# Patient Record
Sex: FEMALE | Race: WHITE | NOT HISPANIC OR LATINO
[De-identification: names, ages, dates, MRNs, and addresses within clinical notes are randomized per-mention and may not be internally consistent; named-entity substitution may affect disease eponyms.]

---

## 2020-08-25 PROBLEM — Z00.00 ENCOUNTER FOR PREVENTIVE HEALTH EXAMINATION: Status: ACTIVE | Noted: 2020-08-25

## 2020-09-01 ENCOUNTER — APPOINTMENT (OUTPATIENT)
Dept: ORTHOPEDIC SURGERY | Facility: CLINIC | Age: 55
End: 2020-09-01
Payer: COMMERCIAL

## 2020-09-01 VITALS
HEART RATE: 97 BPM | DIASTOLIC BLOOD PRESSURE: 84 MMHG | HEIGHT: 66 IN | WEIGHT: 250 LBS | TEMPERATURE: 97.8 F | SYSTOLIC BLOOD PRESSURE: 143 MMHG | BODY MASS INDEX: 40.18 KG/M2

## 2020-09-01 DIAGNOSIS — M16.12 UNILATERAL PRIMARY OSTEOARTHRITIS, LEFT HIP: ICD-10-CM

## 2020-09-01 PROCEDURE — 99204 OFFICE O/P NEW MOD 45 MIN: CPT

## 2020-09-01 PROCEDURE — 73502 X-RAY EXAM HIP UNI 2-3 VIEWS: CPT | Mod: LT

## 2020-09-08 ENCOUNTER — OUTPATIENT (OUTPATIENT)
Dept: OUTPATIENT SERVICES | Facility: HOSPITAL | Age: 55
LOS: 1 days | End: 2020-09-08
Payer: COMMERCIAL

## 2020-09-08 ENCOUNTER — APPOINTMENT (OUTPATIENT)
Dept: CT IMAGING | Facility: CLINIC | Age: 55
End: 2020-09-08
Payer: COMMERCIAL

## 2020-09-08 ENCOUNTER — APPOINTMENT (OUTPATIENT)
Dept: RADIOLOGY | Facility: CLINIC | Age: 55
End: 2020-09-08
Payer: COMMERCIAL

## 2020-09-08 ENCOUNTER — RESULT REVIEW (OUTPATIENT)
Age: 55
End: 2020-09-08

## 2020-09-08 DIAGNOSIS — Z00.00 ENCOUNTER FOR GENERAL ADULT MEDICAL EXAMINATION WITHOUT ABNORMAL FINDINGS: ICD-10-CM

## 2020-09-08 PROCEDURE — 73700 CT LOWER EXTREMITY W/O DYE: CPT | Mod: 26,LT

## 2020-09-08 PROCEDURE — 72170 X-RAY EXAM OF PELVIS: CPT

## 2020-09-08 PROCEDURE — 72170 X-RAY EXAM OF PELVIS: CPT | Mod: 26

## 2020-09-08 PROCEDURE — 73700 CT LOWER EXTREMITY W/O DYE: CPT

## 2020-09-08 PROCEDURE — 76376 3D RENDER W/INTRP POSTPROCES: CPT | Mod: 26

## 2020-09-08 PROCEDURE — 76376 3D RENDER W/INTRP POSTPROCES: CPT

## 2020-09-24 DIAGNOSIS — Z01.818 ENCOUNTER FOR OTHER PREPROCEDURAL EXAMINATION: ICD-10-CM

## 2020-10-14 ENCOUNTER — OUTPATIENT (OUTPATIENT)
Dept: OUTPATIENT SERVICES | Facility: HOSPITAL | Age: 55
LOS: 1 days | End: 2020-10-14
Payer: COMMERCIAL

## 2020-10-14 VITALS — HEIGHT: 67 IN | WEIGHT: 250 LBS

## 2020-10-14 DIAGNOSIS — Z01.818 ENCOUNTER FOR OTHER PREPROCEDURAL EXAMINATION: ICD-10-CM

## 2020-10-14 DIAGNOSIS — M16.12 UNILATERAL PRIMARY OSTEOARTHRITIS, LEFT HIP: ICD-10-CM

## 2020-10-14 DIAGNOSIS — Z90.49 ACQUIRED ABSENCE OF OTHER SPECIFIED PARTS OF DIGESTIVE TRACT: Chronic | ICD-10-CM

## 2020-10-14 DIAGNOSIS — Z98.890 OTHER SPECIFIED POSTPROCEDURAL STATES: Chronic | ICD-10-CM

## 2020-10-14 RX ORDER — MUPIROCIN 20 MG/G
1 OINTMENT TOPICAL
Qty: 1 | Refills: 0
Start: 2020-10-14 | End: 2020-10-18

## 2020-10-14 NOTE — H&P PST ADULT - NSICDXFAMILYHX_GEN_ALL_CORE_FT
FAMILY HISTORY:  Father  Still living? No  Family history of cardiac arrest, Age at diagnosis: Age Unknown  Family history of gangrene, Age at diagnosis: Age Unknown  FHx: anemia, Age at diagnosis: Age Unknown    Mother  Still living? Yes, Estimated age: 81-90  Family history of hypertension, Age at diagnosis: Age Unknown  Family history of type 2 diabetes mellitus, Age at diagnosis: Age Unknown

## 2020-10-14 NOTE — H&P PST ADULT - NSANTHOSAYNRD_GEN_A_CORE
No. MARKY screening performed.  STOP BANG Legend: 0-2 = LOW Risk; 3-4 = INTERMEDIATE Risk; 5-8 = HIGH Risk

## 2020-10-14 NOTE — H&P PST ADULT - NSICDXPASTMEDICALHX_GEN_ALL_CORE_FT
PAST MEDICAL HISTORY:  Hypertension     Osteoarthritis     Prediabetes      PAST MEDICAL HISTORY:  Hypertension     Obesity, Class II, BMI 35-39.9     Osteoarthritis     Prediabetes     Spider veins

## 2020-10-14 NOTE — H&P PST ADULT - PRESSURE ULCER(S)
23  [facility]  Pre-Operative Clearance Request    Patient Name:   Patsy Schilder             :   1955    Surgeon: Dr. Jocelyne King             Date of Surgery: 3/1/23    Surgical Procedure: L4, L5 POSTERIOR INSTRUMENTED FUSION, L1-L5 LAMINECTOMY. Please Complete: 2-3 WEEKS PRIOR TO SURGERY TO AVOID CANCELLATION OF SURGERY. [x]  History and Physical       [x]  Medical  Clearance                     Testing is ordered by Yobani KNOWLES per anesthesia guidelines                         **Please fax test results, H&P, and clearance to [phone] and to                                      P. A.T at [phone]** no

## 2020-10-14 NOTE — H&P PST ADULT - NSICDXPASTSURGICALHX_GEN_ALL_CORE_FT
PAST SURGICAL HISTORY:  History of appendectomy with right oophorecomy 2010    History of hip surgery pinning of left hip age 12 and hardware removed age 17

## 2020-10-14 NOTE — H&P PST ADULT - NSICDXPROBLEM_GEN_ALL_CORE_FT
PROBLEM DIAGNOSES  Problem: Primary osteoarthritis of left hip  Assessment and Plan: left anterior THR. medical clearance requested and PCP sent patient to cardiologist for evaluation. Stress test and echo to be done 10/15/20. EKG done by cardiology and blood work done at [facility] per Dr. James's office. Instructed to stop advil. mupirocin ointment 2% escribed and sent to patient's pharmacy and to be used twice a day for 5 consecutive days. Instructions reviewed and verbalized understanding. surgical wash and ERAS instructions reviewed and verbalized understanding. covid PCR aptt confirmed for 10/19/20 at 1000

## 2020-10-14 NOTE — H&P PST ADULT - SOURCE OF INFORMATION, PROFILE
patient/medical history patient/medical history obtained via telephone as per current covid19 protocol, physical exam to be done day of surgery

## 2020-10-14 NOTE — H&P PST ADULT - HISTORY OF PRESENT ILLNESS
56 yo female presents s/p fall at age 12 resulting in a left hip dislocation which required surgery and "pinning". She was non ambulatory for 3-4 years and was told at age 20 that she would require a hip replacement at that time. She now reports difficulty walking and is currently using a cane. She denies any prior injections into the hip. Pain at rest 0/10 and increased to 10/10 with prolonged activity and when standing from a sitting position. She takes 2 advil in the morning with good relief.

## 2020-10-16 DIAGNOSIS — Z11.59 ENCOUNTER FOR SCREENING FOR OTHER VIRAL DISEASES: ICD-10-CM

## 2020-10-16 PROCEDURE — G0463: CPT

## 2020-10-21 ENCOUNTER — APPOINTMENT (OUTPATIENT)
Dept: ORTHOPEDIC SURGERY | Facility: HOSPITAL | Age: 55
End: 2020-10-21

## 2020-11-09 DIAGNOSIS — M79.605 PAIN IN LEFT LEG: ICD-10-CM

## 2020-11-09 DIAGNOSIS — M79.609 PAIN IN UNSPECIFIED LIMB: ICD-10-CM

## 2020-11-09 PROBLEM — M19.90 UNSPECIFIED OSTEOARTHRITIS, UNSPECIFIED SITE: Chronic | Status: ACTIVE | Noted: 2020-10-14

## 2020-11-09 PROBLEM — I10 ESSENTIAL (PRIMARY) HYPERTENSION: Chronic | Status: ACTIVE | Noted: 2020-10-14

## 2020-11-09 PROBLEM — I78.1 NEVUS, NON-NEOPLASTIC: Chronic | Status: ACTIVE | Noted: 2020-10-14

## 2020-11-09 PROBLEM — E66.9 OBESITY, UNSPECIFIED: Chronic | Status: ACTIVE | Noted: 2020-10-14

## 2020-11-25 ENCOUNTER — OUTPATIENT (OUTPATIENT)
Dept: OUTPATIENT SERVICES | Facility: HOSPITAL | Age: 55
LOS: 1 days | End: 2020-11-25
Payer: COMMERCIAL

## 2020-11-25 VITALS — HEIGHT: 67 IN | WEIGHT: 227.08 LBS

## 2020-11-25 DIAGNOSIS — Z01.818 ENCOUNTER FOR OTHER PREPROCEDURAL EXAMINATION: ICD-10-CM

## 2020-11-25 DIAGNOSIS — Z90.49 ACQUIRED ABSENCE OF OTHER SPECIFIED PARTS OF DIGESTIVE TRACT: Chronic | ICD-10-CM

## 2020-11-25 DIAGNOSIS — M16.12 UNILATERAL PRIMARY OSTEOARTHRITIS, LEFT HIP: ICD-10-CM

## 2020-11-25 DIAGNOSIS — Z98.890 OTHER SPECIFIED POSTPROCEDURAL STATES: Chronic | ICD-10-CM

## 2020-11-25 RX ORDER — METFORMIN HYDROCHLORIDE 850 MG/1
1 TABLET ORAL
Qty: 0 | Refills: 0 | DISCHARGE

## 2020-11-25 NOTE — H&P PST ADULT - ASSESSMENT
54 yo female  with left hip pain  Planned surgery.- left total hip replacement 12/3/20  Will obtain medical clearance/cardiac clearance  Pre op instructions provided- medications mupirocin, wash  Instructions provided on medications to continue and to take the day morning of surgery

## 2020-11-25 NOTE — H&P PST ADULT - NSICDXPASTMEDICALHX_GEN_ALL_CORE_FT
PAST MEDICAL HISTORY:  Hypertension     Obesity, Class II, BMI 35-39.9     Osteoarthritis     Prediabetes     Spider veins

## 2020-11-25 NOTE — H&P PST ADULT - HISTORY OF PRESENT ILLNESS
54 yo female presents s/p fall at age 12 resulting in a left hip dislocation which required surgery and "pinning". She was non ambulatory for 3-4 years and was told at age 20 that she would require a hip replacement at that time. She now reports difficulty walking and is currently using a cane. She denies any prior injections into the hip. Pain at rest 0/10 and increased to 10/10 with prolonged activity and when standing from a sitting position. She takes 2 advil in the morning with good relief. Surgery postponed from 10/21 because of elevated Aic and is rescheduled for 12/3/20

## 2020-11-27 DIAGNOSIS — Z11.59 ENCOUNTER FOR SCREENING FOR OTHER VIRAL DISEASES: ICD-10-CM

## 2020-11-27 PROCEDURE — G0463: CPT

## 2020-12-03 ENCOUNTER — APPOINTMENT (OUTPATIENT)
Dept: ORTHOPEDIC SURGERY | Facility: HOSPITAL | Age: 55
End: 2020-12-03

## 2020-12-17 ENCOUNTER — OUTPATIENT (OUTPATIENT)
Dept: OUTPATIENT SERVICES | Facility: HOSPITAL | Age: 55
LOS: 1 days | End: 2020-12-17
Payer: COMMERCIAL

## 2020-12-17 VITALS
SYSTOLIC BLOOD PRESSURE: 125 MMHG | DIASTOLIC BLOOD PRESSURE: 66 MMHG | HEART RATE: 92 BPM | TEMPERATURE: 97 F | RESPIRATION RATE: 14 BRPM | HEIGHT: 67 IN | WEIGHT: 227.08 LBS | OXYGEN SATURATION: 98 %

## 2020-12-17 DIAGNOSIS — M16.12 UNILATERAL PRIMARY OSTEOARTHRITIS, LEFT HIP: ICD-10-CM

## 2020-12-17 DIAGNOSIS — Z01.818 ENCOUNTER FOR OTHER PREPROCEDURAL EXAMINATION: ICD-10-CM

## 2020-12-17 DIAGNOSIS — Z90.49 ACQUIRED ABSENCE OF OTHER SPECIFIED PARTS OF DIGESTIVE TRACT: Chronic | ICD-10-CM

## 2020-12-17 DIAGNOSIS — M25.552 PAIN IN LEFT HIP: ICD-10-CM

## 2020-12-17 DIAGNOSIS — Z98.890 OTHER SPECIFIED POSTPROCEDURAL STATES: Chronic | ICD-10-CM

## 2020-12-17 RX ORDER — GLUCAGON INJECTION, SOLUTION 0.5 MG/.1ML
1 INJECTION, SOLUTION SUBCUTANEOUS ONCE
Refills: 0 | Status: DISCONTINUED | OUTPATIENT
Start: 2020-12-28 | End: 2020-12-29

## 2020-12-17 RX ORDER — METFORMIN HYDROCHLORIDE 850 MG/1
2 TABLET ORAL
Qty: 0 | Refills: 0 | DISCHARGE

## 2020-12-17 RX ORDER — DEXTROSE 50 % IN WATER 50 %
15 SYRINGE (ML) INTRAVENOUS ONCE
Refills: 0 | Status: DISCONTINUED | OUTPATIENT
Start: 2020-12-28 | End: 2020-12-29

## 2020-12-17 RX ORDER — DEXTROSE 50 % IN WATER 50 %
25 SYRINGE (ML) INTRAVENOUS ONCE
Refills: 0 | Status: DISCONTINUED | OUTPATIENT
Start: 2020-12-28 | End: 2020-12-29

## 2020-12-17 RX ORDER — DEXTROSE 50 % IN WATER 50 %
12.5 SYRINGE (ML) INTRAVENOUS ONCE
Refills: 0 | Status: DISCONTINUED | OUTPATIENT
Start: 2020-12-28 | End: 2020-12-29

## 2020-12-17 NOTE — H&P PST ADULT - ASSESSMENT
this is a 54 y/o female who is scheduled for left hip replacement on 12/28/20 this is a 54 y/o female who is scheduled for left hip replacement on 12/28/20    Na 130 . repeated pending results     FS ; 124     HCG pending

## 2020-12-17 NOTE — H&P PST ADULT - HISTORY OF PRESENT ILLNESS
56 yo female presents s/p fall at age 12 resulting in a left hip dislocation which required surgery and "pinning". She was non ambulatory for 3-4 years and was told at age 20 that she would require a hip replacement at that time. She now reports difficulty walking and is currently using a cane. She denies any prior injections into the hip. Pain at rest 0/10 and increased to 10/10 with prolonged activity and when standing from a sitting position. She takes 2 advil in the morning with good relief. Surgery postponed from 10/21 because of elevated Aic and is rescheduled for 12/3/20; surgery again rescheduled again due to shingles

## 2020-12-17 NOTE — H&P PST ADULT - NSICDXPROBLEM_GEN_ALL_CORE_FT
PROBLEM DIAGNOSES  Problem: Left hip pain  Assessment and Plan: left total hip replacement, anterior approach; covid appt giiven, mupirocin info given-already has some; preop instructions reviewed; had repeat bloodwork and went back for medical clearance

## 2020-12-17 NOTE — H&P PST ADULT - NEUROLOGICAL
Please update me before 1 month to let me know how you're doing on the new cholesterol medication-Crestor   negative Alert & oriented; no sensory, motor or coordination deficits, normal reflexes

## 2020-12-17 NOTE — H&P PST ADULT - NSICDXPASTMEDICALHX_GEN_ALL_CORE_FT
PAST MEDICAL HISTORY:  Hypertension     Obesity, Class II, BMI 35-39.9     Osteoarthritis     Prediabetes now diabetes    [facility], 11/26/20-12/5/20    Spider veins

## 2020-12-18 ENCOUNTER — APPOINTMENT (OUTPATIENT)
Dept: ORTHOPEDIC SURGERY | Facility: CLINIC | Age: 55
End: 2020-12-18

## 2020-12-19 DIAGNOSIS — Z20.828 CONTACT WITH AND (SUSPECTED) EXPOSURE TO OTHER VIRAL COMMUNICABLE DISEASES: ICD-10-CM

## 2020-12-19 PROCEDURE — G0463: CPT

## 2020-12-21 PROBLEM — B02.9 ZOSTER WITHOUT COMPLICATIONS: Chronic | Status: ACTIVE | Noted: 2020-12-17

## 2020-12-21 PROBLEM — R73.03 PREDIABETES: Chronic | Status: ACTIVE | Noted: 2020-10-14

## 2020-12-26 ENCOUNTER — OUTPATIENT (OUTPATIENT)
Dept: OUTPATIENT SERVICES | Facility: HOSPITAL | Age: 55
LOS: 1 days | End: 2020-12-26
Payer: COMMERCIAL

## 2020-12-26 DIAGNOSIS — Z98.890 OTHER SPECIFIED POSTPROCEDURAL STATES: Chronic | ICD-10-CM

## 2020-12-26 DIAGNOSIS — Z90.49 ACQUIRED ABSENCE OF OTHER SPECIFIED PARTS OF DIGESTIVE TRACT: Chronic | ICD-10-CM

## 2020-12-26 DIAGNOSIS — Z20.828 CONTACT WITH AND (SUSPECTED) EXPOSURE TO OTHER VIRAL COMMUNICABLE DISEASES: ICD-10-CM

## 2020-12-26 LAB — SARS-COV-2 RNA SPEC QL NAA+PROBE: SIGNIFICANT CHANGE UP

## 2020-12-26 PROCEDURE — U0003: CPT

## 2020-12-28 ENCOUNTER — APPOINTMENT (OUTPATIENT)
Dept: ORTHOPEDIC SURGERY | Facility: HOSPITAL | Age: 55
End: 2020-12-28

## 2020-12-28 ENCOUNTER — TRANSCRIPTION ENCOUNTER (OUTPATIENT)
Age: 55
End: 2020-12-28

## 2020-12-28 ENCOUNTER — INPATIENT (INPATIENT)
Facility: HOSPITAL | Age: 55
LOS: 0 days | Discharge: HOME | DRG: 470 | End: 2020-12-29
Attending: ORTHOPAEDIC SURGERY | Admitting: ORTHOPAEDIC SURGERY
Payer: COMMERCIAL

## 2020-12-28 ENCOUNTER — RESULT REVIEW (OUTPATIENT)
Age: 55
End: 2020-12-28

## 2020-12-28 VITALS
DIASTOLIC BLOOD PRESSURE: 66 MMHG | WEIGHT: 226.19 LBS | RESPIRATION RATE: 23 BRPM | TEMPERATURE: 97 F | SYSTOLIC BLOOD PRESSURE: 125 MMHG | OXYGEN SATURATION: 98 % | HEART RATE: 92 BPM

## 2020-12-28 DIAGNOSIS — M16.12 UNILATERAL PRIMARY OSTEOARTHRITIS, LEFT HIP: ICD-10-CM

## 2020-12-28 DIAGNOSIS — Z90.49 ACQUIRED ABSENCE OF OTHER SPECIFIED PARTS OF DIGESTIVE TRACT: Chronic | ICD-10-CM

## 2020-12-28 DIAGNOSIS — Z98.890 OTHER SPECIFIED POSTPROCEDURAL STATES: Chronic | ICD-10-CM

## 2020-12-28 LAB
ANION GAP SERPL CALC-SCNC: 9 MMOL/L — SIGNIFICANT CHANGE UP (ref 5–17)
BUN SERPL-MCNC: 25 MG/DL — HIGH (ref 7–23)
CALCIUM SERPL-MCNC: 8.7 MG/DL — SIGNIFICANT CHANGE UP (ref 8.4–10.5)
CHLORIDE SERPL-SCNC: 101 MMOL/L — SIGNIFICANT CHANGE UP (ref 96–108)
CO2 SERPL-SCNC: 24 MMOL/L — SIGNIFICANT CHANGE UP (ref 22–31)
CREAT SERPL-MCNC: 1.16 MG/DL — SIGNIFICANT CHANGE UP (ref 0.5–1.3)
GLUCOSE BLDC GLUCOMTR-MCNC: 124 MG/DL — HIGH (ref 70–99)
GLUCOSE BLDC GLUCOMTR-MCNC: 174 MG/DL — HIGH (ref 70–99)
GLUCOSE BLDC GLUCOMTR-MCNC: 210 MG/DL — HIGH (ref 70–99)
GLUCOSE BLDC GLUCOMTR-MCNC: 297 MG/DL — HIGH (ref 70–99)
GLUCOSE SERPL-MCNC: 307 MG/DL — HIGH (ref 70–99)
HCG UR QL: NEGATIVE — SIGNIFICANT CHANGE UP
HCT VFR BLD CALC: 32.5 % — LOW (ref 34.5–45)
HGB BLD-MCNC: 11 G/DL — LOW (ref 11.5–15.5)
POTASSIUM SERPL-MCNC: 4.1 MMOL/L — SIGNIFICANT CHANGE UP (ref 3.5–5.3)
POTASSIUM SERPL-SCNC: 4.1 MMOL/L — SIGNIFICANT CHANGE UP (ref 3.5–5.3)
SODIUM SERPL-SCNC: 134 MMOL/L — LOW (ref 135–145)
SODIUM SERPL-SCNC: 136 MMOL/L — SIGNIFICANT CHANGE UP (ref 135–145)

## 2020-12-28 PROCEDURE — 99223 1ST HOSP IP/OBS HIGH 75: CPT

## 2020-12-28 PROCEDURE — 73501 X-RAY EXAM HIP UNI 1 VIEW: CPT | Mod: 26,LT

## 2020-12-28 PROCEDURE — 27130 TOTAL HIP ARTHROPLASTY: CPT | Mod: LT

## 2020-12-28 PROCEDURE — 88311 DECALCIFY TISSUE: CPT | Mod: 26

## 2020-12-28 PROCEDURE — 88305 TISSUE EXAM BY PATHOLOGIST: CPT | Mod: 26

## 2020-12-28 RX ORDER — CHLORHEXIDINE GLUCONATE 213 G/1000ML
1 SOLUTION TOPICAL ONCE
Refills: 0 | Status: COMPLETED | OUTPATIENT
Start: 2020-12-28 | End: 2020-12-28

## 2020-12-28 RX ORDER — SODIUM CHLORIDE 9 MG/ML
1000 INJECTION, SOLUTION INTRAVENOUS
Refills: 0 | Status: DISCONTINUED | OUTPATIENT
Start: 2020-12-28 | End: 2020-12-29

## 2020-12-28 RX ORDER — CELECOXIB 200 MG/1
200 CAPSULE ORAL EVERY 12 HOURS
Refills: 0 | Status: DISCONTINUED | OUTPATIENT
Start: 2020-12-28 | End: 2020-12-29

## 2020-12-28 RX ORDER — HYDROMORPHONE HYDROCHLORIDE 2 MG/ML
0.5 INJECTION INTRAMUSCULAR; INTRAVENOUS; SUBCUTANEOUS ONCE
Refills: 0 | Status: DISCONTINUED | OUTPATIENT
Start: 2020-12-28 | End: 2020-12-29

## 2020-12-28 RX ORDER — INSULIN LISPRO 100/ML
VIAL (ML) SUBCUTANEOUS
Refills: 0 | Status: DISCONTINUED | OUTPATIENT
Start: 2020-12-28 | End: 2020-12-29

## 2020-12-28 RX ORDER — ACETAMINOPHEN 500 MG
1000 TABLET ORAL EVERY 8 HOURS
Refills: 0 | Status: DISCONTINUED | OUTPATIENT
Start: 2020-12-29 | End: 2020-12-29

## 2020-12-28 RX ORDER — TRANEXAMIC ACID 100 MG/ML
1000 INJECTION, SOLUTION INTRAVENOUS ONCE
Refills: 0 | Status: COMPLETED | OUTPATIENT
Start: 2020-12-28 | End: 2020-12-28

## 2020-12-28 RX ORDER — ACETAMINOPHEN 500 MG
1000 TABLET ORAL EVERY 6 HOURS
Refills: 0 | Status: COMPLETED | OUTPATIENT
Start: 2020-12-28 | End: 2020-12-29

## 2020-12-28 RX ORDER — ASPIRIN/CALCIUM CARB/MAGNESIUM 324 MG
81 TABLET ORAL EVERY 12 HOURS
Refills: 0 | Status: DISCONTINUED | OUTPATIENT
Start: 2020-12-29 | End: 2020-12-29

## 2020-12-28 RX ORDER — SODIUM CHLORIDE 9 MG/ML
500 INJECTION INTRAMUSCULAR; INTRAVENOUS; SUBCUTANEOUS ONCE
Refills: 0 | Status: COMPLETED | OUTPATIENT
Start: 2020-12-28 | End: 2020-12-28

## 2020-12-28 RX ORDER — ONDANSETRON 8 MG/1
4 TABLET, FILM COATED ORAL EVERY 6 HOURS
Refills: 0 | Status: DISCONTINUED | OUTPATIENT
Start: 2020-12-28 | End: 2020-12-29

## 2020-12-28 RX ORDER — APREPITANT 80 MG/1
40 CAPSULE ORAL ONCE
Refills: 0 | Status: COMPLETED | OUTPATIENT
Start: 2020-12-28 | End: 2020-12-28

## 2020-12-28 RX ORDER — CEFAZOLIN SODIUM 1 G
2000 VIAL (EA) INJECTION ONCE
Refills: 0 | Status: COMPLETED | OUTPATIENT
Start: 2020-12-28 | End: 2020-12-28

## 2020-12-28 RX ORDER — ACETAMINOPHEN 500 MG
1000 TABLET ORAL ONCE
Refills: 0 | Status: COMPLETED | OUTPATIENT
Start: 2020-12-28 | End: 2020-12-28

## 2020-12-28 RX ORDER — SENNA PLUS 8.6 MG/1
2 TABLET ORAL AT BEDTIME
Refills: 0 | Status: DISCONTINUED | OUTPATIENT
Start: 2020-12-28 | End: 2020-12-29

## 2020-12-28 RX ORDER — OXYCODONE HYDROCHLORIDE 5 MG/1
5 TABLET ORAL
Refills: 0 | Status: DISCONTINUED | OUTPATIENT
Start: 2020-12-28 | End: 2020-12-29

## 2020-12-28 RX ORDER — PANTOPRAZOLE SODIUM 20 MG/1
40 TABLET, DELAYED RELEASE ORAL
Refills: 0 | Status: DISCONTINUED | OUTPATIENT
Start: 2020-12-28 | End: 2020-12-29

## 2020-12-28 RX ORDER — SODIUM CHLORIDE 9 MG/ML
1000 INJECTION, SOLUTION INTRAVENOUS
Refills: 0 | Status: DISCONTINUED | OUTPATIENT
Start: 2020-12-28 | End: 2020-12-28

## 2020-12-28 RX ORDER — POLYETHYLENE GLYCOL 3350 17 G/17G
17 POWDER, FOR SOLUTION ORAL AT BEDTIME
Refills: 0 | Status: DISCONTINUED | OUTPATIENT
Start: 2020-12-28 | End: 2020-12-29

## 2020-12-28 RX ORDER — LOSARTAN POTASSIUM 100 MG/1
100 TABLET, FILM COATED ORAL DAILY
Refills: 0 | Status: DISCONTINUED | OUTPATIENT
Start: 2020-12-30 | End: 2020-12-29

## 2020-12-28 RX ORDER — OMEPRAZOLE 10 MG/1
1 CAPSULE, DELAYED RELEASE ORAL
Qty: 30 | Refills: 1
Start: 2020-12-28 | End: 2021-02-25

## 2020-12-28 RX ORDER — HYDROMORPHONE HYDROCHLORIDE 2 MG/ML
0.5 INJECTION INTRAMUSCULAR; INTRAVENOUS; SUBCUTANEOUS
Refills: 0 | Status: DISCONTINUED | OUTPATIENT
Start: 2020-12-28 | End: 2020-12-28

## 2020-12-28 RX ORDER — ASPIRIN/CALCIUM CARB/MAGNESIUM 324 MG
81 TABLET ORAL EVERY 12 HOURS
Refills: 0 | Status: DISCONTINUED | OUTPATIENT
Start: 2020-12-28 | End: 2020-12-28

## 2020-12-28 RX ORDER — MAGNESIUM HYDROXIDE 400 MG/1
30 TABLET, CHEWABLE ORAL DAILY
Refills: 0 | Status: DISCONTINUED | OUTPATIENT
Start: 2020-12-28 | End: 2020-12-29

## 2020-12-28 RX ORDER — CELECOXIB 200 MG/1
1 CAPSULE ORAL
Qty: 60 | Refills: 0
Start: 2020-12-28 | End: 2021-01-26

## 2020-12-28 RX ORDER — ONDANSETRON 8 MG/1
4 TABLET, FILM COATED ORAL ONCE
Refills: 0 | Status: DISCONTINUED | OUTPATIENT
Start: 2020-12-28 | End: 2020-12-28

## 2020-12-28 RX ORDER — OXYCODONE HYDROCHLORIDE 5 MG/1
10 TABLET ORAL
Refills: 0 | Status: DISCONTINUED | OUTPATIENT
Start: 2020-12-28 | End: 2020-12-29

## 2020-12-28 RX ORDER — METFORMIN HYDROCHLORIDE 850 MG/1
1000 TABLET ORAL
Refills: 0 | Status: DISCONTINUED | OUTPATIENT
Start: 2020-12-29 | End: 2020-12-29

## 2020-12-28 RX ORDER — ASPIRIN/CALCIUM CARB/MAGNESIUM 324 MG
1 TABLET ORAL
Qty: 83 | Refills: 0
Start: 2020-12-28 | End: 2021-02-07

## 2020-12-28 RX ORDER — CEFAZOLIN SODIUM 1 G
2000 VIAL (EA) INJECTION EVERY 8 HOURS
Refills: 0 | Status: COMPLETED | OUTPATIENT
Start: 2020-12-28 | End: 2020-12-28

## 2020-12-28 RX ADMIN — POLYETHYLENE GLYCOL 3350 17 GRAM(S): 17 POWDER, FOR SOLUTION ORAL at 21:28

## 2020-12-28 RX ADMIN — CELECOXIB 200 MILLIGRAM(S): 200 CAPSULE ORAL at 21:28

## 2020-12-28 RX ADMIN — APREPITANT 40 MILLIGRAM(S): 80 CAPSULE ORAL at 06:56

## 2020-12-28 RX ADMIN — Medication 400 MILLIGRAM(S): at 14:10

## 2020-12-28 RX ADMIN — SODIUM CHLORIDE 500 MILLILITER(S): 9 INJECTION INTRAMUSCULAR; INTRAVENOUS; SUBCUTANEOUS at 16:44

## 2020-12-28 RX ADMIN — Medication 100 MILLIGRAM(S): at 23:52

## 2020-12-28 RX ADMIN — Medication 3: at 17:16

## 2020-12-28 RX ADMIN — SODIUM CHLORIDE 75 MILLILITER(S): 9 INJECTION, SOLUTION INTRAVENOUS at 10:57

## 2020-12-28 RX ADMIN — Medication 1000 MILLIGRAM(S): at 14:44

## 2020-12-28 RX ADMIN — Medication 100 MILLIGRAM(S): at 15:56

## 2020-12-28 RX ADMIN — Medication 400 MILLIGRAM(S): at 20:19

## 2020-12-28 RX ADMIN — Medication 2: at 13:09

## 2020-12-28 RX ADMIN — CELECOXIB 200 MILLIGRAM(S): 200 CAPSULE ORAL at 21:31

## 2020-12-28 RX ADMIN — SENNA PLUS 2 TABLET(S): 8.6 TABLET ORAL at 21:28

## 2020-12-28 RX ADMIN — CHLORHEXIDINE GLUCONATE 1 APPLICATION(S): 213 SOLUTION TOPICAL at 06:56

## 2020-12-28 RX ADMIN — SODIUM CHLORIDE 125 MILLILITER(S): 9 INJECTION, SOLUTION INTRAVENOUS at 13:11

## 2020-12-28 RX ADMIN — SODIUM CHLORIDE 500 MILLILITER(S): 9 INJECTION INTRAMUSCULAR; INTRAVENOUS; SUBCUTANEOUS at 10:30

## 2020-12-28 RX ADMIN — Medication 1000 MILLIGRAM(S): at 20:21

## 2020-12-28 NOTE — DIETITIAN INITIAL EVALUATION ADULT. - OTHER INFO
54 yo female presents s/p fall at age 12 resulting in a left hip dislocation which required surgery and "pinning". She was non ambulatory for 3-4 years and was told at age 20 that she would require a hip replacement at that time. She now reports difficulty walking and is currently using a cane. Pt admitted for L THR.    Pt seen for nutrition assessment as pt reported to diet technician she follows "no carb, no sugar" diet at home.  Pt reported when COVID19 pandemic first started, she was consuming excessive CHO portions.  States she went to her doctor a few months later and her A1c was 11%, reports she was motivated to lose weight and better manage new dx DM.  Pt states she was strict with her CHO intake (limited breads, rice, starchy veg) and only drank water or coffee.  Per dietary recall, it appears pt was eating in a caloric deficit for prolonged period of time.  Pt consuming complex CHO (non starchy vegetables, fruits), usually consumes 3x meals per day.  Endorses related weight loss (~30#) x 2-3 months (noted wt 250# 10/20, adm wt 226#, indicating 9.6% wt loss x 11 weeks.  Pt reports she self monitors blood glucose occasionally as usual values ~110-115mg/dl preprandially; states she takes metformin bid for management of DM.  Discussed importance of adequate CHO intake for overall health (energy, preserve LMB, blood glucose regulation), especially while recovering from surgery.  Reinforced importance of adequate protein; pt able to verbalize understanding and states she will be eating protein-rich foods and will not be as strict with carbohydrate intake, which was encouraged.  RD to follow up.  Diet tech to obtain meal preferences daily to optimize po intake.

## 2020-12-28 NOTE — DISCHARGE NOTE NURSING/CASE MANAGEMENT/SOCIAL WORK - CASE MANAGER'S NAME
RN/CM   / office [phone]  Your [facility] RN/ Mini Timmons can be reached at [phone] or main office # [phone]

## 2020-12-28 NOTE — DISCHARGE NOTE NURSING/CASE MANAGEMENT/SOCIAL WORK - NSSCNAMETXT_GEN_ALL_CORE
[facility] - [phone]  Nurse to visit the day after hospital discharge; physical therapist to follow. Please contact the home care agency at the above phone number if you have not heard from them by 12 noon on the day after your hospital discharge.

## 2020-12-28 NOTE — DISCHARGE NOTE PROVIDER - NSDCCPGOAL_GEN_ALL_CORE_FT
To get better and follow your care plan as instructed. Improve ambulation, ADLs and quality of life.

## 2020-12-28 NOTE — DISCHARGE NOTE PROVIDER - NSDCFUADDAPPT_GEN_ALL_CORE_FT
It is advisable to follow up with your primary care provider within the next 2-3 weeks to ensure your medications are appropriate and there are no underlying problems after your procedure.

## 2020-12-28 NOTE — OCCUPATIONAL THERAPY INITIAL EVALUATION ADULT - LIVES WITH, PROFILE
Pt lives with her spouse in a private home, 2 platform steps to enter. Pt's bedroom is on the 2nd floor (~13 steps up), however has guest bedroom that she will be staying in on the first floor. Pt has a 1/2 bath on the first floor, has a walk in shower on the 2nd floor. Pt was independent with ADLs, functional mobility/transfers using a cane prior to admission./spouse

## 2020-12-28 NOTE — DISCHARGE NOTE PROVIDER - HOSPITAL COURSE
This patient was admitted to [facility] with a history of severe degenerative joint disease of the left hip.  Patient underwent Pre-Surgical Testing and was medically cleared to undergo elective procedure. Patient underwent Left Anterior THR by Dr. Isidra James on 12/28/20. Procedure was well tolerated.  No operative or lisette-operative complications arose during patient's hospital course.  Patient received antibiotic according to SCIP guidelines for infection prevention.  Aspirin 81mg q 12h was given for DVT prophylaxis, in addition to the use of SCDs.  Anesthesia, Medical Hospitalist, Physical Therapy and Occupational Therapy were consulted. Patient is stable for discharge with a good prognosis.  Appropriate discharge instructions and medications are provided in this document.

## 2020-12-28 NOTE — DISCHARGE NOTE PROVIDER - NSDCFUSCHEDAPPT_GEN_ALL_CORE_FT
DONTAE DO ; 01/11/2021 ; [facility]  DONTAE DO ; 03/02/2021 ; [facility] [address]

## 2020-12-28 NOTE — DISCHARGE NOTE PROVIDER - NSDCCPCAREPLAN_GEN_ALL_CORE_FT
PRINCIPAL DISCHARGE DIAGNOSIS  Diagnosis: Primary osteoarthritis of left hip  Assessment and Plan of Treatment: You have had an Anterior Total Hip Replacement. Follow instructions given to you by your surgeon.   PT/OT Total Hip Protocol; Ambulation, transfers, stairs, & ADLs  Full weight bearing both legs; Walker/cane use as instructed by PT/OT  Anterior THR precautions for 4 weeks: No straight leg raise; No external rotation of hip when extended-standing or lying flat; No hyperextension of hip when standing (kickback)  • Ice 20 minutes several times daily with at least a 20 minute break in between icing sessions  You have a LUKE dressing. You may shower. Disconnect LUKE battery prior to showering. Reconnect battery after showering and press orange button to resume LUKE power. Remove LUKE dressing on post-op day #7.  Keep incision clean. DO NOT APPLY ANYTHING to incision site (salves/ointments/creams). Do not scrub incision site. Pat dry after shower.  Prineo removal 2 weeks after surgery at Surgeon's office.

## 2020-12-28 NOTE — DISCHARGE NOTE PROVIDER - NSDCFUADDINST_GEN_ALL_CORE_FT

## 2020-12-28 NOTE — CONSULT NOTE ADULT - ASSESSMENT
Afetrcare following left THR 12/28    pain meds oxycodone and dilaudid. Monitor for respiratory depression and lethargy.  PT/OT.  DVT prophylaxis.  DVT ppx: [ x]ASA81 [ ] STH525 [ ] Lovenox [ ] Coumadin   [ ] Eliquis [  ] Heparin  Dispo:     Home [ ]     Acute Rehab [ ]     ALVARO [ ]     TBD [x ]    HTN  losartan with parameter.    Obesity BMI 35.4    DM2  sliding scale.  continue metformin.       Afetrcare following left THR 12/28    pain meds oxycodone and dilaudid. Monitor for respiratory depression and lethargy.  PT/OT.  DVT prophylaxis.  DVT ppx: [ x]ASA81 [ ] UBL325 [ ] Lovenox [ ] Coumadin   [ ] Eliquis [  ] Heparin  Dispo:     Home [ ]     Acute Rehab [ ]     ALVARO [ ]     TBD [x ]    HTN  losartan with parameter.    Obesity BMI 35.4    DM2  sliding scale.  continue metformin.    Plan of care was discuss with patient, all questions were answered, seems understand and in agreement.

## 2020-12-28 NOTE — PHYSICAL THERAPY INITIAL EVALUATION ADULT - ADDITIONAL COMMENTS
pvt home with 1 platform MALLY, then plans to stay on main floor guest bedroom with bathroom. Her bedroom is upstairs 13 steps w/ HR. Pt drives. Pt has RW, commode, cane. Pt's spouse will be home to ast pt upon d/c home.

## 2020-12-28 NOTE — PHYSICAL THERAPY INITIAL EVALUATION ADULT - RANGE OF MOTION EXAMINATION, REHAB EVAL
left hip not tested due to surgery/bilateral upper extremity ROM was WNL (within normal limits)/Right LE ROM was WNL (within normal limits)

## 2020-12-28 NOTE — PHYSICAL THERAPY INITIAL EVALUATION ADULT - CRITERIA FOR SKILLED THERAPEUTIC INTERVENTIONS
HCPT, home w/ ast, RW, commode/impairments found/anticipated equipment needs at discharge/anticipated discharge recommendation

## 2020-12-28 NOTE — DISCHARGE NOTE PROVIDER - NSDCCPTREATMENT_GEN_ALL_CORE_FT
PRINCIPAL PROCEDURE  Procedure: Total replacement of left hip joint by anterior approach  Findings and Treatment: severe DJD left hip

## 2020-12-28 NOTE — ASU PREOP CHECKLIST - PATIENT'S PERSONAL PROPERTY GIVEN TO
on unit [4 x 4] : 4 x 4  [Abdominal Pad] : Abdominal Pad [2+] : left 2+ [Ankle Swelling (On Exam)] : present [Varicose Veins Of Lower Extremities] : present [Varicose Veins Of The Left Leg] : of the left leg [] : of the left leg [Ankle Swelling On The Left] : moderate [Skin Ulcer] : ulcer [JVD] : no jugular venous distention  [de-identified] : A&Ox3, NAD [de-identified] : 4/5 strength in all quadrants [de-identified] : left lateral lower leg venous stasis ulceration down to skin and subcutaneous tissue and fat with stasis dermatitis [de-identified] : Dressing and Coban applied by DPM this visit\par circ neurovascular function WNL post Coban application\par EpiFix 3.5cm x 3.5cm Mesh\par   ITEM#: [id number]\par    EXP: 09/01/2025\par Tissue ID: [id number]\par    100% used 0% discarded\par \par \par  [FreeTextEntry1] : Lower Leg [FreeTextEntry2] : 4.3 [FreeTextEntry3] : 1.8 [FreeTextEntry4] : 0.2 [de-identified] : serosanguineous  [de-identified] : intact [de-identified] : <25% [de-identified] : Epifix- 3.5cm x 3.5cm Mesh [de-identified] : Coban [de-identified] : Adaptic Touch and Calcium Alginate [de-identified] : Cleansed with NS\par Lot# [id number]\par EXP: 03/01/2023\par \par EpiFix 3.5cm x 3.5cm\par   ITEM#: [id number]\par    EXP: 09/01/2025\par Tissue ID: [id number]\par    100% used 0% discarded [TWNoteComboBox1] : Left [TWNoteComboBox4] : Small [TWNoteComboBox5] : No [TWNoteComboBox6] : Venous [de-identified] : No [de-identified] : Normal [de-identified] : None [de-identified] : None [de-identified] : >75% [de-identified] : Yes [de-identified] : Application of skin substitute [de-identified] : Multilayer other compression wrap [de-identified] : Weekly [de-identified] : Primary Dressing

## 2020-12-28 NOTE — OCCUPATIONAL THERAPY INITIAL EVALUATION ADULT - ANTICIPATED DISCHARGE DISPOSITION, OT EVAL
Recommend supervision/assist for functional activities prn which pt states her  will provide./no needs

## 2020-12-28 NOTE — DISCHARGE NOTE PROVIDER - NSDCMRMEDTOKEN_GEN_ALL_CORE_FT
3:1 Commode: Dx: s/p Left Anterior THR  Advil 200 mg oral tablet: 2 tab(s) orally once a day  aspirin 81 mg oral delayed release tablet: 1 tab orally every 12 hours. Take 2 hours before Celebrex.   celecoxib 200 mg oral capsule: 1 cap orally every 12 hours. Take 2 hours after Aspirin.  losartan-hydrochlorothiazide 100 mg-25 mg oral tablet: 1 tab(s) orally once a day (at bedtime)  metFORMIN 1000 mg oral tablet: 1 tab(s) orally 2 times a day  mupirocin 2% topical ointment: 1 application in each nostril 2 times a day   omeprazole 20 mg oral delayed release capsule: 1 cap orally once a day   Rolling Walker with 5 inch wheels: Dx: s/p  Left Anterior THR   3:1 Commode: Dx: s/p Left Anterior THR  acetaminophen 500 mg oral tablet: 2 tab(s) orally every 8 hours  aspirin 81 mg oral delayed release tablet: 1 tab orally every 12 hours. Take 2 hours before Celebrex.   celecoxib 200 mg oral capsule: 1 cap orally every 12 hours. Take 2 hours after Aspirin.  losartan-hydrochlorothiazide 100mg-12.5mg oral tablet: 1 tab(s) orally once a day  metFORMIN 1000 mg oral tablet: 1 tab(s) orally 2 times a day  omeprazole 20 mg oral delayed release capsule: 1 cap orally once a day   oxyCODONE 5 mg oral tablet: 1-2 tab(s) orally every 4 hours, As Needed -Moderate-Severe Pain   Reference #: [id number] MDD:6  polyethylene glycol 3350 oral powder for reconstitution: 17 gram(s) orally once a day (at bedtime), As Needed - for constipation  Rolling Walker with 5 inch wheels: Dx: s/p  Left Anterior THR  senna oral tablet: 2 tab(s) orally once a day (at bedtime), As Needed - for constipation

## 2020-12-28 NOTE — DISCHARGE NOTE PROVIDER - PROVIDER TOKENS
PROVIDER:[TOKEN:[[id number]],SCHEDULEDAPPT:[01/11/2021],SCHEDULEDAPPTTIME:[10:40 AM],ESTABLISHEDPATIENT:[T]]

## 2020-12-28 NOTE — DISCHARGE NOTE PROVIDER - CARE PROVIDER_API CALL
Isidra James)  Orthopedics  [address], [address]  [place] [address]  Phone: [phone]  Fax: [phone]  Established Patient  Scheduled Appointment: 01/11/2021 10:40 AM

## 2020-12-28 NOTE — DISCHARGE NOTE NURSING/CASE MANAGEMENT/SOCIAL WORK - PATIENT PORTAL LINK FT
You can access the FollowMyHealth Patient Portal offered by [facility] by registering at the following website: [email or web address]. By joining [employer]’s FollowMyHealth portal, you will also be able to view your health information using other applications (apps) compatible with our system.

## 2020-12-28 NOTE — CONSULT NOTE ADULT - SUBJECTIVE AND OBJECTIVE BOX
Patient is a 55y old  Female who presents with a chief complaint of Left Anterior THR (28 Dec 2020 11:50)  56 yo female presents s/p fall at age 12 resulting in a left hip dislocation which required surgery and "pinning". She was non ambulatory for 3-4 years and was told at age 20 that she would require a hip replacement at that time. She now reports difficulty walking and is currently using a cane. She denies any prior injections into the hip. Pain at rest 0/10 and increased to 10/10 with prolonged activity and when standing from a sitting position. She takes 2 advil in the morning with good relief. Surgery postponed from 10/21 because of elevated Aic and is rescheduled for 12/3/20; surgery again rescheduled again due to shingles.    HPI:  Patient is seen and examined.      PAST MEDICAL & SURGICAL HISTORY:  Shingles  midback area, 11/26/20-12/5/20    Obesity, Class II, BMI 35-39.9    Spider veins    Osteoarthritis    Hypertension    Prediabetes  now diabetes    History of hip surgery  pinning of left hip age 12 and hardware removed age 17    History of appendectomy  with right oophorecomy 2010    MEDICATIONS  (STANDING):  acetaminophen  IVPB .. 1000 milliGRAM(s) IV Intermittent once  acetaminophen  IVPB .. 1000 milliGRAM(s) IV Intermittent every 6 hours  aspirin enteric coated 81 milliGRAM(s) Oral every 12 hours  ceFAZolin   IVPB 2000 milliGRAM(s) IV Intermittent every 8 hours  celecoxib 200 milliGRAM(s) Oral every 12 hours  dextrose 40% Gel 15 Gram(s) Oral once  dextrose 5%. 1000 milliLiter(s) (50 mL/Hr) IV Continuous <Continuous>  dextrose 5%. 1000 milliLiter(s) (100 mL/Hr) IV Continuous <Continuous>  dextrose 50% Injectable 25 Gram(s) IV Push once  dextrose 50% Injectable 12.5 Gram(s) IV Push once  dextrose 50% Injectable 25 Gram(s) IV Push once  glucagon  Injectable 1 milliGRAM(s) IntraMuscular once  HYDROmorphone  Injectable 0.5 milliGRAM(s) IV Push once  insulin lispro (ADMELOG) corrective regimen sliding scale   SubCutaneous three times a day before meals  lactated ringers. 1000 milliLiter(s) (125 mL/Hr) IV Continuous <Continuous>  pantoprazole    Tablet 40 milliGRAM(s) Oral before breakfast  polyethylene glycol 3350 17 Gram(s) Oral at bedtime  senna 2 Tablet(s) Oral at bedtime    MEDICATIONS  (PRN):  magnesium hydroxide Suspension 30 milliLiter(s) Oral daily PRN Constipation  ondansetron Injectable 4 milliGRAM(s) IV Push every 6 hours PRN Nausea and/or Vomiting  oxyCODONE    IR 5 milliGRAM(s) Oral every 3 hours PRN Moderate Pain (4 - 6)  oxyCODONE    IR 10 milliGRAM(s) Oral every 3 hours PRN Severe Pain (7 - 10)      Allergies    No Known Allergies    Intolerances    SOCIAL HISTORY:  Smoker:  YES / NO        PACK YEARS:                         WHEN QUIT?  ETOH use:  YES / NO               FREQUENCY / QUANTITY:  Ilicit Drug use:  YES / NO  Occupation:  Assisted device use (Cane / Walker):  Live with:      FAMILY HISTORY:  Family history of gangrene (Father)  after wound to the foot    Family history of cardiac arrest (Father)    FHx: anemia (Father)    Family history of hypertension (Mother)    Family history of type 2 diabetes mellitus (Mother)        Vital Signs Last 24 Hrs  T(C): 36.6 (28 Dec 2020 12:28), Max: 36.9 (28 Dec 2020 10:00)  T(F): 97.8 (28 Dec 2020 12:28), Max: 98.4 (28 Dec 2020 10:00)  HR: 93 (28 Dec 2020 13:28) (65 - 96)  BP: 113/75 (28 Dec 2020 13:28) (101/52 - 125/66)  BP(mean): --  RR: 16 (28 Dec 2020 12:28) (15 - 23)  SpO2: 95% (28 Dec 2020 13:28) (95% - 100%)            LABS:    12-28    136  |  x   |  x   ----------------------------<  x   x    |  x   |  x             CAPILLARY BLOOD GLUCOSE      POCT Blood Glucose.: 210 mg/dL (28 Dec 2020 12:20)      RADIOLOGY & ADDITIONAL STUDIES: Patient is a 55y old  Female who presents with a chief complaint of Left Anterior THR (28 Dec 2020 11:50)  56 yo female presents s/p fall at age 12 resulting in a left hip dislocation which required surgery and "pinning". She was non ambulatory for 3-4 years and was told at age 20 that she would require a hip replacement at that time. She now reports difficulty walking and is currently using a cane. She denies any prior injections into the hip. Pain at rest 0/10 and increased to 10/10 with prolonged activity and when standing from a sitting position. She takes 2 advil in the morning with good relief. Surgery postponed from 10/21 because of elevated Aic and is rescheduled for 12/3/20; surgery again rescheduled again due to shingles.    HPI:  Patient is seen and examined.  Pain is controlled.      PAST MEDICAL & SURGICAL HISTORY:  Shingles  midback area, 11/26/20-12/5/20    Obesity, Class II, BMI 35-39.9    Spider veins    Osteoarthritis    Hypertension    Prediabetes  now diabetes    History of hip surgery  pinning of left hip age 12 and hardware removed age 17    History of appendectomy  with right oophorecomy 2010    MEDICATIONS  (STANDING):  acetaminophen  IVPB .. 1000 milliGRAM(s) IV Intermittent once  acetaminophen  IVPB .. 1000 milliGRAM(s) IV Intermittent every 6 hours  aspirin enteric coated 81 milliGRAM(s) Oral every 12 hours  ceFAZolin   IVPB 2000 milliGRAM(s) IV Intermittent every 8 hours  celecoxib 200 milliGRAM(s) Oral every 12 hours  dextrose 40% Gel 15 Gram(s) Oral once  dextrose 5%. 1000 milliLiter(s) (50 mL/Hr) IV Continuous <Continuous>  dextrose 5%. 1000 milliLiter(s) (100 mL/Hr) IV Continuous <Continuous>  dextrose 50% Injectable 25 Gram(s) IV Push once  dextrose 50% Injectable 12.5 Gram(s) IV Push once  dextrose 50% Injectable 25 Gram(s) IV Push once  glucagon  Injectable 1 milliGRAM(s) IntraMuscular once  HYDROmorphone  Injectable 0.5 milliGRAM(s) IV Push once  insulin lispro (ADMELOG) corrective regimen sliding scale   SubCutaneous three times a day before meals  lactated ringers. 1000 milliLiter(s) (125 mL/Hr) IV Continuous <Continuous>  pantoprazole    Tablet 40 milliGRAM(s) Oral before breakfast  polyethylene glycol 3350 17 Gram(s) Oral at bedtime  senna 2 Tablet(s) Oral at bedtime    MEDICATIONS  (PRN):  magnesium hydroxide Suspension 30 milliLiter(s) Oral daily PRN Constipation  ondansetron Injectable 4 milliGRAM(s) IV Push every 6 hours PRN Nausea and/or Vomiting  oxyCODONE    IR 5 milliGRAM(s) Oral every 3 hours PRN Moderate Pain (4 - 6)  oxyCODONE    IR 10 milliGRAM(s) Oral every 3 hours PRN Severe Pain (7 - 10)      Allergies    No Known Allergies    Intolerances    SOCIAL HISTORY:  Smoker: / NO        PACK YEARS:                         WHEN QUIT?  ETOH use:  NO               FREQUENCY / QUANTITY:  Ilicit Drug use:   NO  FAMILY HISTORY:  Family history of gangrene (Father)  after wound to the foot    Family history of cardiac arrest (Father)    FHx: anemia (Father)    Family history of hypertension (Mother)    Family history of type 2 diabetes mellitus (Mother)        Vital Signs Last 24 Hrs  T(C): 36.6 (28 Dec 2020 12:28), Max: 36.9 (28 Dec 2020 10:00)  T(F): 97.8 (28 Dec 2020 12:28), Max: 98.4 (28 Dec 2020 10:00)  HR: 93 (28 Dec 2020 13:28) (65 - 96)  BP: 113/75 (28 Dec 2020 13:28) (101/52 - 125/66)  BP(mean): --  RR: 16 (28 Dec 2020 12:28) (15 - 23)  SpO2: 95% (28 Dec 2020 13:28) (95% - 100%)            LABS:    12-28    136  |  x   |  x   ----------------------------<  x   x    |  x   |  x             CAPILLARY BLOOD GLUCOSE      POCT Blood Glucose.: 210 mg/dL (28 Dec 2020 12:20)      RADIOLOGY & ADDITIONAL STUDIES:

## 2020-12-29 VITALS
OXYGEN SATURATION: 97 % | HEART RATE: 73 BPM | RESPIRATION RATE: 18 BRPM | TEMPERATURE: 98 F | SYSTOLIC BLOOD PRESSURE: 102 MMHG | DIASTOLIC BLOOD PRESSURE: 63 MMHG

## 2020-12-29 LAB
ANION GAP SERPL CALC-SCNC: 9 MMOL/L — SIGNIFICANT CHANGE UP (ref 5–17)
BUN SERPL-MCNC: 17 MG/DL — SIGNIFICANT CHANGE UP (ref 7–23)
CALCIUM SERPL-MCNC: 8.6 MG/DL — SIGNIFICANT CHANGE UP (ref 8.4–10.5)
CHLORIDE SERPL-SCNC: 103 MMOL/L — SIGNIFICANT CHANGE UP (ref 96–108)
CO2 SERPL-SCNC: 25 MMOL/L — SIGNIFICANT CHANGE UP (ref 22–31)
CREAT SERPL-MCNC: 0.83 MG/DL — SIGNIFICANT CHANGE UP (ref 0.5–1.3)
GLUCOSE BLDC GLUCOMTR-MCNC: 131 MG/DL — HIGH (ref 70–99)
GLUCOSE BLDC GLUCOMTR-MCNC: 165 MG/DL — HIGH (ref 70–99)
GLUCOSE SERPL-MCNC: 146 MG/DL — HIGH (ref 70–99)
HCT VFR BLD CALC: 27.1 % — LOW (ref 34.5–45)
HGB BLD-MCNC: 9.2 G/DL — LOW (ref 11.5–15.5)
MCHC RBC-ENTMCNC: 30.4 PG — SIGNIFICANT CHANGE UP (ref 27–34)
MCHC RBC-ENTMCNC: 33.9 GM/DL — SIGNIFICANT CHANGE UP (ref 32–36)
MCV RBC AUTO: 89.4 FL — SIGNIFICANT CHANGE UP (ref 80–100)
NRBC # BLD: 0 /100 WBCS — SIGNIFICANT CHANGE UP (ref 0–0)
PLATELET # BLD AUTO: 197 K/UL — SIGNIFICANT CHANGE UP (ref 150–400)
POTASSIUM SERPL-MCNC: 3.5 MMOL/L — SIGNIFICANT CHANGE UP (ref 3.5–5.3)
POTASSIUM SERPL-SCNC: 3.5 MMOL/L — SIGNIFICANT CHANGE UP (ref 3.5–5.3)
RBC # BLD: 3.03 M/UL — LOW (ref 3.8–5.2)
RBC # FLD: 14.7 % — HIGH (ref 10.3–14.5)
SODIUM SERPL-SCNC: 137 MMOL/L — SIGNIFICANT CHANGE UP (ref 135–145)
WBC # BLD: 11.22 K/UL — HIGH (ref 3.8–10.5)
WBC # FLD AUTO: 11.22 K/UL — HIGH (ref 3.8–10.5)

## 2020-12-29 PROCEDURE — 88311 DECALCIFY TISSUE: CPT

## 2020-12-29 PROCEDURE — 81025 URINE PREGNANCY TEST: CPT

## 2020-12-29 PROCEDURE — 94664 DEMO&/EVAL PT USE INHALER: CPT

## 2020-12-29 PROCEDURE — 97110 THERAPEUTIC EXERCISES: CPT

## 2020-12-29 PROCEDURE — C1776: CPT

## 2020-12-29 PROCEDURE — 97116 GAIT TRAINING THERAPY: CPT

## 2020-12-29 PROCEDURE — 36415 COLL VENOUS BLD VENIPUNCTURE: CPT

## 2020-12-29 PROCEDURE — 97161 PT EVAL LOW COMPLEX 20 MIN: CPT

## 2020-12-29 PROCEDURE — 86901 BLOOD TYPING SEROLOGIC RH(D): CPT

## 2020-12-29 PROCEDURE — 86900 BLOOD TYPING SEROLOGIC ABO: CPT

## 2020-12-29 PROCEDURE — 99233 SBSQ HOSP IP/OBS HIGH 50: CPT

## 2020-12-29 PROCEDURE — 82962 GLUCOSE BLOOD TEST: CPT

## 2020-12-29 PROCEDURE — 97530 THERAPEUTIC ACTIVITIES: CPT

## 2020-12-29 PROCEDURE — 97165 OT EVAL LOW COMPLEX 30 MIN: CPT

## 2020-12-29 PROCEDURE — 85014 HEMATOCRIT: CPT

## 2020-12-29 PROCEDURE — 76000 FLUOROSCOPY <1 HR PHYS/QHP: CPT

## 2020-12-29 PROCEDURE — 85027 COMPLETE CBC AUTOMATED: CPT

## 2020-12-29 PROCEDURE — 97535 SELF CARE MNGMENT TRAINING: CPT

## 2020-12-29 PROCEDURE — 88305 TISSUE EXAM BY PATHOLOGIST: CPT

## 2020-12-29 PROCEDURE — 85018 HEMOGLOBIN: CPT

## 2020-12-29 PROCEDURE — 80048 BASIC METABOLIC PNL TOTAL CA: CPT

## 2020-12-29 PROCEDURE — 86850 RBC ANTIBODY SCREEN: CPT

## 2020-12-29 PROCEDURE — 73501 X-RAY EXAM HIP UNI 1 VIEW: CPT

## 2020-12-29 PROCEDURE — C1713: CPT

## 2020-12-29 PROCEDURE — 84295 ASSAY OF SERUM SODIUM: CPT

## 2020-12-29 RX ORDER — OXYCODONE HYDROCHLORIDE 5 MG/1
1 TABLET ORAL
Qty: 42 | Refills: 0
Start: 2020-12-29 | End: 2021-01-04

## 2020-12-29 RX ORDER — IBUPROFEN 200 MG
2 TABLET ORAL
Qty: 0 | Refills: 0 | DISCHARGE

## 2020-12-29 RX ORDER — SENNA PLUS 8.6 MG/1
2 TABLET ORAL
Qty: 0 | Refills: 0 | DISCHARGE
Start: 2020-12-29

## 2020-12-29 RX ORDER — LOSARTAN/HYDROCHLOROTHIAZIDE 100MG-25MG
1 TABLET ORAL
Qty: 0 | Refills: 0 | DISCHARGE

## 2020-12-29 RX ORDER — ACETAMINOPHEN 500 MG
2 TABLET ORAL
Qty: 0 | Refills: 0 | DISCHARGE
Start: 2020-12-29

## 2020-12-29 RX ORDER — POLYETHYLENE GLYCOL 3350 17 G/17G
17 POWDER, FOR SOLUTION ORAL
Qty: 0 | Refills: 0 | DISCHARGE
Start: 2020-12-29

## 2020-12-29 RX ADMIN — Medication 1000 MILLIGRAM(S): at 02:30

## 2020-12-29 RX ADMIN — Medication 1: at 12:46

## 2020-12-29 RX ADMIN — SODIUM CHLORIDE 125 MILLILITER(S): 9 INJECTION, SOLUTION INTRAVENOUS at 01:34

## 2020-12-29 RX ADMIN — Medication 1000 MILLIGRAM(S): at 08:45

## 2020-12-29 RX ADMIN — Medication 400 MILLIGRAM(S): at 02:05

## 2020-12-29 RX ADMIN — Medication 81 MILLIGRAM(S): at 05:34

## 2020-12-29 RX ADMIN — CELECOXIB 200 MILLIGRAM(S): 200 CAPSULE ORAL at 08:45

## 2020-12-29 RX ADMIN — PANTOPRAZOLE SODIUM 40 MILLIGRAM(S): 20 TABLET, DELAYED RELEASE ORAL at 05:35

## 2020-12-29 RX ADMIN — OXYCODONE HYDROCHLORIDE 5 MILLIGRAM(S): 5 TABLET ORAL at 10:02

## 2020-12-29 RX ADMIN — OXYCODONE HYDROCHLORIDE 5 MILLIGRAM(S): 5 TABLET ORAL at 10:32

## 2020-12-29 NOTE — PROGRESS NOTE ADULT - ASSESSMENT
Afetrcare following left THR 12/28    pain meds oxycodone and dilaudid. Monitor for respiratory depression and lethargy.  PT/OT.  DVT prophylaxis.  DVT ppx: [ x]ASA81 [ ] PBS785 [ ] Lovenox [ ] Coumadin   [ ] Eliquis [  ] Heparin  Dispo:     Home [ ]     Acute Rehab [ ]     [facility]VARO [ ]     TBD [x ]    HTN  losartan with parameter.    Obesity BMI 35.4    DM2  sliding scale.  continue metformin.    Post op anemia due to acute blood loss/leucocytosis likly reactive  trned cbc prn.    Plan of care was discuss with patient, all questions were answered, seems understand and in agreement.

## 2020-12-29 NOTE — PROGRESS NOTE ADULT - SUBJECTIVE AND OBJECTIVE BOX
Post Op Day #1    SUBJECTIVE    54yo Female status post left ant THR .   Patient is alert and comfortable.    Pain is controlled with current pain regimen.  Denies nausea, vomiting, chest pain, shortness of breath, abdominal pain or fever.   No new complaints.    OBJECTIVE    Vital Signs Last 24 Hrs  T(C): 36.3 (29 Dec 2020 07:55), Max: 37.1 (28 Dec 2020 19:19)  T(F): 97.4 (29 Dec 2020 07:55), Max: 98.8 (28 Dec 2020 19:19)  HR: 60 (29 Dec 2020 07:55) (55 - 96)  BP: 114/67 (29 Dec 2020 07:55) (100/60 - 114/67)  BP(mean): --  RR: 16 (29 Dec 2020 07:55) (15 - 18)  SpO2: 99% (29 Dec 2020 07:55) (95% - 100%)  I&O's Summary    28 Dec 2020 07:01  -  29 Dec 2020 07:00  --------------------------------------------------------  IN: 3890 mL / OUT: 2000 mL / NET: 1890 mL        PHYSICAL EXAM    Left hip LUKE dressing is clean, dry and intact.   The calf is supple/nontender.   Sensation to light touch is grossly intact distally.   Motor function distally is intact.   No foot drop.   (2+) dorsalis pedis pulse. Capillary refill is less than 2 seconds. No cyanosis.                          9.2<L>  11.22<H> )-----------( 197      ( 29 Dec 2020 06:50 )             27.1<L>  29 Dec 2020 06:50                        11.0<L>  x     )-----------( x        ( 28 Dec 2020 16:15 )             32.5<L>  28 Dec 2020 16:15    29 Dec 2020 06:50    137    |  103    |  17     ----------------------------<  146<H>  3.5     |  25     |  0.83   28 Dec 2020 16:15    134<L>  |  101    |  25<H>  ----------------------------<  307<H>  4.1     |  24     |  1.16     Ca    8.6        29 Dec 2020 06:50  Ca    8.7        28 Dec 2020 16:15        ASSESSMENT AND PLAN    - Orthopedically stable  - DVT prophylaxis: PAS + Ecotrin 81mg twice daily  - HO Prophylaxis: Celebrex 200mg PO twice daily x21 days  - Continue physical therapy and occupational therapy  - Weight bearing as tolerated of the left lower extremity with assistance of a walker  - Incentive spirometry encouraged  - Pain control as clinically indicated  - Disposition: Home when cleared by medicine, PT, and OT     
Discharge medication calendar:  Aspirin EC 81mg q12h x 6 weeks  APAP 1000mg q8h x 2-3 weeks  Celecoxib 200mg q12h x 21 days  Omeprazole 20mg QAM x 6 weeks  Narcotic PRN  Docusate 100mg TID while taking narcotic  Miralax, Senna, or Bisacodyl PRN for treatment of constipation  
Orthopaedic Post Op Note    Procedure: Left Anterior THR  Surgeon: Isidra James    55y Female comfortable, without complaints. In good spirits. Ambulated with PT.  Tolerating diet. Reported pain score = 0  Denies N/V, CP, SOB, numbness/tingling of extremities.    PE:  Vital Signs Last 24 Hrs  T(C): 36.6 (28 Dec 2020 12:28), Max: 36.9 (28 Dec 2020 10:00)  T(F): 97.8 (28 Dec 2020 12:28), Max: 98.4 (28 Dec 2020 10:00)  HR: 93 (28 Dec 2020 13:28) (65 - 96)  BP: 113/75 (28 Dec 2020 13:28) (101/52 - 125/66)  RR: 16 (28 Dec 2020 12:28) (15 - 23)  SpO2: 95% (28 Dec 2020 13:28) (95% - 100%)  General: Pt alert and oriented   Lungs: + BS CTA bilaterally  Heart: +S1 & S2 heard, RRR  Abd: + BS heard, soft, NT, ND  Left Hip LUKE Dressing: C/D/I and functioning (green light on)  Bilateral LEs:  Motor:   5/5 dorsiflexion, plantarflexion, EHL  Sensation intact to LT  2+ DP Pulses  SCDs in place    A/P: 55y Female stable POD#0 s/p Left anterior THR   -  Acetaminophen, Celebrex, Dilaudid, Oxycodone for Pain Control   - DVT ppx: Aspirin   - Jessica op IV abx: Ancef  - Celebrex for HO ppx  - Anterior total hip precautions  - PT, OT per protocol  - F/U AM Labs  DCP = home tomorrow pending PT, OT, medical clearance                                          
Patient is a 55y old  Female who presents with a chief complaint of Left Anterior THR (28 Dec 2020 14:55)      INTERVAL HPI/OVERNIGHT EVENTS:  Pt is seen and examined.  Pain is controlled with meds.     Pain Location & Control:     MEDICATIONS  (STANDING):  acetaminophen   Tablet .. 1000 milliGRAM(s) Oral every 8 hours  aspirin enteric coated 81 milliGRAM(s) Oral every 12 hours  celecoxib 200 milliGRAM(s) Oral every 12 hours  dextrose 40% Gel 15 Gram(s) Oral once  dextrose 5%. 1000 milliLiter(s) (50 mL/Hr) IV Continuous <Continuous>  dextrose 5%. 1000 milliLiter(s) (100 mL/Hr) IV Continuous <Continuous>  dextrose 50% Injectable 25 Gram(s) IV Push once  dextrose 50% Injectable 12.5 Gram(s) IV Push once  dextrose 50% Injectable 25 Gram(s) IV Push once  glucagon  Injectable 1 milliGRAM(s) IntraMuscular once  HYDROmorphone  Injectable 0.5 milliGRAM(s) IV Push once  insulin lispro (ADMELOG) corrective regimen sliding scale   SubCutaneous three times a day before meals  lactated ringers. 1000 milliLiter(s) (125 mL/Hr) IV Continuous <Continuous>  metFORMIN 1000 milliGRAM(s) Oral two times a day  pantoprazole    Tablet 40 milliGRAM(s) Oral before breakfast  polyethylene glycol 3350 17 Gram(s) Oral at bedtime  senna 2 Tablet(s) Oral at bedtime    MEDICATIONS  (PRN):  magnesium hydroxide Suspension 30 milliLiter(s) Oral daily PRN Constipation  ondansetron Injectable 4 milliGRAM(s) IV Push every 6 hours PRN Nausea and/or Vomiting  oxyCODONE    IR 5 milliGRAM(s) Oral every 3 hours PRN Moderate Pain (4 - 6)  oxyCODONE    IR 10 milliGRAM(s) Oral every 3 hours PRN Severe Pain (7 - 10)      Allergies    No Known Allergies    Intolerances    Vital Signs Last 24 Hrs  T(C): 36.3 (29 Dec 2020 07:55), Max: 37.1 (28 Dec 2020 19:19)  T(F): 97.4 (29 Dec 2020 07:55), Max: 98.8 (28 Dec 2020 19:19)  HR: 60 (29 Dec 2020 07:55) (55 - 96)  BP: 114/67 (29 Dec 2020 07:55) (100/60 - 114/67)  BP(mean): --  RR: 16 (29 Dec 2020 07:55) (15 - 18)  SpO2: 99% (29 Dec 2020 07:55) (95% - 100%)        LABS:                        9.2    11.22 )-----------( 197      ( 29 Dec 2020 06:50 )             27.1     29 Dec 2020 06:50    137    |  103    |  17     ----------------------------<  146    3.5     |  25     |  0.83     Ca    8.6        29 Dec 2020 06:50          CAPILLARY BLOOD GLUCOSE      POCT Blood Glucose.: 131 mg/dL (29 Dec 2020 07:45)  POCT Blood Glucose.: 174 mg/dL (28 Dec 2020 22:12)  POCT Blood Glucose.: 297 mg/dL (28 Dec 2020 16:39)  POCT Blood Glucose.: 210 mg/dL (28 Dec 2020 12:20)        Cultures          RADIOLOGY & ADDITIONAL TESTS:    Imaging Personally Reviewed:  [ ] YES  [ ] NO    Consultant(s) Notes Reviewed:  [ ] YES  [ ] NO    Care Discussed with Consultants/Other Providers [x ] YES  [ ] NO

## 2021-01-11 ENCOUNTER — APPOINTMENT (OUTPATIENT)
Dept: ORTHOPEDIC SURGERY | Facility: CLINIC | Age: 56
End: 2021-01-11
Payer: COMMERCIAL

## 2021-01-11 VITALS — TEMPERATURE: 96.2 F | SYSTOLIC BLOOD PRESSURE: 115 MMHG | DIASTOLIC BLOOD PRESSURE: 69 MMHG | HEART RATE: 84 BPM

## 2021-01-11 DIAGNOSIS — Z78.9 OTHER SPECIFIED HEALTH STATUS: ICD-10-CM

## 2021-01-11 PROCEDURE — 73502 X-RAY EXAM HIP UNI 2-3 VIEWS: CPT | Mod: LT

## 2021-01-11 PROCEDURE — 99024 POSTOP FOLLOW-UP VISIT: CPT

## 2021-02-02 ENCOUNTER — APPOINTMENT (OUTPATIENT)
Dept: ORTHOPEDIC SURGERY | Facility: CLINIC | Age: 56
End: 2021-02-02

## 2021-03-02 ENCOUNTER — APPOINTMENT (OUTPATIENT)
Dept: ORTHOPEDIC SURGERY | Facility: CLINIC | Age: 56
End: 2021-03-02
Payer: COMMERCIAL

## 2021-03-02 VITALS — TEMPERATURE: 96.7 F | DIASTOLIC BLOOD PRESSURE: 86 MMHG | HEART RATE: 85 BPM | SYSTOLIC BLOOD PRESSURE: 142 MMHG

## 2021-03-02 PROCEDURE — 99024 POSTOP FOLLOW-UP VISIT: CPT

## 2021-03-02 PROCEDURE — 73502 X-RAY EXAM HIP UNI 2-3 VIEWS: CPT | Mod: LT

## 2021-03-02 RX ORDER — OXYCODONE 5 MG/1
5 TABLET ORAL
Qty: 30 | Refills: 0 | Status: COMPLETED | COMMUNITY
Start: 2021-01-11 | End: 2021-03-02

## 2021-04-22 ENCOUNTER — TRANSCRIPTION ENCOUNTER (OUTPATIENT)
Age: 56
End: 2021-04-22

## 2021-04-22 ENCOUNTER — EMERGENCY (EMERGENCY)
Facility: HOSPITAL | Age: 56
LOS: 0 days | Discharge: HOME | End: 2021-04-22
Attending: STUDENT IN AN ORGANIZED HEALTH CARE EDUCATION/TRAINING PROGRAM
Payer: COMMERCIAL

## 2021-04-22 VITALS
RESPIRATION RATE: 16 BRPM | OXYGEN SATURATION: 100 % | DIASTOLIC BLOOD PRESSURE: 72 MMHG | SYSTOLIC BLOOD PRESSURE: 154 MMHG | TEMPERATURE: 102 F | HEART RATE: 102 BPM

## 2021-04-22 VITALS — WEIGHT: 229.94 LBS | HEIGHT: 72 IN

## 2021-04-22 DIAGNOSIS — M19.90 UNSPECIFIED OSTEOARTHRITIS, UNSPECIFIED SITE: ICD-10-CM

## 2021-04-22 DIAGNOSIS — U07.1 COVID-19: ICD-10-CM

## 2021-04-22 DIAGNOSIS — I10 ESSENTIAL (PRIMARY) HYPERTENSION: ICD-10-CM

## 2021-04-22 DIAGNOSIS — Z90.49 ACQUIRED ABSENCE OF OTHER SPECIFIED PARTS OF DIGESTIVE TRACT: Chronic | ICD-10-CM

## 2021-04-22 DIAGNOSIS — B02.9 ZOSTER WITHOUT COMPLICATIONS: ICD-10-CM

## 2021-04-22 DIAGNOSIS — Z79.82 LONG TERM (CURRENT) USE OF ASPIRIN: ICD-10-CM

## 2021-04-22 DIAGNOSIS — E11.9 TYPE 2 DIABETES MELLITUS WITHOUT COMPLICATIONS: ICD-10-CM

## 2021-04-22 DIAGNOSIS — Z79.84 LONG TERM (CURRENT) USE OF ORAL HYPOGLYCEMIC DRUGS: ICD-10-CM

## 2021-04-22 DIAGNOSIS — R07.0 PAIN IN THROAT: ICD-10-CM

## 2021-04-22 DIAGNOSIS — Z98.890 OTHER SPECIFIED POSTPROCEDURAL STATES: Chronic | ICD-10-CM

## 2021-04-22 DIAGNOSIS — R19.7 DIARRHEA, UNSPECIFIED: ICD-10-CM

## 2021-04-22 DIAGNOSIS — R50.9 FEVER, UNSPECIFIED: ICD-10-CM

## 2021-04-22 LAB
ALBUMIN SERPL ELPH-MCNC: 3.6 G/DL — SIGNIFICANT CHANGE UP (ref 3.3–5)
ALP SERPL-CCNC: 66 U/L — SIGNIFICANT CHANGE UP (ref 40–120)
ALT FLD-CCNC: 30 U/L — SIGNIFICANT CHANGE UP (ref 12–78)
ANION GAP SERPL CALC-SCNC: 5 MMOL/L — SIGNIFICANT CHANGE UP (ref 5–17)
AST SERPL-CCNC: 21 U/L — SIGNIFICANT CHANGE UP (ref 15–37)
BASOPHILS # BLD AUTO: 0.02 K/UL — SIGNIFICANT CHANGE UP (ref 0–0.2)
BASOPHILS NFR BLD AUTO: 0.4 % — SIGNIFICANT CHANGE UP (ref 0–2)
BILIRUB SERPL-MCNC: 0.6 MG/DL — SIGNIFICANT CHANGE UP (ref 0.2–1.2)
BUN SERPL-MCNC: 15 MG/DL — SIGNIFICANT CHANGE UP (ref 7–23)
CALCIUM SERPL-MCNC: 8.8 MG/DL — SIGNIFICANT CHANGE UP (ref 8.5–10.1)
CHLORIDE SERPL-SCNC: 97 MMOL/L — SIGNIFICANT CHANGE UP (ref 96–108)
CO2 SERPL-SCNC: 27 MMOL/L — SIGNIFICANT CHANGE UP (ref 22–31)
CREAT SERPL-MCNC: 0.93 MG/DL — SIGNIFICANT CHANGE UP (ref 0.5–1.3)
EOSINOPHIL # BLD AUTO: 0 K/UL — SIGNIFICANT CHANGE UP (ref 0–0.5)
EOSINOPHIL NFR BLD AUTO: 0 % — SIGNIFICANT CHANGE UP (ref 0–6)
GLUCOSE SERPL-MCNC: 193 MG/DL — HIGH (ref 70–99)
HCT VFR BLD CALC: 44 % — SIGNIFICANT CHANGE UP (ref 34.5–45)
HGB BLD-MCNC: 14.8 G/DL — SIGNIFICANT CHANGE UP (ref 11.5–15.5)
IMM GRANULOCYTES NFR BLD AUTO: 0 % — SIGNIFICANT CHANGE UP (ref 0–1.5)
LYMPHOCYTES # BLD AUTO: 1.33 K/UL — SIGNIFICANT CHANGE UP (ref 1–3.3)
LYMPHOCYTES # BLD AUTO: 28.4 % — SIGNIFICANT CHANGE UP (ref 13–44)
MCHC RBC-ENTMCNC: 28.1 PG — SIGNIFICANT CHANGE UP (ref 27–34)
MCHC RBC-ENTMCNC: 33.6 GM/DL — SIGNIFICANT CHANGE UP (ref 32–36)
MCV RBC AUTO: 83.5 FL — SIGNIFICANT CHANGE UP (ref 80–100)
MONOCYTES # BLD AUTO: 0.8 K/UL — SIGNIFICANT CHANGE UP (ref 0–0.9)
MONOCYTES NFR BLD AUTO: 17.1 % — HIGH (ref 2–14)
NEUTROPHILS # BLD AUTO: 2.53 K/UL — SIGNIFICANT CHANGE UP (ref 1.8–7.4)
NEUTROPHILS NFR BLD AUTO: 54.1 % — SIGNIFICANT CHANGE UP (ref 43–77)
PLATELET # BLD AUTO: 167 K/UL — SIGNIFICANT CHANGE UP (ref 150–400)
POTASSIUM SERPL-MCNC: 3.9 MMOL/L — SIGNIFICANT CHANGE UP (ref 3.5–5.3)
POTASSIUM SERPL-SCNC: 3.9 MMOL/L — SIGNIFICANT CHANGE UP (ref 3.5–5.3)
PROT SERPL-MCNC: 8.7 GM/DL — HIGH (ref 6–8.3)
RAPID RVP RESULT: DETECTED
RBC # BLD: 5.27 M/UL — HIGH (ref 3.8–5.2)
RBC # FLD: 14.5 % — SIGNIFICANT CHANGE UP (ref 10.3–14.5)
SARS-COV-2 RNA SPEC QL NAA+PROBE: DETECTED
SODIUM SERPL-SCNC: 129 MMOL/L — LOW (ref 135–145)
WBC # BLD: 4.68 K/UL — SIGNIFICANT CHANGE UP (ref 3.8–10.5)
WBC # FLD AUTO: 4.68 K/UL — SIGNIFICANT CHANGE UP (ref 3.8–10.5)

## 2021-04-22 PROCEDURE — 99284 EMERGENCY DEPT VISIT MOD MDM: CPT | Mod: 25

## 2021-04-22 PROCEDURE — 36415 COLL VENOUS BLD VENIPUNCTURE: CPT

## 2021-04-22 PROCEDURE — 0225U NFCT DS DNA&RNA 21 SARSCOV2: CPT

## 2021-04-22 PROCEDURE — 96361 HYDRATE IV INFUSION ADD-ON: CPT

## 2021-04-22 PROCEDURE — 85025 COMPLETE CBC W/AUTO DIFF WBC: CPT

## 2021-04-22 PROCEDURE — 80053 COMPREHEN METABOLIC PANEL: CPT

## 2021-04-22 PROCEDURE — 71045 X-RAY EXAM CHEST 1 VIEW: CPT

## 2021-04-22 PROCEDURE — 96365 THER/PROPH/DIAG IV INF INIT: CPT

## 2021-04-22 PROCEDURE — 99284 EMERGENCY DEPT VISIT MOD MDM: CPT

## 2021-04-22 PROCEDURE — 71045 X-RAY EXAM CHEST 1 VIEW: CPT | Mod: 26

## 2021-04-22 RX ORDER — ACETAMINOPHEN 500 MG
1000 TABLET ORAL ONCE
Refills: 0 | Status: COMPLETED | OUTPATIENT
Start: 2021-04-22 | End: 2021-04-22

## 2021-04-22 RX ORDER — SODIUM CHLORIDE 9 MG/ML
250 INJECTION INTRAMUSCULAR; INTRAVENOUS; SUBCUTANEOUS
Refills: 0 | Status: COMPLETED | OUTPATIENT
Start: 2021-04-22 | End: 2021-04-22

## 2021-04-22 RX ORDER — ACETAMINOPHEN 500 MG
650 TABLET ORAL ONCE
Refills: 0 | Status: COMPLETED | OUTPATIENT
Start: 2021-04-22 | End: 2021-04-22

## 2021-04-22 RX ORDER — SODIUM CHLORIDE 9 MG/ML
500 INJECTION INTRAMUSCULAR; INTRAVENOUS; SUBCUTANEOUS ONCE
Refills: 0 | Status: COMPLETED | OUTPATIENT
Start: 2021-04-22 | End: 2021-04-22

## 2021-04-22 RX ADMIN — SODIUM CHLORIDE 500 MILLILITER(S): 9 INJECTION INTRAMUSCULAR; INTRAVENOUS; SUBCUTANEOUS at 16:21

## 2021-04-22 RX ADMIN — SODIUM CHLORIDE 25 MILLILITER(S): 9 INJECTION INTRAMUSCULAR; INTRAVENOUS; SUBCUTANEOUS at 21:05

## 2021-04-22 RX ADMIN — Medication 1000 MILLIGRAM(S): at 23:24

## 2021-04-22 RX ADMIN — Medication 650 MILLIGRAM(S): at 16:22

## 2021-04-22 RX ADMIN — Medication 650 MILLIGRAM(S): at 16:21

## 2021-04-22 RX ADMIN — SODIUM CHLORIDE 500 MILLILITER(S): 9 INJECTION INTRAMUSCULAR; INTRAVENOUS; SUBCUTANEOUS at 17:22

## 2021-04-22 NOTE — ED ADULT TRIAGE NOTE - CHIEF COMPLAINT QUOTE
sent in by dr. nina trujillo for monoclonal antibodies, positive covid yesterday, c/o diarrhea, cough, Temp 102, s/p left hip replacement 4 months ago HX: DM, HTN

## 2021-04-22 NOTE — ED STATDOCS - OBJECTIVE STATEMENT
57 y/o female with a PMHx of DM, HTN, obesity, osteoarthritis, prediabetes, shingles, spider veins, presents to the ED c/o sore throat and intermittent fever x5 days. Pt reports testing COVID + 2 days ago. Notes diarrhea last night. Pt called PCP and was sent to the ED for monoclonal antibody infusion today. No SOB, chest pain. No other complaints at this time.

## 2021-04-22 NOTE — ED STATDOCS - PROGRESS NOTE DETAILS
55 yo female with a PMH of DM, HTN presents with sore throat, fever t max of 104F since sunday. Pt went tuesday to get covid tested which came back positive. Pt also developed 1 episode of NB diarrhea today. Pt has been taking tylenol for symptoms. Pt spoke with her PMD who advised to go to the ER for MAB. Denies cp, sob, cough, abd pain. Labs, covid test, IVF, Tylenol and pending covid for decision of MAB. -Kobe Temple PA-C Covid positive. Will order the BAM for the pt. -Kobe Temple PA-C Pt finished the MAB without any problems or reaction. Will d/c pt home. -Kobe Temple PA-C Pt finished the MAB without any problems or reaction. Had fever of 102.1F  1 hour post transfusion; however, pt was having fever for the last few days with covid. Discussed with Dr. Lopes. Pt can still be d/c home. 1g tylenol was ordered. -Kobe Temple PA-C

## 2021-04-22 NOTE — ED STATDOCS - PMH
Hypertension    Obesity, Class II, BMI 35-39.9    Osteoarthritis    Prediabetes  now diabetes  [facility], 11/26/20-12/5/20  Spider veins

## 2021-04-22 NOTE — ED STATDOCS - PATIENT PORTAL LINK FT
You can access the FollowMyHealth Patient Portal offered by [facility] by registering at the following website: [email or web address]. By joining [employer]’s FollowMyHealth portal, you will also be able to view your health information using other applications (apps) compatible with our system.

## 2021-04-22 NOTE — ED STATDOCS - NSFOLLOWUPINSTRUCTIONS_ED_ALL_ED_FT
You received the monoclonal antibodies in the ER. You cannot receive the covid vaccine until 90 days after the infusion.   Continue to take tylenol for pain or fever.  Quarantine for 14 days since symptoms started.   Follow up with your primary care doctor for further evaluation and treatment.     Return to the ER for any new or other concerns.

## 2021-04-22 NOTE — ED STATDOCS - PSH
History of appendectomy  with right oophorecomy 2010  History of hip surgery  pinning of left hip age 12 and hardware removed age 17

## 2021-04-22 NOTE — ED STATDOCS - ATTENDING CONTRIBUTION TO CARE
I, Kelly Colby DO,  performed the initial face to face bedside interview with this patient regarding history of present illness, review of symptoms and relevant past medical, social and family history.  I completed an independent physical examination.  I was the initial provider who evaluated this patient. I have signed out the follow up of any pending tests (i.e. labs, radiological studies) to the ACP.  I have communicated the patient’s plan of care and disposition with the ACP.  The history, relevant review of systems, past medical and surgical history, medical decision making, and physical examination was documented by the scribe in my presence and I attest to the accuracy of the documentation.

## 2021-04-22 NOTE — ED ADULT NURSE NOTE - OBJECTIVE STATEMENT
Pt is a 57 yo female who presents to the Ed with complaints of sore throat and fever. Reported a temperature of 104F  at home. Tested positive for Covid-19 on Tuesday.

## 2021-05-04 ENCOUNTER — TRANSCRIPTION ENCOUNTER (OUTPATIENT)
Age: 56
End: 2021-05-04

## 2021-06-08 ENCOUNTER — APPOINTMENT (OUTPATIENT)
Dept: ORTHOPEDIC SURGERY | Facility: CLINIC | Age: 56
End: 2021-06-08

## 2021-12-07 ENCOUNTER — APPOINTMENT (OUTPATIENT)
Dept: ORTHOPEDIC SURGERY | Facility: CLINIC | Age: 56
End: 2021-12-07
Payer: COMMERCIAL

## 2021-12-07 ENCOUNTER — NON-APPOINTMENT (OUTPATIENT)
Age: 56
End: 2021-12-07

## 2021-12-07 VITALS — DIASTOLIC BLOOD PRESSURE: 78 MMHG | SYSTOLIC BLOOD PRESSURE: 151 MMHG | HEART RATE: 88 BPM

## 2021-12-07 PROCEDURE — 99212 OFFICE O/P EST SF 10 MIN: CPT

## 2021-12-07 PROCEDURE — 73502 X-RAY EXAM HIP UNI 2-3 VIEWS: CPT | Mod: LT

## 2023-03-03 ENCOUNTER — APPOINTMENT (OUTPATIENT)
Dept: ORTHOPEDIC SURGERY | Facility: CLINIC | Age: 58
End: 2023-03-03
Payer: COMMERCIAL

## 2023-03-03 ENCOUNTER — NON-APPOINTMENT (OUTPATIENT)
Age: 58
End: 2023-03-03

## 2023-03-03 VITALS — HEART RATE: 77 BPM | DIASTOLIC BLOOD PRESSURE: 82 MMHG | SYSTOLIC BLOOD PRESSURE: 150 MMHG

## 2023-03-03 DIAGNOSIS — Z96.642 PRESENCE OF LEFT ARTIFICIAL HIP JOINT: ICD-10-CM

## 2023-03-03 PROCEDURE — 73502 X-RAY EXAM HIP UNI 2-3 VIEWS: CPT | Mod: LT

## 2023-03-03 PROCEDURE — 99214 OFFICE O/P EST MOD 30 MIN: CPT

## 2023-05-03 ENCOUNTER — APPOINTMENT (OUTPATIENT)
Dept: ORTHOPEDIC SURGERY | Facility: CLINIC | Age: 58
End: 2023-05-03
Payer: COMMERCIAL

## 2023-05-03 VITALS
WEIGHT: 230 LBS | BODY MASS INDEX: 36.96 KG/M2 | HEART RATE: 109 BPM | DIASTOLIC BLOOD PRESSURE: 91 MMHG | SYSTOLIC BLOOD PRESSURE: 177 MMHG | HEIGHT: 66 IN

## 2023-05-03 DIAGNOSIS — M54.16 RADICULOPATHY, LUMBAR REGION: ICD-10-CM

## 2023-05-03 DIAGNOSIS — M51.26 OTHER INTERVERTEBRAL DISC DISPLACEMENT, LUMBAR REGION: ICD-10-CM

## 2023-05-03 PROCEDURE — 72110 X-RAY EXAM L-2 SPINE 4/>VWS: CPT

## 2023-05-03 PROCEDURE — 99213 OFFICE O/P EST LOW 20 MIN: CPT

## 2023-05-03 NOTE — PHYSICAL EXAM
[SLR] : positive straight leg raise [Obese] : obese [Stooped] : stooped [NL] : Motor strength of the right lower extremity was normal [___/5] : left ([unfilled]/5) [] : Sensory: [0] : left ankle jerk 0 [Plantar Reflex Right Only] : absent on the right [Plantar Reflex Left Only] : absent on the left [DTR Reflexes Clonus Of Right Ankle (___ Beats)] : absent on the right [DTR Reflexes Clonus Of Left Ankle (___ Beats)] : absent on the left [de-identified] : The pt is awake, alert and oriented to self, place and time, is comfortable and in no acute distress. Inspection of neck, back and lower extremities bilaterally reveals no rashes or ecchymotic lesions.  There is no obvious abnormal spinal curvature in the sagittal and coronal planes. There is no tenderness over the cervical, thoracic or lumbar spine, or the paraspinal or upper and lower extremities musculature. There is no sacroiliac tenderness. No greater trochanteric tenderness bilaterally. No atrophy or abnormal movements noted in the upper or lower extremities. There is no swelling noted in the upper or lower extremities bilaterally. No cervical lymphadenopathy noted anteriorly. No joint laxity noted in the upper and lower extremity joints bilaterally.\par Hip range of motion is degrees internal rotation 30° external rotation without pain. Full range of motion of the shoulders bilaterally with no significant pain\par There is no groin pain with hip internal rotation and a negative DELFINA test bilaterally.  [de-identified] : Midline lumbar and left gluteal and sciatic notch tenderness.\par Healed left hip incision. [de-identified] : 4 views lumbar spine demonstrate minimal right-sided lumbar curve.  Normal lumbar lordosis.  Degenerative changes seen at the L2-3 L1 to lower thoracic spine.  Calcification abdominal aorta.  No dynamic instability between flexion-extension.  No acute fractures.

## 2023-05-03 NOTE — DISCUSSION/SUMMARY
[Medication Risks Reviewed] : Medication risks reviewed [de-identified] : I reviewed x-rays obtained today with the patient and indicated that she needs a medical/cardiac evaluation for calcification noted in the abdominal aorta which is signs of atherosclerosis.\par \par For her left leg symptoms recommend MRI lumbar spine for further evaluation but she would like to hold off on that.  She will call the office to get a prescription for it.  Referral to physical therapy was provided meantime and trial of gabapentin prescribed.\par \par I will see him back in 4 to 6 weeks on as-needed basis to discuss further treatment options which may include lumbar epidural steroid injections after the MRI is appropriate.\par \par The patient was educated regarding their condition, treatment options as well as prescribed course of treatment. \par Risks and benefits as well as alternatives to the proposed treatment were also provided to the patient \par They were given the opportunity to have all their questions answered to their satisfaction.\par \par Vital signs were reviewed with the patient and the patient was instructed to followup with their primary care provider for further management. There were no PAs or scribes used in the evaluation, exam or treatment plan discussion. The surgeon was the primary evaluating or treating physician as noted above.

## 2023-05-03 NOTE — HISTORY OF PRESENT ILLNESS
[Stable] : stable [8] : a current pain level of 8/10 [Daily] : ~He/She~ states the symptoms seem to be occuring daily [___ mths] : [unfilled] month(s) ago [de-identified] : Patient is here today for evaluation on her left buttock down her left leg pain going on for the past several weeks no known injury but she states she fell in August 2022, fell forward onto her knees,  got up and not medically treated. Patient recently saw  thinking pain was coming from her left total hip replacement xrays were negative told to see spine md.\par s/p left THR 12/28/21\par felt pulling posterior thigh and calf to ankle since 11/2022. Pain along left buttock, posterior thigh to ankle.  [de-identified] : depending on the day what she is doing [de-identified] : lying in bed

## 2023-06-20 ENCOUNTER — TRANSCRIPTION ENCOUNTER (OUTPATIENT)
Age: 58
End: 2023-06-20

## 2023-08-14 RX ORDER — GABAPENTIN 100 MG/1
100 CAPSULE ORAL
Qty: 30 | Refills: 2 | Status: ACTIVE | COMMUNITY
Start: 2023-05-03 | End: 1900-01-01

## 2023-10-12 NOTE — H&P PST ADULT - LIVES WITH, PROFILE
spouse/children Adbry Pregnancy And Lactation Text: It is unknown if this medication will adversely affect pregnancy or breast feeding.

## 2023-11-21 ENCOUNTER — INPATIENT (INPATIENT)
Facility: HOSPITAL | Age: 58
LOS: 1 days | Discharge: ANOTHER HOSPITAL | DRG: 65 | End: 2023-11-23
Attending: INTERNAL MEDICINE | Admitting: INTERNAL MEDICINE
Payer: COMMERCIAL

## 2023-11-21 VITALS
HEIGHT: 67 IN | DIASTOLIC BLOOD PRESSURE: 77 MMHG | OXYGEN SATURATION: 100 % | WEIGHT: 220.02 LBS | SYSTOLIC BLOOD PRESSURE: 155 MMHG | HEART RATE: 95 BPM | RESPIRATION RATE: 18 BRPM | TEMPERATURE: 99 F

## 2023-11-21 DIAGNOSIS — Z98.890 OTHER SPECIFIED POSTPROCEDURAL STATES: Chronic | ICD-10-CM

## 2023-11-21 DIAGNOSIS — R29.898 OTHER SYMPTOMS AND SIGNS INVOLVING THE MUSCULOSKELETAL SYSTEM: ICD-10-CM

## 2023-11-21 DIAGNOSIS — Z90.49 ACQUIRED ABSENCE OF OTHER SPECIFIED PARTS OF DIGESTIVE TRACT: Chronic | ICD-10-CM

## 2023-11-21 LAB
ALBUMIN SERPL ELPH-MCNC: 3.8 G/DL — SIGNIFICANT CHANGE UP (ref 3.3–5)
ALBUMIN SERPL ELPH-MCNC: 3.8 G/DL — SIGNIFICANT CHANGE UP (ref 3.3–5)
ALP SERPL-CCNC: 67 U/L — SIGNIFICANT CHANGE UP (ref 40–120)
ALP SERPL-CCNC: 67 U/L — SIGNIFICANT CHANGE UP (ref 40–120)
ALT FLD-CCNC: 47 U/L — SIGNIFICANT CHANGE UP (ref 12–78)
ALT FLD-CCNC: 47 U/L — SIGNIFICANT CHANGE UP (ref 12–78)
ANION GAP SERPL CALC-SCNC: 8 MMOL/L — SIGNIFICANT CHANGE UP (ref 5–17)
ANION GAP SERPL CALC-SCNC: 8 MMOL/L — SIGNIFICANT CHANGE UP (ref 5–17)
APTT BLD: 24.6 SEC — SIGNIFICANT CHANGE UP (ref 24.5–35.6)
APTT BLD: 24.6 SEC — SIGNIFICANT CHANGE UP (ref 24.5–35.6)
AST SERPL-CCNC: 29 U/L — SIGNIFICANT CHANGE UP (ref 15–37)
AST SERPL-CCNC: 29 U/L — SIGNIFICANT CHANGE UP (ref 15–37)
BASOPHILS # BLD AUTO: 0.07 K/UL — SIGNIFICANT CHANGE UP (ref 0–0.2)
BASOPHILS # BLD AUTO: 0.07 K/UL — SIGNIFICANT CHANGE UP (ref 0–0.2)
BASOPHILS NFR BLD AUTO: 0.8 % — SIGNIFICANT CHANGE UP (ref 0–2)
BASOPHILS NFR BLD AUTO: 0.8 % — SIGNIFICANT CHANGE UP (ref 0–2)
BILIRUB SERPL-MCNC: 0.6 MG/DL — SIGNIFICANT CHANGE UP (ref 0.2–1.2)
BILIRUB SERPL-MCNC: 0.6 MG/DL — SIGNIFICANT CHANGE UP (ref 0.2–1.2)
BUN SERPL-MCNC: 17 MG/DL — SIGNIFICANT CHANGE UP (ref 7–23)
BUN SERPL-MCNC: 17 MG/DL — SIGNIFICANT CHANGE UP (ref 7–23)
CALCIUM SERPL-MCNC: 9.6 MG/DL — SIGNIFICANT CHANGE UP (ref 8.5–10.1)
CALCIUM SERPL-MCNC: 9.6 MG/DL — SIGNIFICANT CHANGE UP (ref 8.5–10.1)
CHLORIDE SERPL-SCNC: 104 MMOL/L — SIGNIFICANT CHANGE UP (ref 96–108)
CHLORIDE SERPL-SCNC: 104 MMOL/L — SIGNIFICANT CHANGE UP (ref 96–108)
CO2 SERPL-SCNC: 25 MMOL/L — SIGNIFICANT CHANGE UP (ref 22–31)
CO2 SERPL-SCNC: 25 MMOL/L — SIGNIFICANT CHANGE UP (ref 22–31)
CREAT SERPL-MCNC: 0.99 MG/DL — SIGNIFICANT CHANGE UP (ref 0.5–1.3)
CREAT SERPL-MCNC: 0.99 MG/DL — SIGNIFICANT CHANGE UP (ref 0.5–1.3)
EGFR: 67 ML/MIN/1.73M2 — SIGNIFICANT CHANGE UP
EGFR: 67 ML/MIN/1.73M2 — SIGNIFICANT CHANGE UP
EOSINOPHIL # BLD AUTO: 0.08 K/UL — SIGNIFICANT CHANGE UP (ref 0–0.5)
EOSINOPHIL # BLD AUTO: 0.08 K/UL — SIGNIFICANT CHANGE UP (ref 0–0.5)
EOSINOPHIL NFR BLD AUTO: 0.9 % — SIGNIFICANT CHANGE UP (ref 0–6)
EOSINOPHIL NFR BLD AUTO: 0.9 % — SIGNIFICANT CHANGE UP (ref 0–6)
GLUCOSE BLDC GLUCOMTR-MCNC: 214 MG/DL — HIGH (ref 70–99)
GLUCOSE BLDC GLUCOMTR-MCNC: 214 MG/DL — HIGH (ref 70–99)
GLUCOSE SERPL-MCNC: 171 MG/DL — HIGH (ref 70–99)
GLUCOSE SERPL-MCNC: 171 MG/DL — HIGH (ref 70–99)
HCT VFR BLD CALC: 43.2 % — SIGNIFICANT CHANGE UP (ref 34.5–45)
HCT VFR BLD CALC: 43.2 % — SIGNIFICANT CHANGE UP (ref 34.5–45)
HGB BLD-MCNC: 14.8 G/DL — SIGNIFICANT CHANGE UP (ref 11.5–15.5)
HGB BLD-MCNC: 14.8 G/DL — SIGNIFICANT CHANGE UP (ref 11.5–15.5)
IMM GRANULOCYTES NFR BLD AUTO: 0.2 % — SIGNIFICANT CHANGE UP (ref 0–0.9)
IMM GRANULOCYTES NFR BLD AUTO: 0.2 % — SIGNIFICANT CHANGE UP (ref 0–0.9)
INR BLD: 0.97 RATIO — SIGNIFICANT CHANGE UP (ref 0.85–1.18)
INR BLD: 0.97 RATIO — SIGNIFICANT CHANGE UP (ref 0.85–1.18)
LYMPHOCYTES # BLD AUTO: 1.66 K/UL — SIGNIFICANT CHANGE UP (ref 1–3.3)
LYMPHOCYTES # BLD AUTO: 1.66 K/UL — SIGNIFICANT CHANGE UP (ref 1–3.3)
LYMPHOCYTES # BLD AUTO: 18 % — SIGNIFICANT CHANGE UP (ref 13–44)
LYMPHOCYTES # BLD AUTO: 18 % — SIGNIFICANT CHANGE UP (ref 13–44)
MAGNESIUM SERPL-MCNC: 1.7 MG/DL — SIGNIFICANT CHANGE UP (ref 1.6–2.6)
MAGNESIUM SERPL-MCNC: 1.7 MG/DL — SIGNIFICANT CHANGE UP (ref 1.6–2.6)
MCHC RBC-ENTMCNC: 29.8 PG — SIGNIFICANT CHANGE UP (ref 27–34)
MCHC RBC-ENTMCNC: 29.8 PG — SIGNIFICANT CHANGE UP (ref 27–34)
MCHC RBC-ENTMCNC: 34.3 GM/DL — SIGNIFICANT CHANGE UP (ref 32–36)
MCHC RBC-ENTMCNC: 34.3 GM/DL — SIGNIFICANT CHANGE UP (ref 32–36)
MCV RBC AUTO: 87.1 FL — SIGNIFICANT CHANGE UP (ref 80–100)
MCV RBC AUTO: 87.1 FL — SIGNIFICANT CHANGE UP (ref 80–100)
MONOCYTES # BLD AUTO: 0.58 K/UL — SIGNIFICANT CHANGE UP (ref 0–0.9)
MONOCYTES # BLD AUTO: 0.58 K/UL — SIGNIFICANT CHANGE UP (ref 0–0.9)
MONOCYTES NFR BLD AUTO: 6.3 % — SIGNIFICANT CHANGE UP (ref 2–14)
MONOCYTES NFR BLD AUTO: 6.3 % — SIGNIFICANT CHANGE UP (ref 2–14)
NEUTROPHILS # BLD AUTO: 6.79 K/UL — SIGNIFICANT CHANGE UP (ref 1.8–7.4)
NEUTROPHILS # BLD AUTO: 6.79 K/UL — SIGNIFICANT CHANGE UP (ref 1.8–7.4)
NEUTROPHILS NFR BLD AUTO: 73.8 % — SIGNIFICANT CHANGE UP (ref 43–77)
NEUTROPHILS NFR BLD AUTO: 73.8 % — SIGNIFICANT CHANGE UP (ref 43–77)
PLATELET # BLD AUTO: 266 K/UL — SIGNIFICANT CHANGE UP (ref 150–400)
PLATELET # BLD AUTO: 266 K/UL — SIGNIFICANT CHANGE UP (ref 150–400)
POTASSIUM SERPL-MCNC: 3.8 MMOL/L — SIGNIFICANT CHANGE UP (ref 3.5–5.3)
POTASSIUM SERPL-MCNC: 3.8 MMOL/L — SIGNIFICANT CHANGE UP (ref 3.5–5.3)
POTASSIUM SERPL-SCNC: 3.8 MMOL/L — SIGNIFICANT CHANGE UP (ref 3.5–5.3)
POTASSIUM SERPL-SCNC: 3.8 MMOL/L — SIGNIFICANT CHANGE UP (ref 3.5–5.3)
PROT SERPL-MCNC: 8.5 GM/DL — HIGH (ref 6–8.3)
PROT SERPL-MCNC: 8.5 GM/DL — HIGH (ref 6–8.3)
PROTHROM AB SERPL-ACNC: 11 SEC — SIGNIFICANT CHANGE UP (ref 9.5–13)
PROTHROM AB SERPL-ACNC: 11 SEC — SIGNIFICANT CHANGE UP (ref 9.5–13)
RBC # BLD: 4.96 M/UL — SIGNIFICANT CHANGE UP (ref 3.8–5.2)
RBC # BLD: 4.96 M/UL — SIGNIFICANT CHANGE UP (ref 3.8–5.2)
RBC # FLD: 13.3 % — SIGNIFICANT CHANGE UP (ref 10.3–14.5)
RBC # FLD: 13.3 % — SIGNIFICANT CHANGE UP (ref 10.3–14.5)
SODIUM SERPL-SCNC: 137 MMOL/L — SIGNIFICANT CHANGE UP (ref 135–145)
SODIUM SERPL-SCNC: 137 MMOL/L — SIGNIFICANT CHANGE UP (ref 135–145)
TROPONIN I, HIGH SENSITIVITY RESULT: 6.45 NG/L — SIGNIFICANT CHANGE UP
TROPONIN I, HIGH SENSITIVITY RESULT: 6.45 NG/L — SIGNIFICANT CHANGE UP
WBC # BLD: 9.2 K/UL — SIGNIFICANT CHANGE UP (ref 3.8–10.5)
WBC # BLD: 9.2 K/UL — SIGNIFICANT CHANGE UP (ref 3.8–10.5)
WBC # FLD AUTO: 9.2 K/UL — SIGNIFICANT CHANGE UP (ref 3.8–10.5)
WBC # FLD AUTO: 9.2 K/UL — SIGNIFICANT CHANGE UP (ref 3.8–10.5)

## 2023-11-21 PROCEDURE — 70551 MRI BRAIN STEM W/O DYE: CPT

## 2023-11-21 PROCEDURE — 97161 PT EVAL LOW COMPLEX 20 MIN: CPT | Mod: GP

## 2023-11-21 PROCEDURE — 99223 1ST HOSP IP/OBS HIGH 75: CPT

## 2023-11-21 PROCEDURE — 83036 HEMOGLOBIN GLYCOSYLATED A1C: CPT

## 2023-11-21 PROCEDURE — 70450 CT HEAD/BRAIN W/O DYE: CPT | Mod: 26,MA,59

## 2023-11-21 PROCEDURE — 83735 ASSAY OF MAGNESIUM: CPT

## 2023-11-21 PROCEDURE — 36415 COLL VENOUS BLD VENIPUNCTURE: CPT

## 2023-11-21 PROCEDURE — 70496 CT ANGIOGRAPHY HEAD: CPT | Mod: 26,MA

## 2023-11-21 PROCEDURE — 80061 LIPID PANEL: CPT

## 2023-11-21 PROCEDURE — 93010 ELECTROCARDIOGRAM REPORT: CPT

## 2023-11-21 PROCEDURE — 82962 GLUCOSE BLOOD TEST: CPT

## 2023-11-21 PROCEDURE — 71045 X-RAY EXAM CHEST 1 VIEW: CPT | Mod: 26

## 2023-11-21 PROCEDURE — 80048 BASIC METABOLIC PNL TOTAL CA: CPT

## 2023-11-21 PROCEDURE — 93880 EXTRACRANIAL BILAT STUDY: CPT

## 2023-11-21 PROCEDURE — 97530 THERAPEUTIC ACTIVITIES: CPT | Mod: GP

## 2023-11-21 PROCEDURE — 99285 EMERGENCY DEPT VISIT HI MDM: CPT

## 2023-11-21 PROCEDURE — 93880 EXTRACRANIAL BILAT STUDY: CPT | Mod: 26

## 2023-11-21 PROCEDURE — 73030 X-RAY EXAM OF SHOULDER: CPT | Mod: 26,LT

## 2023-11-21 PROCEDURE — 70498 CT ANGIOGRAPHY NECK: CPT | Mod: 26,MA

## 2023-11-21 PROCEDURE — 92610 EVALUATE SWALLOWING FUNCTION: CPT | Mod: GN

## 2023-11-21 PROCEDURE — 97116 GAIT TRAINING THERAPY: CPT | Mod: GP

## 2023-11-21 PROCEDURE — 93306 TTE W/DOPPLER COMPLETE: CPT

## 2023-11-21 RX ORDER — POLYETHYLENE GLYCOL 3350 17 G/17G
17 POWDER, FOR SOLUTION ORAL AT BEDTIME
Refills: 0 | Status: DISCONTINUED | OUTPATIENT
Start: 2023-11-21 | End: 2023-11-23

## 2023-11-21 RX ORDER — CLOPIDOGREL BISULFATE 75 MG/1
75 TABLET, FILM COATED ORAL DAILY
Refills: 0 | Status: DISCONTINUED | OUTPATIENT
Start: 2023-11-21 | End: 2023-11-23

## 2023-11-21 RX ORDER — LOSARTAN POTASSIUM 100 MG/1
100 TABLET, FILM COATED ORAL DAILY
Refills: 0 | Status: DISCONTINUED | OUTPATIENT
Start: 2023-11-21 | End: 2023-11-23

## 2023-11-21 RX ORDER — ASPIRIN/CALCIUM CARB/MAGNESIUM 324 MG
81 TABLET ORAL DAILY
Refills: 0 | Status: DISCONTINUED | OUTPATIENT
Start: 2023-11-21 | End: 2023-11-23

## 2023-11-21 RX ORDER — SODIUM CHLORIDE 9 MG/ML
1000 INJECTION, SOLUTION INTRAVENOUS
Refills: 0 | Status: DISCONTINUED | OUTPATIENT
Start: 2023-11-21 | End: 2023-11-23

## 2023-11-21 RX ORDER — INSULIN LISPRO 100/ML
VIAL (ML) SUBCUTANEOUS
Refills: 0 | Status: DISCONTINUED | OUTPATIENT
Start: 2023-11-21 | End: 2023-11-23

## 2023-11-21 RX ORDER — DEXTROSE 50 % IN WATER 50 %
15 SYRINGE (ML) INTRAVENOUS ONCE
Refills: 0 | Status: DISCONTINUED | OUTPATIENT
Start: 2023-11-21 | End: 2023-11-23

## 2023-11-21 RX ORDER — ATORVASTATIN CALCIUM 80 MG/1
40 TABLET, FILM COATED ORAL AT BEDTIME
Refills: 0 | Status: DISCONTINUED | OUTPATIENT
Start: 2023-11-21 | End: 2023-11-23

## 2023-11-21 RX ORDER — GLUCAGON INJECTION, SOLUTION 0.5 MG/.1ML
1 INJECTION, SOLUTION SUBCUTANEOUS ONCE
Refills: 0 | Status: DISCONTINUED | OUTPATIENT
Start: 2023-11-21 | End: 2023-11-23

## 2023-11-21 RX ORDER — MAGNESIUM SULFATE 500 MG/ML
1 VIAL (ML) INJECTION ONCE
Refills: 0 | Status: COMPLETED | OUTPATIENT
Start: 2023-11-21 | End: 2023-11-21

## 2023-11-21 RX ORDER — LOSARTAN/HYDROCHLOROTHIAZIDE 100MG-25MG
1 TABLET ORAL
Qty: 0 | Refills: 0 | DISCHARGE

## 2023-11-21 RX ORDER — DEXTROSE 50 % IN WATER 50 %
25 SYRINGE (ML) INTRAVENOUS ONCE
Refills: 0 | Status: DISCONTINUED | OUTPATIENT
Start: 2023-11-21 | End: 2023-11-23

## 2023-11-21 RX ORDER — GABAPENTIN 400 MG/1
100 CAPSULE ORAL AT BEDTIME
Refills: 0 | Status: DISCONTINUED | OUTPATIENT
Start: 2023-11-21 | End: 2023-11-23

## 2023-11-21 RX ORDER — INSULIN LISPRO 100/ML
VIAL (ML) SUBCUTANEOUS AT BEDTIME
Refills: 0 | Status: DISCONTINUED | OUTPATIENT
Start: 2023-11-21 | End: 2023-11-23

## 2023-11-21 RX ORDER — SENNA PLUS 8.6 MG/1
2 TABLET ORAL AT BEDTIME
Refills: 0 | Status: DISCONTINUED | OUTPATIENT
Start: 2023-11-21 | End: 2023-11-23

## 2023-11-21 RX ORDER — ASPIRIN/CALCIUM CARB/MAGNESIUM 324 MG
325 TABLET ORAL ONCE
Refills: 0 | Status: COMPLETED | OUTPATIENT
Start: 2023-11-21 | End: 2023-11-21

## 2023-11-21 RX ORDER — DEXTROSE 50 % IN WATER 50 %
12.5 SYRINGE (ML) INTRAVENOUS ONCE
Refills: 0 | Status: DISCONTINUED | OUTPATIENT
Start: 2023-11-21 | End: 2023-11-23

## 2023-11-21 RX ADMIN — Medication 325 MILLIGRAM(S): at 17:01

## 2023-11-21 RX ADMIN — Medication 100 GRAM(S): at 23:46

## 2023-11-21 RX ADMIN — ATORVASTATIN CALCIUM 40 MILLIGRAM(S): 80 TABLET, FILM COATED ORAL at 22:09

## 2023-11-21 NOTE — ED ADULT NURSE REASSESSMENT NOTE - NS ED NURSE REASSESS COMMENT FT1
MRI not performed, pt refused states " I can't lay there, why don't you have an open MRI?" Ativan IVP ordered but pt still refused. Dr Olvera and RN at bedside and spoke with pt and explained at depth need for MRI. Pt states she will think about it tonight and reconsider in am. LUE weakness persists.  Family at bedside.

## 2023-11-21 NOTE — ED ADULT NURSE REASSESSMENT NOTE - NS ED NURSE REASSESS COMMENT FT1
Pt received A&Ox4, VSS afebrile. Pt continues with left sided weakness-neurology at bedside. MRI safety sheet filled out and faxed. Pt ordered dinner. Pt to go to ultrasound and MRI. Bed assignment pending.

## 2023-11-21 NOTE — H&P ADULT - ASSESSMENT
57 y/o F w/ PMH of HTN, OA, DM2, shingles, sciatica, p/w L sided weakness    *L-sided weakness - R/o TIA vs CVA   -CTH/Angio:  -Patient evaluated by Neurologist, Dr. Menjivar, with recommendations to start ASA 81 daily + plavix for 21 days + carotid U/S   -Neuro checks  -ASA / Statin  -MRI  -Lipid panel / A1c  -Tele monitoring  -Echo  -PT consult  -Speech and swallow consult  -Fall precautions     *DM2  -Humalog ISS  -Diabetic diet     *Hypomagnesemia  -Replete and recheck     *H/o HTN / OA / Shingles / sciatica  -C/w home meds and f/u outpatient for further management if conditions remain stable during hospitalization     *DVT ppx   -SCDs     I spent >75 mins on this encounter

## 2023-11-21 NOTE — H&P ADULT - HISTORY OF PRESENT ILLNESS
57 y/o F w/ PMH of HTN, OA, DM2, shingles, sciatica, p/w L sided weakness. Patient states she woke up Sunday AM after sleeping on her L side, and when she started walking she was leaning to the L. She felt like her LUE and LLE weaker than her R side. She went to the store and she was holding on to the cart, and she continued to feel like she was leaning to the L side. On Sunday, she felt similar as well. Today morning when she woke up she was feeling worse, states that she tried to get up from bed and ended up sliding off the bed (Denies injuries/ trauma). Patient also has trouble grasping things with L hand due to weakness. Denies facial droop, slurred speech, dysphagia, sensory deficits, HA, nausea, vomiting, abdominal pain, fever, chills, cough, runny nose, sore throat       PSH: Appendectomy, R oophorectomy, Hip surgery     Social Hx: Denies tobacco / etoh / drugs     Family Hx: Mother - HTN / DM2, father - cardiac arrest s/p blood transfusion

## 2023-11-21 NOTE — PHARMACOTHERAPY INTERVENTION NOTE - COMMENTS
Medication reconciliation completed.  Patient was unable to provide medication information, spoke to  Anatoliy at bedside and they provided current medication list; confirmed with Dr. [facility].

## 2023-11-21 NOTE — CONSULT NOTE ADULT - SUBJECTIVE AND OBJECTIVE BOX
Patient is a 58y old  Female who presents with a chief complaint of left sided weakness.    HPI:      PAST MEDICAL & SURGICAL HISTORY:  Prediabetes  now diabetes      Hypertension      Osteoarthritis      Spider veins      Obesity, Class II, BMI 35-39.9      [facility], 11/26/20-12/5/20      History of appendectomy  with right oophorecomy 2010      History of hip surgery  pinning of left hip age 12 and hardware removed age 17          FAMILY HISTORY:  Family history of type 2 diabetes mellitus (Mother)    Family history of hypertension (Mother)    FHx: anemia (Father)    Family history of cardiac arrest (Father)    Family history of gangrene (Father)  after wound to the foot        Social Hx:  Nonsmoker, no drug or alcohol use    MEDICATIONS  (STANDING):       Allergies    No Known Allergies    Intolerances        ROS: Pertinent positives in HPI, all other ROS were reviewed and are negative.      Vital Signs Last 24 Hrs  T(C): 37.1 (21 Nov 2023 13:30), Max: 37.1 (21 Nov 2023 13:30)  T(F): 98.7 (21 Nov 2023 13:30), Max: 98.7 (21 Nov 2023 13:30)  HR: 75 (21 Nov 2023 17:08) (75 - 95)  BP: 135/89 (21 Nov 2023 17:08) (135/89 - 155/77)  BP(mean): 97 (21 Nov 2023 13:30) (97 - 97)  RR: 17 (21 Nov 2023 17:08) (17 - 18)  SpO2: 99% (21 Nov 2023 17:08) (99% - 100%)    Parameters below as of 21 Nov 2023 17:08  Patient On (Oxygen Delivery Method): room air            Constitutional: awake and alert.  HEENT: PERRLA, EOMI,   Neck: Supple.  Respiratory: Breath sounds are clear bilaterally  Cardiovascular: S1 and S2, regular / irregular rhythm  Gastrointestinal: soft, nontender  Extremities:  no edema  Vascular: Caritid Bruit - no  Musculoskeletal: no joint swelling/tenderness, no abnormal movements  Skin: No rashes    Neurological exam:  HF: A x O x 3. Appropriately interactive, normal affect. Speech fluent, No Aphasia or paraphasic errors. Naming /repetition intact   CN: CRISELDA, EOMI, VFF, facial sensation normal, no NLFD, tongue midline, Palate moves equally, SCM equal bilaterally  Motor: No pronator drift, Strength 5/5 in all 4 ext, normal bulk and tone, no tremor, rigidity or bradykinesia.    Sens: Intact to light touch / PP/ VS/ JS    Reflexes: Symmetric and normal . BJ 2+, BR 2+, KJ 2+, AJ 2+, downgoing toes b/l  Coord:  No FNFA, dysmetria, LISBET intact   Gait/Balance: Normal/Cannot test    NIHSS:          Labs:   11-21    137  |  104  |  17  ----------------------------<  171<H>  3.8   |  25  |  0.99    Ca    9.6      21 Nov 2023 15:33  Mg     1.7     11-21    TPro  8.5<H>  /  Alb  3.8  /  TBili  0.6  /  DBili  x   /  AST  29  /  ALT  47  /  AlkPhos  67  11-21                              14.8   9.20  )-----------( 266      ( 21 Nov 2023 15:33 )             43.2       Radiology:    CT head 11/21/23:  Abnormal areas of low attenuation involving the high right frontal cortical and subcortical region as described above.    CTA head and neck 11/21/23:  CTA brain:  Skull base and supraclinoid right ICA is very small in caliber,   particularly at the level of the proximal cavernous segment. The   supraclinoid right ICA is somewhat small in caliber.    Small anterior communicating artery visualized.Large bilateral A1   segments.    No vascular aneurysm or AVM.    CTA neck:  Right internal carotid artery origin and proximal segment is essentially   including, which appears chronic in nature. More distal right ICA is   small in caliber and demonstrates multiple short segment stenoses.    Calcified plaque at the origin of the left internal carotid artery   results in approximately 30-35% short segment stenosis. Normal   flow-related enhancement of the more distal vessel.   Patient is a 58y old  Female who presents with a chief complaint of left sided weakness.    HPI: Ms. Landry is a 58 year old woman right handed woman with HTN, prediabetes who presents with left sided weakness.  She reports chronic difficulty of the left lower extremity due to hip issues and sciatica.   On 11/19 she noticed that she was veering to the left when walking (although she did not feel dizzy) and noted left arm weakness in addition to her left leg weakness.  When symptoms got worse she came to the ED.      PAST MEDICAL & SURGICAL HISTORY:  Prediabetes  now diabetes      Hypertension      Osteoarthritis      Spider veins      Obesity, Class II, BMI 35-39.9      [facility], 11/26/20-12/5/20      History of appendectomy  with right oophorecomy 2010      History of hip surgery  pinning of left hip age 12 and hardware removed age 17          FAMILY HISTORY:  Family history of type 2 diabetes mellitus (Mother)    Family history of hypertension (Mother)    FHx: anemia (Father)    Family history of cardiac arrest (Father)    Family history of gangrene (Father)  after wound to the foot        Social Hx:  Nonsmoker, no drug or alcohol use    MEDICATIONS  (STANDING):  Metformin 1000 mg BID  Gabapentin 100 mg hs - recently stopped taking and then restarted again  Losartan/HCTZ 100/25 mg daily       Allergies    No Known Allergies    Intolerances        ROS: Pertinent positives in HPI, all other ROS were reviewed and are negative.      Vital Signs Last 24 Hrs  T(C): 37.1 (21 Nov 2023 13:30), Max: 37.1 (21 Nov 2023 13:30)  T(F): 98.7 (21 Nov 2023 13:30), Max: 98.7 (21 Nov 2023 13:30)  HR: 75 (21 Nov 2023 17:08) (75 - 95)  BP: 135/89 (21 Nov 2023 17:08) (135/89 - 155/77)  BP(mean): 97 (21 Nov 2023 13:30) (97 - 97)  RR: 17 (21 Nov 2023 17:08) (17 - 18)  SpO2: 99% (21 Nov 2023 17:08) (99% - 100%)    Parameters below as of 21 Nov 2023 17:08  Patient On (Oxygen Delivery Method): room air            Constitutional: awake and alert.  HEENT: PERRLA, EOMI,   Neck: Supple.  Respiratory: Breath sounds are clear bilaterally  Cardiovascular: S1 and S2, regular / irregular rhythm  Gastrointestinal: soft, nontender  Extremities:  no edema  Musculoskeletal: no joint swelling/tenderness, no abnormal movements  Skin: No rashes    Neurological exam:  HF: A x O x 3. Appropriately interactive, normal affect. Speech fluent, No Aphasia or paraphasic errors. Naming /repetition intact   CN: CRISELDA, EOMI, VFF, facial sensation normal, no NLFD, tongue midline, Palate moves equally, SCM equal bilaterally  Motor: + left pronator drift, 5/5 in RUE, 5/5 in proximal LUE, mild weakness in left hand, 5/5 in b/l lower extremities  Sens: Intact to light touch   Reflexes: Symmetric and normal . BJ 1+, BR 1+, KJ 1+, downgoing toes b/l  Coord:  mild dysmetria with left finger to nose, heel to shin intact b/l but requires more effort on the left  Gait/Balance: Not tested    NIHSS: 2 (for left upper extremity drift and dysmetria)          Labs:   11-21    137  |  104  |  17  ----------------------------<  171<H>  3.8   |  25  |  0.99    Ca    9.6      21 Nov 2023 15:33  Mg     1.7     11-21    TPro  8.5<H>  /  Alb  3.8  /  TBili  0.6  /  DBili  x   /  AST  29  /  ALT  47  /  AlkPhos  67  11-21                              14.8   9.20  )-----------( 266      ( 21 Nov 2023 15:33 )             43.2       Radiology:    CT head 11/21/23:  Abnormal areas of low attenuation involving the high right frontal cortical and subcortical region as described above.    CTA head and neck 11/21/23:  CTA brain:  Skull base and supraclinoid right ICA is very small in caliber,   particularly at the level of the proximal cavernous segment. The   supraclinoid right ICA is somewhat small in caliber.    Small anterior communicating artery visualized.Large bilateral A1   segments.    No vascular aneurysm or AVM.    CTA neck:  Right internal carotid artery origin and proximal segment is essentially   including, which appears chronic in nature. More distal right ICA is   small in caliber and demonstrates multiple short segment stenoses.    Calcified plaque at the origin of the left internal carotid artery   results in approximately 30-35% short segment stenosis. Normal   flow-related enhancement of the more distal vessel.

## 2023-11-21 NOTE — H&P ADULT - NEUROLOGICAL
+Mild LUE / LLE weakness compared to R side. +Decreased grasping ability in L hand compared to R/cranial nerves II-XII intact/responds to pain/responds to verbal commands

## 2023-11-21 NOTE — ED PROVIDER NOTE - CLINICAL SUMMARY MEDICAL DECISION MAKING FREE TEXT BOX
59 yo female presents to the ED with L sided weakness since 11/19. Pt overall well appearing in NAD. Exam notable for decreased strength in LUE and dysmetria LUE, remainder of exam reassuring. Pt out of code stroke window, but concern for CVA based on symptoms. Will obtain ct head, cta head/neck, screening labs, ekg, and likely admission for further stroke work up. Will reassess the need for additional interventions as clinically warranted. Refer to any progress notes for updates on clinical course and as a continuation of this MDM. 57 yo female presents to the ED with L sided weakness since 11/19. Pt overall well appearing in NAD. Exam notable for decreased strength in LUE and dysmetria LUE, remainder of neuro exam reassuring. Pt out of code stroke window, but concern for CVA based on symptoms. Eval for metabolic abnormalities. Will obtain ct head, cta head/neck, screening labs, ekg, and likely admission for further stroke work up. Will reassess the need for additional interventions as clinically warranted. Refer to any progress notes for updates on clinical course and as a continuation of this MDM.

## 2023-11-21 NOTE — ED ADULT TRIAGE NOTE - CHIEF COMPLAINT QUOTE
Pt presents to the ED c/o left-sided weakness since Sunday morning, worsening today. Denies vision changes. No facial droop or slurred speech noted. PMH of sciatica, left hip replacement, HTN, and DM. No medication PTA.

## 2023-11-21 NOTE — ED STATDOCS - NSICDXPASTMEDICALHX_GEN_ALL_CORE_FT
PAST MEDICAL HISTORY:  Hypertension     Obesity, Class II, BMI 35-39.9     Osteoarthritis     Prediabetes now diabetes    [facility], 11/26/20-12/5/20    Spider veins

## 2023-11-21 NOTE — ED PROVIDER NOTE - OBJECTIVE STATEMENT
57 yo female w/PMHx sciatica, left hip replacement, HTN, shingles, osteoarthritis and prediabetes and SHx of appendectomy presents to the ED c/o left-sided weakness since Sunday morning, worsening today. Pt reports she has had a L hip replacement, and suspects her symptoms might be due to the hip replacement combined with her sciatics. Pt also endorsing L shoulder weakness, and pain when lifting up her L arm. Pt was unable to get out of bed this morning due to L leg weakness. No recent falls, not on blood thinners. 57 yo female w/ PMHx sciatica, left hip replacement, HTN, shingles, osteoarthritis and prediabetes and SHx of appendectomy presents to the ED c/o left-sided weakness since Sunday morning, worsening today. Pt reports she has had a L hip replacement, and suspects her symptoms might be due to the hip replacement combined with her sciatica. Pt also endorsing L shoulder weakness, and pain when lifting up her L arm. Pt was unable to get out of bed this morning due to L leg weakness.  assisted pt out of bed, pt reported feeling unsteady like she was falling off to the side. No recent falls, not on blood thinners. Denies fevers, chills, headache, dizziness, blurred vision, chest pain, cough, shortness of breath, abdominal pain, n/v/d/c, urinary symptoms, rash.

## 2023-11-21 NOTE — ED ADULT NURSE NOTE - NSICDXPASTMEDICALHX_GEN_ALL_CORE_FT
PAST MEDICAL HISTORY:  Hypertension     Obesity, Class II, BMI 35-39.9     Osteoarthritis     Prediabetes now diabetes    [facility], 11/26/20-12/5/20    Spider veins

## 2023-11-21 NOTE — ED PROVIDER NOTE - NSICDXPASTMEDICALHX_GEN_ALL_CORE_FT
PAST MEDICAL HISTORY:  Hypertension     Obesity, Class II, BMI 35-39.9     Osteoarthritis     Prediabetes now diabetes    [facility], 11/26/20-12/5/20    Spider veins

## 2023-11-21 NOTE — ED STATDOCS - PROGRESS NOTE DETAILS
Broderick Martinez for Dr. Lau  57 y/o F with PMHx of sciatica, left hip replacement, HTN, shingles, osteoarthritis and DM and SHx of appendectomy to the ED c/o left-sided weakness since Sunday morning, worsening today. Denies vision changes. No facial droop or slurred speech noted. No medication PTA. Pt's  says that sometimes when she is walking her leg gives out. Denies tingling in her face. Pt not on AC. Pt on Metformin.  LUE and LLQ weakness , pronator drift on LUE , question l facial asymmetry, sensation intact. Sending to main for further eval for risk of stroke. Pt with sx on sunday. Not a code stroke candidate or TNK candidate.

## 2023-11-21 NOTE — ED PROVIDER NOTE - PROGRESS NOTE DETAILS
Attending Christopher: CTs w/ chronic findings, no acute CVA on CTs. aspirin given. consulted neuro to see pt. endorsed to hospitalist Dr. Figueroa. updated pt and , agreeable w/ admission.

## 2023-11-21 NOTE — ED ADULT NURSE NOTE - NSFALLRISKINTERV_ED_ALL_ED

## 2023-11-21 NOTE — ED ADULT NURSE NOTE - OBJECTIVE STATEMENT
Pt is AOx4 from home c/o left sided weakness beginning Sunday becoming progressively worse today, was unable to go upstairs, and was falling toward left side w/ ambulation. Upon initial assessment pt has left shoulder and arm weakness. PMHx left hip replacement and sciatica. Utilizes cane at baseline.  Denies falls, facial droop, slurred speech, aphasia or blood thinner use.

## 2023-11-21 NOTE — ED PROVIDER NOTE - NS_ ATTENDINGSCRIBEDETAILS _ED_A_ED_FT
Attending Christopher: The scribe's documentation has been prepared under my direction and personally reviewed by me in its entirety. I confirm that the note above accurately reflects all work, treatment, procedures, and medical decision making performed by me.

## 2023-11-21 NOTE — ED STATDOCS - DR. NAME
Received a phone call from PCP.   Patient has hx of being intolerant to atorvastatin. Had lower ext swelling? Therefore has not been taking statin.  Her most recent lipid panel shows:   Total cholesterol of 272  Triglycerides 203  HDL 51  LDL of 180    We can try crestor 5 mg po daily x 2 weeks then increase to 10 mg po daily then repeat lipid panel and cmp in 8 weeks. She can let us know how she tolerates.     If she does not tolerate would then try injectable. If she wants to go right to the injectable please check with insurance to see what we need to do to get this covered for her.     Thank you   Lau

## 2023-11-22 LAB
A1C WITH ESTIMATED AVERAGE GLUCOSE RESULT: 7.6 % — HIGH (ref 4–5.6)
A1C WITH ESTIMATED AVERAGE GLUCOSE RESULT: 7.6 % — HIGH (ref 4–5.6)
A1C WITH ESTIMATED AVERAGE GLUCOSE RESULT: 7.7 % — HIGH (ref 4–5.6)
A1C WITH ESTIMATED AVERAGE GLUCOSE RESULT: 7.7 % — HIGH (ref 4–5.6)
ANION GAP SERPL CALC-SCNC: 7 MMOL/L — SIGNIFICANT CHANGE UP (ref 5–17)
ANION GAP SERPL CALC-SCNC: 7 MMOL/L — SIGNIFICANT CHANGE UP (ref 5–17)
BUN SERPL-MCNC: 20 MG/DL — SIGNIFICANT CHANGE UP (ref 7–23)
BUN SERPL-MCNC: 20 MG/DL — SIGNIFICANT CHANGE UP (ref 7–23)
CALCIUM SERPL-MCNC: 9.3 MG/DL — SIGNIFICANT CHANGE UP (ref 8.5–10.1)
CALCIUM SERPL-MCNC: 9.3 MG/DL — SIGNIFICANT CHANGE UP (ref 8.5–10.1)
CHLORIDE SERPL-SCNC: 107 MMOL/L — SIGNIFICANT CHANGE UP (ref 96–108)
CHLORIDE SERPL-SCNC: 107 MMOL/L — SIGNIFICANT CHANGE UP (ref 96–108)
CHOLEST SERPL-MCNC: 196 MG/DL — SIGNIFICANT CHANGE UP
CHOLEST SERPL-MCNC: 196 MG/DL — SIGNIFICANT CHANGE UP
CO2 SERPL-SCNC: 25 MMOL/L — SIGNIFICANT CHANGE UP (ref 22–31)
CO2 SERPL-SCNC: 25 MMOL/L — SIGNIFICANT CHANGE UP (ref 22–31)
CREAT SERPL-MCNC: 0.9 MG/DL — SIGNIFICANT CHANGE UP (ref 0.5–1.3)
CREAT SERPL-MCNC: 0.9 MG/DL — SIGNIFICANT CHANGE UP (ref 0.5–1.3)
EGFR: 75 ML/MIN/1.73M2 — SIGNIFICANT CHANGE UP
EGFR: 75 ML/MIN/1.73M2 — SIGNIFICANT CHANGE UP
ESTIMATED AVERAGE GLUCOSE: 171 MG/DL — HIGH (ref 68–114)
ESTIMATED AVERAGE GLUCOSE: 171 MG/DL — HIGH (ref 68–114)
ESTIMATED AVERAGE GLUCOSE: 174 MG/DL — HIGH (ref 68–114)
ESTIMATED AVERAGE GLUCOSE: 174 MG/DL — HIGH (ref 68–114)
GLUCOSE BLDC GLUCOMTR-MCNC: 156 MG/DL — HIGH (ref 70–99)
GLUCOSE BLDC GLUCOMTR-MCNC: 156 MG/DL — HIGH (ref 70–99)
GLUCOSE BLDC GLUCOMTR-MCNC: 180 MG/DL — HIGH (ref 70–99)
GLUCOSE BLDC GLUCOMTR-MCNC: 180 MG/DL — HIGH (ref 70–99)
GLUCOSE BLDC GLUCOMTR-MCNC: 189 MG/DL — HIGH (ref 70–99)
GLUCOSE BLDC GLUCOMTR-MCNC: 189 MG/DL — HIGH (ref 70–99)
GLUCOSE BLDC GLUCOMTR-MCNC: 213 MG/DL — HIGH (ref 70–99)
GLUCOSE BLDC GLUCOMTR-MCNC: 213 MG/DL — HIGH (ref 70–99)
GLUCOSE SERPL-MCNC: 182 MG/DL — HIGH (ref 70–99)
GLUCOSE SERPL-MCNC: 182 MG/DL — HIGH (ref 70–99)
HDLC SERPL-MCNC: 58 MG/DL — SIGNIFICANT CHANGE UP
HDLC SERPL-MCNC: 58 MG/DL — SIGNIFICANT CHANGE UP
LIPID PNL WITH DIRECT LDL SERPL: 116 MG/DL — HIGH
LIPID PNL WITH DIRECT LDL SERPL: 116 MG/DL — HIGH
MAGNESIUM SERPL-MCNC: 2.1 MG/DL — SIGNIFICANT CHANGE UP (ref 1.6–2.6)
MAGNESIUM SERPL-MCNC: 2.1 MG/DL — SIGNIFICANT CHANGE UP (ref 1.6–2.6)
NON HDL CHOLESTEROL: 138 MG/DL — HIGH
NON HDL CHOLESTEROL: 138 MG/DL — HIGH
POTASSIUM SERPL-MCNC: 3.6 MMOL/L — SIGNIFICANT CHANGE UP (ref 3.5–5.3)
POTASSIUM SERPL-MCNC: 3.6 MMOL/L — SIGNIFICANT CHANGE UP (ref 3.5–5.3)
POTASSIUM SERPL-SCNC: 3.6 MMOL/L — SIGNIFICANT CHANGE UP (ref 3.5–5.3)
POTASSIUM SERPL-SCNC: 3.6 MMOL/L — SIGNIFICANT CHANGE UP (ref 3.5–5.3)
SODIUM SERPL-SCNC: 139 MMOL/L — SIGNIFICANT CHANGE UP (ref 135–145)
SODIUM SERPL-SCNC: 139 MMOL/L — SIGNIFICANT CHANGE UP (ref 135–145)
TRIGL SERPL-MCNC: 125 MG/DL — SIGNIFICANT CHANGE UP
TRIGL SERPL-MCNC: 125 MG/DL — SIGNIFICANT CHANGE UP

## 2023-11-22 PROCEDURE — 70551 MRI BRAIN STEM W/O DYE: CPT | Mod: 26

## 2023-11-22 PROCEDURE — 99233 SBSQ HOSP IP/OBS HIGH 50: CPT

## 2023-11-22 PROCEDURE — 93306 TTE W/DOPPLER COMPLETE: CPT | Mod: 26

## 2023-11-22 PROCEDURE — 99232 SBSQ HOSP IP/OBS MODERATE 35: CPT

## 2023-11-22 RX ORDER — ENOXAPARIN SODIUM 100 MG/ML
40 INJECTION SUBCUTANEOUS EVERY 24 HOURS
Refills: 0 | Status: DISCONTINUED | OUTPATIENT
Start: 2023-11-22 | End: 2023-11-23

## 2023-11-22 RX ORDER — ALPRAZOLAM 0.25 MG
0.5 TABLET ORAL EVERY 8 HOURS
Refills: 0 | Status: DISCONTINUED | OUTPATIENT
Start: 2023-11-22 | End: 2023-11-23

## 2023-11-22 RX ADMIN — Medication 1: at 17:23

## 2023-11-22 RX ADMIN — LOSARTAN POTASSIUM 100 MILLIGRAM(S): 100 TABLET, FILM COATED ORAL at 09:19

## 2023-11-22 RX ADMIN — Medication 1: at 08:34

## 2023-11-22 RX ADMIN — Medication 1 MILLIGRAM(S): at 12:38

## 2023-11-22 RX ADMIN — ATORVASTATIN CALCIUM 40 MILLIGRAM(S): 80 TABLET, FILM COATED ORAL at 21:57

## 2023-11-22 RX ADMIN — ENOXAPARIN SODIUM 40 MILLIGRAM(S): 100 INJECTION SUBCUTANEOUS at 17:24

## 2023-11-22 RX ADMIN — Medication 2: at 12:03

## 2023-11-22 RX ADMIN — CLOPIDOGREL BISULFATE 75 MILLIGRAM(S): 75 TABLET, FILM COATED ORAL at 09:19

## 2023-11-22 RX ADMIN — Medication 0.5 MILLIGRAM(S): at 21:57

## 2023-11-22 RX ADMIN — Medication 81 MILLIGRAM(S): at 09:19

## 2023-11-22 NOTE — PHYSICAL THERAPY INITIAL EVALUATION ADULT - PERTINENT HX OF CURRENT PROBLEM, REHAB EVAL
presents with left sided weakness since 11/19/23.  CT head shows low attenuation in the high right frontal and subcortical regions. This may represent subacute infarcts. MRI done, results pending. presents with left sided weakness since 11/19/23.  CT head shows low attenuation in the high right frontal and subcortical regions. This may represent subacute infarcts. MRI done, results pending.  found w/ high grade right ICA disease-neurol rec. vasc cx.

## 2023-11-22 NOTE — SWALLOW BEDSIDE ASSESSMENT ADULT - COMMENTS
57 y/o F w/ PMH of HTN, OA, DM2, shingles, sciatica, p/w L sided weakness. Patient states she woke up Sunday AM after sleeping on her L side, and when she started walking she was leaning to the L. She felt like her LUE and LLE weaker than her R side. She went to the store and she was holding on to the cart, and she continued to feel like she was leaning to the L side. On Sunday, she felt similar as well. Today morning when she woke up she was feeling worse, states that she tried to get up from bed and ended up sliding off the bed (Denies injuries/ trauma). Patient also has trouble grasping things with L hand due to weakness. Denies facial droop, slurred speech, dysphagia, sensory deficits, HA, nausea, vomiting, abdominal pain, fever, chills, cough, runny nose, sore throat.    Service is consulted for po intake.

## 2023-11-22 NOTE — PHYSICAL THERAPY INITIAL EVALUATION ADULT - ACTIVE RANGE OF MOTION EXAMINATION, REHAB EVAL
dec coord. LUE/bilateral upper extremity Active ROM was WFL (within functional limits)/bilateral  lower extremity Active ROM was WFL (within functional limits)

## 2023-11-22 NOTE — PHYSICAL THERAPY INITIAL EVALUATION ADULT - GENERAL OBSERVATIONS, REHAB EVAL
pt rec'd supine in bed on 3N, HM, family present. pt pleasant/cooperative. talkative, little tangential, seems fairly non-pulsed by present symptoms until PT recommended acute rehab.

## 2023-11-22 NOTE — SWALLOW BEDSIDE ASSESSMENT ADULT - ORAL PHASE
Call Center TCM Note      Responses   [facility] patient discharged from?  [facility]   Does the patient have one of the following disease processes/diagnoses(primary or secondary)?  General Surgery   TCM attempt successful?  Yes   Call start time  1027   Call end time  1030   Discharge diagnosis  MICRA PACEMAKER IMPLANTATION + AVN   Medication alerts for this patient  eliquis   Meds reviewed with patient/caregiver?  Yes   Is the patient having any side effects they believe may be caused by any medication additions or changes?  No   Does the patient have all medications related to this admission filled (includes all antibiotics, pain medications, etc.)  Yes   Is the patient taking all medications as directed (includes completed medication regime)?  Yes   Does the patient have a follow up appointment scheduled with their surgeon?  Yes   Has the patient kept scheduled appointments due by today?  N/A   Comments  f/u with Dr. Denney on 10/7   Psychosocial issues?  No   Did the patient receive a copy of their discharge instructions?  Yes   Nursing interventions  Reviewed instructions with patient   What is the patient's perception of their health status since discharge?  Improving   Nursing interventions  Nurse provided patient education   Is the patient /caregiver able to teach back basic post-op care?  Continue use of incentive spirometry at least 1 week post discharge, Practice 'cough and deep breath', Drive as instructed by MD in discharge instructions, Take showers only when approved by MD-sponge bathe until then, No tub bath, swimming, or hot tub until instructed by MD, Keep incision areas clean,dry and protected, Do not remove steri-strips, Lifting as instructed by MD in discharge instructions   Is the patient/caregiver able to teach back signs and symptoms of incisional infection?  Fever   Is the patient/caregiver able to teach back the hierarchy of who to call/visit for symptoms/problems? PCP, Specialist,  Home health nurse, Urgent Care, ED, 911  Yes   TCM call completed?  Yes   Wrap up additional comments  Patient says she is doing good, she is following her discharge instructions closely, no questions or concerns at this time.          Cristiana Roman RN    10/2/2020, 10:30 EDT       Within functional limits

## 2023-11-22 NOTE — PHYSICAL THERAPY INITIAL EVALUATION ADULT - MODALITIES TREATMENT COMMENTS
LISBET impaired, finger /thumb opposition L impaired (although pt notes some chr difficulty d/t CTS, ie texts using ring finger), heel to shin impaired L

## 2023-11-22 NOTE — PROGRESS NOTE ADULT - SUBJECTIVE AND OBJECTIVE BOX
Interval History:  11/22/23: No new complaints. She attempted MRI last night but became anxious.    MEDICATIONS  (STANDING):  aspirin enteric coated 81 milliGRAM(s) Oral daily  atorvastatin 40 milliGRAM(s) Oral at bedtime  clopidogrel Tablet 75 milliGRAM(s) Oral daily  dextrose 5%. 1000 milliLiter(s) (50 mL/Hr) IV Continuous <Continuous>  dextrose 5%. 1000 milliLiter(s) (100 mL/Hr) IV Continuous <Continuous>  dextrose 50% Injectable 25 Gram(s) IV Push once  dextrose 50% Injectable 25 Gram(s) IV Push once  dextrose 50% Injectable 12.5 Gram(s) IV Push once  glucagon  Injectable 1 milliGRAM(s) IntraMuscular once  hydrochlorothiazide 25 milliGRAM(s) Oral daily  insulin lispro (ADMELOG) corrective regimen sliding scale   SubCutaneous at bedtime  insulin lispro (ADMELOG) corrective regimen sliding scale   SubCutaneous three times a day before meals  losartan 100 milliGRAM(s) Oral daily    MEDICATIONS  (PRN):  dextrose Oral Gel 15 Gram(s) Oral once PRN Blood Glucose LESS THAN 70 milliGRAM(s)/deciliter  gabapentin 100 milliGRAM(s) Oral at bedtime PRN neuropathy  LORazepam   Injectable 0.5 milliGRAM(s) IV Push once PRN Anxiety  LORazepam   Injectable 1 milliGRAM(s) IV Push once PRN anxiety for MRI  polyethylene glycol 3350 17 Gram(s) Oral at bedtime PRN for constipation  senna 2 Tablet(s) Oral at bedtime PRN for constipation      Allergies    No Known Allergies    Intolerances        PHYSICAL EXAM:  Vital Signs Last 24 Hrs  T(F): 98.3 (11-22-23 @ 09:08)  HR: 73 (11-22-23 @ 09:08)  BP: 135/70 (11-22-23 @ 09:08)  RR: 18 (11-22-23 @ 09:08)    GENERAL: NAD, well-groomed  HEAD:  Atraumatic, Normocephalic  Neuro:  Awake, alert, no aphasia  CN: PERRL, EOMI, no nystagmus, no facial weakness, tongue protrudes in the midline  motor: normal tone, left pronator drift otherwise strength intact  sensory: intact to light touch  coordination: + dysmetria on left finger to nose  gait: not tested    LABS:                        14.8   9.20  )-----------( 266      ( 21 Nov 2023 15:33 )             43.2     11-22    139  |  107  |  20  ----------------------------<  182<H>  3.6   |  25  |  0.90    Ca    9.3      22 Nov 2023 07:24  Mg     2.1     11-22    TPro  8.5<H>  /  Alb  3.8  /  TBili  0.6  /  DBili  x   /  AST  29  /  ALT  47  /  AlkPhos  67  11-21    PT/INR - ( 21 Nov 2023 15:33 )   PT: 11.0 sec;   INR: 0.97 ratio         PTT - ( 21 Nov 2023 15:33 )  PTT:24.6 sec  Urinalysis Basic - ( 22 Nov 2023 07:24 )    Color: x / Appearance: x / SG: x / pH: x  Gluc: 182 mg/dL / Ketone: x  / Bili: x / Urobili: x   Blood: x / Protein: x / Nitrite: x   Leuk Esterase: x / RBC: x / WBC x   Sq Epi: x / Non Sq Epi: x / Bacteria: x        RADIOLOGY & ADDITIONAL STUDIES:    CT head 11/21/23:  Abnormal areas of low attenuation involving the high right frontal cortical and subcortical region as described above.    CTA head and neck 11/21/23:  CTA brain:  Skull base and supraclinoid right ICA is very small in caliber,   particularly at the level of the proximal cavernous segment. The   supraclinoid right ICA is somewhat small in caliber.    Small anterior communicating artery visualized.Large bilateral A1   segments.    No vascular aneurysm or AVM.    CTA neck:  Right internal carotid artery origin and proximal segment is essentially   including, which appears chronic in nature. More distal right ICA is   small in caliber and demonstrates multiple short segment stenoses.    Calcified plaque at the origin of the left internal carotid artery   results in approximately 30-35% short segment stenosis. Normal   flow-related enhancement of the more distal vessel.    carotid ultrasound 11/21/23:  RIGHT: Pre-obstructive pattern of flow at the right distal common carotid   artery. Plaque at the right carotid bulb with apparent string sign on   image 1.13, which may correspond to recent CTA findings, reflecting high   grade right ICA disease. Though, there is antegrade flow along   interrogated portions of the right ICA. Correlate with recent CTA   findings.    LEFT: Plaque of the left carotid bulb extending into internal and   external branches without hemodynamically significant stenosis.

## 2023-11-22 NOTE — SWALLOW BEDSIDE ASSESSMENT ADULT - SWALLOW EVAL: DIAGNOSIS
oral-pharyngeal swallow skills are normal for  regular texture diet. No dysphagia; No dysarthria, no evidence of primary linguistic pathology.

## 2023-11-22 NOTE — PATIENT PROFILE ADULT - FALL HARM RISK - HARM RISK INTERVENTIONS

## 2023-11-22 NOTE — PROGRESS NOTE ADULT - SUBJECTIVE AND OBJECTIVE BOX
CHIEF COMPLAINT/INTERVAL HISTORY:    Patient is a 58y old  Female who presents with a chief complaint of L sided weakness (21 Nov 2023 19:52)      HPI:  59 y/o F w/ PMH of HTN, OA, DM2, shingles, sciatica, p/w L sided weakness. Patient states she woke up Sunday AM after sleeping on her L side, and when she started walking she was leaning to the L. She felt like her LUE and LLE weaker than her R side. She went to the store and she was holding on to the cart, and she continued to feel like she was leaning to the L side. On Sunday, she felt similar as well. Today morning when she woke up she was feeling worse, states that she tried to get up from bed and ended up sliding off the bed (Denies injuries/ trauma). Patient also has trouble grasping things with L hand due to weakness. Denies facial droop, slurred speech, dysphagia, sensory deficits, HA, nausea, vomiting, abdominal pain, fever, chills, cough, runny nose, sore throat       PSH: Appendectomy, R oophorectomy, Hip surgery     Social Hx: Denies tobacco / etoh / drugs     Family Hx: Mother - HTN / DM2, father - cardiac arrest s/p blood transfusion    (21 Nov 2023 19:52)      SUBJECTIVE & OBJECTIVE: Pt seen and examined at bedside.     ICU Vital Signs Last 24 Hrs  T(C): 36.8 (22 Nov 2023 01:30), Max: 37.1 (21 Nov 2023 13:30)  T(F): 98.2 (22 Nov 2023 01:30), Max: 98.7 (21 Nov 2023 13:30)  HR: 84 (22 Nov 2023 01:30) (75 - 95)  BP: 146/75 (22 Nov 2023 01:30) (126/72 - 159/81)  BP(mean): 87 (21 Nov 2023 23:18) (87 - 98)  ABP: --  ABP(mean): --  RR: 18 (22 Nov 2023 01:30) (17 - 20)  SpO2: 100% (22 Nov 2023 01:30) (98% - 100%)    O2 Parameters below as of 22 Nov 2023 01:30  Patient On (Oxygen Delivery Method): room air              MEDICATIONS  (STANDING):  aspirin enteric coated 81 milliGRAM(s) Oral daily  atorvastatin 40 milliGRAM(s) Oral at bedtime  clopidogrel Tablet 75 milliGRAM(s) Oral daily  dextrose 5%. 1000 milliLiter(s) (50 mL/Hr) IV Continuous <Continuous>  dextrose 5%. 1000 milliLiter(s) (100 mL/Hr) IV Continuous <Continuous>  dextrose 50% Injectable 25 Gram(s) IV Push once  dextrose 50% Injectable 25 Gram(s) IV Push once  dextrose 50% Injectable 12.5 Gram(s) IV Push once  glucagon  Injectable 1 milliGRAM(s) IntraMuscular once  hydrochlorothiazide 25 milliGRAM(s) Oral daily  insulin lispro (ADMELOG) corrective regimen sliding scale   SubCutaneous at bedtime  insulin lispro (ADMELOG) corrective regimen sliding scale   SubCutaneous three times a day before meals  losartan 100 milliGRAM(s) Oral daily    MEDICATIONS  (PRN):  dextrose Oral Gel 15 Gram(s) Oral once PRN Blood Glucose LESS THAN 70 milliGRAM(s)/deciliter  gabapentin 100 milliGRAM(s) Oral at bedtime PRN neuropathy  LORazepam   Injectable 0.5 milliGRAM(s) IV Push once PRN Anxiety  polyethylene glycol 3350 17 Gram(s) Oral at bedtime PRN for constipation  senna 2 Tablet(s) Oral at bedtime PRN for constipation        PHYSICAL EXAM:      NERVOUS SYSTEM:  Alert & Oriented X3, Motor Strength 5/5 B/L upper and lower extremities; DTRs 2+ intact and symmetric  CHEST/LUNG: Clear to auscultation bilaterally; No rales, rhonchi, wheezing, or rubs  HEART: Regular rate and rhythm; No murmurs, rubs, or gallops  ABDOMEN: Soft, Nontender, Nondistended; Bowel sounds present  EXTREMITIES:  2+ Peripheral Pulses, No clubbing, cyanosis, or edema    LABS:                        14.8   9.20  )-----------( 266      ( 21 Nov 2023 15:33 )             43.2     11-22    139  |  107  |  20  ----------------------------<  182<H>  3.6   |  25  |  0.90    Ca    9.3      22 Nov 2023 07:24  Mg     2.1     11-22    TPro  8.5<H>  /  Alb  3.8  /  TBili  0.6  /  DBili  x   /  AST  29  /  ALT  47  /  AlkPhos  67  11-21    PT/INR - ( 21 Nov 2023 15:33 )   PT: 11.0 sec;   INR: 0.97 ratio         PTT - ( 21 Nov 2023 15:33 )  PTT:24.6 sec  Urinalysis Basic - ( 22 Nov 2023 07:24 )    Color: x / Appearance: x / SG: x / pH: x  Gluc: 182 mg/dL / Ketone: x  / Bili: x / Urobili: x   Blood: x / Protein: x / Nitrite: x   Leuk Esterase: x / RBC: x / WBC x   Sq Epi: x / Non Sq Epi: x / Bacteria: x        CAPILLARY BLOOD GLUCOSE      POCT Blood Glucose.: 189 mg/dL (22 Nov 2023 08:28)  POCT Blood Glucose.: 214 mg/dL (21 Nov 2023 22:21)      RECENT CULTURES:      RADIOLOGY & ADDITIONAL TESTS: personally reviewed    59 y/o F w/ PMH of HTN, OA, DM2, shingles, sciatica, p/w L sided weakness    *L-sided weakness - R/o TIA vs CVA   -CTH/Angio:  -Patient evaluated by Neurologist, Dr. Menjivar, with recommendations to start ASA 81 daily + plavix for 21 days + carotid U/S   -Neuro checks  -ASA / Statin  -MRI  -Lipid panel / A1c  -Tele monitoring  -Echo  -PT consult  -Speech and swallow consult  -Fall precautions     *DM2  -Humalog ISS  -Diabetic diet     *Hypomagnesemia  -Replete and recheck     *H/o HTN / OA / Shingles / sciatica  -C/w home meds and f/u outpatient for further management if conditions remain stable during hospitalization     *DVT ppx   -SCDs             time spent:      CHIEF COMPLAINT/INTERVAL HISTORY:    Patient is a 58y old  Female who presents with a chief complaint of L sided weakness (21 Nov 2023 19:52)      HPI:  57 y/o F w/ PMH of HTN, OA, DM2, shingles, sciatica, p/w L sided weakness. Patient states she woke up Sunday AM after sleeping on her L side, and when she started walking she was leaning to the L. She felt like her LUE and LLE weaker than her R side. She went to the store and she was holding on to the cart, and she continued to feel like she was leaning to the L side. On Sunday, she felt similar as well. Today morning when she woke up she was feeling worse, states that she tried to get up from bed and ended up sliding off the bed (Denies injuries/ trauma). Patient also has trouble grasping things with L hand due to weakness. Denies facial droop, slurred speech, dysphagia, sensory deficits, HA, nausea, vomiting, abdominal pain, fever, chills, cough, runny nose, sore throat         MRI + , tight ICA stenosis on the right also    ICU Vital Signs Last 24 Hrs  T(C): 36.8 (22 Nov 2023 01:30), Max: 37.1 (21 Nov 2023 13:30)  T(F): 98.2 (22 Nov 2023 01:30), Max: 98.7 (21 Nov 2023 13:30)  HR: 84 (22 Nov 2023 01:30) (75 - 95)  BP: 146/75 (22 Nov 2023 01:30) (126/72 - 159/81)  BP(mean): 87 (21 Nov 2023 23:18) (87 - 98)  ABP: --  ABP(mean): --  RR: 18 (22 Nov 2023 01:30) (17 - 20)  SpO2: 100% (22 Nov 2023 01:30) (98% - 100%)    O2 Parameters below as of 22 Nov 2023 01:30  Patient On (Oxygen Delivery Method): room air              MEDICATIONS  (STANDING):  aspirin enteric coated 81 milliGRAM(s) Oral daily  atorvastatin 40 milliGRAM(s) Oral at bedtime  clopidogrel Tablet 75 milliGRAM(s) Oral daily  dextrose 5%. 1000 milliLiter(s) (50 mL/Hr) IV Continuous <Continuous>  dextrose 5%. 1000 milliLiter(s) (100 mL/Hr) IV Continuous <Continuous>  dextrose 50% Injectable 25 Gram(s) IV Push once  dextrose 50% Injectable 25 Gram(s) IV Push once  dextrose 50% Injectable 12.5 Gram(s) IV Push once  glucagon  Injectable 1 milliGRAM(s) IntraMuscular once  hydrochlorothiazide 25 milliGRAM(s) Oral daily  insulin lispro (ADMELOG) corrective regimen sliding scale   SubCutaneous at bedtime  insulin lispro (ADMELOG) corrective regimen sliding scale   SubCutaneous three times a day before meals  losartan 100 milliGRAM(s) Oral daily    MEDICATIONS  (PRN):  dextrose Oral Gel 15 Gram(s) Oral once PRN Blood Glucose LESS THAN 70 milliGRAM(s)/deciliter  gabapentin 100 milliGRAM(s) Oral at bedtime PRN neuropathy  LORazepam   Injectable 0.5 milliGRAM(s) IV Push once PRN Anxiety  polyethylene glycol 3350 17 Gram(s) Oral at bedtime PRN for constipation  senna 2 Tablet(s) Oral at bedtime PRN for constipation        PHYSICAL EXAM:      NERVOUS SYSTEM:  Alert & Oriented X3, Motor Strength 5/5 B/L upper and lower extremities; DTRs 2+ intact and symmetric  CHEST/LUNG: Clear to auscultation bilaterally; No rales, rhonchi, wheezing, or rubs  HEART: Regular rate and rhythm; No murmurs, rubs, or gallops  ABDOMEN: Soft, Nontender, Nondistended; Bowel sounds present  EXTREMITIES:  2+ Peripheral Pulses, No clubbing, cyanosis, or edema    LABS:                        14.8   9.20  )-----------( 266      ( 21 Nov 2023 15:33 )             43.2     11-22    139  |  107  |  20  ----------------------------<  182<H>  3.6   |  25  |  0.90    Ca    9.3      22 Nov 2023 07:24  Mg     2.1     11-22    TPro  8.5<H>  /  Alb  3.8  /  TBili  0.6  /  DBili  x   /  AST  29  /  ALT  47  /  AlkPhos  67  11-21    PT/INR - ( 21 Nov 2023 15:33 )   PT: 11.0 sec;   INR: 0.97 ratio         PTT - ( 21 Nov 2023 15:33 )  PTT:24.6 sec  Urinalysis Basic - ( 22 Nov 2023 07:24 )    Color: x / Appearance: x / SG: x / pH: x  Gluc: 182 mg/dL / Ketone: x  / Bili: x / Urobili: x   Blood: x / Protein: x / Nitrite: x   Leuk Esterase: x / RBC: x / WBC x   Sq Epi: x / Non Sq Epi: x / Bacteria: x        CAPILLARY BLOOD GLUCOSE      POCT Blood Glucose.: 189 mg/dL (22 Nov 2023 08:28)  POCT Blood Glucose.: 214 mg/dL (21 Nov 2023 22:21)      RECENT CULTURES:      RADIOLOGY & ADDITIONAL TESTS: personally reviewed      < from: MR Head No Cont (11.22.23 @ 14:14) >    IMPRESSION:  Scattered small acute infarcts involving the superior aspects of the   right frontal lobe and right parietal lobe as well as the right caudate.    Pending TTE    57 y/o F w/ PMH of HTN, OA, DM2, shingles, sciatica, p/w L sided weakness    *L-sided weakness - R/o TIA vs CVA   + MRI sec to right carotid stenosis  c/w Neuro checks, vasc consult    *DM2  -Humalog ISS  -Diabetic diet     *Hypomagnesemia      *H/o HTN / OA / Shingles / sciatica  -C/w home meds and f/u outpatient for further management if conditions remain stable during hospitalization     *DVT ppx   -SCDs             time spent: 60 min d/w Neuro, fam reviewing rec and studies

## 2023-11-22 NOTE — SWALLOW BEDSIDE ASSESSMENT ADULT - NS SPL SWALLOW CLINIC TRIAL FT
Maintain regular texture, thin liquids. meds as tolerated.   Disc with NSg. and with Pt. Service will not follow.

## 2023-11-23 ENCOUNTER — INPATIENT (INPATIENT)
Facility: HOSPITAL | Age: 58
LOS: 4 days | Discharge: NURSING, REHAB OR LONG-TERM CARE | DRG: 65 | End: 2023-11-28
Attending: STUDENT IN AN ORGANIZED HEALTH CARE EDUCATION/TRAINING PROGRAM | Admitting: STUDENT IN AN ORGANIZED HEALTH CARE EDUCATION/TRAINING PROGRAM
Payer: COMMERCIAL

## 2023-11-23 ENCOUNTER — TRANSCRIPTION ENCOUNTER (OUTPATIENT)
Age: 58
End: 2023-11-23

## 2023-11-23 VITALS
SYSTOLIC BLOOD PRESSURE: 144 MMHG | OXYGEN SATURATION: 98 % | RESPIRATION RATE: 17 BRPM | DIASTOLIC BLOOD PRESSURE: 55 MMHG | TEMPERATURE: 98 F | HEART RATE: 104 BPM

## 2023-11-23 VITALS
OXYGEN SATURATION: 97 % | HEART RATE: 96 BPM | SYSTOLIC BLOOD PRESSURE: 114 MMHG | RESPIRATION RATE: 18 BRPM | TEMPERATURE: 99 F | DIASTOLIC BLOOD PRESSURE: 78 MMHG

## 2023-11-23 DIAGNOSIS — Z90.49 ACQUIRED ABSENCE OF OTHER SPECIFIED PARTS OF DIGESTIVE TRACT: Chronic | ICD-10-CM

## 2023-11-23 DIAGNOSIS — Z98.890 OTHER SPECIFIED POSTPROCEDURAL STATES: Chronic | ICD-10-CM

## 2023-11-23 DIAGNOSIS — I63.9 CEREBRAL INFARCTION, UNSPECIFIED: ICD-10-CM

## 2023-11-23 LAB
ALBUMIN SERPL ELPH-MCNC: 4.1 G/DL — SIGNIFICANT CHANGE UP (ref 3.3–5.2)
ALBUMIN SERPL ELPH-MCNC: 4.1 G/DL — SIGNIFICANT CHANGE UP (ref 3.3–5.2)
ALP SERPL-CCNC: 79 U/L — SIGNIFICANT CHANGE UP (ref 40–120)
ALP SERPL-CCNC: 79 U/L — SIGNIFICANT CHANGE UP (ref 40–120)
ALT FLD-CCNC: 29 U/L — SIGNIFICANT CHANGE UP
ALT FLD-CCNC: 29 U/L — SIGNIFICANT CHANGE UP
ANION GAP SERPL CALC-SCNC: 14 MMOL/L — SIGNIFICANT CHANGE UP (ref 5–17)
ANION GAP SERPL CALC-SCNC: 14 MMOL/L — SIGNIFICANT CHANGE UP (ref 5–17)
APTT BLD: 26.3 SEC — SIGNIFICANT CHANGE UP (ref 24.5–35.6)
APTT BLD: 26.3 SEC — SIGNIFICANT CHANGE UP (ref 24.5–35.6)
AST SERPL-CCNC: 22 U/L — SIGNIFICANT CHANGE UP
AST SERPL-CCNC: 22 U/L — SIGNIFICANT CHANGE UP
BILIRUB SERPL-MCNC: 0.4 MG/DL — SIGNIFICANT CHANGE UP (ref 0.4–2)
BILIRUB SERPL-MCNC: 0.4 MG/DL — SIGNIFICANT CHANGE UP (ref 0.4–2)
BUN SERPL-MCNC: 34.9 MG/DL — HIGH (ref 8–20)
BUN SERPL-MCNC: 34.9 MG/DL — HIGH (ref 8–20)
CALCIUM SERPL-MCNC: 9.4 MG/DL — SIGNIFICANT CHANGE UP (ref 8.4–10.5)
CALCIUM SERPL-MCNC: 9.4 MG/DL — SIGNIFICANT CHANGE UP (ref 8.4–10.5)
CHLORIDE SERPL-SCNC: 98 MMOL/L — SIGNIFICANT CHANGE UP (ref 96–108)
CHLORIDE SERPL-SCNC: 98 MMOL/L — SIGNIFICANT CHANGE UP (ref 96–108)
CO2 SERPL-SCNC: 22 MMOL/L — SIGNIFICANT CHANGE UP (ref 22–29)
CO2 SERPL-SCNC: 22 MMOL/L — SIGNIFICANT CHANGE UP (ref 22–29)
CREAT SERPL-MCNC: 1.1 MG/DL — SIGNIFICANT CHANGE UP (ref 0.5–1.3)
CREAT SERPL-MCNC: 1.1 MG/DL — SIGNIFICANT CHANGE UP (ref 0.5–1.3)
EGFR: 59 ML/MIN/1.73M2 — LOW
EGFR: 59 ML/MIN/1.73M2 — LOW
GLUCOSE BLDC GLUCOMTR-MCNC: 188 MG/DL — HIGH (ref 70–99)
GLUCOSE BLDC GLUCOMTR-MCNC: 188 MG/DL — HIGH (ref 70–99)
GLUCOSE BLDC GLUCOMTR-MCNC: 192 MG/DL — HIGH (ref 70–99)
GLUCOSE SERPL-MCNC: 182 MG/DL — HIGH (ref 70–99)
GLUCOSE SERPL-MCNC: 182 MG/DL — HIGH (ref 70–99)
HCT VFR BLD CALC: 40.9 % — SIGNIFICANT CHANGE UP (ref 34.5–45)
HCT VFR BLD CALC: 40.9 % — SIGNIFICANT CHANGE UP (ref 34.5–45)
HGB BLD-MCNC: 14.1 G/DL — SIGNIFICANT CHANGE UP (ref 11.5–15.5)
HGB BLD-MCNC: 14.1 G/DL — SIGNIFICANT CHANGE UP (ref 11.5–15.5)
INR BLD: 1.01 RATIO — SIGNIFICANT CHANGE UP (ref 0.85–1.18)
INR BLD: 1.01 RATIO — SIGNIFICANT CHANGE UP (ref 0.85–1.18)
MCHC RBC-ENTMCNC: 29.6 PG — SIGNIFICANT CHANGE UP (ref 27–34)
MCHC RBC-ENTMCNC: 29.6 PG — SIGNIFICANT CHANGE UP (ref 27–34)
MCHC RBC-ENTMCNC: 34.5 GM/DL — SIGNIFICANT CHANGE UP (ref 32–36)
MCHC RBC-ENTMCNC: 34.5 GM/DL — SIGNIFICANT CHANGE UP (ref 32–36)
MCV RBC AUTO: 85.9 FL — SIGNIFICANT CHANGE UP (ref 80–100)
MCV RBC AUTO: 85.9 FL — SIGNIFICANT CHANGE UP (ref 80–100)
PLATELET # BLD AUTO: 265 K/UL — SIGNIFICANT CHANGE UP (ref 150–400)
PLATELET # BLD AUTO: 265 K/UL — SIGNIFICANT CHANGE UP (ref 150–400)
POTASSIUM SERPL-MCNC: 3.8 MMOL/L — SIGNIFICANT CHANGE UP (ref 3.5–5.3)
POTASSIUM SERPL-MCNC: 3.8 MMOL/L — SIGNIFICANT CHANGE UP (ref 3.5–5.3)
POTASSIUM SERPL-SCNC: 3.8 MMOL/L — SIGNIFICANT CHANGE UP (ref 3.5–5.3)
POTASSIUM SERPL-SCNC: 3.8 MMOL/L — SIGNIFICANT CHANGE UP (ref 3.5–5.3)
PROT SERPL-MCNC: 7.5 G/DL — SIGNIFICANT CHANGE UP (ref 6.6–8.7)
PROT SERPL-MCNC: 7.5 G/DL — SIGNIFICANT CHANGE UP (ref 6.6–8.7)
PROTHROM AB SERPL-ACNC: 11.2 SEC — SIGNIFICANT CHANGE UP (ref 9.5–13)
PROTHROM AB SERPL-ACNC: 11.2 SEC — SIGNIFICANT CHANGE UP (ref 9.5–13)
RBC # BLD: 4.76 M/UL — SIGNIFICANT CHANGE UP (ref 3.8–5.2)
RBC # BLD: 4.76 M/UL — SIGNIFICANT CHANGE UP (ref 3.8–5.2)
RBC # FLD: 13.1 % — SIGNIFICANT CHANGE UP (ref 10.3–14.5)
RBC # FLD: 13.1 % — SIGNIFICANT CHANGE UP (ref 10.3–14.5)
SODIUM SERPL-SCNC: 134 MMOL/L — LOW (ref 135–145)
SODIUM SERPL-SCNC: 134 MMOL/L — LOW (ref 135–145)
WBC # BLD: 9.74 K/UL — SIGNIFICANT CHANGE UP (ref 3.8–10.5)
WBC # BLD: 9.74 K/UL — SIGNIFICANT CHANGE UP (ref 3.8–10.5)
WBC # FLD AUTO: 9.74 K/UL — SIGNIFICANT CHANGE UP (ref 3.8–10.5)
WBC # FLD AUTO: 9.74 K/UL — SIGNIFICANT CHANGE UP (ref 3.8–10.5)

## 2023-11-23 PROCEDURE — 93010 ELECTROCARDIOGRAM REPORT: CPT

## 2023-11-23 PROCEDURE — 99223 1ST HOSP IP/OBS HIGH 75: CPT | Mod: GC

## 2023-11-23 PROCEDURE — 99232 SBSQ HOSP IP/OBS MODERATE 35: CPT

## 2023-11-23 PROCEDURE — 99239 HOSP IP/OBS DSCHRG MGMT >30: CPT

## 2023-11-23 PROCEDURE — 99223 1ST HOSP IP/OBS HIGH 75: CPT

## 2023-11-23 RX ORDER — LIDOCAINE 4 G/100G
1 CREAM TOPICAL DAILY
Refills: 0 | Status: DISCONTINUED | OUTPATIENT
Start: 2023-11-23 | End: 2023-11-23

## 2023-11-23 RX ORDER — POLYETHYLENE GLYCOL 3350 17 G/17G
17 POWDER, FOR SOLUTION ORAL AT BEDTIME
Refills: 0 | Status: DISCONTINUED | OUTPATIENT
Start: 2023-11-23 | End: 2023-11-28

## 2023-11-23 RX ORDER — DEXTROSE 50 % IN WATER 50 %
12.5 SYRINGE (ML) INTRAVENOUS ONCE
Refills: 0 | Status: DISCONTINUED | OUTPATIENT
Start: 2023-11-23 | End: 2023-11-28

## 2023-11-23 RX ORDER — LOSARTAN POTASSIUM 100 MG/1
100 TABLET, FILM COATED ORAL DAILY
Refills: 0 | Status: DISCONTINUED | OUTPATIENT
Start: 2023-11-23 | End: 2023-11-28

## 2023-11-23 RX ORDER — METFORMIN HYDROCHLORIDE 850 MG/1
1 TABLET ORAL
Qty: 0 | Refills: 0 | DISCHARGE

## 2023-11-23 RX ORDER — CLOPIDOGREL BISULFATE 75 MG/1
600 TABLET, FILM COATED ORAL ONCE
Refills: 0 | Status: COMPLETED | OUTPATIENT
Start: 2023-11-23 | End: 2023-11-23

## 2023-11-23 RX ORDER — INSULIN LISPRO 100/ML
VIAL (ML) SUBCUTANEOUS
Refills: 0 | Status: DISCONTINUED | OUTPATIENT
Start: 2023-11-23 | End: 2023-11-28

## 2023-11-23 RX ORDER — ONDANSETRON 8 MG/1
4 TABLET, FILM COATED ORAL EVERY 8 HOURS
Refills: 0 | Status: DISCONTINUED | OUTPATIENT
Start: 2023-11-23 | End: 2023-11-28

## 2023-11-23 RX ORDER — SENNA PLUS 8.6 MG/1
2 TABLET ORAL AT BEDTIME
Refills: 0 | Status: DISCONTINUED | OUTPATIENT
Start: 2023-11-23 | End: 2023-11-28

## 2023-11-23 RX ORDER — CLOPIDOGREL BISULFATE 75 MG/1
75 TABLET, FILM COATED ORAL DAILY
Refills: 0 | Status: DISCONTINUED | OUTPATIENT
Start: 2023-11-24 | End: 2023-11-28

## 2023-11-23 RX ORDER — SODIUM CHLORIDE 9 MG/ML
1000 INJECTION, SOLUTION INTRAVENOUS
Refills: 0 | Status: DISCONTINUED | OUTPATIENT
Start: 2023-11-23 | End: 2023-11-28

## 2023-11-23 RX ORDER — ASPIRIN/CALCIUM CARB/MAGNESIUM 324 MG
1 TABLET ORAL
Qty: 0 | Refills: 0 | DISCHARGE
Start: 2023-11-23

## 2023-11-23 RX ORDER — ALPRAZOLAM 0.25 MG
0.25 TABLET ORAL ONCE
Refills: 0 | Status: DISCONTINUED | OUTPATIENT
Start: 2023-11-23 | End: 2023-11-23

## 2023-11-23 RX ORDER — ACETAMINOPHEN 500 MG
650 TABLET ORAL EVERY 6 HOURS
Refills: 0 | Status: DISCONTINUED | OUTPATIENT
Start: 2023-11-23 | End: 2023-11-28

## 2023-11-23 RX ORDER — DEXTROSE 50 % IN WATER 50 %
15 SYRINGE (ML) INTRAVENOUS ONCE
Refills: 0 | Status: DISCONTINUED | OUTPATIENT
Start: 2023-11-23 | End: 2023-11-28

## 2023-11-23 RX ORDER — DEXTROSE 50 % IN WATER 50 %
25 SYRINGE (ML) INTRAVENOUS ONCE
Refills: 0 | Status: DISCONTINUED | OUTPATIENT
Start: 2023-11-23 | End: 2023-11-28

## 2023-11-23 RX ORDER — CLOPIDOGREL BISULFATE 75 MG/1
1 TABLET, FILM COATED ORAL
Qty: 0 | Refills: 0 | DISCHARGE
Start: 2023-11-23

## 2023-11-23 RX ORDER — ATORVASTATIN CALCIUM 80 MG/1
1 TABLET, FILM COATED ORAL
Qty: 0 | Refills: 0 | DISCHARGE
Start: 2023-11-23

## 2023-11-23 RX ORDER — ASPIRIN/CALCIUM CARB/MAGNESIUM 324 MG
324 TABLET ORAL ONCE
Refills: 0 | Status: COMPLETED | OUTPATIENT
Start: 2023-11-23 | End: 2023-11-23

## 2023-11-23 RX ORDER — ASPIRIN/CALCIUM CARB/MAGNESIUM 324 MG
81 TABLET ORAL DAILY
Refills: 0 | Status: DISCONTINUED | OUTPATIENT
Start: 2023-11-24 | End: 2023-11-28

## 2023-11-23 RX ORDER — ATORVASTATIN CALCIUM 80 MG/1
40 TABLET, FILM COATED ORAL AT BEDTIME
Refills: 0 | Status: DISCONTINUED | OUTPATIENT
Start: 2023-11-23 | End: 2023-11-28

## 2023-11-23 RX ORDER — GLUCAGON INJECTION, SOLUTION 0.5 MG/.1ML
1 INJECTION, SOLUTION SUBCUTANEOUS ONCE
Refills: 0 | Status: DISCONTINUED | OUTPATIENT
Start: 2023-11-23 | End: 2023-11-28

## 2023-11-23 RX ORDER — GABAPENTIN 400 MG/1
1 CAPSULE ORAL
Refills: 0 | DISCHARGE

## 2023-11-23 RX ADMIN — SODIUM CHLORIDE 75 MILLILITER(S): 9 INJECTION, SOLUTION INTRAVENOUS at 23:19

## 2023-11-23 RX ADMIN — Medication 1: at 16:51

## 2023-11-23 RX ADMIN — ENOXAPARIN SODIUM 40 MILLIGRAM(S): 100 INJECTION SUBCUTANEOUS at 16:51

## 2023-11-23 RX ADMIN — Medication 1: at 07:43

## 2023-11-23 RX ADMIN — LOSARTAN POTASSIUM 100 MILLIGRAM(S): 100 TABLET, FILM COATED ORAL at 09:50

## 2023-11-23 RX ADMIN — Medication 81 MILLIGRAM(S): at 09:50

## 2023-11-23 RX ADMIN — ATORVASTATIN CALCIUM 40 MILLIGRAM(S): 80 TABLET, FILM COATED ORAL at 23:19

## 2023-11-23 RX ADMIN — CLOPIDOGREL BISULFATE 600 MILLIGRAM(S): 75 TABLET, FILM COATED ORAL at 21:27

## 2023-11-23 RX ADMIN — Medication 0.25 MILLIGRAM(S): at 23:19

## 2023-11-23 RX ADMIN — Medication 324 MILLIGRAM(S): at 21:28

## 2023-11-23 RX ADMIN — Medication 1: at 12:47

## 2023-11-23 RX ADMIN — CLOPIDOGREL BISULFATE 75 MILLIGRAM(S): 75 TABLET, FILM COATED ORAL at 09:50

## 2023-11-23 NOTE — DISCHARGE NOTE PROVIDER - NSDCCPCAREPLAN_GEN_ALL_CORE_FT
PRINCIPAL DISCHARGE DIAGNOSIS  Diagnosis: CVA (cerebrovascular accident)  Assessment and Plan of Treatment: abnormal carotid scan transfer to [facility] for angiogram      SECONDARY DISCHARGE DIAGNOSES  Diagnosis: Type 2 diabetes mellitus  Assessment and Plan of Treatment: on metformin at home

## 2023-11-23 NOTE — CONSULT NOTE ADULT - ATTENDING COMMENTS
pt see 11/23/23 59 y/o female with remote smoking history , on Sunday felt " off balance " this progressed to left sided weakness by Tuesday prompting visit to hospital . CT and ultrasound performed . CT appears right carotid is occluded with underfilling or hypoplastic ICA . duplex suggested monophasic antegrade flow in ICA distal to stenosis suggestive of near occlusive lesion . she is not an endarterectomy candidate . spoke with DR Rankin . We agree that she deserves at least a diagnostic angio with possible intervention if there appears to be antegrade flow in carotid . needs DAPT , statin . transfer to [facility] for possible neuro intervention

## 2023-11-23 NOTE — H&P ADULT - NSICDXPASTMEDICALHX_GEN_ALL_CORE_FT
PAST MEDICAL HISTORY:  Hypertension     Obesity, Class II, BMI 35-39.9     Osteoarthritis     Prediabetes now diabetes    [facility], 11/26/20-12/5/20    Spider veins

## 2023-11-23 NOTE — H&P ADULT - HISTORY OF PRESENT ILLNESS
This is a 58 year old female w/ PMHx of HTN, OA, DM2, shingles and sciatica who presents as a direct transfer from [facility] for further evaluation of right carotid occlusion requiring diagnostic angiogram.  She presented to OSH on 11/21 with progressively worsening left sided weakness, such that she fell.  Was noted to have CVA, but outside window for tPA.     This is a 58 year old female w/ PMHx of vertigo, HTN, OA, DM2, shingles and sciatica who presents as a direct transfer from [facility] for further evaluation of right carotid occlusion requiring diagnostic angiogram.  She presented to OSH on 11/21 with progressively worsening left sided weakness, such that she slid off bed 2 days prior.  Was noted to have CVA, but outside window for tPA.  She denies any previous episodes.  Mentions her mother had carotid stenosis.    She mentions 2 days prior to admission on 11/19 felt weaker on left side, felt like she was leaning to left while ambulating, which progressed to starting to use a cane on 11/20/23.  S/P left hip replacement 1/2021.

## 2023-11-23 NOTE — DISCHARGE NOTE NURSING/CASE MANAGEMENT/SOCIAL WORK - PATIENT PORTAL LINK FT
You can access the FollowMyHealth Patient Portal offered by [facility] by registering at the following website: [email or web address]. By joining [employer]’s FollowMyHealth portal, you will also be able to view your health information using other applications (apps) compatible with our system.

## 2023-11-23 NOTE — DISCHARGE NOTE PROVIDER - HOSPITAL COURSE
57 y/o F w/ PMH of HTN, OA, DM2, shingles, sciatica, p/w L sided weakness. Patient states she woke up Sunday AM after sleeping on her L side, and when she started walking she was leaning to the L. She felt like her LUE and LLE weaker than her R side. She went to the store and she was holding on to the cart, and she continued to feel like she was leaning to the L side. On Sunday, she felt similar as well. The  morning of adm when she woke up she was feeling worse, states that she tried to get up from bed and ended up sliding off the bed. Adm with impression CVA outside the window for tpa. NIH was 2 but gait not tested, she would have scored higher. She was not on antiplatelets nor statin at home, had recent physical and was told all was ok. Mother had a h/o carotid stenosis.      Home Medications:  gabapentin 100 mg oral capsule: 1 cap(s) orally once a day (at bedtime) as needed for - (21 Nov 2023 19:16)  losartan-hydroCHLOROthiazide 100 mg-25 mg oral tablet: 1 tab(s) orally once a day (21 Nov 2023 19:16)  metFORMIN 1000 mg oral tablet: 1 tab(s) orally 2 times a day (21 Nov 2023 19:16)  polyethylene glycol 3350 oral powder for reconstitution: 17 gram(s) orally once a day (at bedtime), As Needed - for constipation (21 Nov 2023 19:16)  senna oral tablet: 2 tab(s) orally once a day (at bedtime), As Needed - for constipation (21 Nov 2023 19:16)      CT Angio Neck w/ IV Cont (11.21.23 @ 16:14) >      CTA neck:  Right internal carotid artery origin and proximal segment is essentially   including, which appears chronic in nature. More distal right ICA is   small in caliber and demonstrates multiple short segment stenoses.    Calcified plaque at the origin of the left internal carotid artery   results in approximately 30-35% short segment stenosis. Normal   flow-related enhancement of the more distal vessel.        < from: MR Head No Cont (11.22.23 @ 14:14) >    IMPRESSION:  Scattered small acute infarcts involving the superior aspects of the   right frontal lobe and right parietal lobe as well as the right caudate.    GENERAL: NAD, well-groomed  HEAD:  Atraumatic, Normocephalic  Neuro:  Awake, alert, no aphasia  CN: PERRL, EOMI, no nystagmus, no facial weakness, tongue protrudes in the midline  motor: normal tone, left pronator drift otherwise strength intact  sensory: intact to light touch  coordination: + dysmetria on left finger to nose  gait: with PT, balance if impaired, needs walker, veers to left, + dizziness       MEDICATIONS  (STANDING):  aspirin enteric coated 81 milliGRAM(s) Oral daily  atorvastatin 40 milliGRAM(s) Oral at bedtime  clopidogrel Tablet 75 milliGRAM(s) Oral daily  enoxaparin Injectable 40 milliGRAM(s) SubCutaneous every 24 hours  glucagon  Injectable 1 milliGRAM(s) IntraMuscular once  hydrochlorothiazide 25 milliGRAM(s) Oral daily  insulin lispro (ADMELOG) corrective regimen sliding scale   SubCutaneous at bedtime  insulin lispro (ADMELOG) corrective regimen sliding scale   SubCutaneous three times a day before meals  lidocaine   4% Patch 1 Patch Transdermal daily  losartan 100 milliGRAM(s) Oral daily    11-22    139  |  107  |  20  ----------------------------<  182<H>  3.6   |  25  |  0.90      CAPILLARY BLOOD GLUCOSE      POCT Blood Glucose.: 188 mg/dL (23 Nov 2023 12:45)  POCT Blood Glucose.: 192 mg/dL (23 Nov 2023 07:37)  POCT Blood Glucose.: 156 mg/dL (22 Nov 2023 21:52)  POCT Blood Glucose.: 180 mg/dL (22 Nov 2023 17:20)    Vital Signs Last 24 Hrs  T(C): 36.6 (23 Nov 2023 08:36), Max: 37.3 (22 Nov 2023 23:21)  T(F): 97.9 (23 Nov 2023 08:36), Max: 99.1 (22 Nov 2023 23:21)  HR: 92 (23 Nov 2023 09:45) (78 - 93)  BP: 146/76 (23 Nov 2023 09:45) (118/57 - 146/76)  BP(mean): 97 (23 Nov 2023 09:45) (72 - 97)  RR: 18 (23 Nov 2023 08:36) (18 - 18)  SpO2: 99% (23 Nov 2023 08:36) (96% - 99%)    Parameters below as of 23 Nov 2023 08:36  Patient On (Oxygen Delivery Method): room air    57 y/o F w/ PMH of HTN, OA, DM2, shingles, sciatica, p/w L sided weakness    *L-sided weakness - R/o TIA vs CVA   + MRI sec to right carotid stenosis  transfer to [facility] for diagnostic angiogram she took Metformin on 11/20    *DM2  -Humalog ISS  -Diabetic diet a1c 7.7      *H/o HTN / OA / Shingles / sciatica       59 y/o F w/ PMH of HTN, OA, DM2, shingles, sciatica, p/w L sided weakness. Patient states she woke up Sunday AM after sleeping on her L side, and when she started walking she was leaning to the L. She felt like her LUE and LLE weaker than her R side. She went to the store and she was holding on to the cart, and she continued to feel like she was leaning to the L side. On Sunday, she felt similar as well. The  morning of adm when she woke up she was feeling worse, states that she tried to get up from bed and ended up sliding off the bed. Adm with impression CVA outside the window for tpa. NIH was 2 but gait not tested, she would have scored higher. She was not on antiplatelets nor statin at home, had recent physical and was told all was ok. Mother had a h/o carotid stenosis.      Home Medications:  gabapentin 100 mg oral capsule: 1 cap(s) orally once a day (at bedtime) as needed for - (21 Nov 2023 19:16)  losartan-hydroCHLOROthiazide 100 mg-25 mg oral tablet: 1 tab(s) orally once a day (21 Nov 2023 19:16)  metFORMIN 1000 mg oral tablet: 1 tab(s) orally 2 times a day (21 Nov 2023 19:16)  polyethylene glycol 3350 oral powder for reconstitution: 17 gram(s) orally once a day (at bedtime), As Needed - for constipation (21 Nov 2023 19:16)  senna oral tablet: 2 tab(s) orally once a day (at bedtime), As Needed - for constipation (21 Nov 2023 19:16)      CT Angio Neck w/ IV Cont (11.21.23 @ 16:14) >      CTA neck:  Right internal carotid artery origin and proximal segment is essentially   including, which appears chronic in nature. More distal right ICA is   small in caliber and demonstrates multiple short segment stenoses.    Calcified plaque at the origin of the left internal carotid artery   results in approximately 30-35% short segment stenosis. Normal   flow-related enhancement of the more distal vessel.        < from: MR Head No Cont (11.22.23 @ 14:14) >    IMPRESSION:  Scattered small acute infarcts involving the superior aspects of the   right frontal lobe and right parietal lobe as well as the right caudate.    GENERAL: NAD, well-groomed  HEAD:  Atraumatic, Normocephalic  Neuro:  Awake, alert, no aphasia  CN: PERRL, EOMI, no nystagmus, no facial weakness, tongue protrudes in the midline  motor: normal tone, left pronator drift otherwise strength intact  sensory: intact to light touch  coordination: + dysmetria on left finger to nose  gait: with PT, balance if impaired, needs walker, veers to left, + dizziness       MEDICATIONS  (STANDING):  aspirin enteric coated 81 milliGRAM(s) Oral daily  atorvastatin 40 milliGRAM(s) Oral at bedtime  clopidogrel Tablet 75 milliGRAM(s) Oral daily  enoxaparin Injectable 40 milliGRAM(s) SubCutaneous every 24 hours  glucagon  Injectable 1 milliGRAM(s) IntraMuscular once  hydrochlorothiazide 25 milliGRAM(s) Oral daily  insulin lispro (ADMELOG) corrective regimen sliding scale   SubCutaneous at bedtime  insulin lispro (ADMELOG) corrective regimen sliding scale   SubCutaneous three times a day before meals  lidocaine   4% Patch 1 Patch Transdermal daily  losartan 100 milliGRAM(s) Oral daily    11-22    139  |  107  |  20  ----------------------------<  182<H>  3.6   |  25  |  0.90      CAPILLARY BLOOD GLUCOSE      POCT Blood Glucose.: 188 mg/dL (23 Nov 2023 12:45)  POCT Blood Glucose.: 192 mg/dL (23 Nov 2023 07:37)  POCT Blood Glucose.: 156 mg/dL (22 Nov 2023 21:52)  POCT Blood Glucose.: 180 mg/dL (22 Nov 2023 17:20)    Vital Signs Last 24 Hrs  T(C): 36.6 (23 Nov 2023 08:36), Max: 37.3 (22 Nov 2023 23:21)  T(F): 97.9 (23 Nov 2023 08:36), Max: 99.1 (22 Nov 2023 23:21)  HR: 92 (23 Nov 2023 09:45) (78 - 93)  BP: 146/76 (23 Nov 2023 09:45) (118/57 - 146/76)  BP(mean): 97 (23 Nov 2023 09:45) (72 - 97)  RR: 18 (23 Nov 2023 08:36) (18 - 18)  SpO2: 99% (23 Nov 2023 08:36) (96% - 99%)    Parameters below as of 23 Nov 2023 08:36  Patient On (Oxygen Delivery Method): room air    59 y/o F w/ PMH of HTN, OA, DM2, shingles, sciatica, p/w L sided weakness    *L-sided weakness - R/o TIA vs CVA   + MRI sec to right carotid stenosis  transfer to [facility] for diagnostic angiogram she took Metformin on 11/20    *DM2  -Humalog ISS  -Diabetic diet a1c 7.7      *H/o HTN / OA / Shingles / sciatica      time spent d/w vascular, transfer center and pt- 60 min

## 2023-11-23 NOTE — PROGRESS NOTE ADULT - ASSESSMENT
57 yo woman with HTN, left sciatica, pre-diabetes who presents with left sided weakness since 11/19/23.  On examination she has left upper extremity drift and mild dysmetria. NIH SS score is 2.  CT head shows low attenuation in the high right frontal and subcortical regions. This may represent subacute infarcts.    Stroke:  -Scattered acute infarcts in the right hemisphere  -Suspect this is related to high grade right ICA disease  -Vascular consult pending  -Continue to monitor on telemetry  -Aspirin 81 mg + Plavix 75 mg. After 21 days can d/c Plavix and continue aspirin monotherapy  -Continue statin  -DVT prophylaxis  -If she is interested in going to acute rehab, will need evaluation by PM&R    Will follow up as needed
59 yo woman with HTN, left sciatica, pre-diabetes who presents with left sided weakness since 11/19/23.  On examination she has left upper extremity drift and mild dysmetria. NIH SS score is 2.  CT head shows low attenuation in the high right frontal and subcortical regions. This may represent subacute infarcts.    Admit for stroke workup:  -Telemetry  -Aspirin 81 mg + Plavix 75 mg. After 21 days can d/c Plavix and continue aspirin monotherapy  -MRI brain pending  -Carotid ultrasound confirms high grade disease of right carotid.   -Vascular consult - high grade right ICA disease with suspected right sided stroke   -Continue statin  -f/u echo  -DVT prophylaxis    Will follow

## 2023-11-23 NOTE — PROGRESS NOTE ADULT - SUBJECTIVE AND OBJECTIVE BOX
Interval History:  11/23/23: Tearful this morning. Depressed about being in the hospital over Thanksgiving.    MEDICATIONS  (STANDING):  aspirin enteric coated 81 milliGRAM(s) Oral daily  atorvastatin 40 milliGRAM(s) Oral at bedtime  clopidogrel Tablet 75 milliGRAM(s) Oral daily  dextrose 5%. 1000 milliLiter(s) (50 mL/Hr) IV Continuous <Continuous>  dextrose 5%. 1000 milliLiter(s) (100 mL/Hr) IV Continuous <Continuous>  dextrose 50% Injectable 25 Gram(s) IV Push once  dextrose 50% Injectable 25 Gram(s) IV Push once  dextrose 50% Injectable 12.5 Gram(s) IV Push once  enoxaparin Injectable 40 milliGRAM(s) SubCutaneous every 24 hours  glucagon  Injectable 1 milliGRAM(s) IntraMuscular once  hydrochlorothiazide 25 milliGRAM(s) Oral daily  insulin lispro (ADMELOG) corrective regimen sliding scale   SubCutaneous at bedtime  insulin lispro (ADMELOG) corrective regimen sliding scale   SubCutaneous three times a day before meals  losartan 100 milliGRAM(s) Oral daily    MEDICATIONS  (PRN):  ALPRAZolam 0.5 milliGRAM(s) Oral every 8 hours PRN Anxiety  dextrose Oral Gel 15 Gram(s) Oral once PRN Blood Glucose LESS THAN 70 milliGRAM(s)/deciliter  gabapentin 100 milliGRAM(s) Oral at bedtime PRN neuropathy  LORazepam   Injectable 0.5 milliGRAM(s) IV Push once PRN Anxiety  polyethylene glycol 3350 17 Gram(s) Oral at bedtime PRN for constipation  senna 2 Tablet(s) Oral at bedtime PRN for constipation      Allergies    No Known Allergies    Intolerances        PHYSICAL EXAM:  Vital Signs Last 24 Hrs  T(F): 97.9 (11-23-23 @ 08:36)  HR: 92 (11-23-23 @ 09:45)  BP: 146/76 (11-23-23 @ 09:45)  RR: 18 (11-23-23 @ 08:36)      GENERAL: NAD, well-groomed  HEAD:  Atraumatic, Normocephalic  Neuro:  Awake, alert, no aphasia  CN: PERRL, EOMI, no nystagmus, no facial weakness, tongue protrudes in the midline  motor: normal tone, left pronator drift otherwise strength intact  sensory: intact to light touch  coordination: + dysmetria on left finger to nose  gait: not tested        LABS:                        14.8   9.20  )-----------( 266      ( 21 Nov 2023 15:33 )             43.2     11-22    139  |  107  |  20  ----------------------------<  182<H>  3.6   |  25  |  0.90    Ca    9.3      22 Nov 2023 07:24  Mg     2.1     11-22    TPro  8.5<H>  /  Alb  3.8  /  TBili  0.6  /  DBili  x   /  AST  29  /  ALT  47  /  AlkPhos  67  11-21    PT/INR - ( 21 Nov 2023 15:33 )   PT: 11.0 sec;   INR: 0.97 ratio         PTT - ( 21 Nov 2023 15:33 )  PTT:24.6 sec  Urinalysis Basic - ( 22 Nov 2023 07:24 )    Color: x / Appearance: x / SG: x / pH: x  Gluc: 182 mg/dL / Ketone: x  / Bili: x / Urobili: x   Blood: x / Protein: x / Nitrite: x   Leuk Esterase: x / RBC: x / WBC x   Sq Epi: x / Non Sq Epi: x / Bacteria: x        RADIOLOGY & ADDITIONAL STUDIES:    CT head 11/21/23:  Abnormal areas of low attenuation involving the high right frontal cortical and subcortical region as described above.    CTA head and neck 11/21/23:  CTA brain:  Skull base and supraclinoid right ICA is very small in caliber,   particularly at the level of the proximal cavernous segment. The   supraclinoid right ICA is somewhat small in caliber.    Small anterior communicating artery visualized.Large bilateral A1   segments.    No vascular aneurysm or AVM.    CTA neck:  Right internal carotid artery origin and proximal segment is essentially   including, which appears chronic in nature. More distal right ICA is   small in caliber and demonstrates multiple short segment stenoses.    Calcified plaque at the origin of the left internal carotid artery   results in approximately 30-35% short segment stenosis. Normal   flow-related enhancement of the more distal vessel.    carotid ultrasound 11/21/23:  RIGHT: Pre-obstructive pattern of flow at the right distal common carotid   artery. Plaque at the right carotid bulb with apparent string sign on   image 1.13, which may correspond to recent CTA findings, reflecting high   grade right ICA disease. Though, there is antegrade flow along   interrogated portions of the right ICA. Correlate with recent CTA   findings.    LEFT: Plaque of the left carotid bulb extending into internal and   external branches without hemodynamically significant stenosis.    TTE 11/22/23:  Mild concentric left ventricular hypertrophy is present.   Estimated left ventricular ejection fraction is 60-65 %.   Normal appearing left atrium.   Normal appearing right atrium.   Aneurysmal atrial septum is seen.   Mild aortic sclerosisis present with normal valvular opening.   Mild mitral annular calcification is present.   Mild (1+) mitral regurgitation is present.   EA reversal of the mitral inflow consistent with reduced compliance of   the   left ventricle.   Normal appearing tricuspid valve structure and function.   Trace tricuspid valve regurgitation is present.   Normal appearing right atrium. No significant shunt appreciated across   the   interatrial septum under color doppler interrogation and with agitated   saline contrast   along with provocative maneuvers to increase RA pressure.   The IVC appears normal.      MRI head 11/23/23:  Scattered small acute infarcts involving the superior aspects of the   right frontal lobe and right parietal lobe as well as the right caudate.

## 2023-11-23 NOTE — CONSULT NOTE ADULT - SUBJECTIVE AND OBJECTIVE BOX
57yo F w/ PMH of HTN, OA, DM2, shingles, sciatica, p/w L sided weakness. Patient states she woke up Sunday AM and when she started walking she was leaning to the L. She felt like her LUE and LLE weaker than her R side. She went to the store and she was holding on to the cart, and she continued to feel like she was leaning to the L side. She presented on Tuesday when sxs did not resolve. On CT imaging, she was found to have a chronically occluded R ICA, but US showed anterograde flow. The patient has persistence of weakness but notes it is improving. She denies facial weakness, change in vision, confusion, change in voice, or numbness/tingling. She notes she used to smoke but quit 7 years ago, approximately 5 pack years total.      Physical Exam:  Pt is AAOx3  General: No acute distress.   Chest: Lungs clear, no rales, no rhonchi, no wheezes.   Heart: RR, no murmurs, no rubs, no gallops.   Abdomen: Soft, nondistended  Back: Normal curvature, no tenderness.   Neuro: LUE and LLE strength 4/5; RUE and RLE strength 5/5. No facial droop.   Skin: Normal, no rashes, no lesions noted.   Extremities: Warm, well perfused, no edema, Pulses intact    Pt seen and examined at bedside, no acute events. Pt had no complaints. Tolerating liquid diet. Continues to have bowel function. Denied fever, chills, nausea, vomiting or SOB overnight.     Vital Signs Last 24 Hrs  T(C): 36.6 (23 Nov 2023 08:36), Max: 37.3 (22 Nov 2023 23:21)  T(F): 97.9 (23 Nov 2023 08:36), Max: 99.1 (22 Nov 2023 23:21)  HR: 92 (23 Nov 2023 09:45) (78 - 93)  BP: 146/76 (23 Nov 2023 09:45) (118/57 - 146/76)  BP(mean): 97 (23 Nov 2023 09:45) (72 - 97)  RR: 18 (23 Nov 2023 08:36) (18 - 18)  SpO2: 99% (23 Nov 2023 08:36) (96% - 99%)    Parameters below as of 23 Nov 2023 08:36  Patient On (Oxygen Delivery Method): room air                            14.8   9.20  )-----------( 266      ( 21 Nov 2023 15:33 )             43.2     11-22    139  |  107  |  20  ----------------------------<  182<H>  3.6   |  25  |  0.90    Ca    9.3      22 Nov 2023 07:24  Mg     2.1     11-22    TPro  8.5<H>  /  Alb  3.8  /  TBili  0.6  /  DBili  x   /  AST  29  /  ALT  47  /  AlkPhos  67  11-21    I&O's Summary    < from: CT Head No Cont (11.21.23 @ 15:09) >  IMPRESSION: Abnormal areas of low attenuation involving the high right   frontal cortical and subcortical region as described above.    If symptoms continue MRI can be done for further evaluation if there are   no contraindications.    < end of copied text >  < from: CT Angio Head w/ IV Cont (11.21.23 @ 16:14) >  CTA brain:  Skull base and supraclinoid right ICA is very small in caliber,   particularly at the level of the proximal cavernous segment. The   supraclinoid right ICA is somewhat small in caliber.    Small anterior communicating artery visualized.Large bilateral A1   segments.    No vascular aneurysm or AVM.    CTA neck:  Right internal carotid artery origin and proximal segment is essentially   including, which appears chronic in nature. More distal right ICA is   small in caliber and demonstrates multiple short segment stenoses.    Calcified plaque at the origin of the left internal carotid artery   results in approximately 30-35% short segment stenosis. Normal   flow-related enhancement of the more distal vessel.    < end of copied text >  < from: US Duplex Carotid Arteries Complete, Bilateral (11.21.23 @ 19:31) >  IMPRESSION:    RIGHT: Pre-obstructive pattern of flow at the right distal common carotid   artery. Plaque at the right carotid bulb with apparent string sign on   image 1.13, which may correspond to recent CTA findings, reflecting high   grade right ICA disease. Though, there is antegrade flow along   interrogated portions of the right ICA. Correlate with recent CTA   findings.    LEFT: Plaque of the left carotid bulb extending into internal and   external branches without hemodynamically significant stenosis.    < end of copied text >  < from: MR Head No Cont (11.22.23 @ 14:14) >  IMPRESSION:  Scattered small acute infarcts involving the superior aspects of the   right frontal lobe and right parietal lobe as well as the right caudate.    < end of copied text >

## 2023-11-23 NOTE — H&P ADULT - NSHPREVIEWOFSYSTEMS_GEN_ALL_CORE
She denies any fever, chills, changes in vision, changes in hearing, headaches, cough, sore throat, rhinorrhea, chest pain, SOB, palpitations, abdominal pain, nausea, vomiting, dysuria, diarrhea, weakness, lethargy, paresthesias, lightheadedness, leg swelling, orthopnea, PND, sick contact or recent travel. At baseline, he/she does not use any devices to assist with ambulation. She denies any fever, chills, changes in vision, changes in hearing, headaches, cough, sore throat, rhinorrhea, chest pain, SOB, abdominal pain, nausea, vomiting, dysuria, diarrhea, lethargy, paresthesias, lightheadedness.  At baseline, she does not use any devices to assist with ambulation.

## 2023-11-23 NOTE — PROGRESS NOTE ADULT - SUBJECTIVE AND OBJECTIVE BOX
CHIEF COMPLAINT/INTERVAL HISTORY:    Patient is a 58y old  Female who presents with a chief complaint of L sided weakness (21 Nov 2023 19:52)      HPI:  59 y/o F w/ PMH of HTN, OA, DM2, shingles, sciatica, p/w L sided weakness. Patient states she woke up Sunday AM after sleeping on her L side, and when she started walking she was leaning to the L. She felt like her LUE and LLE weaker than her R side. She went to the store and she was holding on to the cart, and she continued to feel like she was leaning to the L side. On Sunday, she felt similar as well. Today morning when she woke up she was feeling worse, states that she tried to get up from bed and ended up sliding off the bed (Denies injuries/ trauma). Patient also has trouble grasping things with L hand due to weakness. Denies facial droop, slurred speech, dysphagia, sensory deficits, HA, nausea, vomiting, abdominal pain, fever, chills, cough, runny nose, sore throat   MRI + , tight ICA stenosis on the right also            MEDICATIONS  (STANDING):  aspirin enteric coated 81 milliGRAM(s) Oral daily  atorvastatin 40 milliGRAM(s) Oral at bedtime  clopidogrel Tablet 75 milliGRAM(s) Oral daily  glucagon  Injectable 1 milliGRAM(s) IntraMuscular once  hydrochlorothiazide 25 milliGRAM(s) Oral daily  insulin lispro (ADMELOG) corrective regimen sliding scale   SubCutaneous at bedtime  insulin lispro (ADMELOG) corrective regimen sliding scale   SubCutaneous three times a day before meals  losartan 100 milliGRAM(s) Oral daily    MEDICATIONS  (PRN):  dextrose Oral Gel 15 Gram(s) Oral once PRN Blood Glucose LESS THAN 70 milliGRAM(s)/deciliter  gabapentin 100 milliGRAM(s) Oral at bedtime PRN neuropathy  LORazepam   Injectable 0.5 milliGRAM(s) IV Push once PRN Anxiety  polyethylene glycol 3350 17 Gram(s) Oral at bedtime PRN for constipation  senna 2 Tablet(s) Oral at bedtime PRN for constipation        PHYSICAL EXAM:      NERVOUS SYSTEM:  Alert & Oriented X3, Motor Strength 5/5 B/L upper and lower extremities; DTRs 2+ intact and symmetric  CHEST/LUNG: Clear to auscultation bilaterally; No rales, rhonchi, wheezing, or rubs  HEART: Regular rate and rhythm; No murmurs, rubs, or gallops  ABDOMEN: Soft, Nontender, Nondistended; Bowel sounds present  EXTREMITIES:  2+ Peripheral Pulses, No clubbing, cyanosis, or edema    LABS:                        14.8   9.20  )-----------( 266      ( 21 Nov 2023 15:33 )             43.2     11-22    139  |  107  |  20  ----------------------------<  182<H>  3.6   |  25  |  0.90    Ca    9.3      22 Nov 2023 07:24  Mg     2.1     11-22    TPro  8.5<H>  /  Alb  3.8  /  TBili  0.6  /  DBili  x   /  AST  29  /  ALT  47  /  AlkPhos  67  11-21    PT/INR - ( 21 Nov 2023 15:33 )   PT: 11.0 sec;   INR: 0.97 ratio         PTT - ( 21 Nov 2023 15:33 )  PTT:24.6 sec  Urinalysis Basic - ( 22 Nov 2023 07:24 )    Color: x / Appearance: x / SG: x / pH: x  Gluc: 182 mg/dL / Ketone: x  / Bili: x / Urobili: x   Blood: x / Protein: x / Nitrite: x   Leuk Esterase: x / RBC: x / WBC x   Sq Epi: x / Non Sq Epi: x / Bacteria: x        CAPILLARY BLOOD GLUCOSE      POCT Blood Glucose.: 189 mg/dL (22 Nov 2023 08:28)  POCT Blood Glucose.: 214 mg/dL (21 Nov 2023 22:21)      RECENT CULTURES:      RADIOLOGY & ADDITIONAL TESTS: personally reviewed      < from: MR Head No Cont (11.22.23 @ 14:14) >    IMPRESSION:  Scattered small acute infarcts involving the superior aspects of the   right frontal lobe and right parietal lobe as well as the right caudate.    Pending TTE    59 y/o F w/ PMH of HTN, OA, DM2, shingles, sciatica, p/w L sided weakness    *L-sided weakness - R/o TIA vs CVA   + MRI sec to right carotid stenosis  c/w Neuro checks, vasc consult  dc with cardiac monitor too consult ep    *DM2  -Humalog ISS  -Diabetic diet       *H/o HTN / OA / Shingles / sciatica  -C/w home meds and f/u outpatient for further management if conditions remain stable during hospitalization     *DVT ppx   -SCDs     dc to rehab            time spent: 60 min d/w Neuro, fam reviewing rec and studies

## 2023-11-23 NOTE — H&P ADULT - ASSESSMENT
ASSESSMENT:  ***    PLAN:  1.Stroke w/ high grade right carotid artery stenosis  -afebrile, no leukocytosis  -will monitor on telemetry  -Initial trop *** at ***, will f/u 2nd trop  -EKG as above  -will start aspirin 81mg po daily and atorvastatin 40mg po daily  -will f/u TSH, HbA1c and lipid panel  -will f/u TTE  -CXRAY and U/A without acute infectious source  -CT head w/o contrast within normal limits  -CTA head and neck within normal limits  -will f/u MRI brain w/o contrast  -will f/u MRA head and neck w/o contrast  -S/P tpa  -started on heparin drip in ED  -will f/u orthostats  -will start IVF  -will place on fall precautions  -will f/u stroke team recommendations in AM  -will consult PT/OT in AM  -will monitor BP closely, with goal SBP<180  -will start neurochecks q 4 hours  -S/P in ED  -mention pronator drift in PE  -NIH Stroke Scale  -will f/u speech and swallow eval  -S/P in ED  -will consult neurology   -will consult vascular surgery    -labs at OSH- WBC 9.2, hemoglobin 14.8m hematocrit 43.2, platelet 266, PT 11, PTT 24.6, INR 0.97, sodium 137, potassium 3.8, chloride 104, CO2 25, AG 8, BUN 17, Cr 0.99, glucose 171, calcium 9.6, albumin 3.8, LFTs within normal limits, HbA1c 7.6, trop 6.45, magnesium 1.7, total chol 196, trig 125, HDL 58, ;     CT head w/o contrast 11/21/23 -IMPRESSION: Abnormal areas of low attenuation involving the high right   frontal cortical and subcortical region as described above.  If symptoms continue MRI can be done for further evaluation if there are   no contraindications.    CTA head and neck w/ IV contrast 11/21/23-  CTA neck:  Right internal carotid artery origin and proximal segment is essentially   including, which appears chronic in nature. More distal right ICA is   small in caliber and demonstrates multiple short segment stenoses.  Calcified plaque at the origin of the left internal carotid artery   results in approximately 30-35% short segment stenosis. Normal   flow-related enhancement of the more distal vessel.    MRI head w/o contrast 11/22/23-  IMPRESSION:  Scattered small acute infarcts involving the superior aspects of the   right frontal lobe and right parietal lobe as well as the right caudate.  B/L carotid U/S 11/21/23 -  IMPRESSION:  RIGHT: Pre-obstructive pattern of flow at the right distal common carotid   artery. Plaque at the right carotid bulb with apparent string sign on   image 1.13, which may correspond to recent CTA findings, reflecting high   grade right ICA disease. Though, there is antegrade flow along   interrogated portions of the right ICA. Correlate with recent CTA   findings.  LEFT: Plaque of the left carotid bulb extending into internal and   external branches without hemodynamically significant stenosis.  Measurement of carotid stenosis is based on velocity parameters that   correlate the residual internal carotid diameter with that of the more   distal vessel in accordance with a method such as the [place] American   Symptomatic Carotid Endarterectomy Trial (NASCET).    CXRAY 11/21/23 -  IMPRESSION: No acute chest finding. Significant left shoulder   degeneration.    left shoulder XRAY 11/21/23 -  IMPRESSION: No acute chest finding. Significant left shoulder   degeneration.    EKG as OSH - NSR at 94 bpm, TWI in AVL, no ST changes or TWI    TTE 11/22/23 -   Impression   Summary   Mild concentric left ventricular hypertrophy is present.   Estimated left ventricular ejection fraction is 60-65 %.   Normal appearing left atrium.   Normal appearing right atrium.   Aneurysmal atrial septum is seen.   Mild aortic sclerosis is present with normal valvular opening.   Mild mitral annular calcification is present.   Mild (1+) mitral regurgitation is present.   EA reversal of the mitral inflow consistent with reduced compliance of   the  left ventricle.   Normal appearing tricuspid valve structure and function.   Trace tricuspid valve regurgitation is present.   Normal appearing right atrium. No significant shunt appreciated across   the   interatrial septum under color doppler interrogation and with agitated   saline contrast   along with provocative maneuvers to increase RA pressure.   The IVC appears normal.    2.DM2  -will place on ISS  -HbA1c 7.6 11/21/23  -will hold metformin    3.HTN    4.OA    5.Sciatica    6.DVT PPx  -will place B/L LE SCDs ASSESSMENT:  This is a 58 year old female w/ PMHx of vertigo, HTN, OA, DM2, shingles and sciatica who presents as a direct transfer from [facility] for further evaluation of right carotid occlusion requiring diagnostic angiogram.     PLAN:  1.Stroke w/ high grade right carotid artery stenosis  -afebrile, no leukocytosis  -will monitor on telemetry  -will admission EKG   -will f/u TSH  -PT 11.2  -INR 1.01  -PTT 26.3  -will start IVF  -will place on fall precautions  -will consult PT/OT in AM  -will start neurochecks q 4 hours  -will consult neurology in AM  -interventional neurology consulted, for cerebral angiogram in AM, will give plavix 600mg X 1 now and aspirin 325 mg X 1 now (patient dose not know if given aspirin at OSH), will f/u further recommendations  -will f/u P2Y12 in AM  -will continue aspirin 81mg QD  -will continue plavix 75mg QD  -will continue lipitor 40mg QD   -will start LR  -labs at OSH- WBC 9.2, hemoglobin 14.8m hematocrit 43.2, platelet 266, PT 11, PTT 24.6, INR 0.97, sodium 137, potassium 3.8, chloride 104, CO2 25, AG 8, BUN 17, Cr 0.99, glucose 171, calcium 9.6, albumin 3.8, LFTs within normal limits, HbA1c 7.6, trop 6.45, magnesium 1.7, total chol 196, trig 125, HDL 58, ;     -CT head w/o contrast 11/21/23 -IMPRESSION: Abnormal areas of low attenuation involving the high right   frontal cortical and subcortical region as described above.  If symptoms continue MRI can be done for further evaluation if there are   no contraindications.    -CTA head and neck w/ IV contrast 11/21/23-  CTA neck:  Right internal carotid artery origin and proximal segment is essentially   including, which appears chronic in nature. More distal right ICA is   small in caliber and demonstrates multiple short segment stenoses.  Calcified plaque at the origin of the left internal carotid artery   results in approximately 30-35% short segment stenosis. Normal   flow-related enhancement of the more distal vessel.    -MRI head w/o contrast 11/22/23-  IMPRESSION:  Scattered small acute infarcts involving the superior aspects of the   right frontal lobe and right parietal lobe as well as the right caudate.  B/L carotid U/S 11/21/23 -  IMPRESSION:  RIGHT: Pre-obstructive pattern of flow at the right distal common carotid   artery. Plaque at the right carotid bulb with apparent string sign on   image 1.13, which may correspond to recent CTA findings, reflecting high   grade right ICA disease. Though, there is antegrade flow along   interrogated portions of the right ICA. Correlate with recent CTA   findings.  LEFT: Plaque of the left carotid bulb extending into internal and   external branches without hemodynamically significant stenosis.  Measurement of carotid stenosis is based on velocity parameters that   correlate the residual internal carotid diameter with that of the more   distal vessel in accordance with a method such as the North American   Symptomatic Carotid Endarterectomy Trial (NASCET).    -CXRAY 11/21/23 -  IMPRESSION: No acute chest finding. Significant left shoulder   degeneration.    -left shoulder XRAY 11/21/23 -  IMPRESSION: No acute chest finding. Significant left shoulder   degeneration.    -EKG as OSH - NSR at 94 bpm, TWI in AVL, no ST changes or TWI    -TTE 11/22/23 -   Impression   Summary   Mild concentric left ventricular hypertrophy is present.   Estimated left ventricular ejection fraction is 60-65 %.   Normal appearing left atrium.   Normal appearing right atrium.   Aneurysmal atrial septum is seen.   Mild aortic sclerosis is present with normal valvular opening.   Mild mitral annular calcification is present.   Mild (1+) mitral regurgitation is present.   EA reversal of the mitral inflow consistent with reduced compliance of   the  left ventricle.   Normal appearing tricuspid valve structure and function.   Trace tricuspid valve regurgitation is present.   Normal appearing right atrium. No significant shunt appreciated across   the   interatrial septum under color doppler interrogation and with agitated   saline contrast   along with provocative maneuvers to increase RA pressure.   The IVC appears normal.    2.DM2  -will place on ISS  -HbA1c 7.6 11/21/23  -will hold metformin    3.HTN  -will continue losartan 100mg QD and HCTZ 25mg QD    4.OA    5.Sciatica    6.DVT PPx  -will place B/L LE SCDs

## 2023-11-23 NOTE — CONSULT NOTE ADULT - ASSESSMENT
57yo F admitted for stroke sxs found to have high grade stenosis vs occlusion of the right internal carotid artery. The patient is not a candidate for carotid endarterectomy as the area in question is too proximal. She requires a diagnostic angiogram. Dr. Rankin of [facility] is agreeable and available for the procedure tomorrow.    Recommendations:  - transfer the patient to the medicine service at [facility]  - discussed with patient and family, who are agreeable  - Will follow until transfer    Discussed with Dr. Grover
59 yo woman with HTN, left sciatica, pre-diabetes who presents with left sided weakness since 11/19/23.  On examination she has left upper extremity drift and mild dysmetria. NIH SS score is 2.  CT head shows low attenuation in the high right frontal and subcortical regions. This may represent subacute infarcts.    Admit for stroke workup:  -Telemetry  -Start aspirin 325 mg x 1 and then 81 mg/day. Will also add Plavix x 21 days and then aspirin monotherapy  -MRI brain  -Carotid ultrasound - ? of chronically occluded right ICA  -Statin  -f/u lipid panel, HbA1C  -Echo  -DVT prophylaxis  -No need for permissive hypertension since symptoms have been present for > 24 hours.  -Physical therapy    Will follow

## 2023-11-23 NOTE — H&P ADULT - NSHPPHYSICALEXAM_GEN_ALL_CORE
PHYSICAL EXAM:  Vital Signs:  Vital Signs (24 Hrs):  T(C): 37.3 (11-23-23 @ 19:28), Max: 37.3 (11-22-23 @ 23:21)  HR: 96 (11-23-23 @ 19:28) (78 - 104)  BP: 114/78 (11-23-23 @ 19:28) (114/78 - 146/76)  RR: 18 (11-23-23 @ 19:28) (17 - 18)  SpO2: 97% (11-23-23 @ 19:28) (95% - 99%)      General: well appearing/toxic appearing/chronically ill appearing and in no acute distress   HENT: atraumatic, normocephalic, oropharynx pink and moist, sinuses nontender, normal nasal mucosa/turbinates, thyroid not enlarged or tender, no radiating carotid murmur or bruit and no masses; no JVD  Eyes: pupil equally round and reactive to light (PERRLA) bilaterally, extra-ocular muscle intact (EOMI) bilaterally, lids/conjunctiva normal bilaterally and anicteric bilaterally  Neck: normal, supple, no adenopathy and thyroid normal in size, no nodules or tenderness  CV: normal s1, s2 , regular rhythm, no murmurs, no rubs and no gallops  Lungs: clear to auscultation no w/r/r  Abd: normal bowel sounds, no hepatosplenomegaly, non-tender, non-distended and no masses  Musculoskeletal: no clubbing, cyanosis and full range of motion of joints: right upper extremity, left upper extremity, right lower extremity and left lower extremity;  2+ Peripheral Pulses, No clubbing, cyanosis, or edema  Neuro: alert, awake & oriented times three (AA&O x 3), cranial Nerves II-XII intact and normal strength  Psych: normal judgment and insight, normal mood/affect and non-anxious  Vascular: 2+ radial and dorsalis pedis pulses B/L equal and symmetric  Skin: no rash, warm and dry PHYSICAL EXAM:  Vital Signs:  Vital Signs (24 Hrs):  T(C): 37.3 (11-23-23 @ 19:28), Max: 37.3 (11-22-23 @ 23:21)  HR: 96 (11-23-23 @ 19:28) (78 - 104)  BP: 114/78 (11-23-23 @ 19:28) (114/78 - 146/76)  RR: 18 (11-23-23 @ 19:28) (17 - 18)  SpO2: 97% (11-23-23 @ 19:28) (95% - 99%)      General: well appearing and in no acute distress   HENT: atraumatic, normocephalic, oropharynx pink and moist, sinuses nontender, normal nasal mucosa/turbinates, thyroid not enlarged or tender, no radiating carotid murmur or bruit and no masses; no JVD  Eyes: pupil equally round and reactive to light (PERRLA) bilaterally, extra-ocular muscle intact (EOMI) bilaterally, lids/conjunctiva normal bilaterally and anicteric bilaterally  Neck: normal, supple, no adenopathy and thyroid normal in size, no nodules or tenderness  CV: normal s1, s2 , regular rhythm, no murmurs, no rubs and no gallops  Lungs: clear to auscultation no w/r/r  Abd: normal bowel sounds, no hepatosplenomegaly, non-tender, non-distended and no masses  Musculoskeletal: no clubbing, cyanosis and full range of motion of joints: right upper extremity, left upper extremity, right lower extremity and left lower extremity;  2+ Peripheral Pulses, No clubbing, cyanosis, or edema; LUE 4/5 strength, left pronator drift  Neuro: alert, awake & oriented times three (AA&O x 3), cranial Nerves II-XII intact   Psych: normal judgment and insight, normal mood/affect and non-anxious  Vascular: 2+ radial and dorsalis pedis pulses B/L equal and symmetric  Skin: no rash, warm and dry

## 2023-11-23 NOTE — H&P ADULT - NSHPLABSRESULTS_GEN_ALL_CORE
labs personally reviewed and pertinent [facility] documents/labs/diagnostics reviewed       11-22    139  |  107  |  20  ----------------------------<  182<H>  3.6   |  25  |  0.90    Ca    9.3      22 Nov 2023 07:24  Mg     2.1     11-22    Urinalysis Basic - ( 22 Nov 2023 07:24 )    Color: x / Appearance: x / SG: x / pH: x  Gluc: 182 mg/dL / Ketone: x  / Bili: x / Urobili: x   Blood: x / Protein: x / Nitrite: x   Leuk Esterase: x / RBC: x / WBC x   Sq Epi: x / Non Sq Epi: x / Bacteria: x    EKG:     RADIOLOGY:

## 2023-11-23 NOTE — DISCHARGE NOTE PROVIDER - NSDCMRMEDTOKEN_GEN_ALL_CORE_FT
aspirin 81 mg oral delayed release tablet: 1 tab(s) orally once a day  atorvastatin 40 mg oral tablet: 1 tab(s) orally once a day (at bedtime)  clopidogrel 75 mg oral tablet: 1 tab(s) orally once a day  gabapentin 100 mg oral capsule: 1 cap(s) orally once a day (at bedtime) as needed for -  losartan-hydroCHLOROthiazide 100 mg-25 mg oral tablet: 1 tab(s) orally once a day  polyethylene glycol 3350 oral powder for reconstitution: 17 gram(s) orally once a day (at bedtime), As Needed - for constipation  senna oral tablet: 2 tab(s) orally once a day (at bedtime), As Needed - for constipation

## 2023-11-24 ENCOUNTER — APPOINTMENT (OUTPATIENT)
Dept: NEUROLOGY | Facility: HOSPITAL | Age: 58
End: 2023-11-24

## 2023-11-24 LAB
ANION GAP SERPL CALC-SCNC: 14 MMOL/L — SIGNIFICANT CHANGE UP (ref 5–17)
ANION GAP SERPL CALC-SCNC: 14 MMOL/L — SIGNIFICANT CHANGE UP (ref 5–17)
BUN SERPL-MCNC: 32.7 MG/DL — HIGH (ref 8–20)
BUN SERPL-MCNC: 32.7 MG/DL — HIGH (ref 8–20)
CALCIUM SERPL-MCNC: 9 MG/DL — SIGNIFICANT CHANGE UP (ref 8.4–10.5)
CALCIUM SERPL-MCNC: 9 MG/DL — SIGNIFICANT CHANGE UP (ref 8.4–10.5)
CHLORIDE SERPL-SCNC: 100 MMOL/L — SIGNIFICANT CHANGE UP (ref 96–108)
CHLORIDE SERPL-SCNC: 100 MMOL/L — SIGNIFICANT CHANGE UP (ref 96–108)
CO2 SERPL-SCNC: 22 MMOL/L — SIGNIFICANT CHANGE UP (ref 22–29)
CO2 SERPL-SCNC: 22 MMOL/L — SIGNIFICANT CHANGE UP (ref 22–29)
CREAT SERPL-MCNC: 0.91 MG/DL — SIGNIFICANT CHANGE UP (ref 0.5–1.3)
CREAT SERPL-MCNC: 0.91 MG/DL — SIGNIFICANT CHANGE UP (ref 0.5–1.3)
EGFR: 74 ML/MIN/1.73M2 — SIGNIFICANT CHANGE UP
EGFR: 74 ML/MIN/1.73M2 — SIGNIFICANT CHANGE UP
GLUCOSE BLDC GLUCOMTR-MCNC: 154 MG/DL — HIGH (ref 70–99)
GLUCOSE BLDC GLUCOMTR-MCNC: 154 MG/DL — HIGH (ref 70–99)
GLUCOSE BLDC GLUCOMTR-MCNC: 179 MG/DL — HIGH (ref 70–99)
GLUCOSE BLDC GLUCOMTR-MCNC: 179 MG/DL — HIGH (ref 70–99)
GLUCOSE BLDC GLUCOMTR-MCNC: 182 MG/DL — HIGH (ref 70–99)
GLUCOSE BLDC GLUCOMTR-MCNC: 182 MG/DL — HIGH (ref 70–99)
GLUCOSE BLDC GLUCOMTR-MCNC: 196 MG/DL — HIGH (ref 70–99)
GLUCOSE BLDC GLUCOMTR-MCNC: 196 MG/DL — HIGH (ref 70–99)
GLUCOSE SERPL-MCNC: 195 MG/DL — HIGH (ref 70–99)
GLUCOSE SERPL-MCNC: 195 MG/DL — HIGH (ref 70–99)
HCT VFR BLD CALC: 39.4 % — SIGNIFICANT CHANGE UP (ref 34.5–45)
HCT VFR BLD CALC: 39.4 % — SIGNIFICANT CHANGE UP (ref 34.5–45)
HGB BLD-MCNC: 13.4 G/DL — SIGNIFICANT CHANGE UP (ref 11.5–15.5)
HGB BLD-MCNC: 13.4 G/DL — SIGNIFICANT CHANGE UP (ref 11.5–15.5)
MCHC RBC-ENTMCNC: 29.2 PG — SIGNIFICANT CHANGE UP (ref 27–34)
MCHC RBC-ENTMCNC: 29.2 PG — SIGNIFICANT CHANGE UP (ref 27–34)
MCHC RBC-ENTMCNC: 34 GM/DL — SIGNIFICANT CHANGE UP (ref 32–36)
MCHC RBC-ENTMCNC: 34 GM/DL — SIGNIFICANT CHANGE UP (ref 32–36)
MCV RBC AUTO: 85.8 FL — SIGNIFICANT CHANGE UP (ref 80–100)
MCV RBC AUTO: 85.8 FL — SIGNIFICANT CHANGE UP (ref 80–100)
PA ADP PRP-ACNC: 191 PRU — SIGNIFICANT CHANGE UP (ref 180–376)
PA ADP PRP-ACNC: 191 PRU — SIGNIFICANT CHANGE UP (ref 180–376)
PLATELET # BLD AUTO: 218 K/UL — SIGNIFICANT CHANGE UP (ref 150–400)
PLATELET # BLD AUTO: 218 K/UL — SIGNIFICANT CHANGE UP (ref 150–400)
POTASSIUM SERPL-MCNC: 3.4 MMOL/L — LOW (ref 3.5–5.3)
POTASSIUM SERPL-MCNC: 3.4 MMOL/L — LOW (ref 3.5–5.3)
POTASSIUM SERPL-SCNC: 3.4 MMOL/L — LOW (ref 3.5–5.3)
POTASSIUM SERPL-SCNC: 3.4 MMOL/L — LOW (ref 3.5–5.3)
RBC # BLD: 4.59 M/UL — SIGNIFICANT CHANGE UP (ref 3.8–5.2)
RBC # BLD: 4.59 M/UL — SIGNIFICANT CHANGE UP (ref 3.8–5.2)
RBC # FLD: 13.2 % — SIGNIFICANT CHANGE UP (ref 10.3–14.5)
RBC # FLD: 13.2 % — SIGNIFICANT CHANGE UP (ref 10.3–14.5)
SODIUM SERPL-SCNC: 136 MMOL/L — SIGNIFICANT CHANGE UP (ref 135–145)
SODIUM SERPL-SCNC: 136 MMOL/L — SIGNIFICANT CHANGE UP (ref 135–145)
TSH SERPL-MCNC: 0.99 UIU/ML — SIGNIFICANT CHANGE UP (ref 0.27–4.2)
TSH SERPL-MCNC: 0.99 UIU/ML — SIGNIFICANT CHANGE UP (ref 0.27–4.2)
WBC # BLD: 6.89 K/UL — SIGNIFICANT CHANGE UP (ref 3.8–10.5)
WBC # BLD: 6.89 K/UL — SIGNIFICANT CHANGE UP (ref 3.8–10.5)
WBC # FLD AUTO: 6.89 K/UL — SIGNIFICANT CHANGE UP (ref 3.8–10.5)
WBC # FLD AUTO: 6.89 K/UL — SIGNIFICANT CHANGE UP (ref 3.8–10.5)

## 2023-11-24 PROCEDURE — 36224 PLACE CATH CAROTD ART: CPT | Mod: LT

## 2023-11-24 PROCEDURE — 36223 PLACE CATH CAROTID/INOM ART: CPT | Mod: 59,RT

## 2023-11-24 PROCEDURE — 99233 SBSQ HOSP IP/OBS HIGH 50: CPT

## 2023-11-24 PROCEDURE — 36227 PLACE CATH XTRNL CAROTID: CPT | Mod: LT

## 2023-11-24 PROCEDURE — 36226 PLACE CATH VERTEBRAL ART: CPT | Mod: 50

## 2023-11-24 RX ORDER — HYDROXYZINE HCL 10 MG
25 TABLET ORAL ONCE
Refills: 0 | Status: COMPLETED | OUTPATIENT
Start: 2023-11-24 | End: 2023-11-24

## 2023-11-24 RX ORDER — INFLUENZA VIRUS VACCINE 15; 15; 15; 15 UG/.5ML; UG/.5ML; UG/.5ML; UG/.5ML
0.5 SUSPENSION INTRAMUSCULAR ONCE
Refills: 0 | Status: COMPLETED | OUTPATIENT
Start: 2023-11-24 | End: 2023-11-24

## 2023-11-24 RX ORDER — SODIUM CHLORIDE 9 MG/ML
1000 INJECTION INTRAMUSCULAR; INTRAVENOUS; SUBCUTANEOUS
Refills: 0 | Status: DISCONTINUED | OUTPATIENT
Start: 2023-11-24 | End: 2023-11-28

## 2023-11-24 RX ADMIN — LOSARTAN POTASSIUM 100 MILLIGRAM(S): 100 TABLET, FILM COATED ORAL at 05:39

## 2023-11-24 RX ADMIN — Medication 81 MILLIGRAM(S): at 11:56

## 2023-11-24 RX ADMIN — SODIUM CHLORIDE 75 MILLILITER(S): 9 INJECTION, SOLUTION INTRAVENOUS at 21:28

## 2023-11-24 RX ADMIN — ATORVASTATIN CALCIUM 40 MILLIGRAM(S): 80 TABLET, FILM COATED ORAL at 21:27

## 2023-11-24 RX ADMIN — CLOPIDOGREL BISULFATE 75 MILLIGRAM(S): 75 TABLET, FILM COATED ORAL at 11:56

## 2023-11-24 NOTE — OCCUPATIONAL THERAPY INITIAL EVALUATION ADULT - ADDITIONAL COMMENTS
Pt lives in a private home with her , who can provide assistance if needed. There are 2 MALLY and 12 once inside to reach the bedroom/bathroom. Bathroom has a walk-in shower with a built in shower chair. Pt owns a SAC and RW from prior surgery, but has not used since. Pt is right handed.

## 2023-11-24 NOTE — PROGRESS NOTE ADULT - NS ATTEST RISK PROBLEM GEN_ALL_CORE FT
Multi territorial stroke in patient with near complete occlusion of R. ICA plan for Neuro intervention for diagnostic angiogram today, continues on ASA/Statin

## 2023-11-24 NOTE — PATIENT PROFILE ADULT - FALL HARM RISK - HARM RISK INTERVENTIONS

## 2023-11-24 NOTE — CHART NOTE - NSCHARTNOTEFT_GEN_A_CORE
Interventional Neuro- Radiology   Procedure Note     Procedure:Selective Cerebral Angiography   Pre- Procedure Diagnosis:  Post- Procedure Diagnosis:    : Dr. Chucho HUNG    RN:    Anesthesia: (MAC)   (general anesthesia)    Sheath:    I/Os/meds/vitals: see anesthesia/nursing note     Preliminary Report:  Under MAC/ general anesthesia, using a ___Fr short/long sheath to the right/ left/ bilateral groin examination of left vertebral artery/ left common carotid artery/ left external carotid artey/ right vertebral artery/ right common carotid artery/ right external carotid artery via selective cerebral angiography demonstrates ________. ( Official note to follow).    Patient tolerated procedure well, vital signs as noted above,  no change in neurological status noted.  Results discussed with   Groin sheath d/c'ed at ________ , manual compression held to hemostasis, no active bleeding, no hematoma, soft throughout, + pedal pulses, angio-seal device applied ????? , quick clot and safeguard balloon dressing applied at _____h.    Patient transferred to PACU/ ICU.

## 2023-11-24 NOTE — CHART NOTE - NSCHARTNOTEFT_GEN_A_CORE
Interventional Neuro Radiology  Pre-Procedure Note     HPI:  This is a 58 year old female w/ PMHx of vertigo, HTN, OA, DM2, hip replacement, shingles and sciatica who presented  as a direct transfer from [facility] on 11/23/23 for further evaluation of right carotid stenosis.  She presented to [facility] on 11/21 with progressively worsening left sided weakness, such that she slid off bed 2 days prior (11/19/23) to presentation, required her to use assistive device starting on 11/20/23.  Was noted to have stroke , but outside window for tPA.  She denied  any previous episodes.  Reported family history that her mother had carotid stenosis.        PAST MEDICAL & SURGICAL HISTORY:  Prediabetes  now diabetes  Hypertension  Osteoarthritis  Spider veins  Obesity, Class II, BMI 35-39.9  Shingles  midback area, 11/26/20-12/5/20  History of appendectomy  with right oophorecomy 2010  History of hip surgery  pinning of left hip age 12 and hardware removed age 17      Social History:     FAMILY HISTORY:  Family history of type 2 diabetes mellitus (Mother), ? carotid dz   Family history of hypertension (Mother)  FHx: anemia (Father)  Family history of cardiac arrest (Father)  Family history of gangrene (Father)  after wound to the foot    Allergies: No Known Allergies    Current Medications: acetaminophen     Tablet .. 650 milliGRAM(s) Oral every 6 hours PRN  aluminum hydroxide/magnesium hydroxide/simethicone Suspension 30 milliLiter(s) Oral every 4 hours PRN  aspirin  chewable 81 milliGRAM(s) Oral daily  atorvastatin 40 milliGRAM(s) Oral at bedtime  clopidogrel Tablet 75 milliGRAM(s) Oral daily  dextrose 5%. 1000 milliLiter(s) IV Continuous <Continuous>  dextrose 5%. 1000 milliLiter(s) IV Continuous <Continuous>  dextrose 50% Injectable 25 Gram(s) IV Push once  dextrose 50% Injectable 25 Gram(s) IV Push once  dextrose 50% Injectable 12.5 Gram(s) IV Push once  dextrose Oral Gel 15 Gram(s) Oral once PRN  glucagon  Injectable 1 milliGRAM(s) IntraMuscular once  hydrochlorothiazide 25 milliGRAM(s) Oral daily  influenza   Vaccine 0.5 milliLiter(s) IntraMuscular once  insulin lispro (ADMELOG) corrective regimen sliding scale   SubCutaneous three times a day before meals  lactated ringers. 1000 milliLiter(s) IV Continuous <Continuous>  losartan 100 milliGRAM(s) Oral daily  ondansetron Injectable 4 milliGRAM(s) IV Push every 8 hours PRN  polyethylene glycol 3350 17 Gram(s) Oral at bedtime PRN  senna 2 Tablet(s) Oral at bedtime PRN    General: NAD    Detailed Neurologic Exam:    Mental status: The patient is awake and alert and has normal attention span.  The patient is fully oriented in 3 spheres--Initially states the president is Chandra but corrects to Biden- states was watching show on Chandra, currently oriented .  The patient is able to name objects, follow commands, repeat sentences.    Cranial nerves: Pupils equal and react symmetrically to light. There is no visual field deficit to confrontation. Extraocular motion is full with no nystagmus. There is no ptosis. Facial sensation is intact. Subtle left facial palsy. Palate elevates symmetrically. Tongue is midline.    Motor: There is normal bulk and tone.  There is no tremor.  Strength is 5/5 in the right arm and leg.   Strength is 5/5 in the left arm and leg; slight drift in LUE/LLE; slow fine finger movements in the left hand     Sensation: Intact to light touch in 4 extremities    Cerebellar: There is no dysmetria on finger to nose testing.    Gait : deferred    [facility] SS:  DATE: 11/24/23  TIME: 1300    1A: Level of consciousness (0-3): 0  1B: Questions (0-2): 0  1C: Commands (0-2): 0  2: Gaze (0-2):   3: Visual fields (0-3): 0  4: Facial palsy (0-3): 1  MOTOR:  5A: Left arm motor drift (0-4): 1  5B: Right arm motor drift (0-4): 0  6A: Left leg motor drift (0-4): 1  6B: Right leg motor drift (0-4): 0  7: Limb ataxia (0-2): 0  SENSORY:  8: Sensation (0-2): 0  SPEECH:  9: Language (0-3): 0  10: Dysarthria (0-2): 0  EXTINCTION:  11: Extinction/inattention (0-2): 0    TOTAL SCORE: 3    prehospital mRS=0    Labs:                         13.4   6.89  )-----------( 218      ( 24 Nov 2023 07:13 )             39.4       11-24    136  |  100  |  32.7<H>  ----------------------------<  195<H>  3.4<L>   |  22.0  |  0.91    Ca    9.0      24 Nov 2023 07:13    TPro  7.5  /  Alb  4.1  /  TBili  0.4  /  DBili  x   /  AST  22  /  ALT  29  /  AlkPhos  79  11-23    P2Y12 191     Blood Bank:  pending     Assessment/Plan:   This is a 58y/ o hand dominant Female  presents with worsening left hemiparesis since 11/19/23 found to have multifocal scattered right hemispheric cerebral infarctions and right ICA severe stenosis vs. occlusion.   Patient presents to neuro-IR for selective cerebral angiography for further evaluation followed by possible stent/angioplasty pending findings.   Procedure/ risks/ benefits/ goals/ alternatives were explained. Risks include but are not limited to stroke/ vessel injury/ hemorrhage/ groin hematoma. All questions answered and concerns addressed. Informed content obtained from patient who verbalizes /expresses full understanding. Consent placed in chart. Son and  at bedside.

## 2023-11-24 NOTE — PHYSICAL THERAPY INITIAL EVALUATION ADULT - PERTINENT HX OF CURRENT PROBLEM, REHAB EVAL
This is a 58 year old female w/ PMHx of vertigo, HTN, OA, DM2, hip replacement, shingles and sciatica who presented  as a direct transfer from [facility] on 11/23/23 for further evaluation of right carotid stenosis.  She presented to [facility] on 11/21 with progressively worsening left sided weakness, such that she slid off bed 2 days prior (11/19/23) to presentation, required her to use assistive device starting on 11/20/23.  Was noted to have stroke , but outside window for tPA.  She denied  any previous episodes.  Reported family history that her mother had carotid stenosis.

## 2023-11-24 NOTE — PHYSICAL THERAPY INITIAL EVALUATION ADULT - ADDITIONAL COMMENTS
Pt lives in a private home with her , who can provide assistance if needed. There are 2 MALLY and 12 once inside to reach the bedroom/bathroom. Bathroom has a walk-in shower with a built in shower chair. Pt owns a SAC and RW from prior surgery, but has not used since.

## 2023-11-24 NOTE — PROGRESS NOTE ADULT - ASSESSMENT
This is a 58 year old female w/ PMHx of vertigo, HTN, OA, DM2, shingles and sciatica who presents as a direct transfer from [facility] for further evaluation of right carotid occlusion requiring diagnostic angiogram. Procedure planned this AM 11/24, possible intervention of sever R. carotid occlusion and multi territorial infarct, likely eventual DC to rehab.       #Acute CVA   #Severe R. carotid stenosis   MRI brain performed 11/22 with scattered small acute infarcts involving the superior aspects of the R. frontal lobe, R. parietal and R. caudate   - B/L Carotid US showed high grade R. ICA disease   - CTA neck had shown R. ICA stenosis near completely occlusive with more distal R. ICA with mutiple short segment stenoses and L. ICA 30-35% stenosis  - per Vascular at [facility] not Endarterectomy candidate    - Neuro/ Neuro intervention following, plan for Diagnostic Angiogram this AM/Afternoon 11/24   - Continue neurochecks q4, BP goal normotension   - PT/OT eval pending PT eval from [facility] noted 11/22 with Rec for Acute Rehab   - Continue Plavix/ ASA, Lipitor   - TTE performed at [facility] showing 60-65% EF, no PFO noted   - LDL noted 116, TSH .99, A1c 7.6     #HTN  c/w HCTZ 25, Losartan 100  - BP goal normotension     #DM   continue insulin sliding scale     #OA  #Sciatica   Symptomatic care at this time, tylenol for pain   - will add gabapentin if neuropathic pain worsens     DVT Prophylaxis: SCDs   Diet: Carb consistent / NPO for procedure   Code Status: Full  Dispo: Pending Angio findings

## 2023-11-24 NOTE — OCCUPATIONAL THERAPY INITIAL EVALUATION ADULT - PLANNED THERAPY INTERVENTIONS, OT EVAL
ADL retraining/balance training/bed mobility training/transfer training Evaluation performed at 12:00pm/ADL retraining/balance training/bed mobility training/transfer training

## 2023-11-24 NOTE — PHYSICAL THERAPY INITIAL EVALUATION ADULT - PASSIVE RANGE OF MOTION EXAMINATION, REHAB EVAL
[facility]  Medical Oncology  [address] [place] [id number]  Phone: [phone]  Fax: [phone]  Established Patient  Follow Up Time: 1 week   bilateral upper extremity Passive ROM was WFL (within functional limits)/bilateral lower extremity Passive ROM was WFL (within functional limits)

## 2023-11-24 NOTE — CONSULT NOTE ADULT - NS ATTEND AMEND GEN_ALL_CORE FT
High grade R ICA stenosis with multiple R ICA distribution infarcts transferred for possible carotid stent with Dr. Rankin later today  On DAPT with ASA, Plavix, further recommendations per MIKE

## 2023-11-24 NOTE — PROGRESS NOTE ADULT - SUBJECTIVE AND OBJECTIVE BOX
[facility] Division of Hospital Medicine    Chief Complaint:      SUBJECTIVE / OVERNIGHT EVENTS:    Patient seen and examined at bedside, no acute events overnight, Denies any new complaints, states mild improvement in L. hand function, patient planned for Diagnostic angiogram today for severe R. carotid stenosis / multi territorial stroke. Plan for Acute rehab on DC most likely.     MEDICATIONS  (STANDING):  aspirin  chewable 81 milliGRAM(s) Oral daily  atorvastatin 40 milliGRAM(s) Oral at bedtime  clopidogrel Tablet 75 milliGRAM(s) Oral daily  dextrose 5%. 1000 milliLiter(s) (50 mL/Hr) IV Continuous <Continuous>  dextrose 5%. 1000 milliLiter(s) (100 mL/Hr) IV Continuous <Continuous>  dextrose 50% Injectable 25 Gram(s) IV Push once  dextrose 50% Injectable 25 Gram(s) IV Push once  dextrose 50% Injectable 12.5 Gram(s) IV Push once  glucagon  Injectable 1 milliGRAM(s) IntraMuscular once  hydrochlorothiazide 25 milliGRAM(s) Oral daily  influenza   Vaccine 0.5 milliLiter(s) IntraMuscular once  insulin lispro (ADMELOG) corrective regimen sliding scale   SubCutaneous three times a day before meals  lactated ringers. 1000 milliLiter(s) (75 mL/Hr) IV Continuous <Continuous>  losartan 100 milliGRAM(s) Oral daily    MEDICATIONS  (PRN):  acetaminophen     Tablet .. 650 milliGRAM(s) Oral every 6 hours PRN Temp greater or equal to 38C (100.4F), Mild Pain (1 - 3)  aluminum hydroxide/magnesium hydroxide/simethicone Suspension 30 milliLiter(s) Oral every 4 hours PRN Dyspepsia  dextrose Oral Gel 15 Gram(s) Oral once PRN Blood Glucose LESS THAN 70 milliGRAM(s)/deciliter  ondansetron Injectable 4 milliGRAM(s) IV Push every 8 hours PRN Nausea and/or Vomiting  polyethylene glycol 3350 17 Gram(s) Oral at bedtime PRN for constipation  senna 2 Tablet(s) Oral at bedtime PRN for constipation        I&O's Summary      PHYSICAL EXAM:  Vital Signs Last 24 Hrs  T(C): 37.1 (24 Nov 2023 04:02), Max: 37.3 (23 Nov 2023 19:28)  T(F): 98.7 (24 Nov 2023 04:02), Max: 99.1 (23 Nov 2023 19:28)  HR: 95 (24 Nov 2023 04:02) (92 - 104)  BP: 135/74 (24 Nov 2023 04:02) (110/69 - 144/55)  BP(mean): --  RR: 18 (24 Nov 2023 04:02) (17 - 19)  SpO2: 98% (24 Nov 2023 04:02) (95% - 99%)    CONSTITUTIONAL: NAD, well groomed   ENMT: Moist oral mucosa  RESPIRATORY: clear to auscultation bilaterally   CARDIOVASCULAR: Regular rate and rhythm, normal S1 and S2, no peripheral edema noted   ABDOMEN: Nontender to palpation, normoactive bowel sounds  MUSCLOSKELETAL:  ROM wnl in all extremities tested, mild decrease in LUE strength particularly hand / fine finger movements   PSYCH: A+O to person, place, and time; affect appropriate  NEUROLOGY: CN 2-12 are intact and symmetric; no sensory deficits, decreased  strength in L .hand with mildly diminished fine finger motions   SKIN: No rashes; no palpable lesions    LABS:                        13.4   6.89  )-----------( 218      ( 24 Nov 2023 07:13 )             39.4     11-24    136  |  100  |  32.7<H>  ----------------------------<  195<H>  3.4<L>   |  22.0  |  0.91    Ca    9.0      24 Nov 2023 07:13    TPro  7.5  /  Alb  4.1  /  TBili  0.4  /  DBili  x   /  AST  22  /  ALT  29  /  AlkPhos  79  11-23    PT/INR - ( 23 Nov 2023 22:00 )   PT: 11.2 sec;   INR: 1.01 ratio         PTT - ( 23 Nov 2023 22:00 )  PTT:26.3 sec      Urinalysis Basic - ( 24 Nov 2023 07:13 )    Color: x / Appearance: x / SG: x / pH: x  Gluc: 195 mg/dL / Ketone: x  / Bili: x / Urobili: x   Blood: x / Protein: x / Nitrite: x   Leuk Esterase: x / RBC: x / WBC x   Sq Epi: x / Non Sq Epi: x / Bacteria: x        CAPILLARY BLOOD GLUCOSE      POCT Blood Glucose.: 179 mg/dL (24 Nov 2023 05:43)  POCT Blood Glucose.: 182 mg/dL (24 Nov 2023 00:08)  POCT Blood Glucose.: 192 mg/dL (23 Nov 2023 16:37)  POCT Blood Glucose.: 188 mg/dL (23 Nov 2023 12:45)        RADIOLOGY & ADDITIONAL TESTS:  Results Reviewed:   Imaging Personally Reviewed:  Electrocardiogram Personally Reviewed:

## 2023-11-24 NOTE — CONSULT NOTE ADULT - ASSESSMENT
INCOMPLETE- VISIT PENDING     ASSESSMENT: This is a 58 year old female w/ PMHx of vertigo, HTN, OA, DM2, hip replacement, shingles and sciatica who presented as a transfer from [facility] on 11/23/23 for further evaluation of  right carotid stenosis.  She presented to [facility] on 11/21 with progressively worsening left sided weakness since 11/19/23.  CT head Was noted to have attenuation in high right frontal cortical subcoritcal region concerning for old infarction, tenecteplase was deferred to to being outside of window. CT angiogram head/neck demonstrated short segment mild left ICA stenosis and multiple segments of stenosis in the right ICA noted to be small in caliber.  Carotid duplex showed flow concerning for pre-obstructive pattern.  There was a plaque with string sign corresponding to the CT angiogram reflecting high grade stenosis.  MRI demonstrated small acute scattered right hemispheric frontal, parietal, and caudate infarcts.  Etiology concerning for symptomatic large artery atherosclerosis.       NEURO:   -Neurologically ---   -Continue close monitoring for neurologic deterioration    - Stroke neuro checks q _   -  SBP goal   -ANTITHROMBOTIC THERAPY: ASA 81mg / Clopidogrel 75mg daily.   -titrate statin to LDL goal less than 70  -MRI Brain w/o as noted   - Transferred after consult with vascular surgery for possible neurointerventional radiology cerebral angiogram/possible stent.    -Dysphagia screen: pass   -Physical therapy/OT/Speech eval/treatment.       -CARDIOVASCULAR:  TTE as noted , cardiac monitoring w/ telemetry for now, further evaluation pending findings of noted workup                              -HEMATOLOGY: H/H without anemia , Platelets 218, patient should have all age and risk appropriate malignancy screenings with PCP or sooner if clinically suspected   -check LE duplex given immobility prior to admission      DVT ppx: no neurological contraindication to pharmacological dvt ppx     PULMONARY: CXR left shoulder degeneration  , protecting airway, saturating well     RENAL: BUN slightly elevated /Cr within range , monitor urine output, maintain adequate hydration , monitor hyponatremia       Na Goal:  135-145    ID: afebrile, no leukocytosis, monitor for si/sx of infection     OTHER:  condition and plan of care d/w patient, questions and concerns addressed.     DISPOSITION: Rehab or home depending on PT eval once stable and workup is complete      CORE MEASURES:        Admission NIHSS: 2     Tenecteplase : [] YES [x] NO      LDL/HDL/A1C: 116/58/7.7     Depression Screen- if depression hx and/or present      Statin Therapy: as noted      Dysphagia Screen: [x] PASS [] FAIL 11/24     Smoking [] YES [] NO      Afib [] YES [x] NO     Stroke Education [] YES [] NO pending     Obtain screening lower extremity venous ultrasound in patients who meet 1 or more of the following criteria as patient is high risk for DVT/PE on admission:   [] History of DVT/PE  []Hypercoagulable states (Factor V Leiden, Cancer, OCP, etc. )  []Prolonged immobility (hemiplegia/hemiparesis/post operative or any other extended immobilization)  [] Transferred from outside facility (Rehab or Long term care)  [] Age </= to 50   ASSESSMENT: This is a 58 year old female w/ PMHx of vertigo, HTN, OA, DM2, hip replacement, shingles and sciatica who presented as a transfer from [facility] on 11/23/23 for further evaluation of  right carotid stenosis.  She presented to [facility] on 11/21 with progressively worsening left sided weakness since 11/19/23, initially attributed to hip replacement/sciatica .  CT head Was noted to have attenuation in high right frontal cortical subcoritcal region concerning for old infarction, tenecteplase was deferred to to being outside of window. CT angiogram head/neck demonstrated short segment mild left ICA stenosis and multiple segments of stenosis in the right ICA noted to be small in caliber.  Carotid duplex showed flow concerning for pre-obstructive pattern.  There was a plaque with string sign corresponding to the CT angiogram reflecting high grade stenosis.  MRI demonstrated small acute scattered right hemispheric frontal, parietal, and caudate infarcts.  Etiology concerning for symptomatic large artery atherosclerosis.       NEURO:   -Neurologically with mild left hemiparesis of face, arm, and leg   -Continue close monitoring for neurologic deterioration    - Stroke neuro checks q 2    -  SBP goal 130-150mmHg for now as appears to be neurologically tolerating, further titration recommendations pending findings of noted workup.    -ANTITHROMBOTIC THERAPY: ASA 81mg / Clopidogrel 75mg daily.   -titrate statin to LDL goal less than 70  -MRI Brain w/o as noted   - Transferred after consult with vascular surgery for possible neurointerventional radiology cerebral angiogram/possible stent anticipated for 11/24/23.    -Dysphagia screen: pass   -Physical therapy/OT/Speech eval/treatment.       -CARDIOVASCULAR:  TTE as noted , cardiac monitoring w/ telemetry for now, further evaluation pending findings of noted workup                              -HEMATOLOGY: H/H without anemia , Platelets 218, patient should have all age and risk appropriate malignancy screenings with PCP or sooner if clinically suspected   -check LE duplex given immobility prior to admission      DVT ppx: no neurological contraindication to pharmacological dvt ppx     PULMONARY: CXR left shoulder degeneration  , protecting airway, saturating well     RENAL: BUN slightly elevated /Cr within range , monitor urine output, maintain adequate hydration , monitor hyponatremia       Na Goal:  135-145    ID: afebrile, no leukocytosis, monitor for si/sx of infection     OTHER:  condition and plan of care d/w patient, questions and concerns addressed.     DISPOSITION: Rehab or home depending on PT eval once stable and workup is complete      CORE MEASURES:        Admission NIHSS: 2     Tenecteplase : [] YES [x] NO      LDL/HDL/A1C: 116/58/7.7     Depression Screen- if depression hx and/or present      Statin Therapy: as noted      Dysphagia Screen: [x] PASS [] FAIL 11/24     Smoking [] YES [] NO      Afib [] YES [x] NO     Stroke Education [] YES [] NO pending     Obtain screening lower extremity venous ultrasound in patients who meet 1 or more of the following criteria as patient is high risk for DVT/PE on admission:   [] History of DVT/PE  []Hypercoagulable states (Factor V Leiden, Cancer, OCP, etc. )  []Prolonged immobility (hemiplegia/hemiparesis/post operative or any other extended immobilization)  [] Transferred from outside facility (Rehab or Long term care)  [] Age </= to 50   ASSESSMENT: This is a 58 year old female w/ PMHx of vertigo, HTN, OA, DM2, hip replacement, shingles and sciatica who [facility] for further evaluation of  right carotid stenosis.  [facility] with progressively worsening left sided weakness since 11/19/23, initially attributed to hip replacement/sciatica .  CT head Was noted to have attenuation in high right frontal cortical subcoritcal region concerning for old infarction, tenecteplase was deferred to to being outside of window. CT angiogram head/neck demonstrated short segment mild left ICA stenosis and multiple segments of stenosis in the right ICA noted to be small in caliber.  Carotid duplex showed flow concerning for pre-obstructive pattern.  There was a plaque with string sign corresponding to the CT angiogram reflecting high grade stenosis.  MRI demonstrated small acute scattered right hemispheric frontal, parietal, and caudate infarcts.  Etiology concerning for symptomatic large artery atherosclerosis.       NEURO:   -Neurologically with mild left hemiparesis of face, arm, and leg   -Continue close monitoring for neurologic deterioration    - Stroke neuro checks q 4 hr    -  SBP goal 130-150mmHg for now as appears to be neurologically tolerating, further titration recommendations pending findings of noted workup.    -ANTITHROMBOTIC THERAPY: ASA 81mg / Clopidogrel 75mg daily.   -titrate statin to LDL goal less than 70  -MRI Brain w/o as noted   - Transferred after consult with vascular surgery for possible neurointerventional radiology cerebral angiogram/possible stent anticipated for 11/24/23.    -Dysphagia screen: pass   -Physical therapy/OT/Speech eval/treatment.       -CARDIOVASCULAR:  TTE as noted , cardiac monitoring w/ telemetry for now, further evaluation pending findings of noted workup                              -HEMATOLOGY: H/H without anemia , Platelets 218, patient should have all age and risk appropriate malignancy screenings with PCP or sooner if clinically suspected   -check LE duplex given immobility prior to admission      DVT ppx: no neurological contraindication to pharmacological dvt ppx     PULMONARY: CXR left shoulder degeneration  , protecting airway, saturating well     RENAL: BUN slightly elevated /Cr within range , monitor urine output, maintain adequate hydration , monitor hyponatremia       Na Goal:  135-145    ID: afebrile, no leukocytosis, monitor for si/sx of infection     OTHER:  condition and plan of care d/w patient, questions and concerns addressed.     DISPOSITION: Rehab or home depending on PT eval once stable and workup is complete      CORE MEASURES:        Admission NIHSS: 2     Tenecteplase : [] YES [x] NO      LDL/HDL/A1C: 116/58/7.7     Depression Screen- if depression hx and/or present      Statin Therapy: as noted      Dysphagia Screen: [x] PASS [] FAIL 11/24     Smoking [] YES [] NO      Afib [] YES [x] NO     Stroke Education [] YES [] NO pending     Obtain screening lower extremity venous ultrasound in patients who meet 1 or more of the following criteria as patient is high risk for DVT/PE on admission:   [] History of DVT/PE  []Hypercoagulable states (Factor V Leiden, Cancer, OCP, etc. )  []Prolonged immobility (hemiplegia/hemiparesis/post operative or any other extended immobilization)  [] Transferred from outside facility (Rehab or Long term care)  [] Age </= to 50

## 2023-11-24 NOTE — CONSULT NOTE ADULT - SUBJECTIVE AND OBJECTIVE BOX
Preliminary note, official note pending attending review/signature.                               [facility] Stroke Team  CC: left sided weakness     HPI:  This is a 58 year old female w/ PMHx of vertigo, HTN, OA, DM2, hip replacement, shingles and sciatica who presented  as a direct transfer from [facility] on 11/23/23 for further evaluation of right carotid stenosis.  She presented to [facility] on 11/21 with progressively worsening left sided weakness, such that she slid off bed 2 days prior (11/19/23) to presentation, required her to use assistive device starting on 11/20/23.  Was noted to have stroke , but outside window for tPA.  She denied  any previous episodes.  Reported family history that her mother had carotid stenosis.      PAST MEDICAL & SURGICAL HISTORY:  Prediabetes  now diabetes  Hypertension  Osteoarthritis  Spider veins  Obesity, Class II, BMI 35-39.9  [facility], 11/26/20-12/5/20  History of appendectomy  with right oophorecomy 2010  History of hip surgery  pinning of left hip age 12 and hardware removed age 17    MEDICATIONS  (STANDING):  aspirin  chewable 81 milliGRAM(s) Oral daily  atorvastatin 40 milliGRAM(s) Oral at bedtime  clopidogrel Tablet 75 milliGRAM(s) Oral daily  dextrose 5%. 1000 milliLiter(s) (50 mL/Hr) IV Continuous <Continuous>  dextrose 5%. 1000 milliLiter(s) (100 mL/Hr) IV Continuous <Continuous>  dextrose 50% Injectable 25 Gram(s) IV Push once  dextrose 50% Injectable 12.5 Gram(s) IV Push once  dextrose 50% Injectable 25 Gram(s) IV Push once  glucagon  Injectable 1 milliGRAM(s) IntraMuscular once  hydrochlorothiazide 25 milliGRAM(s) Oral daily  influenza   Vaccine 0.5 milliLiter(s) IntraMuscular once  insulin lispro (ADMELOG) corrective regimen sliding scale   SubCutaneous three times a day before meals  lactated ringers. 1000 milliLiter(s) (75 mL/Hr) IV Continuous <Continuous>  losartan 100 milliGRAM(s) Oral daily    MEDICATIONS  (PRN):  acetaminophen     Tablet .. 650 milliGRAM(s) Oral every 6 hours PRN Temp greater or equal to 38C (100.4F), Mild Pain (1 - 3)  aluminum hydroxide/magnesium hydroxide/simethicone Suspension 30 milliLiter(s) Oral every 4 hours PRN Dyspepsia  dextrose Oral Gel 15 Gram(s) Oral once PRN Blood Glucose LESS THAN 70 milliGRAM(s)/deciliter  ondansetron Injectable 4 milliGRAM(s) IV Push every 8 hours PRN Nausea and/or Vomiting  polyethylene glycol 3350 17 Gram(s) Oral at bedtime PRN for constipation  senna 2 Tablet(s) Oral at bedtime PRN for constipation      Allergies  No Known Allergies    SOCIAL HISTORY:  no tob,   no alcohol   no drugs    FAMILY HISTORY:  Family history of type 2 diabetes mellitus,   ? carotid stenosis (Mother)  Family history of hypertension (Mother)  FHx: anemia (Father)  Family history of cardiac arrest (Father)  Family history of gangrene (Father)  after wound to the foot          ROS: 14 point ROS negative other than what is present in HPI or below    Vital Signs Last 24 Hrs  T(C): 37.1 (24 Nov 2023 04:02), Max: 37.3 (23 Nov 2023 19:28)  T(F): 98.7 (24 Nov 2023 04:02), Max: 99.1 (23 Nov 2023 19:28)  HR: 95 (24 Nov 2023 04:02) (78 - 104)  BP: 135/74 (24 Nov 2023 04:02) (110/69 - 146/76)  BP(mean): 97 (23 Nov 2023 09:45) (97 - 97)  RR: 18 (24 Nov 2023 04:02) (17 - 19)  SpO2: 98% (24 Nov 2023 04:02) (95% - 99%)      EXAM PENDING     General: NAD    Detailed Neurologic Exam:    Mental status: The patient is awake and alert and has normal attention span.  The patient is fully oriented in 3 spheres. The patient is oriented to current events. The patient is able to name objects, follow commands, repeat sentences.    Cranial nerves: Pupils equal and react symmetrically to light. There is no visual field deficit to confrontation. Extraocular motion is full with no nystagmus. There is no ptosis. Facial sensation is intact. Facial musculature is symmetric. Palate elevates symmetrically. Tongue is midline.    Motor: There is normal bulk and tone.  There is no tremor.  Strength is 5/5 in the right arm and leg.   Strength is 5/5 in the left arm and leg.    Sensation: Intact to light touch and pin in 4 extremities    Reflexes: 1-2+ throughout and plantar responses are flexor.    Cerebellar: There is no dysmetria on finger to nose testing.    Gait : deferred    [facility] SS:  DATE: 11/24/23  TIME:  1A: Level of consciousness (0-3):   1B: Questions (0-2):   1C: Commands (0-2):   2: Gaze (0-2):   3: Visual fields (0-3):   4: Facial palsy (0-3):   MOTOR:  5A: Left arm motor drift (0-4):   5B: Right arm motor drift (0-4):   6A: Left leg motor drift (0-4):   6B: Right leg motor drift (0-4):   7: Limb ataxia (0-2):   SENSORY:  8: Sensation (0-2):   SPEECH:  9: Language (0-3):   10: Dysarthria (0-2):   EXTINCTION:  11: Extinction/inattention (0-2):     TOTAL SCORE:     prehospital mRS=      LABS:                         13.4   6.89  )-----------( 218      ( 24 Nov 2023 07:13 )             39.4       11-23    134<L>  |  98  |  34.9<H>  ----------------------------<  182<H>  3.8   |  22.0  |  1.10    Ca    9.4      23 Nov 2023 22:00    TPro  7.5  /  Alb  4.1  /  TBili  0.4  /  DBili  x   /  AST  22  /  ALT  29  /  AlkPhos  79  11-23      PT/INR - ( 23 Nov 2023 22:00 )   PT: 11.2 sec;   INR: 1.01 ratio         PTT - ( 23 Nov 2023 22:00 )  PTT:26.3 sec    Lipid Profile (11.22.23 @ 07:24)    Cholesterol: 196: Interpretive Comment:  Acceptable: <200 mg/dL (for adults) ; <170 mg/dL (for children) mg/dL   Triglycerides, Serum: 125: Interpretive Comment:  Acceptable: <150 mg/dL (for adults) ; < 90 mg/dL (for children)  Triglyceride concentration can be influenced when measured in the  non-fasting state. mg/dL   HDL Cholesterol: 58: Interpretive Comment:  HDL cholesterol less than 40 mg/dL is a risk factor for cardiovascular  disease mg/dL   Non HDL Cholesterol: 138: Optimal Non-HDL Cholesterol (Non-HDL-C)   LDL Cholesterol Calculated: 116: Optimal LDL Cholesterol (LDL-C)       A1C with Estimated Average Glucose (11.22.23 @ 07:24)    A1C with Estimated Average Glucose Result: 7.7: Method: Immunoassay    RADIOLOGY & ADDITIONAL STUDIES (independently reviewed unless otherwise noted):    MR Head No Cont (11.22.23 @ 14:14)   IMPRESSION:  Scattered small acute infarcts involving the superior aspects of the   right frontal lobe and right parietal lobe as well as the right caudate.    (11.21.23 @ 16:14)   CTA brain:  Skull base and supraclinoid right ICA is very small in caliber,   particularly at the level of the proximal cavernous segment. The   supraclinoid right ICA is somewhat small in caliber.  Small anterior communicating artery visualized.Large bilateral A1   segments.  No vascular aneurysm or AVM.    CTA neck:  Right internal carotid artery origin and proximal segment is essentially   including, which appears chronic in nature. More distal right ICA is   small in caliber and demonstrates multiple short segment stenoses.    Calcified plaque at the origin of the left internal carotid artery   results in approximately 30-35% short segment stenosis. Normal   flow-related enhancement of the more distal vessel.    CT Head No Cont (11.21.23 @ 15:09)   Parenchymal volume loss is identified. Abnormal low attenuation seen   involving the high right frontal cortical subcortical region. This likely   compatible with an area of old infarct.  Noacute hemorrhage mass or mass effect is seen  Evaluation of the osseous structures appropriate window appears   unremarkable  The visualized paranasal sinuses mastoid and middle ear regions appear   clear.  IMPRESSION: Abnormal areas of low attenuation involving the high right   frontal cortical and subcortical region as described above.      US Duplex Carotid Arteries Complete, Bilateral (11.21.23 @ 19:31)   RIGHT: Pre-obstructive pattern of flow at the right distal common carotid   artery. Plaque at the right carotid bulb with apparent string sign on   image 1.13, which may correspond to recent CTA findings, reflecting high   grade right ICA disease. Though, there is antegrade flow along   interrogated portions of the right ICA. Correlate with recent CTA   findings.    LEFT: Plaque of the left carotid bulb extending into internal and   external branches without hemodynamically significant stenosis.    TTE Echo Complete w/o Contrast w/ Doppler (11.22.23 @ 08:00)    Mild concentric left ventricular hypertrophy is present.   Estimated left ventricular ejection fraction is 60-65 %.   Normal appearing left atrium.   Normal appearing right atrium.   Aneurysmal atrial septum is seen.   Mild aortic sclerosisis present with normal valvular opening.   Mild mitral annular calcification is present.   Mild (1+) mitral regurgitation is present.   EA reversal of the mitral inflow consistent with reduced compliance of   the   left ventricle.   Normal appearing tricuspid valve structure and function.   Trace tricuspid valve regurgitation is present.   Normal appearing right atrium. No significant shunt appreciated across   the   interatrial septum under color doppler interrogation and with agitated   saline contrast   along with provocative maneuvers to increase RA pressure.   The IVC appears normal.        Preliminary note, official note pending attending review/signature.                               [facility] Stroke Team  CC: left sided weakness     HPI:  This is a 58 year old female w/ PMHx of vertigo, HTN, OA, DM2, hip replacement, shingles and sciatica who presented  as a direct transfer from [facility] on 11/23/23 for further evaluation of right carotid stenosis.  She presented to [facility] on 11/21 with progressively worsening left sided weakness, such that she slid off bed 2 days prior (11/19/23) to presentation, required her to use assistive device starting on 11/20/23.  Was noted to have stroke , but outside window for tPA.  She denied  any previous episodes.  Reported family history that her mother had carotid stenosis.      PAST MEDICAL & SURGICAL HISTORY:  Prediabetes  now diabetes  Hypertension  Osteoarthritis  Spider veins  Obesity, Class II, BMI 35-39.9  [facility], 11/26/20-12/5/20  History of appendectomy  with right oophorecomy 2010  History of hip surgery  pinning of left hip age 12 and hardware removed age 17    MEDICATIONS  (STANDING):  aspirin  chewable 81 milliGRAM(s) Oral daily  atorvastatin 40 milliGRAM(s) Oral at bedtime  clopidogrel Tablet 75 milliGRAM(s) Oral daily  dextrose 5%. 1000 milliLiter(s) (50 mL/Hr) IV Continuous <Continuous>  dextrose 5%. 1000 milliLiter(s) (100 mL/Hr) IV Continuous <Continuous>  dextrose 50% Injectable 25 Gram(s) IV Push once  dextrose 50% Injectable 12.5 Gram(s) IV Push once  dextrose 50% Injectable 25 Gram(s) IV Push once  glucagon  Injectable 1 milliGRAM(s) IntraMuscular once  hydrochlorothiazide 25 milliGRAM(s) Oral daily  influenza   Vaccine 0.5 milliLiter(s) IntraMuscular once  insulin lispro (ADMELOG) corrective regimen sliding scale   SubCutaneous three times a day before meals  lactated ringers. 1000 milliLiter(s) (75 mL/Hr) IV Continuous <Continuous>  losartan 100 milliGRAM(s) Oral daily    MEDICATIONS  (PRN):  acetaminophen     Tablet .. 650 milliGRAM(s) Oral every 6 hours PRN Temp greater or equal to 38C (100.4F), Mild Pain (1 - 3)  aluminum hydroxide/magnesium hydroxide/simethicone Suspension 30 milliLiter(s) Oral every 4 hours PRN Dyspepsia  dextrose Oral Gel 15 Gram(s) Oral once PRN Blood Glucose LESS THAN 70 milliGRAM(s)/deciliter  ondansetron Injectable 4 milliGRAM(s) IV Push every 8 hours PRN Nausea and/or Vomiting  polyethylene glycol 3350 17 Gram(s) Oral at bedtime PRN for constipation  senna 2 Tablet(s) Oral at bedtime PRN for constipation      Allergies  No Known Allergies    SOCIAL HISTORY:  no tob,   no alcohol   no drugs    FAMILY HISTORY:  Family history of type 2 diabetes mellitus,   ? carotid stenosis (Mother)  Family history of hypertension (Mother)  FHx: anemia (Father)  Family history of cardiac arrest (Father)  Family history of gangrene (Father)  after wound to the foot          ROS: 14 point ROS negative other than what is present in HPI or below    Vital Signs Last 24 Hrs  T(C): 37.1 (24 Nov 2023 04:02), Max: 37.3 (23 Nov 2023 19:28)  T(F): 98.7 (24 Nov 2023 04:02), Max: 99.1 (23 Nov 2023 19:28)  HR: 95 (24 Nov 2023 04:02) (78 - 104)  BP: 135/74 (24 Nov 2023 04:02) (110/69 - 146/76)  BP(mean): 97 (23 Nov 2023 09:45) (97 - 97)  RR: 18 (24 Nov 2023 04:02) (17 - 19)  SpO2: 98% (24 Nov 2023 04:02) (95% - 99%)      EXAM PENDING     General: NAD    Detailed Neurologic Exam:    Mental status: The patient is awake and alert and has normal attention span.  The patient is fully oriented in 3 spheres--Initially states the president is Chandra but corrects to Biden.  The patient is able to name objects, follow commands, repeat sentences.    Cranial nerves: Pupils equal and react symmetrically to light. There is no visual field deficit to confrontation. Extraocular motion is full with no nystagmus. There is no ptosis. Facial sensation is intact. Facial musculature is symmetric. Palate elevates symmetrically. Tongue is midline.    Motor: There is normal bulk and tone.  There is no tremor.  Strength is 5/5 in the right arm and leg.   Strength is 5/5 in the left arm and leg; slight drift in LUE which does not hit the bed; slow fine finger movements in the left hand     Sensation: Intact to light touch in 4 extremities    Cerebellar: There is no dysmetria on finger to nose testing.    Gait : deferred    [facility] SS:  DATE: 11/24/23  TIME: 11:45 AM  1A: Level of consciousness (0-3): 0  1B: Questions (0-2): 0  1C: Commands (0-2): 0  2: Gaze (0-2):   3: Visual fields (0-3): 0  4: Facial palsy (0-3): 0  MOTOR:  5A: Left arm motor drift (0-4): 1  5B: Right arm motor drift (0-4): 0  6A: Left leg motor drift (0-4): 0  6B: Right leg motor drift (0-4): 0  7: Limb ataxia (0-2): 0  SENSORY:  8: Sensation (0-2): 0  SPEECH:  9: Language (0-3): 0  10: Dysarthria (0-2): 0  EXTINCTION:  11: Extinction/inattention (0-2): 0    TOTAL SCORE: 1    prehospital mRS=      LABS:                         13.4   6.89  )-----------( 218      ( 24 Nov 2023 07:13 )             39.4       11-23    134<L>  |  98  |  34.9<H>  ----------------------------<  182<H>  3.8   |  22.0  |  1.10    Ca    9.4      23 Nov 2023 22:00    TPro  7.5  /  Alb  4.1  /  TBili  0.4  /  DBili  x   /  AST  22  /  ALT  29  /  AlkPhos  79  11-23      PT/INR - ( 23 Nov 2023 22:00 )   PT: 11.2 sec;   INR: 1.01 ratio         PTT - ( 23 Nov 2023 22:00 )  PTT:26.3 sec    Lipid Profile (11.22.23 @ 07:24)    Cholesterol: 196: Interpretive Comment:  Acceptable: <200 mg/dL (for adults) ; <170 mg/dL (for children) mg/dL   Triglycerides, Serum: 125: Interpretive Comment:  Acceptable: <150 mg/dL (for adults) ; < 90 mg/dL (for children)  Triglyceride concentration can be influenced when measured in the  non-fasting state. mg/dL   HDL Cholesterol: 58: Interpretive Comment:  HDL cholesterol less than 40 mg/dL is a risk factor for cardiovascular  disease mg/dL   Non HDL Cholesterol: 138: Optimal Non-HDL Cholesterol (Non-HDL-C)   LDL Cholesterol Calculated: 116: Optimal LDL Cholesterol (LDL-C)       A1C with Estimated Average Glucose (11.22.23 @ 07:24)    A1C with Estimated Average Glucose Result: 7.7: Method: Immunoassay    RADIOLOGY & ADDITIONAL STUDIES (independently reviewed unless otherwise noted):    MR Head No Cont (11.22.23 @ 14:14)   IMPRESSION:  Scattered small acute infarcts involving the superior aspects of the   right frontal lobe and right parietal lobe as well as the right caudate.    (11.21.23 @ 16:14)   CTA brain:  Skull base and supraclinoid right ICA is very small in caliber,   particularly at the level of the proximal cavernous segment. The   supraclinoid right ICA is somewhat small in caliber.  Small anterior communicating artery visualized.Large bilateral A1   segments.  No vascular aneurysm or AVM.    CTA neck:  Right internal carotid artery origin and proximal segment is essentially   including, which appears chronic in nature. More distal right ICA is   small in caliber and demonstrates multiple short segment stenoses.    Calcified plaque at the origin of the left internal carotid artery   results in approximately 30-35% short segment stenosis. Normal   flow-related enhancement of the more distal vessel.    CT Head No Cont (11.21.23 @ 15:09)   Parenchymal volume loss is identified. Abnormal low attenuation seen   involving the high right frontal cortical subcortical region. This likely   compatible with an area of old infarct.  Noacute hemorrhage mass or mass effect is seen  Evaluation of the osseous structures appropriate window appears   unremarkable  The visualized paranasal sinuses mastoid and middle ear regions appear   clear.  IMPRESSION: Abnormal areas of low attenuation involving the high right   frontal cortical and subcortical region as described above.      US Duplex Carotid Arteries Complete, Bilateral (11.21.23 @ 19:31)   RIGHT: Pre-obstructive pattern of flow at the right distal common carotid   artery. Plaque at the right carotid bulb with apparent string sign on   image 1.13, which may correspond to recent CTA findings, reflecting high   grade right ICA disease. Though, there is antegrade flow along   interrogated portions of the right ICA. Correlate with recent CTA   findings.    LEFT: Plaque of the left carotid bulb extending into internal and   external branches without hemodynamically significant stenosis.    TTE Echo Complete w/o Contrast w/ Doppler (11.22.23 @ 08:00)    Mild concentric left ventricular hypertrophy is present.   Estimated left ventricular ejection fraction is 60-65 %.   Normal appearing left atrium.   Normal appearing right atrium.   Aneurysmal atrial septum is seen.   Mild aortic sclerosisis present with normal valvular opening.   Mild mitral annular calcification is present.   Mild (1+) mitral regurgitation is present.   EA reversal of the mitral inflow consistent with reduced compliance of   the   left ventricle.   Normal appearing tricuspid valve structure and function.   Trace tricuspid valve regurgitation is present.   Normal appearing right atrium. No significant shunt appreciated across   the   interatrial septum under color doppler interrogation and with agitated   saline contrast   along with provocative maneuvers to increase RA pressure.   The IVC appears normal.        Preliminary note, official note pending attending review/signature.                               [facility] Stroke Team  CC: left sided weakness     HPI:  This is a 58 year old female w/ PMHx of vertigo, HTN, OA, DM2, hip replacement, shingles and sciatica who presented  as a direct transfer from [facility] on 11/23/23 for further evaluation of right carotid stenosis.  She presented to [facility] on 11/21 with progressively worsening left sided weakness, such that she slid off bed 2 days prior (11/19/23) to presentation, required her to use assistive device starting on 11/20/23.  Was noted to have stroke , but outside window for tPA.  She denied  any previous episodes.  Reported family history that her mother had carotid stenosis.      PAST MEDICAL & SURGICAL HISTORY:  Prediabetes  now diabetes  Hypertension  Osteoarthritis  Spider veins  Obesity, Class II, BMI 35-39.9  [facility], 11/26/20-12/5/20  History of appendectomy  with right oophorecomy 2010  History of hip surgery  pinning of left hip age 12 and hardware removed age 17    MEDICATIONS  (STANDING):  aspirin  chewable 81 milliGRAM(s) Oral daily  atorvastatin 40 milliGRAM(s) Oral at bedtime  clopidogrel Tablet 75 milliGRAM(s) Oral daily  dextrose 5%. 1000 milliLiter(s) (50 mL/Hr) IV Continuous <Continuous>  dextrose 5%. 1000 milliLiter(s) (100 mL/Hr) IV Continuous <Continuous>  dextrose 50% Injectable 25 Gram(s) IV Push once  dextrose 50% Injectable 12.5 Gram(s) IV Push once  dextrose 50% Injectable 25 Gram(s) IV Push once  glucagon  Injectable 1 milliGRAM(s) IntraMuscular once  hydrochlorothiazide 25 milliGRAM(s) Oral daily  influenza   Vaccine 0.5 milliLiter(s) IntraMuscular once  insulin lispro (ADMELOG) corrective regimen sliding scale   SubCutaneous three times a day before meals  lactated ringers. 1000 milliLiter(s) (75 mL/Hr) IV Continuous <Continuous>  losartan 100 milliGRAM(s) Oral daily    MEDICATIONS  (PRN):  acetaminophen     Tablet .. 650 milliGRAM(s) Oral every 6 hours PRN Temp greater or equal to 38C (100.4F), Mild Pain (1 - 3)  aluminum hydroxide/magnesium hydroxide/simethicone Suspension 30 milliLiter(s) Oral every 4 hours PRN Dyspepsia  dextrose Oral Gel 15 Gram(s) Oral once PRN Blood Glucose LESS THAN 70 milliGRAM(s)/deciliter  ondansetron Injectable 4 milliGRAM(s) IV Push every 8 hours PRN Nausea and/or Vomiting  polyethylene glycol 3350 17 Gram(s) Oral at bedtime PRN for constipation  senna 2 Tablet(s) Oral at bedtime PRN for constipation      Allergies  No Known Allergies    SOCIAL HISTORY:  no tob,   no alcohol   no drugs    FAMILY HISTORY:  Family history of type 2 diabetes mellitus,   ? carotid stenosis (Mother)  Family history of hypertension (Mother)  FHx: anemia (Father)  Family history of cardiac arrest (Father)  Family history of gangrene (Father)  after wound to the foot          ROS: 14 point ROS negative other than what is present in HPI or below    Vital Signs Last 24 Hrs  T(C): 37.1 (24 Nov 2023 04:02), Max: 37.3 (23 Nov 2023 19:28)  T(F): 98.7 (24 Nov 2023 04:02), Max: 99.1 (23 Nov 2023 19:28)  HR: 95 (24 Nov 2023 04:02) (78 - 104)  BP: 135/74 (24 Nov 2023 04:02) (110/69 - 146/76)  BP(mean): 97 (23 Nov 2023 09:45) (97 - 97)  RR: 18 (24 Nov 2023 04:02) (17 - 19)  SpO2: 98% (24 Nov 2023 04:02) (95% - 99%)    General: NAD    Detailed Neurologic Exam:    Mental status: The patient is awake and alert and has normal attention span.  The patient is fully oriented in 3 spheres--Initially states the president is Chandra but corrects to Biden.  The patient is able to name objects, follow commands, repeat sentences.    Cranial nerves: Pupils equal and react symmetrically to light. There is no visual field deficit to confrontation. Extraocular motion is full with no nystagmus. There is no ptosis. Facial sensation is intact. Facial musculature is symmetric. Palate elevates symmetrically. Tongue is midline.    Motor: There is normal bulk and tone.  There is no tremor.  Strength is 5/5 in the right arm and leg.   Strength is 5/5 in the left arm and leg; slight drift in LUE which does not hit the bed; slow fine finger movements in the left hand     Sensation: Intact to light touch in 4 extremities    Cerebellar: There is no dysmetria on finger to nose testing.    Gait : deferred    [facility] SS:  DATE: 11/24/23  TIME: 11:45 AM  1A: Level of consciousness (0-3): 0  1B: Questions (0-2): 0  1C: Commands (0-2): 0  2: Gaze (0-2):   3: Visual fields (0-3): 0  4: Facial palsy (0-3): 0  MOTOR:  5A: Left arm motor drift (0-4): 1  5B: Right arm motor drift (0-4): 0  6A: Left leg motor drift (0-4): 0  6B: Right leg motor drift (0-4): 0  7: Limb ataxia (0-2): 0  SENSORY:  8: Sensation (0-2): 0  SPEECH:  9: Language (0-3): 0  10: Dysarthria (0-2): 0  EXTINCTION:  11: Extinction/inattention (0-2): 0    TOTAL SCORE: 1    prehospital mRS=      LABS:                         13.4   6.89  )-----------( 218      ( 24 Nov 2023 07:13 )             39.4       11-23    134<L>  |  98  |  34.9<H>  ----------------------------<  182<H>  3.8   |  22.0  |  1.10    Ca    9.4      23 Nov 2023 22:00    TPro  7.5  /  Alb  4.1  /  TBili  0.4  /  DBili  x   /  AST  22  /  ALT  29  /  AlkPhos  79  11-23      PT/INR - ( 23 Nov 2023 22:00 )   PT: 11.2 sec;   INR: 1.01 ratio         PTT - ( 23 Nov 2023 22:00 )  PTT:26.3 sec    Lipid Profile (11.22.23 @ 07:24)    Cholesterol: 196: Interpretive Comment:  Acceptable: <200 mg/dL (for adults) ; <170 mg/dL (for children) mg/dL   Triglycerides, Serum: 125: Interpretive Comment:  Acceptable: <150 mg/dL (for adults) ; < 90 mg/dL (for children)  Triglyceride concentration can be influenced when measured in the  non-fasting state. mg/dL   HDL Cholesterol: 58: Interpretive Comment:  HDL cholesterol less than 40 mg/dL is a risk factor for cardiovascular  disease mg/dL   Non HDL Cholesterol: 138: Optimal Non-HDL Cholesterol (Non-HDL-C)   LDL Cholesterol Calculated: 116: Optimal LDL Cholesterol (LDL-C)       A1C with Estimated Average Glucose (11.22.23 @ 07:24)    A1C with Estimated Average Glucose Result: 7.7: Method: Immunoassay    RADIOLOGY & ADDITIONAL STUDIES (independently reviewed unless otherwise noted):    MR Head No Cont (11.22.23 @ 14:14)   IMPRESSION:  Scattered small acute infarcts involving the superior aspects of the   right frontal lobe and right parietal lobe as well as the right caudate.    (11.21.23 @ 16:14)   CTA brain:  Skull base and supraclinoid right ICA is very small in caliber,   particularly at the level of the proximal cavernous segment. The   supraclinoid right ICA is somewhat small in caliber.  Small anterior communicating artery visualized.Large bilateral A1   segments.  No vascular aneurysm or AVM.    CTA neck:  Right internal carotid artery origin and proximal segment is essentially   including, which appears chronic in nature. More distal right ICA is   small in caliber and demonstrates multiple short segment stenoses.    Calcified plaque at the origin of the left internal carotid artery   results in approximately 30-35% short segment stenosis. Normal   flow-related enhancement of the more distal vessel.    CT Head No Cont (11.21.23 @ 15:09)   Parenchymal volume loss is identified. Abnormal low attenuation seen   involving the high right frontal cortical subcortical region. This likely   compatible with an area of old infarct.  Noacute hemorrhage mass or mass effect is seen  Evaluation of the osseous structures appropriate window appears   unremarkable  The visualized paranasal sinuses mastoid and middle ear regions appear   clear.  IMPRESSION: Abnormal areas of low attenuation involving the high right   frontal cortical and subcortical region as described above.      US Duplex Carotid Arteries Complete, Bilateral (11.21.23 @ 19:31)   RIGHT: Pre-obstructive pattern of flow at the right distal common carotid   artery. Plaque at the right carotid bulb with apparent string sign on   image 1.13, which may correspond to recent CTA findings, reflecting high   grade right ICA disease. Though, there is antegrade flow along   interrogated portions of the right ICA. Correlate with recent CTA   findings.    LEFT: Plaque of the left carotid bulb extending into internal and   external branches without hemodynamically significant stenosis.    TTE Echo Complete w/o Contrast w/ Doppler (11.22.23 @ 08:00)    Mild concentric left ventricular hypertrophy is present.   Estimated left ventricular ejection fraction is 60-65 %.   Normal appearing left atrium.   Normal appearing right atrium.   Aneurysmal atrial septum is seen.   Mild aortic sclerosisis present with normal valvular opening.   Mild mitral annular calcification is present.   Mild (1+) mitral regurgitation is present.   EA reversal of the mitral inflow consistent with reduced compliance of   the   left ventricle.   Normal appearing tricuspid valve structure and function.   Trace tricuspid valve regurgitation is present.   Normal appearing right atrium. No significant shunt appreciated across   the   interatrial septum under color doppler interrogation and with agitated   saline contrast   along with provocative maneuvers to increase RA pressure.   The IVC appears normal.        Preliminary note, official note pending attending review/signature.                               [facility] Stroke Team  CC: left sided weakness     HPI:  This is a 58 year old female w/ PMHx of vertigo, HTN, OA, DM2, hip replacement, shingles and sciatica who presented  as a direct transfer from [facility] on 11/23/23 for further evaluation of right carotid stenosis.  She presented to [facility] on 11/21 with progressively worsening left sided weakness, such that she slid off bed 2 days prior (11/19/23) to presentation, required her to use assistive device starting on 11/20/23.  Was noted to have stroke , but outside window for tPA.  She denied  any previous episodes.  Reported family history that her mother had carotid stenosis.      PAST MEDICAL & SURGICAL HISTORY:  Prediabetes  now diabetes  Hypertension  Osteoarthritis  Spider veins  Obesity, Class II, BMI 35-39.9  [facility], 11/26/20-12/5/20  History of appendectomy  with right oophorecomy 2010  History of hip surgery  pinning of left hip age 12 and hardware removed age 17    MEDICATIONS  (STANDING):  aspirin  chewable 81 milliGRAM(s) Oral daily  atorvastatin 40 milliGRAM(s) Oral at bedtime  clopidogrel Tablet 75 milliGRAM(s) Oral daily  dextrose 5%. 1000 milliLiter(s) (50 mL/Hr) IV Continuous <Continuous>  dextrose 5%. 1000 milliLiter(s) (100 mL/Hr) IV Continuous <Continuous>  dextrose 50% Injectable 25 Gram(s) IV Push once  dextrose 50% Injectable 12.5 Gram(s) IV Push once  dextrose 50% Injectable 25 Gram(s) IV Push once  glucagon  Injectable 1 milliGRAM(s) IntraMuscular once  hydrochlorothiazide 25 milliGRAM(s) Oral daily  influenza   Vaccine 0.5 milliLiter(s) IntraMuscular once  insulin lispro (ADMELOG) corrective regimen sliding scale   SubCutaneous three times a day before meals  lactated ringers. 1000 milliLiter(s) (75 mL/Hr) IV Continuous <Continuous>  losartan 100 milliGRAM(s) Oral daily    MEDICATIONS  (PRN):  acetaminophen     Tablet .. 650 milliGRAM(s) Oral every 6 hours PRN Temp greater or equal to 38C (100.4F), Mild Pain (1 - 3)  aluminum hydroxide/magnesium hydroxide/simethicone Suspension 30 milliLiter(s) Oral every 4 hours PRN Dyspepsia  dextrose Oral Gel 15 Gram(s) Oral once PRN Blood Glucose LESS THAN 70 milliGRAM(s)/deciliter  ondansetron Injectable 4 milliGRAM(s) IV Push every 8 hours PRN Nausea and/or Vomiting  polyethylene glycol 3350 17 Gram(s) Oral at bedtime PRN for constipation  senna 2 Tablet(s) Oral at bedtime PRN for constipation      Allergies  No Known Allergies    SOCIAL HISTORY:  no tob,   no alcohol   no drugs    FAMILY HISTORY:  Family history of type 2 diabetes mellitus,   ? carotid stenosis (Mother)  Family history of hypertension (Mother)  FHx: anemia (Father)  Family history of cardiac arrest (Father)  Family history of gangrene (Father)  after wound to the foot          ROS: 14 point ROS negative other than what is present in HPI or below    Vital Signs Last 24 Hrs  T(C): 37.1 (24 Nov 2023 04:02), Max: 37.3 (23 Nov 2023 19:28)  T(F): 98.7 (24 Nov 2023 04:02), Max: 99.1 (23 Nov 2023 19:28)  HR: 95 (24 Nov 2023 04:02) (78 - 104)  BP: 135/74 (24 Nov 2023 04:02) (110/69 - 146/76)  BP(mean): 97 (23 Nov 2023 09:45) (97 - 97)  RR: 18 (24 Nov 2023 04:02) (17 - 19)  SpO2: 98% (24 Nov 2023 04:02) (95% - 99%)    General: NAD    Detailed Neurologic Exam:    Mental status: The patient is awake and alert and has normal attention span.  The patient is fully oriented in 3 spheres--Initially states the president is Chandra but corrects to Biden- states was watching show on Chandra, currently oriented .  The patient is able to name objects, follow commands, repeat sentences.    Cranial nerves: Pupils equal and react symmetrically to light. There is no visual field deficit to confrontation. Extraocular motion is full with no nystagmus. There is no ptosis. Facial sensation is intact. Subtle left facial palsy. Palate elevates symmetrically. Tongue is midline.    Motor: There is normal bulk and tone.  There is no tremor.  Strength is 5/5 in the right arm and leg.   Strength is 5/5 in the left arm and leg; slight drift in LUE/LLE; slow fine finger movements in the left hand     Sensation: Intact to light touch in 4 extremities    Cerebellar: There is no dysmetria on finger to nose testing.    Gait : deferred    NIH SS:  DATE: 11/24/23  TIME: 11:45 AM  1A: Level of consciousness (0-3): 0  1B: Questions (0-2): 0  1C: Commands (0-2): 0  2: Gaze (0-2):   3: Visual fields (0-3): 0  4: Facial palsy (0-3): 1  MOTOR:  5A: Left arm motor drift (0-4): 1  5B: Right arm motor drift (0-4): 0  6A: Left leg motor drift (0-4): 1  6B: Right leg motor drift (0-4): 0  7: Limb ataxia (0-2): 0  SENSORY:  8: Sensation (0-2): 0  SPEECH:  9: Language (0-3): 0  10: Dysarthria (0-2): 0  EXTINCTION:  11: Extinction/inattention (0-2): 0    TOTAL SCORE: 3    prehospital mRS=0      LABS:                         13.4   6.89  )-----------( 218      ( 24 Nov 2023 07:13 )             39.4       11-23    134<L>  |  98  |  34.9<H>  ----------------------------<  182<H>  3.8   |  22.0  |  1.10    Ca    9.4      23 Nov 2023 22:00    TPro  7.5  /  Alb  4.1  /  TBili  0.4  /  DBili  x   /  AST  22  /  ALT  29  /  AlkPhos  79  11-23      PT/INR - ( 23 Nov 2023 22:00 )   PT: 11.2 sec;   INR: 1.01 ratio         PTT - ( 23 Nov 2023 22:00 )  PTT:26.3 sec    Lipid Profile (11.22.23 @ 07:24)    Cholesterol: 196: Interpretive Comment:  Acceptable: <200 mg/dL (for adults) ; <170 mg/dL (for children) mg/dL   Triglycerides, Serum: 125: Interpretive Comment:  Acceptable: <150 mg/dL (for adults) ; < 90 mg/dL (for children)  Triglyceride concentration can be influenced when measured in the  non-fasting state. mg/dL   HDL Cholesterol: 58: Interpretive Comment:  HDL cholesterol less than 40 mg/dL is a risk factor for cardiovascular  disease mg/dL   Non HDL Cholesterol: 138: Optimal Non-HDL Cholesterol (Non-HDL-C)   LDL Cholesterol Calculated: 116: Optimal LDL Cholesterol (LDL-C)       A1C with Estimated Average Glucose (11.22.23 @ 07:24)    A1C with Estimated Average Glucose Result: 7.7: Method: Immunoassay    RADIOLOGY & ADDITIONAL STUDIES (independently reviewed unless otherwise noted):    MR Head No Cont (11.22.23 @ 14:14)   IMPRESSION:  Scattered small acute infarcts involving the superior aspects of the   right frontal lobe and right parietal lobe as well as the right caudate.    (11.21.23 @ 16:14)   CTA brain:  Skull base and supraclinoid right ICA is very small in caliber,   particularly at the level of the proximal cavernous segment. The   supraclinoid right ICA is somewhat small in caliber.  Small anterior communicating artery visualized.Large bilateral A1   segments.  No vascular aneurysm or AVM.    CTA neck:  Right internal carotid artery origin and proximal segment is essentially   including, which appears chronic in nature. More distal right ICA is   small in caliber and demonstrates multiple short segment stenoses.    Calcified plaque at the origin of the left internal carotid artery   results in approximately 30-35% short segment stenosis. Normal   flow-related enhancement of the more distal vessel.    CT Head No Cont (11.21.23 @ 15:09)   Parenchymal volume loss is identified. Abnormal low attenuation seen   involving the high right frontal cortical subcortical region. This likely   compatible with an area of old infarct.  Noacute hemorrhage mass or mass effect is seen  Evaluation of the osseous structures appropriate window appears   unremarkable  The visualized paranasal sinuses mastoid and middle ear regions appear   clear.  IMPRESSION: Abnormal areas of low attenuation involving the high right   frontal cortical and subcortical region as described above.      US Duplex Carotid Arteries Complete, Bilateral (11.21.23 @ 19:31)   RIGHT: Pre-obstructive pattern of flow at the right distal common carotid   artery. Plaque at the right carotid bulb with apparent string sign on   image 1.13, which may correspond to recent CTA findings, reflecting high   grade right ICA disease. Though, there is antegrade flow along   interrogated portions of the right ICA. Correlate with recent CTA   findings.    LEFT: Plaque of the left carotid bulb extending into internal and   external branches without hemodynamically significant stenosis.    TTE Echo Complete w/o Contrast w/ Doppler (11.22.23 @ 08:00)    Mild concentric left ventricular hypertrophy is present.   Estimated left ventricular ejection fraction is 60-65 %.   Normal appearing left atrium.   Normal appearing right atrium.   Aneurysmal atrial septum is seen.   Mild aortic sclerosisis present with normal valvular opening.   Mild mitral annular calcification is present.   Mild (1+) mitral regurgitation is present.   EA reversal of the mitral inflow consistent with reduced compliance of   the   left ventricle.   Normal appearing tricuspid valve structure and function.   Trace tricuspid valve regurgitation is present.   Normal appearing right atrium. No significant shunt appreciated across   the   interatrial septum under color doppler interrogation and with agitated   saline contrast   along with provocative maneuvers to increase RA pressure.   The IVC appears normal.                                     [facility] Stroke Team  CC: left sided weakness     HPI:  This is a 58 year old female w/ PMHx of vertigo, HTN, OA, DM2, hip replacement, shingles and sciatica who presented  as a direct transfer from [facility] on 11/23/23 for further evaluation of right carotid stenosis.  She presented to [facility] on 11/21 with progressively worsening left sided weakness, such that she slid off bed 2 days prior (11/19/23) to presentation, required her to use assistive device starting on 11/20/23.  Was noted to have stroke , but outside window for tPA.  She denied  any previous episodes.  Reported family history that her mother had carotid stenosis.      PAST MEDICAL & SURGICAL HISTORY:  Prediabetes  now diabetes  Hypertension  Osteoarthritis  Spider veins  Obesity, Class II, BMI 35-39.9  [facility], 11/26/20-12/5/20  History of appendectomy  with right oophorecomy 2010  History of hip surgery  pinning of left hip age 12 and hardware removed age 17    MEDICATIONS  (STANDING):  aspirin  chewable 81 milliGRAM(s) Oral daily  atorvastatin 40 milliGRAM(s) Oral at bedtime  clopidogrel Tablet 75 milliGRAM(s) Oral daily  dextrose 5%. 1000 milliLiter(s) (50 mL/Hr) IV Continuous <Continuous>  dextrose 5%. 1000 milliLiter(s) (100 mL/Hr) IV Continuous <Continuous>  dextrose 50% Injectable 25 Gram(s) IV Push once  dextrose 50% Injectable 12.5 Gram(s) IV Push once  dextrose 50% Injectable 25 Gram(s) IV Push once  glucagon  Injectable 1 milliGRAM(s) IntraMuscular once  hydrochlorothiazide 25 milliGRAM(s) Oral daily  influenza   Vaccine 0.5 milliLiter(s) IntraMuscular once  insulin lispro (ADMELOG) corrective regimen sliding scale   SubCutaneous three times a day before meals  lactated ringers. 1000 milliLiter(s) (75 mL/Hr) IV Continuous <Continuous>  losartan 100 milliGRAM(s) Oral daily    MEDICATIONS  (PRN):  acetaminophen     Tablet .. 650 milliGRAM(s) Oral every 6 hours PRN Temp greater or equal to 38C (100.4F), Mild Pain (1 - 3)  aluminum hydroxide/magnesium hydroxide/simethicone Suspension 30 milliLiter(s) Oral every 4 hours PRN Dyspepsia  dextrose Oral Gel 15 Gram(s) Oral once PRN Blood Glucose LESS THAN 70 milliGRAM(s)/deciliter  ondansetron Injectable 4 milliGRAM(s) IV Push every 8 hours PRN Nausea and/or Vomiting  polyethylene glycol 3350 17 Gram(s) Oral at bedtime PRN for constipation  senna 2 Tablet(s) Oral at bedtime PRN for constipation      Allergies  No Known Allergies    SOCIAL HISTORY:  no tob,   no alcohol   no drugs    FAMILY HISTORY:  Family history of type 2 diabetes mellitus,   ? carotid stenosis (Mother)  Family history of hypertension (Mother)  FHx: anemia (Father)  Family history of cardiac arrest (Father)  Family history of gangrene (Father)  after wound to the foot          ROS: 14 point ROS negative other than what is present in HPI or below    Vital Signs Last 24 Hrs  T(C): 37.1 (24 Nov 2023 04:02), Max: 37.3 (23 Nov 2023 19:28)  T(F): 98.7 (24 Nov 2023 04:02), Max: 99.1 (23 Nov 2023 19:28)  HR: 95 (24 Nov 2023 04:02) (78 - 104)  BP: 135/74 (24 Nov 2023 04:02) (110/69 - 146/76)  BP(mean): 97 (23 Nov 2023 09:45) (97 - 97)  RR: 18 (24 Nov 2023 04:02) (17 - 19)  SpO2: 98% (24 Nov 2023 04:02) (95% - 99%)    General: NAD    Detailed Neurologic Exam:    Mental status: The patient is awake and alert and has normal attention span.  The patient is fully oriented in 3 spheres--Initially states the president is Chandra but corrects to Biden- states was watching show on Chandra, currently oriented .  The patient is able to name objects, follow commands, repeat sentences.    Cranial nerves: Pupils equal and react symmetrically to light. There is no visual field deficit to confrontation. Extraocular motion is full with no nystagmus. There is no ptosis. Facial sensation is intact. Subtle left facial palsy. Palate elevates symmetrically. Tongue is midline.    Motor: There is normal bulk and tone.  There is no tremor.  Strength is 5/5 in the right arm and leg.   Strength is 5/5 in the left arm and leg; slight drift in LUE/LLE; slow fine finger movements in the left hand     Sensation: Intact to light touch in 4 extremities    Cerebellar: There is no dysmetria on finger to nose testing.    Gait : deferred    NIH SS:  DATE: 11/24/23  TIME: 11:45 AM  1A: Level of consciousness (0-3): 0  1B: Questions (0-2): 0  1C: Commands (0-2): 0  2: Gaze (0-2):   3: Visual fields (0-3): 0  4: Facial palsy (0-3): 1  MOTOR:  5A: Left arm motor drift (0-4): 1  5B: Right arm motor drift (0-4): 0  6A: Left leg motor drift (0-4): 1  6B: Right leg motor drift (0-4): 0  7: Limb ataxia (0-2): 0  SENSORY:  8: Sensation (0-2): 0  SPEECH:  9: Language (0-3): 0  10: Dysarthria (0-2): 0  EXTINCTION:  11: Extinction/inattention (0-2): 0    TOTAL SCORE: 3    prehospital mRS=0      LABS:                         13.4   6.89  )-----------( 218      ( 24 Nov 2023 07:13 )             39.4       11-23    134<L>  |  98  |  34.9<H>  ----------------------------<  182<H>  3.8   |  22.0  |  1.10    Ca    9.4      23 Nov 2023 22:00    TPro  7.5  /  Alb  4.1  /  TBili  0.4  /  DBili  x   /  AST  22  /  ALT  29  /  AlkPhos  79  11-23      PT/INR - ( 23 Nov 2023 22:00 )   PT: 11.2 sec;   INR: 1.01 ratio         PTT - ( 23 Nov 2023 22:00 )  PTT:26.3 sec    Lipid Profile (11.22.23 @ 07:24)    Cholesterol: 196: Interpretive Comment:  Acceptable: <200 mg/dL (for adults) ; <170 mg/dL (for children) mg/dL   Triglycerides, Serum: 125: Interpretive Comment:  Acceptable: <150 mg/dL (for adults) ; < 90 mg/dL (for children)  Triglyceride concentration can be influenced when measured in the  non-fasting state. mg/dL   HDL Cholesterol: 58: Interpretive Comment:  HDL cholesterol less than 40 mg/dL is a risk factor for cardiovascular  disease mg/dL   Non HDL Cholesterol: 138: Optimal Non-HDL Cholesterol (Non-HDL-C)   LDL Cholesterol Calculated: 116: Optimal LDL Cholesterol (LDL-C)       A1C with Estimated Average Glucose (11.22.23 @ 07:24)    A1C with Estimated Average Glucose Result: 7.7: Method: Immunoassay    RADIOLOGY & ADDITIONAL STUDIES (independently reviewed unless otherwise noted):    MR Head No Cont (11.22.23 @ 14:14)   IMPRESSION:  Scattered small acute infarcts involving the superior aspects of the   right frontal lobe and right parietal lobe as well as the right caudate.    (11.21.23 @ 16:14)   CTA brain:  Skull base and supraclinoid right ICA is very small in caliber,   particularly at the level of the proximal cavernous segment. The   supraclinoid right ICA is somewhat small in caliber.  Small anterior communicating artery visualized.Large bilateral A1   segments.  No vascular aneurysm or AVM.    CTA neck:  Right internal carotid artery origin and proximal segment is essentially   including, which appears chronic in nature. More distal right ICA is   small in caliber and demonstrates multiple short segment stenoses.    Calcified plaque at the origin of the left internal carotid artery   results in approximately 30-35% short segment stenosis. Normal   flow-related enhancement of the more distal vessel.    CT Head No Cont (11.21.23 @ 15:09)   Parenchymal volume loss is identified. Abnormal low attenuation seen   involving the high right frontal cortical subcortical region. This likely   compatible with an area of old infarct.  Noacute hemorrhage mass or mass effect is seen  Evaluation of the osseous structures appropriate window appears   unremarkable  The visualized paranasal sinuses mastoid and middle ear regions appear   clear.  IMPRESSION: Abnormal areas of low attenuation involving the high right   frontal cortical and subcortical region as described above.      US Duplex Carotid Arteries Complete, Bilateral (11.21.23 @ 19:31)   RIGHT: Pre-obstructive pattern of flow at the right distal common carotid   artery. Plaque at the right carotid bulb with apparent string sign on   image 1.13, which may correspond to recent CTA findings, reflecting high   grade right ICA disease. Though, there is antegrade flow along   interrogated portions of the right ICA. Correlate with recent CTA   findings.    LEFT: Plaque of the left carotid bulb extending into internal and   external branches without hemodynamically significant stenosis.    TTE Echo Complete w/o Contrast w/ Doppler (11.22.23 @ 08:00)    Mild concentric left ventricular hypertrophy is present.   Estimated left ventricular ejection fraction is 60-65 %.   Normal appearing left atrium.   Normal appearing right atrium.   Aneurysmal atrial septum is seen.   Mild aortic sclerosisis present with normal valvular opening.   Mild mitral annular calcification is present.   Mild (1+) mitral regurgitation is present.   EA reversal of the mitral inflow consistent with reduced compliance of   the   left ventricle.   Normal appearing tricuspid valve structure and function.   Trace tricuspid valve regurgitation is present.   Normal appearing right atrium. No significant shunt appreciated across   the   interatrial septum under color doppler interrogation and with agitated   saline contrast   along with provocative maneuvers to increase RA pressure.   The IVC appears normal.

## 2023-11-25 LAB
ANION GAP SERPL CALC-SCNC: 11 MMOL/L — SIGNIFICANT CHANGE UP (ref 5–17)
ANION GAP SERPL CALC-SCNC: 11 MMOL/L — SIGNIFICANT CHANGE UP (ref 5–17)
BASOPHILS # BLD AUTO: 0.07 K/UL — SIGNIFICANT CHANGE UP (ref 0–0.2)
BASOPHILS # BLD AUTO: 0.07 K/UL — SIGNIFICANT CHANGE UP (ref 0–0.2)
BASOPHILS NFR BLD AUTO: 1 % — SIGNIFICANT CHANGE UP (ref 0–2)
BASOPHILS NFR BLD AUTO: 1 % — SIGNIFICANT CHANGE UP (ref 0–2)
BUN SERPL-MCNC: 21.9 MG/DL — HIGH (ref 8–20)
BUN SERPL-MCNC: 21.9 MG/DL — HIGH (ref 8–20)
CALCIUM SERPL-MCNC: 8.9 MG/DL — SIGNIFICANT CHANGE UP (ref 8.4–10.5)
CALCIUM SERPL-MCNC: 8.9 MG/DL — SIGNIFICANT CHANGE UP (ref 8.4–10.5)
CHLORIDE SERPL-SCNC: 104 MMOL/L — SIGNIFICANT CHANGE UP (ref 96–108)
CHLORIDE SERPL-SCNC: 104 MMOL/L — SIGNIFICANT CHANGE UP (ref 96–108)
CO2 SERPL-SCNC: 24 MMOL/L — SIGNIFICANT CHANGE UP (ref 22–29)
CO2 SERPL-SCNC: 24 MMOL/L — SIGNIFICANT CHANGE UP (ref 22–29)
CREAT SERPL-MCNC: 0.75 MG/DL — SIGNIFICANT CHANGE UP (ref 0.5–1.3)
CREAT SERPL-MCNC: 0.75 MG/DL — SIGNIFICANT CHANGE UP (ref 0.5–1.3)
EGFR: 93 ML/MIN/1.73M2 — SIGNIFICANT CHANGE UP
EGFR: 93 ML/MIN/1.73M2 — SIGNIFICANT CHANGE UP
EOSINOPHIL # BLD AUTO: 0.21 K/UL — SIGNIFICANT CHANGE UP (ref 0–0.5)
EOSINOPHIL # BLD AUTO: 0.21 K/UL — SIGNIFICANT CHANGE UP (ref 0–0.5)
EOSINOPHIL NFR BLD AUTO: 3.1 % — SIGNIFICANT CHANGE UP (ref 0–6)
EOSINOPHIL NFR BLD AUTO: 3.1 % — SIGNIFICANT CHANGE UP (ref 0–6)
GLUCOSE BLDC GLUCOMTR-MCNC: 155 MG/DL — HIGH (ref 70–99)
GLUCOSE BLDC GLUCOMTR-MCNC: 155 MG/DL — HIGH (ref 70–99)
GLUCOSE BLDC GLUCOMTR-MCNC: 187 MG/DL — HIGH (ref 70–99)
GLUCOSE BLDC GLUCOMTR-MCNC: 187 MG/DL — HIGH (ref 70–99)
GLUCOSE BLDC GLUCOMTR-MCNC: 195 MG/DL — HIGH (ref 70–99)
GLUCOSE BLDC GLUCOMTR-MCNC: 195 MG/DL — HIGH (ref 70–99)
GLUCOSE BLDC GLUCOMTR-MCNC: 196 MG/DL — HIGH (ref 70–99)
GLUCOSE BLDC GLUCOMTR-MCNC: 196 MG/DL — HIGH (ref 70–99)
GLUCOSE SERPL-MCNC: 154 MG/DL — HIGH (ref 70–99)
GLUCOSE SERPL-MCNC: 154 MG/DL — HIGH (ref 70–99)
HCT VFR BLD CALC: 37.2 % — SIGNIFICANT CHANGE UP (ref 34.5–45)
HCT VFR BLD CALC: 37.2 % — SIGNIFICANT CHANGE UP (ref 34.5–45)
HGB BLD-MCNC: 12.8 G/DL — SIGNIFICANT CHANGE UP (ref 11.5–15.5)
HGB BLD-MCNC: 12.8 G/DL — SIGNIFICANT CHANGE UP (ref 11.5–15.5)
IMM GRANULOCYTES NFR BLD AUTO: 0.3 % — SIGNIFICANT CHANGE UP (ref 0–0.9)
IMM GRANULOCYTES NFR BLD AUTO: 0.3 % — SIGNIFICANT CHANGE UP (ref 0–0.9)
LYMPHOCYTES # BLD AUTO: 2.47 K/UL — SIGNIFICANT CHANGE UP (ref 1–3.3)
LYMPHOCYTES # BLD AUTO: 2.47 K/UL — SIGNIFICANT CHANGE UP (ref 1–3.3)
LYMPHOCYTES # BLD AUTO: 36 % — SIGNIFICANT CHANGE UP (ref 13–44)
LYMPHOCYTES # BLD AUTO: 36 % — SIGNIFICANT CHANGE UP (ref 13–44)
MCHC RBC-ENTMCNC: 29.3 PG — SIGNIFICANT CHANGE UP (ref 27–34)
MCHC RBC-ENTMCNC: 29.3 PG — SIGNIFICANT CHANGE UP (ref 27–34)
MCHC RBC-ENTMCNC: 34.4 GM/DL — SIGNIFICANT CHANGE UP (ref 32–36)
MCHC RBC-ENTMCNC: 34.4 GM/DL — SIGNIFICANT CHANGE UP (ref 32–36)
MCV RBC AUTO: 85.1 FL — SIGNIFICANT CHANGE UP (ref 80–100)
MCV RBC AUTO: 85.1 FL — SIGNIFICANT CHANGE UP (ref 80–100)
MONOCYTES # BLD AUTO: 0.59 K/UL — SIGNIFICANT CHANGE UP (ref 0–0.9)
MONOCYTES # BLD AUTO: 0.59 K/UL — SIGNIFICANT CHANGE UP (ref 0–0.9)
MONOCYTES NFR BLD AUTO: 8.6 % — SIGNIFICANT CHANGE UP (ref 2–14)
MONOCYTES NFR BLD AUTO: 8.6 % — SIGNIFICANT CHANGE UP (ref 2–14)
NEUTROPHILS # BLD AUTO: 3.51 K/UL — SIGNIFICANT CHANGE UP (ref 1.8–7.4)
NEUTROPHILS # BLD AUTO: 3.51 K/UL — SIGNIFICANT CHANGE UP (ref 1.8–7.4)
NEUTROPHILS NFR BLD AUTO: 51 % — SIGNIFICANT CHANGE UP (ref 43–77)
NEUTROPHILS NFR BLD AUTO: 51 % — SIGNIFICANT CHANGE UP (ref 43–77)
PLATELET # BLD AUTO: 229 K/UL — SIGNIFICANT CHANGE UP (ref 150–400)
PLATELET # BLD AUTO: 229 K/UL — SIGNIFICANT CHANGE UP (ref 150–400)
POTASSIUM SERPL-MCNC: 3.5 MMOL/L — SIGNIFICANT CHANGE UP (ref 3.5–5.3)
POTASSIUM SERPL-MCNC: 3.5 MMOL/L — SIGNIFICANT CHANGE UP (ref 3.5–5.3)
POTASSIUM SERPL-SCNC: 3.5 MMOL/L — SIGNIFICANT CHANGE UP (ref 3.5–5.3)
POTASSIUM SERPL-SCNC: 3.5 MMOL/L — SIGNIFICANT CHANGE UP (ref 3.5–5.3)
RBC # BLD: 4.37 M/UL — SIGNIFICANT CHANGE UP (ref 3.8–5.2)
RBC # BLD: 4.37 M/UL — SIGNIFICANT CHANGE UP (ref 3.8–5.2)
RBC # FLD: 13.2 % — SIGNIFICANT CHANGE UP (ref 10.3–14.5)
RBC # FLD: 13.2 % — SIGNIFICANT CHANGE UP (ref 10.3–14.5)
SODIUM SERPL-SCNC: 139 MMOL/L — SIGNIFICANT CHANGE UP (ref 135–145)
SODIUM SERPL-SCNC: 139 MMOL/L — SIGNIFICANT CHANGE UP (ref 135–145)
WBC # BLD: 6.87 K/UL — SIGNIFICANT CHANGE UP (ref 3.8–10.5)
WBC # BLD: 6.87 K/UL — SIGNIFICANT CHANGE UP (ref 3.8–10.5)
WBC # FLD AUTO: 6.87 K/UL — SIGNIFICANT CHANGE UP (ref 3.8–10.5)
WBC # FLD AUTO: 6.87 K/UL — SIGNIFICANT CHANGE UP (ref 3.8–10.5)

## 2023-11-25 PROCEDURE — 99232 SBSQ HOSP IP/OBS MODERATE 35: CPT

## 2023-11-25 RX ADMIN — LOSARTAN POTASSIUM 100 MILLIGRAM(S): 100 TABLET, FILM COATED ORAL at 06:10

## 2023-11-25 RX ADMIN — Medication 81 MILLIGRAM(S): at 11:07

## 2023-11-25 RX ADMIN — CLOPIDOGREL BISULFATE 75 MILLIGRAM(S): 75 TABLET, FILM COATED ORAL at 11:07

## 2023-11-25 RX ADMIN — ATORVASTATIN CALCIUM 40 MILLIGRAM(S): 80 TABLET, FILM COATED ORAL at 22:14

## 2023-11-25 RX ADMIN — Medication 2: at 08:45

## 2023-11-25 RX ADMIN — Medication 2: at 17:30

## 2023-11-25 NOTE — DISCHARGE NOTE PROVIDER - ATTENDING DISCHARGE PHYSICAL EXAMINATION:
CONSTITUTIONAL: NAD, well groomed   ENMT: Moist oral mucosa  RESPIRATORY: clear to auscultation bilaterally   CARDIOVASCULAR: Regular rate and rhythm, normal S1 and S2, no peripheral edema noted   ABDOMEN: Nontender to palpation, normoactive bowel sounds  MUSCLOSKELETAL:  ROM wnl in all extremities tested, mild decrease in LUE strength particularly hand / fine finger movements   PSYCH: A+O to person, place, and time; affect appropriate  NEUROLOGY: CN 2-12 are intact and symmetric; no sensory deficits, decreased  strength in L .hand with mildly diminished fine finger motions   SKIN: No rashes; no palpable lesions

## 2023-11-25 NOTE — DISCHARGE NOTE PROVIDER - HOSPITAL COURSE
58 year old female w/ PMHx of vertigo, HTN, OA, DM2, shingles and sciatica who presents as a direct transfer from [facility] for further evaluation of right carotid occlusion requiring diagnostic angiogram. Found to have acute CVA, patient started on medical mgmt for stroke, clinically improved, deemed fit for DC to Acute Rehab.

## 2023-11-25 NOTE — DISCHARGE NOTE PROVIDER - PROVIDER RX CONTACT NUMBER
Problem: Potential for Falls  Goal: Patient will remain free of falls  Description  INTERVENTIONS:  - Assess patient frequently for physical needs  -  Identify cognitive and physical deficits and behaviors that affect risk of falls    -  [place] fall precautions as indicated by assessment   - Educate patient/family on patient safety including physical limitations  - Instruct patient to call for assistance with activity based on assessment  - Modify environment to reduce risk of injury  - Consider OT/PT consult to assist with strengthening/mobility  Outcome: Progressing     Problem: RESPIRATORY - ADULT  Goal: Achieves optimal ventilation and oxygenation  Description  INTERVENTIONS:  - Assess for changes in respiratory status  - Assess for changes in mentation and behavior  - Position to facilitate oxygenation and minimize respiratory effort  - Oxygen administered by appropriate delivery if ordered  - Initiate smoking cessation education as indicated  - Encourage broncho-pulmonary hygiene including cough, deep breathe, Incentive Spirometry  - Assess the need for suctioning and aspirate as needed  - Assess and instruct to report SOB or any respiratory difficulty  - Respiratory Therapy support as indicated  Outcome: Progressing     Problem: METABOLIC, FLUID AND ELECTROLYTES - ADULT  Goal: Electrolytes maintained within normal limits  Description  INTERVENTIONS:  - Monitor labs and assess patient for signs and symptoms of electrolyte imbalances  - Administer electrolyte replacement as ordered  - Monitor response to electrolyte replacements, including repeat lab results as appropriate  - Instruct patient on fluid and nutrition as appropriate  Outcome: Progressing  Goal: Fluid balance maintained  Description  INTERVENTIONS:  - Monitor labs   - Monitor I/O and WT  - Instruct patient on fluid and nutrition as appropriate  - Assess for signs & symptoms of volume excess or deficit  Outcome: Progressing  Goal: Glucose maintained within target range  Description  INTERVENTIONS:  - Monitor Blood Glucose as ordered  - Assess for signs and symptoms of hyperglycemia and hypoglycemia  - Administer ordered medications to maintain glucose within target range  - Assess nutritional intake and initiate nutrition service referral as needed  Outcome: Progressing     Problem: INFECTION - ADULT  Goal: Absence or prevention of progression during hospitalization  Description  INTERVENTIONS:  - Assess and monitor for signs and symptoms of infection  - Monitor lab/diagnostic results  - Monitor all insertion sites, i e  indwelling lines, tubes, and drains  - Monitor endotracheal if appropriate and nasal secretions for changes in amount and color  - [place] appropriate cooling/warming therapies per order  - Administer medications as ordered  - Instruct and encourage patient and family to use good hand hygiene technique  - Identify and instruct in appropriate isolation precautions for identified infection/condition  Outcome: Progressing  Goal: Absence of fever/infection during neutropenic period  Description  INTERVENTIONS:  - Monitor WBC    Outcome: Progressing     Problem: DISCHARGE PLANNING  Goal: Discharge to home or other facility with appropriate resources  Description  INTERVENTIONS:  - Identify barriers to discharge w/patient and caregiver  - Arrange for needed discharge resources and transportation as appropriate  - Identify discharge learning needs (meds, wound care, etc )  - Arrange for interpretive services to assist at discharge as needed  - Refer to Case Management Department for coordinating discharge planning if the patient needs post-hospital services based on physician/advanced practitioner order or complex needs related to functional status, cognitive ability, or social support system  Outcome: Progressing     Problem: Knowledge Deficit  Goal: Patient/family/caregiver demonstrates understanding of disease process, treatment plan, medications, and discharge instructions  Description  Complete learning assessment and assess knowledge base    Interventions:  - Provide teaching at level of understanding  - Provide teaching via preferred learning methods  Outcome: Progressing [phone]

## 2023-11-25 NOTE — PROGRESS NOTE ADULT - NS ATTEND AMEND GEN_ALL_CORE FT
Seen and examined with the ACP team. Plan discussed with ACPs.    I agree with assessment and plan  as written with modifications made above.    will follow with you    Kobe Maldonado MD PhD   [phone]

## 2023-11-25 NOTE — DISCHARGE NOTE PROVIDER - NSDCCPCAREPLAN_GEN_ALL_CORE_FT
PRINCIPAL DISCHARGE DIAGNOSIS  Diagnosis: Stroke  Assessment and Plan of Treatment: Take meds as prescribed. take part in aggressive physical therapy at rehab. Follow up with Neurology outpatient in 1-2 weeks w/ Dr. Rankin

## 2023-11-25 NOTE — PROGRESS NOTE ADULT - ASSESSMENT
INCOMPLETE NOTE   ASSESSMENT: This is a 58 year old female w/ PMHx of vertigo, HTN, OA, DM2, hip replacement, shingles and sciatica who presented as a transfer from [facility] on 11/23/23 for further evaluation of  right carotid stenosis.  She presented to [facility] on 11/21 with progressively worsening left sided weakness since 11/19/23, initially attributed to hip replacement/sciatica .  CT head Was noted to have attenuation in high right frontal cortical subcoritcal region concerning for old infarction, tenecteplase was deferred to to being outside of window. CT angiogram head/neck demonstrated short segment mild left ICA stenosis and multiple segments of stenosis in the right ICA noted to be small in caliber.  Carotid duplex showed flow concerning for pre-obstructive pattern.  There was a plaque with string sign corresponding to the CT angiogram reflecting high grade stenosis.  MRI demonstrated small acute scattered right hemispheric frontal, parietal, and caudate infarcts.  Etiology concerning for symptomatic large artery atherosclerosis.       NEURO:   -Neurologically with mild left hemiparesis of face, arm, and leg   -Continue close monitoring for neurologic deterioration    - Stroke neuro checks q 4 hr    -  SBP goal 130-150mmHg for now as appears to be neurologically tolerating, further titration recommendations pending findings of noted workup.    -ANTITHROMBOTIC THERAPY: ASA 81mg / Clopidogrel 75mg daily.   -titrate statin to LDL goal less than 70  -MRI Brain w/o as noted   - Transferred after consult with vascular surgery for possible neurointerventional radiology cerebral angiogram/possible stent anticipated for 11/24/23.    -Dysphagia screen: pass   -Physical therapy/OT/Speech eval/treatment.       -CARDIOVASCULAR:  TTE as noted , cardiac monitoring w/ telemetry for now, further evaluation pending findings of noted workup                              -HEMATOLOGY: H/H without anemia , Platelets 218, patient should have all age and risk appropriate malignancy screenings with PCP or sooner if clinically suspected   -check LE duplex given immobility prior to admission      DVT ppx: no neurological contraindication to pharmacological dvt ppx     PULMONARY: CXR left shoulder degeneration  , protecting airway, saturating well     RENAL: BUN slightly elevated /Cr within range , monitor urine output, maintain adequate hydration , monitor hyponatremia       Na Goal:  135-145    ID: afebrile, no leukocytosis, monitor for si/sx of infection     OTHER:  condition and plan of care d/w patient, questions and concerns addressed.     DISPOSITION: Rehab or home depending on PT eval once stable and workup is complete      CORE MEASURES:        Admission NIHSS: 2     Tenecteplase : [] YES [x] NO      LDL/HDL/A1C: 116/58/7.7     Depression Screen- if depression hx and/or present      Statin Therapy: as noted      Dysphagia Screen: [x] PASS [] FAIL 11/24     Smoking [] YES [] NO      Afib [] YES [x] NO     Stroke Education [] YES [] NO pending     Obtain screening lower extremity venous ultrasound in patients who meet 1 or more of the following criteria as patient is high risk for DVT/PE on admission:   [] History of DVT/PE  []Hypercoagulable states (Factor V Leiden, Cancer, OCP, etc. )  []Prolonged immobility (hemiplegia/hemiparesis/post operative or any other extended immobilization)  [] Transferred from outside facility (Rehab or Long term care)  [] Age </= to 50   ASSESSMENT: This is a 58 year old female w/ PMHx of vertigo, HTN, OA, DM2, hip replacement, shingles and sciatica who presented as a transfer from [facility] on 11/23/23 for further evaluation of  right carotid stenosis.  She presented to [facility] on 11/21 with progressively worsening left sided weakness since 11/19/23, initially attributed to hip replacement/sciatica .  CT head Was noted to have attenuation in high right frontal cortical subcoritcal region concerning for old infarction, tenecteplase was deferred to to being outside of window. CT angiogram head/neck demonstrated short segment mild left ICA stenosis and multiple segments of stenosis in the right ICA noted to be small in caliber.  Carotid duplex showed flow concerning for pre-obstructive pattern.  There was a plaque with string sign corresponding to the CT angiogram reflecting high grade stenosis.  MRI demonstrated small acute scattered right hemispheric frontal, parietal, and caudate infarcts.  Etiology concerning for symptomatic large artery atherosclerosis.       NEURO:   -Neurologically with mild left hemiparesis of face, arm, and leg   -Continue close monitoring for neurologic deterioration    - Stroke neuro checks q 4 hr    -  SBP goal 130-150mmHg for now as appears to be neurologically tolerating, further titration recommendations pending findings of noted workup.    -ANTITHROMBOTIC THERAPY: ASA 81mg / Clopidogrel 75mg daily.   -titrate statin to LDL goal less than 70  -MRI Brain w/o as noted   - Transferred after consult with vascular surgery for possible neurointerventional radiology cerebral angiogram/possible stent   - No interventional per Neuro-interventionalist Dr. Rankin, medical management for now with serial monitoring outpatient with Dr. Rankin  -Dysphagia screen: pass   -Physical therapy/OT/Speech eval/treatment.       -CARDIOVASCULAR:  TTE as noted , cardiac monitoring w/ telemetry for now, further evaluation pending findings of noted workup                              -HEMATOLOGY: H/H without anemia , Platelets 229, patient should have all age and risk appropriate malignancy screenings with PCP or sooner if clinically suspected   -check LE duplex given immobility prior to admission      DVT ppx: no neurological contraindication to pharmacological dvt ppx     PULMONARY: CXR left shoulder degeneration  , protecting airway, saturating well     RENAL: BUN slightly elevated /Cr within range , monitor urine output, maintain adequate hydration , monitor hyponatremia       Na Goal:  135-145    ID: afebrile, no leukocytosis, monitor for si/sx of infection     OTHER:  condition and plan of care d/w patient, questions and concerns addressed.     DISPOSITION: Rehab or home depending on PT eval once stable and workup is complete      CORE MEASURES:        Admission NIHSS: 2     Tenecteplase : [] YES [x] NO      LDL/HDL/A1C: 116/58/7.7     Depression Screen- if depression hx and/or present      Statin Therapy: as noted      Dysphagia Screen: [x] PASS [] FAIL 11/24     Smoking [] YES [] NO      Afib [] YES [x] NO     Stroke Education [] YES [] NO pending     Obtain screening lower extremity venous ultrasound in patients who meet 1 or more of the following criteria as patient is high risk for DVT/PE on admission:   [] History of DVT/PE  []Hypercoagulable states (Factor V Leiden, Cancer, OCP, etc. )  []Prolonged immobility (hemiplegia/hemiparesis/post operative or any other extended immobilization)  [] Transferred from outside facility (Rehab or Long term care)  [] Age </= to 50   ASSESSMENT: This is a 58 year old female w/ PMHx of vertigo, HTN, OA, DM2, hip replacement, shingles and sciatica who presented as a transfer from [facility] on 11/23/23 for further evaluation of  right carotid stenosis.  She presented to [facility] on 11/21 with progressively worsening left sided weakness since 11/19/23, initially attributed to hip replacement/sciatica .  CT head Was noted to have attenuation in high right frontal cortical subcoritcal region concerning for old infarction, tenecteplase was deferred to to being outside of window. CT angiogram head/neck demonstrated short segment mild left ICA stenosis and multiple segments of stenosis in the right ICA noted to be small in caliber.  Carotid duplex showed flow concerning for pre-obstructive pattern.  There was a plaque with string sign corresponding to the CT angiogram reflecting high grade stenosis.  MRI demonstrated small acute scattered right hemispheric frontal, parietal, and caudate infarcts.  Etiology concerning for symptomatic large artery atherosclerosis.       NEURO:   -Neurologically with mild left hemiparesis of face, arm, and leg   -Continue close monitoring for neurologic deterioration    - Stroke neuro checks q 4 hr    -  SBP goal 130-150mmHg for now as appears to be neurologically tolerating, further titration recommendations pending findings of noted workup.    -ANTITHROMBOTIC THERAPY: ASA 81mg / Clopidogrel 75mg daily.   -titrate statin to LDL goal less than 70  -MRI Brain w/o as noted   - Transferred after consult with vascular surgery for possible neurointerventional radiology cerebral angiogram/possible stent   - No intervention per Neuro-interventionalist Dr. Rankin, medical management for now with DAPT/high dopse statin and serial monitoring outpatient with Dr. Rankin  -Dysphagia screen: pass   -Physical therapy/OT/Speech eval/treatment.       -CARDIOVASCULAR:  TTE as noted , cardiac monitoring w/ telemetry for now, further evaluation pending findings of noted workup                              -HEMATOLOGY: H/H without anemia , Platelets 229, patient should have all age and risk appropriate malignancy screenings with PCP or sooner if clinically suspected   -check LE duplex given immobility prior to admission      DVT ppx: no neurological contraindication to pharmacological dvt ppx     PULMONARY: CXR left shoulder degeneration  , protecting airway, saturating well     RENAL: BUN slightly elevated /Cr within range , monitor urine output, maintain adequate hydration , monitor hyponatremia       Na Goal:  135-145    ID: afebrile, no leukocytosis, monitor for si/sx of infection     OTHER:  condition and plan of care d/w patient, questions and concerns addressed.     DISPOSITION: Rehab or home depending on PT eval once stable and workup is complete. Patient may benefit from acute rehab      CORE MEASURES:        Admission NIHSS: 2     Tenecteplase : [] YES [x] NO      LDL/HDL/A1C: 116/58/7.7     Depression Screen- if depression hx and/or present      Statin Therapy: as noted      Dysphagia Screen: [x] PASS [] FAIL 11/24     Smoking [] YES [] NO      Afib [] YES [x] NO     Stroke Education [] YES [] NO pending     Obtain screening lower extremity venous ultrasound in patients who meet 1 or more of the following criteria as patient is high risk for DVT/PE on admission:   [] History of DVT/PE  []Hypercoagulable states (Factor V Leiden, Cancer, OCP, etc. )  []Prolonged immobility (hemiplegia/hemiparesis/post operative or any other extended immobilization)  [] Transferred from outside facility (Rehab or Long term care)  [] Age </= to 50

## 2023-11-25 NOTE — DISCHARGE NOTE PROVIDER - NSDCMRMEDTOKEN_GEN_ALL_CORE_FT
aspirin 81 mg oral tablet, chewable: 1 tab(s) orally once a day  atorvastatin 40 mg oral tablet: 1 tab(s) orally once a day (at bedtime)  clopidogrel 75 mg oral tablet: 1 tab(s) orally once a day  hydroCHLOROthiazide 25 mg oral tablet: 1 tab(s) orally once a day  insulin lispro 100 units/mL injectable solution: 1 unit(s) injectable 4 times a day (before meals and at bedtime) 2 Unit(s) if Glucose 151 - 200  4 Unit(s) if Glucose 201 - 250  6 Unit(s) if Glucose 251 - 300  8 Unit(s) if Glucose 301 - 350  10 Unit(s) if Glucose 351 - 400  12 Unit(s) if Glucose Greater Than 400  losartan 100 mg oral tablet: 1 tab(s) orally once a day  polyethylene glycol 3350 oral powder for reconstitution: 17 gram(s) orally once a day (at bedtime) As needed for constipation  senna leaf extract oral tablet: 2 tab(s) orally once a day (at bedtime) As needed for constipation

## 2023-11-25 NOTE — DISCHARGE NOTE PROVIDER - CARE PROVIDER_API CALL
Toñito Rankin  Interventional Neuroradiology  [address] [place] [id number]  Phone: [phone]  Fax: [phone]  Follow Up Time: 1 week

## 2023-11-25 NOTE — PROGRESS NOTE ADULT - SUBJECTIVE AND OBJECTIVE BOX
Hospitalist Progress Note    Chief Complaint:  Stroke    SUBJECTIVE / OVERNIGHT EVENTS:  No events overnight, patient seen at bedside, in NAD,  no complaints reported today. Patient denies chest pain, SOB, abd pain, N/V, fever, chills, dysuria or any other complaints. All remainder ROS negative.     MEDICATIONS  (STANDING):  aspirin  chewable 81 milliGRAM(s) Oral daily  atorvastatin 40 milliGRAM(s) Oral at bedtime  clopidogrel Tablet 75 milliGRAM(s) Oral daily  dextrose 5%. 1000 milliLiter(s) (50 mL/Hr) IV Continuous <Continuous>  dextrose 5%. 1000 milliLiter(s) (100 mL/Hr) IV Continuous <Continuous>  dextrose 50% Injectable 12.5 Gram(s) IV Push once  dextrose 50% Injectable 25 Gram(s) IV Push once  dextrose 50% Injectable 25 Gram(s) IV Push once  glucagon  Injectable 1 milliGRAM(s) IntraMuscular once  hydrochlorothiazide 25 milliGRAM(s) Oral daily  influenza   Vaccine 0.5 milliLiter(s) IntraMuscular once  insulin lispro (ADMELOG) corrective regimen sliding scale   SubCutaneous three times a day before meals  lactated ringers. 1000 milliLiter(s) (75 mL/Hr) IV Continuous <Continuous>  losartan 100 milliGRAM(s) Oral daily  sodium chloride 0.9%. 1000 milliLiter(s) (50 mL/Hr) IV Continuous <Continuous>    MEDICATIONS  (PRN):  acetaminophen     Tablet .. 650 milliGRAM(s) Oral every 6 hours PRN Temp greater or equal to 38C (100.4F), Mild Pain (1 - 3)  aluminum hydroxide/magnesium hydroxide/simethicone Suspension 30 milliLiter(s) Oral every 4 hours PRN Dyspepsia  dextrose Oral Gel 15 Gram(s) Oral once PRN Blood Glucose LESS THAN 70 milliGRAM(s)/deciliter  ondansetron Injectable 4 milliGRAM(s) IV Push every 8 hours PRN Nausea and/or Vomiting  polyethylene glycol 3350 17 Gram(s) Oral at bedtime PRN for constipation  senna 2 Tablet(s) Oral at bedtime PRN for constipation        I&O's Summary      PHYSICAL EXAM:  Vital Signs Last 24 Hrs  T(C): 36.7 (25 Nov 2023 05:12), Max: 36.7 (24 Nov 2023 20:24)  T(F): 98.1 (25 Nov 2023 05:12), Max: 98.1 (24 Nov 2023 20:24)  HR: 85 (25 Nov 2023 05:12) (77 - 89)  BP: 113/69 (25 Nov 2023 05:12) (113/69 - 141/73)  BP(mean): --  RR: 18 (25 Nov 2023 05:12) (15 - 18)  SpO2: 96% (25 Nov 2023 05:12) (96% - 99%)    Parameters below as of 25 Nov 2023 05:12  Patient On (Oxygen Delivery Method): room air          CONSTITUTIONAL: NAD, well groomed   ENMT: Moist oral mucosa  RESPIRATORY: clear to auscultation bilaterally   CARDIOVASCULAR: Regular rate and rhythm, normal S1 and S2, no peripheral edema noted   ABDOMEN: Nontender to palpation, normoactive bowel sounds  MUSCLOSKELETAL:  ROM wnl in all extremities tested, mild decrease in LUE strength particularly hand / fine finger movements   PSYCH: A+O to person, place, and time; affect appropriate  NEUROLOGY: CN 2-12 are intact and symmetric; no sensory deficits, decreased  strength in L .hand with mildly diminished fine finger motions   SKIN: No rashes; no palpable lesions      LABS:                        12.8   6.87  )-----------( 229      ( 25 Nov 2023 05:35 )             37.2     11-25    139  |  104  |  21.9<H>  ----------------------------<  154<H>  3.5   |  24.0  |  0.75    Ca    8.9      25 Nov 2023 05:35    TPro  7.5  /  Alb  4.1  /  TBili  0.4  /  DBili  x   /  AST  22  /  ALT  29  /  AlkPhos  79  11-23    PT/INR - ( 23 Nov 2023 22:00 )   PT: 11.2 sec;   INR: 1.01 ratio         PTT - ( 23 Nov 2023 22:00 )  PTT:26.3 sec      Urinalysis Basic - ( 25 Nov 2023 05:35 )    Color: x / Appearance: x / SG: x / pH: x  Gluc: 154 mg/dL / Ketone: x  / Bili: x / Urobili: x   Blood: x / Protein: x / Nitrite: x   Leuk Esterase: x / RBC: x / WBC x   Sq Epi: x / Non Sq Epi: x / Bacteria: x        CAPILLARY BLOOD GLUCOSE      POCT Blood Glucose.: 196 mg/dL (25 Nov 2023 08:08)  POCT Blood Glucose.: 154 mg/dL (24 Nov 2023 21:26)  POCT Blood Glucose.: 196 mg/dL (24 Nov 2023 11:54)        RADIOLOGY & ADDITIONAL TESTS:  Results Reviewed: Y  Imaging Personally Reviewed: N  Electrocardiogram Personally Reviewed: MAXIMUS

## 2023-11-25 NOTE — PROGRESS NOTE ADULT - SUBJECTIVE AND OBJECTIVE BOX
INCOMPLETE NOTE   Preliminary note, offical recommendations pending attending review/signature   [facility] Stroke Team  Progress Note     HPI:  This is a 58 year old female w/ PMHx of vertigo, HTN, OA, DM2, hip replacement, shingles and sciatica who presented  as a direct transfer from [facility] on 11/23/23 for further evaluation of right carotid stenosis.  She presented to [facility] on 11/21 with progressively worsening left sided weakness, such that she slid off bed 2 days prior (11/19/23) to presentation, required her to use assistive device starting on 11/20/23.  Was noted to have stroke , but outside window for tPA.  She denied  any previous episodes.  Reported family history that her mother had carotid stenosis.       SUBJECTIVE: No events overnight.  No new neurologic complaints.  ROS reported negative unless otherwise noted.      acetaminophen     Tablet .. 650 milliGRAM(s) Oral every 6 hours PRN  aluminum hydroxide/magnesium hydroxide/simethicone Suspension 30 milliLiter(s) Oral every 4 hours PRN  aspirin  chewable 81 milliGRAM(s) Oral daily  atorvastatin 40 milliGRAM(s) Oral at bedtime  clopidogrel Tablet 75 milliGRAM(s) Oral daily  dextrose 5%. 1000 milliLiter(s) IV Continuous <Continuous>  dextrose 5%. 1000 milliLiter(s) IV Continuous <Continuous>  dextrose 50% Injectable 25 Gram(s) IV Push once  dextrose 50% Injectable 25 Gram(s) IV Push once  dextrose 50% Injectable 12.5 Gram(s) IV Push once  dextrose Oral Gel 15 Gram(s) Oral once PRN  glucagon  Injectable 1 milliGRAM(s) IntraMuscular once  hydrochlorothiazide 25 milliGRAM(s) Oral daily  influenza   Vaccine 0.5 milliLiter(s) IntraMuscular once  insulin lispro (ADMELOG) corrective regimen sliding scale   SubCutaneous three times a day before meals  lactated ringers. 1000 milliLiter(s) IV Continuous <Continuous>  losartan 100 milliGRAM(s) Oral daily  ondansetron Injectable 4 milliGRAM(s) IV Push every 8 hours PRN  polyethylene glycol 3350 17 Gram(s) Oral at bedtime PRN  senna 2 Tablet(s) Oral at bedtime PRN  sodium chloride 0.9%. 1000 milliLiter(s) IV Continuous <Continuous>      PHYSICAL EXAM:   Vital Signs Last 24 Hrs  T(C): 36.7 (25 Nov 2023 05:12), Max: 36.7 (24 Nov 2023 20:24)  T(F): 98.1 (25 Nov 2023 05:12), Max: 98.1 (24 Nov 2023 20:24)  HR: 85 (25 Nov 2023 05:12) (77 - 89)  BP: 113/69 (25 Nov 2023 05:12) (113/69 - 144/73)  BP(mean): --  RR: 18 (25 Nov 2023 05:12) (15 - 18)  SpO2: 96% (25 Nov 2023 05:12) (95% - 99%)    Parameters below as of 25 Nov 2023 05:12  Patient On (Oxygen Delivery Method): room air    EXAM PENDING     General: No acute distress    Detailed Neurologic Exam:    Mental status: The patient is awake and alert and has normal attention span.  The patient is fully oriented in 3 spheres--Initially states the president is Chandra but corrects to Biden- states was watching show on Chandra, currently oriented .  The patient is able to name objects, follow commands, repeat sentences.    Cranial nerves: Pupils equal and react symmetrically to light. There is no visual field deficit to confrontation. Extraocular motion is full with no nystagmus. There is no ptosis. Facial sensation is intact. Subtle left facial palsy. Palate elevates symmetrically. Tongue is midline.    Motor: There is normal bulk and tone.  There is no tremor.  Strength is 5/5 in the right arm and leg.   Strength is 5/5 in the left arm and leg; slight drift in LUE/LLE; slow fine finger movements in the left hand     Sensation: Intact to light touch in 4 extremities    Cerebellar: There is no dysmetria on finger to nose testing.    Gait : deferred        LABS:                        12.8   6.87  )-----------( 229      ( 25 Nov 2023 05:35 )             37.2    11-25    139  |  104  |  21.9<H>  ----------------------------<  154<H>  3.5   |  24.0  |  0.75    Ca    8.9      25 Nov 2023 05:35    TPro  7.5  /  Alb  4.1  /  TBili  0.4  /  DBili  x   /  AST  22  /  ALT  29  /  AlkPhos  79  11-23  PT/INR - ( 23 Nov 2023 22:00 )   PT: 11.2 sec;   INR: 1.01 ratio         PTT - ( 23 Nov 2023 22:00 )  PTT:26.3 sec      IMAGING: Reviewed by me.     MR Head No Cont (11.22.23 @ 14:14)   IMPRESSION:  Scattered small acute infarcts involving the superior aspects of the   right frontal lobe and right parietal lobe as well as the right caudate.    (11.21.23 @ 16:14)   CTA brain:  Skull base and supraclinoid right ICA is very small in caliber,   particularly at the level of the proximal cavernous segment. The   supraclinoid right ICA is somewhat small in caliber.  Small anterior communicating artery visualized.Large bilateral A1   segments.  No vascular aneurysm or AVM.    CTA neck:  Right internal carotid artery origin and proximal segment is essentially   including, which appears chronic in nature. More distal right ICA is   small in caliber and demonstrates multiple short segment stenoses.    Calcified plaque at the origin of the left internal carotid artery   results in approximately 30-35% short segment stenosis. Normal   flow-related enhancement of the more distal vessel.    CT Head No Cont (11.21.23 @ 15:09)   Parenchymal volume loss is identified. Abnormal low attenuation seen   involving the high right frontal cortical subcortical region. This likely   compatible with an area of old infarct.  Noacute hemorrhage mass or mass effect is seen  Evaluation of the osseous structures appropriate window appears   unremarkable  The visualized paranasal sinuses mastoid and middle ear regions appear   clear.  IMPRESSION: Abnormal areas of low attenuation involving the high right   frontal cortical and subcortical region as described above.      US Duplex Carotid Arteries Complete, Bilateral (11.21.23 @ 19:31)   RIGHT: Pre-obstructive pattern of flow at the right distal common carotid   artery. Plaque at the right carotid bulb with apparent string sign on   image 1.13, which may correspond to recent CTA findings, reflecting high   grade right ICA disease. Though, there is antegrade flow along   interrogated portions of the right ICA. Correlate with recent CTA   findings.    LEFT: Plaque of the left carotid bulb extending into internal and   external branches without hemodynamically significant stenosis.    TTE Echo Complete w/o Contrast w/ Doppler (11.22.23 @ 08:00)    Mild concentric left ventricular hypertrophy is present.   Estimated left ventricular ejection fraction is 60-65 %.   Normal appearing left atrium.   Normal appearing right atrium.   Aneurysmal atrial septum is seen.   Mild aortic sclerosisis present with normal valvular opening.   Mild mitral annular calcification is present.   Mild (1+) mitral regurgitation is present.   EA reversal of the mitral inflow consistent with reduced compliance of   the   left ventricle.   Normal appearing tricuspid valve structure and function.   Trace tricuspid valve regurgitation is present.   Normal appearing right atrium. No significant shunt appreciated across   the   interatrial septum under color doppler interrogation and with agitated   saline contrast   along with provocative maneuvers to increase RA pressure.   The IVC appears normal.      Preliminary note, offical recommendations pending attending review/signature   [facility] Stroke Team  Progress Note     HPI:  This is a 58 year old female w/ PMHx of vertigo, HTN, OA, DM2, hip replacement, shingles and sciatica who presented  as a direct transfer from [facility] on 11/23/23 for further evaluation of right carotid stenosis.  She presented to [facility] on 11/21 with progressively worsening left sided weakness, such that she slid off bed 2 days prior (11/19/23) to presentation, required her to use assistive device starting on 11/20/23.  Was noted to have stroke , but outside window for tPA.  She denied  any previous episodes.  Reported family history that her mother had carotid stenosis.       SUBJECTIVE: patient had cerebral angiogram, tolerated procedure well with no change in neurological status, otherwise no events overnight.  No new neurologic complaints.  ROS reported negative unless otherwise noted.      acetaminophen     Tablet .. 650 milliGRAM(s) Oral every 6 hours PRN  aluminum hydroxide/magnesium hydroxide/simethicone Suspension 30 milliLiter(s) Oral every 4 hours PRN  aspirin  chewable 81 milliGRAM(s) Oral daily  atorvastatin 40 milliGRAM(s) Oral at bedtime  clopidogrel Tablet 75 milliGRAM(s) Oral daily  dextrose 5%. 1000 milliLiter(s) IV Continuous <Continuous>  dextrose 5%. 1000 milliLiter(s) IV Continuous <Continuous>  dextrose 50% Injectable 25 Gram(s) IV Push once  dextrose 50% Injectable 25 Gram(s) IV Push once  dextrose 50% Injectable 12.5 Gram(s) IV Push once  dextrose Oral Gel 15 Gram(s) Oral once PRN  glucagon  Injectable 1 milliGRAM(s) IntraMuscular once  hydrochlorothiazide 25 milliGRAM(s) Oral daily  influenza   Vaccine 0.5 milliLiter(s) IntraMuscular once  insulin lispro (ADMELOG) corrective regimen sliding scale   SubCutaneous three times a day before meals  lactated ringers. 1000 milliLiter(s) IV Continuous <Continuous>  losartan 100 milliGRAM(s) Oral daily  ondansetron Injectable 4 milliGRAM(s) IV Push every 8 hours PRN  polyethylene glycol 3350 17 Gram(s) Oral at bedtime PRN  senna 2 Tablet(s) Oral at bedtime PRN  sodium chloride 0.9%. 1000 milliLiter(s) IV Continuous <Continuous>      PHYSICAL EXAM:   Vital Signs Last 24 Hrs  T(C): 36.7 (25 Nov 2023 05:12), Max: 36.7 (24 Nov 2023 20:24)  T(F): 98.1 (25 Nov 2023 05:12), Max: 98.1 (24 Nov 2023 20:24)  HR: 85 (25 Nov 2023 05:12) (77 - 89)  BP: 113/69 (25 Nov 2023 05:12) (113/69 - 144/73)  BP(mean): --  RR: 18 (25 Nov 2023 05:12) (15 - 18)  SpO2: 96% (25 Nov 2023 05:12) (95% - 99%)    Parameters below as of 25 Nov 2023 05:12  Patient On (Oxygen Delivery Method): room air      General: No acute distress    Detailed Neurologic Exam:    Mental status: The patient is awake and alert and has normal attention span.  The patient is fully oriented in 3 spheres.  The patient is able to name objects, follow commands, repeat sentences.    Cranial nerves: Pupils equal and react symmetrically to light. There is no visual field deficit to confrontation. Extraocular motion is full with no nystagmus. There is no ptosis. Facial sensation is intact. Subtle left facial palsy. Palate elevates symmetrically. Tongue is midline.    Motor: There is normal bulk and tone.  There is no tremor.  Strength is 5/5 in the right arm and leg.   Strength is 5/5 in the left arm and leg; slight drift in LUE/LLE; slow fine finger movements (improving from prior exam) in the left hand     Sensation: Intact to light touch in 4 extremities    Cerebellar: There is slight Left dysmetria on finger to nose testing, nor on the Right .    Gait : deferred        LABS:                        12.8   6.87  )-----------( 229      ( 25 Nov 2023 05:35 )             37.2    11-25    139  |  104  |  21.9<H>  ----------------------------<  154<H>  3.5   |  24.0  |  0.75    Ca    8.9      25 Nov 2023 05:35    TPro  7.5  /  Alb  4.1  /  TBili  0.4  /  DBili  x   /  AST  22  /  ALT  29  /  AlkPhos  79  11-23  PT/INR - ( 23 Nov 2023 22:00 )   PT: 11.2 sec;   INR: 1.01 ratio         PTT - ( 23 Nov 2023 22:00 )  PTT:26.3 sec      IMAGING: Reviewed by me.     MR Head No Cont (11.22.23 @ 14:14)   IMPRESSION:  Scattered small acute infarcts involving the superior aspects of the   right frontal lobe and right parietal lobe as well as the right caudate.    (11.21.23 @ 16:14)   CTA brain:  Skull base and supraclinoid right ICA is very small in caliber,   particularly at the level of the proximal cavernous segment. The   supraclinoid right ICA is somewhat small in caliber.  Small anterior communicating artery visualized. Large bilateral A1   segments.  No vascular aneurysm or AVM.    CTA neck:  Right internal carotid artery origin and proximal segment is essentially   including, which appears chronic in nature. More distal right ICA is   small in caliber and demonstrates multiple short segment stenoses.    Calcified plaque at the origin of the left internal carotid artery   results in approximately 30-35% short segment stenosis. Normal   flow-related enhancement of the more distal vessel.    CT Head No Cont (11.21.23 @ 15:09)   Parenchymal volume loss is identified. Abnormal low attenuation seen   involving the high right frontal cortical subcortical region. This likely   compatible with an area of old infarct.  Noacute hemorrhage mass or mass effect is seen  Evaluation of the osseous structures appropriate window appears   unremarkable  The visualized paranasal sinuses mastoid and middle ear regions appear   clear.  IMPRESSION: Abnormal areas of low attenuation involving the high right   frontal cortical and subcortical region as described above.      US Duplex Carotid Arteries Complete, Bilateral (11.21.23 @ 19:31)   RIGHT: Pre-obstructive pattern of flow at the right distal common carotid   artery. Plaque at the right carotid bulb with apparent string sign on   image 1.13, which may correspond to recent CTA findings, reflecting high   grade right ICA disease. Though, there is antegrade flow along   interrogated portions of the right ICA. Correlate with recent CTA   findings.    LEFT: Plaque of the left carotid bulb extending into internal and   external branches without hemodynamically significant stenosis.    TTE Echo Complete w/o Contrast w/ Doppler (11.22.23 @ 08:00)    Mild concentric left ventricular hypertrophy is present.   Estimated left ventricular ejection fraction is 60-65 %.   Normal appearing left atrium.   Normal appearing right atrium.   Aneurysmal atrial septum is seen.   Mild aortic sclerosisis present with normal valvular opening.   Mild mitral annular calcification is present.   Mild (1+) mitral regurgitation is present.   EA reversal of the mitral inflow consistent with reduced compliance of   the   left ventricle.   Normal appearing tricuspid valve structure and function.   Trace tricuspid valve regurgitation is present.   Normal appearing right atrium. No significant shunt appreciated across   the   interatrial septum under color doppler interrogation and with agitated   saline contrast   along with provocative maneuvers to increase RA pressure.   The IVC appears normal.    [facility] Stroke Team  Progress Note     HPI:  This is a 58 year old female w/ PMHx of vertigo, HTN, OA, DM2, hip replacement, shingles and sciatica who presented  as a direct transfer from [facility] on 11/23/23 for further evaluation of right carotid stenosis.  She presented to [facility] on 11/21 with progressively worsening left sided weakness, such that she slid off bed 2 days prior (11/19/23) to presentation, required her to use assistive device starting on 11/20/23.  Was noted to have stroke , but outside window for tenecteplase.  She denied  any previous episodes.  Reported family history that her mother had carotid stenosis.       SUBJECTIVE: patient had cerebral angiogram, tolerated procedure well with no change in neurological status, otherwise no events overnight.  No new neurologic complaints.  ROS reported negative unless otherwise noted.      acetaminophen     Tablet .. 650 milliGRAM(s) Oral every 6 hours PRN  aluminum hydroxide/magnesium hydroxide/simethicone Suspension 30 milliLiter(s) Oral every 4 hours PRN  aspirin  chewable 81 milliGRAM(s) Oral daily  atorvastatin 40 milliGRAM(s) Oral at bedtime  clopidogrel Tablet 75 milliGRAM(s) Oral daily  dextrose 5%. 1000 milliLiter(s) IV Continuous <Continuous>  dextrose 5%. 1000 milliLiter(s) IV Continuous <Continuous>  dextrose 50% Injectable 25 Gram(s) IV Push once  dextrose 50% Injectable 25 Gram(s) IV Push once  dextrose 50% Injectable 12.5 Gram(s) IV Push once  dextrose Oral Gel 15 Gram(s) Oral once PRN  glucagon  Injectable 1 milliGRAM(s) IntraMuscular once  hydrochlorothiazide 25 milliGRAM(s) Oral daily  influenza   Vaccine 0.5 milliLiter(s) IntraMuscular once  insulin lispro (ADMELOG) corrective regimen sliding scale   SubCutaneous three times a day before meals  lactated ringers. 1000 milliLiter(s) IV Continuous <Continuous>  losartan 100 milliGRAM(s) Oral daily  ondansetron Injectable 4 milliGRAM(s) IV Push every 8 hours PRN  polyethylene glycol 3350 17 Gram(s) Oral at bedtime PRN  senna 2 Tablet(s) Oral at bedtime PRN  sodium chloride 0.9%. 1000 milliLiter(s) IV Continuous <Continuous>      PHYSICAL EXAM:   Vital Signs Last 24 Hrs  T(C): 36.7 (25 Nov 2023 05:12), Max: 36.7 (24 Nov 2023 20:24)  T(F): 98.1 (25 Nov 2023 05:12), Max: 98.1 (24 Nov 2023 20:24)  HR: 85 (25 Nov 2023 05:12) (77 - 89)  BP: 113/69 (25 Nov 2023 05:12) (113/69 - 144/73)  BP(mean): --  RR: 18 (25 Nov 2023 05:12) (15 - 18)  SpO2: 96% (25 Nov 2023 05:12) (95% - 99%)    Parameters below as of 25 Nov 2023 05:12  Patient On (Oxygen Delivery Method): room air      General: No acute distress    Detailed Neurologic Exam:    Mental status: The patient is awake and alert and has normal attention span.  The patient is fully oriented in 3 spheres.  The patient is able to name objects, follow commands, repeat sentences.    Cranial nerves: Pupils equal and react symmetrically to light. There is no visual field deficit to confrontation. Extraocular motion is full with no nystagmus. There is no ptosis. Facial sensation is intact. Subtle left facial palsy. Palate elevates symmetrically. Tongue is midline.    Motor: There is normal bulk and tone.  There is no tremor.  Strength is 5/5 in the right arm and leg.   Strength is 5/5 in the left arm and leg; slight drift in LUE/LLE; slow fine finger movements (improving from prior exam) in the left hand     Sensation: Intact to light touch in 4 extremities    Cerebellar: There is slight Left dysmetria on finger to nose testing, not on the Right .    Gait : deferred        LABS:                        12.8   6.87  )-----------( 229      ( 25 Nov 2023 05:35 )             37.2    11-25    139  |  104  |  21.9<H>  ----------------------------<  154<H>  3.5   |  24.0  |  0.75    Ca    8.9      25 Nov 2023 05:35    TPro  7.5  /  Alb  4.1  /  TBili  0.4  /  DBili  x   /  AST  22  /  ALT  29  /  AlkPhos  79  11-23  PT/INR - ( 23 Nov 2023 22:00 )   PT: 11.2 sec;   INR: 1.01 ratio         PTT - ( 23 Nov 2023 22:00 )  PTT:26.3 sec      IMAGING: Reviewed by me.     MR Head No Cont (11.22.23 @ 14:14)   IMPRESSION:  Scattered small acute infarcts involving the superior aspects of the   right frontal lobe and right parietal lobe as well as the right caudate.    (11.21.23 @ 16:14)   CTA brain:  Skull base and supraclinoid right ICA is very small in caliber,   particularly at the level of the proximal cavernous segment. The   supraclinoid right ICA is somewhat small in caliber.  Small anterior communicating artery visualized. Large bilateral A1   segments.  No vascular aneurysm or AVM.    CTA neck:  Right internal carotid artery origin and proximal segment is essentially   including, which appears chronic in nature. More distal right ICA is   small in caliber and demonstrates multiple short segment stenoses.    Calcified plaque at the origin of the left internal carotid artery   results in approximately 30-35% short segment stenosis. Normal   flow-related enhancement of the more distal vessel.    CT Head No Cont (11.21.23 @ 15:09)   Parenchymal volume loss is identified. Abnormal low attenuation seen   involving the high right frontal cortical subcortical region. This likely   compatible with an area of old infarct.  Noacute hemorrhage mass or mass effect is seen  Evaluation of the osseous structures appropriate window appears   unremarkable  The visualized paranasal sinuses mastoid and middle ear regions appear   clear.  IMPRESSION: Abnormal areas of low attenuation involving the high right   frontal cortical and subcortical region as described above.      US Duplex Carotid Arteries Complete, Bilateral (11.21.23 @ 19:31)   RIGHT: Pre-obstructive pattern of flow at the right distal common carotid   artery. Plaque at the right carotid bulb with apparent string sign on   image 1.13, which may correspond to recent CTA findings, reflecting high   grade right ICA disease. Though, there is antegrade flow along   interrogated portions of the right ICA. Correlate with recent CTA   findings.    LEFT: Plaque of the left carotid bulb extending into internal and   external branches without hemodynamically significant stenosis.    TTE Echo Complete w/o Contrast w/ Doppler (11.22.23 @ 08:00)    Mild concentric left ventricular hypertrophy is present.   Estimated left ventricular ejection fraction is 60-65 %.   Normal appearing left atrium.   Normal appearing right atrium.   Aneurysmal atrial septum is seen.   Mild aortic sclerosisis present with normal valvular opening.   Mild mitral annular calcification is present.   Mild (1+) mitral regurgitation is present.   EA reversal of the mitral inflow consistent with reduced compliance of   the   left ventricle.   Normal appearing tricuspid valve structure and function.   Trace tricuspid valve regurgitation is present.   Normal appearing right atrium. No significant shunt appreciated across   the   interatrial septum under color doppler interrogation and with agitated   saline contrast   along with provocative maneuvers to increase RA pressure.   The IVC appears normal.

## 2023-11-25 NOTE — PROGRESS NOTE ADULT - ASSESSMENT
This is a 58 year old female w/ PMHx of vertigo, HTN, OA, DM2, shingles and sciatica who presents as a direct transfer from [facility] for further evaluation of right carotid occlusion requiring diagnostic angiogram. Procedure planned this AM 11/24, possible intervention of sever R. carotid occlusion and multi territorial infarct, likely eventual DC to rehab.       #Acute CVA   #Severe R. carotid stenosis   MRI brain performed 11/22 with scattered small acute infarcts involving the superior aspects of the R. frontal lobe, R. parietal and R. caudate   - B/L Carotid US showed high grade R. ICA disease   - CTA neck had shown R. ICA stenosis near completely occlusive with more distal R. ICA with mutiple short segment stenoses and L. ICA 30-35% stenosis  - per Vascular at [place] not Endarterectomy candidate    - Neuro/ Neuro intervention following, S/P Diagnostic Angiogram, no further intervention  - Continue neurochecks q4, BP goal normotension   - PT/OT eval pending PT eval from [place] noted 11/22 with Rec for Acute Rehab, reeval by [facility] pt recommending outpatient PT on 11/24 - pending Neuro clearance for DC home  - Continue Plavix/ ASA, Lipitor   - TTE performed at [facility] showing 60-65% EF, no PFO noted   - LDL noted 116, TSH .99, A1c 7.6     #HTN  c/w HCTZ 25, Losartan 100  - BP goal normotension     #DM   continue insulin sliding scale     #OA  #Sciatica   Symptomatic care at this time, tylenol for pain   - stable for now, restart gabapentin if needed    DVT Prophylaxis: SCDs   Diet: Carb consistent / NPO for procedure   Code Status: Full  Dispo: likely Home w/ Home PT

## 2023-11-25 NOTE — DISCHARGE NOTE PROVIDER - NSDCQMSTAIRS_GEN_ALL_CORE
No
You can access the FollowMyHealth Patient Portal offered by [facility] by registering at the following website: [email or web address]. By joining [employer]’s FollowMyHealth portal, you will also be able to view your health information using other applications (apps) compatible with our system.

## 2023-11-26 LAB
ANION GAP SERPL CALC-SCNC: 15 MMOL/L — SIGNIFICANT CHANGE UP (ref 5–17)
ANION GAP SERPL CALC-SCNC: 15 MMOL/L — SIGNIFICANT CHANGE UP (ref 5–17)
BUN SERPL-MCNC: 18.1 MG/DL — SIGNIFICANT CHANGE UP (ref 8–20)
BUN SERPL-MCNC: 18.1 MG/DL — SIGNIFICANT CHANGE UP (ref 8–20)
CALCIUM SERPL-MCNC: 9 MG/DL — SIGNIFICANT CHANGE UP (ref 8.4–10.5)
CALCIUM SERPL-MCNC: 9 MG/DL — SIGNIFICANT CHANGE UP (ref 8.4–10.5)
CHLORIDE SERPL-SCNC: 99 MMOL/L — SIGNIFICANT CHANGE UP (ref 96–108)
CHLORIDE SERPL-SCNC: 99 MMOL/L — SIGNIFICANT CHANGE UP (ref 96–108)
CO2 SERPL-SCNC: 23 MMOL/L — SIGNIFICANT CHANGE UP (ref 22–29)
CO2 SERPL-SCNC: 23 MMOL/L — SIGNIFICANT CHANGE UP (ref 22–29)
CREAT SERPL-MCNC: 0.75 MG/DL — SIGNIFICANT CHANGE UP (ref 0.5–1.3)
CREAT SERPL-MCNC: 0.75 MG/DL — SIGNIFICANT CHANGE UP (ref 0.5–1.3)
EGFR: 93 ML/MIN/1.73M2 — SIGNIFICANT CHANGE UP
EGFR: 93 ML/MIN/1.73M2 — SIGNIFICANT CHANGE UP
GLUCOSE BLDC GLUCOMTR-MCNC: 152 MG/DL — HIGH (ref 70–99)
GLUCOSE BLDC GLUCOMTR-MCNC: 152 MG/DL — HIGH (ref 70–99)
GLUCOSE BLDC GLUCOMTR-MCNC: 193 MG/DL — HIGH (ref 70–99)
GLUCOSE BLDC GLUCOMTR-MCNC: 193 MG/DL — HIGH (ref 70–99)
GLUCOSE BLDC GLUCOMTR-MCNC: 196 MG/DL — HIGH (ref 70–99)
GLUCOSE BLDC GLUCOMTR-MCNC: 196 MG/DL — HIGH (ref 70–99)
GLUCOSE BLDC GLUCOMTR-MCNC: 210 MG/DL — HIGH (ref 70–99)
GLUCOSE BLDC GLUCOMTR-MCNC: 210 MG/DL — HIGH (ref 70–99)
GLUCOSE SERPL-MCNC: 178 MG/DL — HIGH (ref 70–99)
GLUCOSE SERPL-MCNC: 178 MG/DL — HIGH (ref 70–99)
POTASSIUM SERPL-MCNC: 3.5 MMOL/L — SIGNIFICANT CHANGE UP (ref 3.5–5.3)
POTASSIUM SERPL-MCNC: 3.5 MMOL/L — SIGNIFICANT CHANGE UP (ref 3.5–5.3)
POTASSIUM SERPL-SCNC: 3.5 MMOL/L — SIGNIFICANT CHANGE UP (ref 3.5–5.3)
POTASSIUM SERPL-SCNC: 3.5 MMOL/L — SIGNIFICANT CHANGE UP (ref 3.5–5.3)
SODIUM SERPL-SCNC: 137 MMOL/L — SIGNIFICANT CHANGE UP (ref 135–145)
SODIUM SERPL-SCNC: 137 MMOL/L — SIGNIFICANT CHANGE UP (ref 135–145)

## 2023-11-26 PROCEDURE — 99232 SBSQ HOSP IP/OBS MODERATE 35: CPT

## 2023-11-26 RX ADMIN — CLOPIDOGREL BISULFATE 75 MILLIGRAM(S): 75 TABLET, FILM COATED ORAL at 12:05

## 2023-11-26 RX ADMIN — SODIUM CHLORIDE 75 MILLILITER(S): 9 INJECTION, SOLUTION INTRAVENOUS at 05:44

## 2023-11-26 RX ADMIN — Medication 2: at 08:24

## 2023-11-26 RX ADMIN — Medication 2: at 17:13

## 2023-11-26 RX ADMIN — Medication 4: at 12:05

## 2023-11-26 RX ADMIN — LOSARTAN POTASSIUM 100 MILLIGRAM(S): 100 TABLET, FILM COATED ORAL at 05:37

## 2023-11-26 RX ADMIN — SODIUM CHLORIDE 75 MILLILITER(S): 9 INJECTION, SOLUTION INTRAVENOUS at 17:13

## 2023-11-26 RX ADMIN — SODIUM CHLORIDE 75 MILLILITER(S): 9 INJECTION, SOLUTION INTRAVENOUS at 12:05

## 2023-11-26 RX ADMIN — Medication 81 MILLIGRAM(S): at 12:05

## 2023-11-26 RX ADMIN — ATORVASTATIN CALCIUM 40 MILLIGRAM(S): 80 TABLET, FILM COATED ORAL at 22:16

## 2023-11-26 NOTE — PROGRESS NOTE ADULT - SUBJECTIVE AND OBJECTIVE BOX
Hospitalist Progress Note    Chief Complaint:  Stroke    SUBJECTIVE / OVERNIGHT EVENTS:  No events overnight, patient seen at bedside, in NAD,  no new complaints reported today. Patient denies chest pain, SOB, abd pain, N/V, fever, chills, dysuria or any other complaints. All remainder ROS negative.     MEDICATIONS  (STANDING):  aspirin  chewable 81 milliGRAM(s) Oral daily  atorvastatin 40 milliGRAM(s) Oral at bedtime  clopidogrel Tablet 75 milliGRAM(s) Oral daily  dextrose 5%. 1000 milliLiter(s) (50 mL/Hr) IV Continuous <Continuous>  dextrose 5%. 1000 milliLiter(s) (100 mL/Hr) IV Continuous <Continuous>  dextrose 50% Injectable 12.5 Gram(s) IV Push once  dextrose 50% Injectable 25 Gram(s) IV Push once  dextrose 50% Injectable 25 Gram(s) IV Push once  glucagon  Injectable 1 milliGRAM(s) IntraMuscular once  hydrochlorothiazide 25 milliGRAM(s) Oral daily  influenza   Vaccine 0.5 milliLiter(s) IntraMuscular once  insulin lispro (ADMELOG) corrective regimen sliding scale   SubCutaneous three times a day before meals  lactated ringers. 1000 milliLiter(s) (75 mL/Hr) IV Continuous <Continuous>  losartan 100 milliGRAM(s) Oral daily  sodium chloride 0.9%. 1000 milliLiter(s) (50 mL/Hr) IV Continuous <Continuous>    MEDICATIONS  (PRN):  acetaminophen     Tablet .. 650 milliGRAM(s) Oral every 6 hours PRN Temp greater or equal to 38C (100.4F), Mild Pain (1 - 3)  aluminum hydroxide/magnesium hydroxide/simethicone Suspension 30 milliLiter(s) Oral every 4 hours PRN Dyspepsia  dextrose Oral Gel 15 Gram(s) Oral once PRN Blood Glucose LESS THAN 70 milliGRAM(s)/deciliter  ondansetron Injectable 4 milliGRAM(s) IV Push every 8 hours PRN Nausea and/or Vomiting  polyethylene glycol 3350 17 Gram(s) Oral at bedtime PRN for constipation  senna 2 Tablet(s) Oral at bedtime PRN for constipation        I&O's Summary      PHYSICAL EXAM:  Vital Signs Last 24 Hrs  T(C): 36.8 (26 Nov 2023 05:22), Max: 37.1 (25 Nov 2023 11:25)  T(F): 98.2 (26 Nov 2023 05:22), Max: 98.8 (25 Nov 2023 11:25)  HR: 69 (26 Nov 2023 05:22) (69 - 88)  BP: 102/65 (26 Nov 2023 05:22) (102/65 - 124/72)  BP(mean): --  RR: 18 (26 Nov 2023 05:22) (18 - 19)  SpO2: 97% (26 Nov 2023 05:22) (96% - 99%)    Parameters below as of 26 Nov 2023 05:22  Patient On (Oxygen Delivery Method): room air          CONSTITUTIONAL: NAD, well groomed   ENMT: Moist oral mucosa  RESPIRATORY: clear to auscultation bilaterally   CARDIOVASCULAR: Regular rate and rhythm, normal S1 and S2, no peripheral edema noted   ABDOMEN: Nontender to palpation, normoactive bowel sounds  MUSCLOSKELETAL:  ROM wnl in all extremities tested, mild decrease in LUE strength particularly hand / fine finger movements   PSYCH: A+O to person, place, and time; affect appropriate  NEUROLOGY: CN 2-12 are intact and symmetric; no sensory deficits, decreased  strength in L .hand with mildly diminished fine finger motions   SKIN: No rashes; no palpable lesions    LABS:                        12.8   6.87  )-----------( 229      ( 25 Nov 2023 05:35 )             37.2     11-26    137  |  99  |  18.1  ----------------------------<  178<H>  3.5   |  23.0  |  0.75    Ca    9.0      26 Nov 2023 06:44            Urinalysis Basic - ( 26 Nov 2023 06:44 )    Color: x / Appearance: x / SG: x / pH: x  Gluc: 178 mg/dL / Ketone: x  / Bili: x / Urobili: x   Blood: x / Protein: x / Nitrite: x   Leuk Esterase: x / RBC: x / WBC x   Sq Epi: x / Non Sq Epi: x / Bacteria: x        CAPILLARY BLOOD GLUCOSE      POCT Blood Glucose.: 193 mg/dL (26 Nov 2023 08:09)  POCT Blood Glucose.: 187 mg/dL (25 Nov 2023 22:15)  POCT Blood Glucose.: 155 mg/dL (25 Nov 2023 16:40)  POCT Blood Glucose.: 195 mg/dL (25 Nov 2023 12:07)        RADIOLOGY & ADDITIONAL TESTS:  Results Reviewed: Y  Imaging Personally Reviewed: N  Electrocardiogram Personally Reviewed: MAXIMUS

## 2023-11-26 NOTE — PROGRESS NOTE ADULT - ASSESSMENT
58 year old female w/ PMHx of vertigo, HTN, OA, DM2, shingles and sciatica who presents as a direct transfer from [facility] for further evaluation of right carotid occlusion requiring diagnostic angiogram. Procedure planned this AM 11/24, possible intervention of sever R. carotid occlusion and multi territorial infarct, likely eventual DC to rehab.       #Acute CVA   #Severe R. carotid stenosis   MRI brain performed 11/22 with scattered small acute infarcts involving the superior aspects of the R. frontal lobe, R. parietal and R. caudate   - B/L Carotid US showed high grade R. ICA disease   - CTA neck had shown R. ICA stenosis near completely occlusive with more distal R. ICA with mutiple short segment stenoses and L. ICA 30-35% stenosis  - per [facility] at [facility] not Endarterectomy candidate    - Neuro/ Neuro intervention following, S/P Diagnostic Angiogram, no further intervention  - Continue neurochecks q4, BP goal normotension   - PT/OT eval pending PT eval from [facility] noted 11/22 with Rec for Acute Rehab, reeval by [facility] pt recommending outpatient PT on 11/24 - pending Neuro clearance for DC home  - Continue Plavix/ ASA, Lipitor   - TTE performed at [facility] showing 60-65% EF, no PFO noted   - LDL noted 116, TSH .99, A1c 7.6     #HTN  c/w HCTZ 25, Losartan 100  - BP goal normotension     #DM   continue insulin sliding scale     #OA  #Sciatica   Symptomatic care at this time, tylenol for pain   - stable for now, restart gabapentin if needed    DVT Prophylaxis: SCDs   Diet: Carb consistent / NPO for procedure   Code Status: Full  Dispo: patient adamant about going to rehab, repeat PT eval, PM&R eval

## 2023-11-27 LAB
GLUCOSE BLDC GLUCOMTR-MCNC: 146 MG/DL — HIGH (ref 70–99)
GLUCOSE BLDC GLUCOMTR-MCNC: 146 MG/DL — HIGH (ref 70–99)
GLUCOSE BLDC GLUCOMTR-MCNC: 182 MG/DL — HIGH (ref 70–99)
GLUCOSE BLDC GLUCOMTR-MCNC: 182 MG/DL — HIGH (ref 70–99)
GLUCOSE BLDC GLUCOMTR-MCNC: 209 MG/DL — HIGH (ref 70–99)
GLUCOSE BLDC GLUCOMTR-MCNC: 209 MG/DL — HIGH (ref 70–99)
GLUCOSE BLDC GLUCOMTR-MCNC: 223 MG/DL — HIGH (ref 70–99)
GLUCOSE BLDC GLUCOMTR-MCNC: 223 MG/DL — HIGH (ref 70–99)

## 2023-11-27 PROCEDURE — 99232 SBSQ HOSP IP/OBS MODERATE 35: CPT

## 2023-11-27 PROCEDURE — 99223 1ST HOSP IP/OBS HIGH 75: CPT

## 2023-11-27 RX ADMIN — Medication 81 MILLIGRAM(S): at 12:01

## 2023-11-27 RX ADMIN — Medication 4: at 17:11

## 2023-11-27 RX ADMIN — Medication 4: at 12:01

## 2023-11-27 RX ADMIN — Medication 2: at 07:55

## 2023-11-27 RX ADMIN — CLOPIDOGREL BISULFATE 75 MILLIGRAM(S): 75 TABLET, FILM COATED ORAL at 12:01

## 2023-11-27 RX ADMIN — ATORVASTATIN CALCIUM 40 MILLIGRAM(S): 80 TABLET, FILM COATED ORAL at 21:20

## 2023-11-27 RX ADMIN — LOSARTAN POTASSIUM 100 MILLIGRAM(S): 100 TABLET, FILM COATED ORAL at 05:10

## 2023-11-27 RX ADMIN — SODIUM CHLORIDE 75 MILLILITER(S): 9 INJECTION, SOLUTION INTRAVENOUS at 12:02

## 2023-11-27 RX ADMIN — SODIUM CHLORIDE 50 MILLILITER(S): 9 INJECTION INTRAMUSCULAR; INTRAVENOUS; SUBCUTANEOUS at 21:27

## 2023-11-27 NOTE — PROGRESS NOTE ADULT - ASSESSMENT
58 year old female w/ PMHx of vertigo, HTN, OA, DM2, shingles and sciatica who presents as a direct transfer from [facility] for further evaluation of right carotid occlusion requiring diagnostic angiogram. Procedure planned this AM 11/24, possible intervention of sever R. carotid occlusion and multi territorial infarct, likely eventual DC to rehab.       #Acute CVA   #Severe R. carotid stenosis   MRI brain performed 11/22 with scattered small acute infarcts involving the superior aspects of the R. frontal lobe, R. parietal and R. caudate   - B/L Carotid US showed high grade R. ICA disease   - CTA neck had shown R. ICA stenosis near completely occlusive with more distal R. ICA with mutiple short segment stenoses and L. ICA 30-35% stenosis  - per [facility] at [place] not Endarterectomy candidate    - Neuro/ Neuro intervention following, S/P Diagnostic Angiogram, no further intervention  - Continue neurochecks q4, BP goal normotension   - Acute Rehab recommended by PT and PMR  - Continue Plavix/ ASA, Lipitor   - TTE performed at [facility] showing 60-65% EF, no PFO noted   - LDL noted 116, TSH .99, A1c 7.6     #HTN  c/w HCTZ 25, Losartan 100  - BP goal normotension     #DM   continue insulin sliding scale     #OA  #Sciatica   Symptomatic care at this time, tylenol for pain   - stable for now, restart gabapentin if needed    DVT Prophylaxis: SCDs   Diet: Carb consistent  Code Status: Full  Dispo: Acute Rehab pending insurance and auth

## 2023-11-27 NOTE — PROGRESS NOTE ADULT - SUBJECTIVE AND OBJECTIVE BOX
Hospitalist Progress Note    Chief Complaint:  Stroke    SUBJECTIVE / OVERNIGHT EVENTS:  No events overnight, patient seen at bedside, in NAD,  no new complaints reported today. Patient denies chest pain, SOB, abd pain, N/V, fever, chills, dysuria or any other complaints. All remainder ROS negative.     MEDICATIONS  (STANDING):  aspirin  chewable 81 milliGRAM(s) Oral daily  atorvastatin 40 milliGRAM(s) Oral at bedtime  clopidogrel Tablet 75 milliGRAM(s) Oral daily  dextrose 5%. 1000 milliLiter(s) (50 mL/Hr) IV Continuous <Continuous>  dextrose 5%. 1000 milliLiter(s) (100 mL/Hr) IV Continuous <Continuous>  dextrose 50% Injectable 25 Gram(s) IV Push once  dextrose 50% Injectable 25 Gram(s) IV Push once  dextrose 50% Injectable 12.5 Gram(s) IV Push once  glucagon  Injectable 1 milliGRAM(s) IntraMuscular once  hydrochlorothiazide 25 milliGRAM(s) Oral daily  influenza   Vaccine 0.5 milliLiter(s) IntraMuscular once  insulin lispro (ADMELOG) corrective regimen sliding scale   SubCutaneous three times a day before meals  lactated ringers. 1000 milliLiter(s) (75 mL/Hr) IV Continuous <Continuous>  losartan 100 milliGRAM(s) Oral daily  sodium chloride 0.9%. 1000 milliLiter(s) (50 mL/Hr) IV Continuous <Continuous>    MEDICATIONS  (PRN):  acetaminophen     Tablet .. 650 milliGRAM(s) Oral every 6 hours PRN Temp greater or equal to 38C (100.4F), Mild Pain (1 - 3)  aluminum hydroxide/magnesium hydroxide/simethicone Suspension 30 milliLiter(s) Oral every 4 hours PRN Dyspepsia  dextrose Oral Gel 15 Gram(s) Oral once PRN Blood Glucose LESS THAN 70 milliGRAM(s)/deciliter  ondansetron Injectable 4 milliGRAM(s) IV Push every 8 hours PRN Nausea and/or Vomiting  polyethylene glycol 3350 17 Gram(s) Oral at bedtime PRN for constipation  senna 2 Tablet(s) Oral at bedtime PRN for constipation        I&O's Summary    26 Nov 2023 07:01  -  27 Nov 2023 07:00  --------------------------------------------------------  IN: 900 mL / OUT: 3 mL / NET: 897 mL        PHYSICAL EXAM:  Vital Signs Last 24 Hrs  T(C): 36.7 (27 Nov 2023 04:50), Max: 37.3 (26 Nov 2023 17:01)  T(F): 98.1 (27 Nov 2023 04:50), Max: 99.2 (26 Nov 2023 17:01)  HR: 76 (27 Nov 2023 04:50) (76 - 81)  BP: 133/74 (27 Nov 2023 05:59) (118/70 - 164/74)  BP(mean): --  RR: 18 (27 Nov 2023 04:50) (18 - 19)  SpO2: 97% (27 Nov 2023 04:50) (96% - 98%)    Parameters below as of 27 Nov 2023 04:50  Patient On (Oxygen Delivery Method): room air      CONSTITUTIONAL: NAD, well groomed   ENMT: Moist oral mucosa  RESPIRATORY: clear to auscultation bilaterally   CARDIOVASCULAR: Regular rate and rhythm, normal S1 and S2, no peripheral edema noted   ABDOMEN: Nontender to palpation, normoactive bowel sounds  MUSCLOSKELETAL:  ROM wnl in all extremities tested, mild decrease in LUE strength particularly hand / fine finger movements   PSYCH: A+O to person, place, and time; affect appropriate  NEUROLOGY: CN 2-12 are intact and symmetric; no sensory deficits, decreased  strength in L .hand with mildly diminished fine finger motions   SKIN: No rashes; no palpable lesions    LABS:    11-26    137  |  99  |  18.1  ----------------------------<  178<H>  3.5   |  23.0  |  0.75    Ca    9.0      26 Nov 2023 06:44            Urinalysis Basic - ( 26 Nov 2023 06:44 )    Color: x / Appearance: x / SG: x / pH: x  Gluc: 178 mg/dL / Ketone: x  / Bili: x / Urobili: x   Blood: x / Protein: x / Nitrite: x   Leuk Esterase: x / RBC: x / WBC x   Sq Epi: x / Non Sq Epi: x / Bacteria: x        CAPILLARY BLOOD GLUCOSE      POCT Blood Glucose.: 182 mg/dL (27 Nov 2023 07:37)  POCT Blood Glucose.: 152 mg/dL (26 Nov 2023 22:41)  POCT Blood Glucose.: 196 mg/dL (26 Nov 2023 16:58)  POCT Blood Glucose.: 210 mg/dL (26 Nov 2023 12:03)        RADIOLOGY & ADDITIONAL TESTS:  Results Reviewed: Y  Imaging Personally Reviewed: N  Electrocardiogram Personally Reviewed: MAXIMUS

## 2023-11-27 NOTE — OCCUPATIONAL THERAPY INITIAL EVALUATION ADULT - ADDITIONAL COMMENTS
Pt lives in a private home with her , who is able to assist as needed. There are 2 MALLY and a full flight once inside to reach the bedroom/bathroom. Bathroom has a walk-in shower with a build in shower chair. Pt owns a RW from previous hip surgery but does not use. Pt is right handed. Pt lives in a private home with her , who is able to assist as needed. There are 2 MALLY and a full flight once inside to reach the bedroom/bathroom. Bathroom has a walk-in shower with a built in shower chair. Pt owns a RW from previous hip surgery but does not use. Pt is right handed.
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
## 2023-11-27 NOTE — OCCUPATIONAL THERAPY INITIAL EVALUATION ADULT - PERTINENT HX OF CURRENT PROBLEM, REHAB EVAL
As per MD note: This is a 58 year old female w/ PMHx of vertigo, HTN, OA, DM2, hip replacement, shingles and sciatica who presented  as a direct transfer from [facility] on 11/23/23 for further evaluation of right carotid stenosis.  She presented to [facility] on 11/21 with progressively worsening left sided weakness, such that she slid off bed 2 days prior (11/19/23) to presentation, required her to use assistive device starting on 11/20/23.  Was noted to have stroke , but outside window for tPA.  She denied  any previous episodes.  Reported family history that her mother had carotid stenosis.
As per MD note: 58 year old female w/ PMHx of vertigo, HTN, OA, DM2, shingles and sciatica who presents as a direct transfer from [facility] for further evaluation of right carotid occlusion requiring diagnostic angiogram. Procedure planned this AM 11/24, possible intervention of sever R. carotid occlusion and multi territorial infarct, likely eventual DC to rehab.

## 2023-11-27 NOTE — OCCUPATIONAL THERAPY INITIAL EVALUATION ADULT - RANGE OF MOTION EXAMINATION
Left thumb and 2nd digit limited/no Active ROM deficits were identified/deficits as listed below
no Active ROM deficits were identified

## 2023-11-28 ENCOUNTER — TRANSCRIPTION ENCOUNTER (OUTPATIENT)
Age: 58
End: 2023-11-28

## 2023-11-28 ENCOUNTER — INPATIENT (INPATIENT)
Facility: HOSPITAL | Age: 58
LOS: 16 days | Discharge: HOME | DRG: 57 | End: 2023-12-15
Attending: PSYCHIATRY & NEUROLOGY | Admitting: PSYCHIATRY & NEUROLOGY
Payer: COMMERCIAL

## 2023-11-28 VITALS
SYSTOLIC BLOOD PRESSURE: 116 MMHG | HEART RATE: 74 BPM | DIASTOLIC BLOOD PRESSURE: 76 MMHG | TEMPERATURE: 99 F | OXYGEN SATURATION: 96 % | RESPIRATION RATE: 18 BRPM

## 2023-11-28 VITALS
OXYGEN SATURATION: 96 % | SYSTOLIC BLOOD PRESSURE: 131 MMHG | TEMPERATURE: 98 F | HEART RATE: 78 BPM | RESPIRATION RATE: 16 BRPM | DIASTOLIC BLOOD PRESSURE: 76 MMHG

## 2023-11-28 DIAGNOSIS — Z90.49 ACQUIRED ABSENCE OF OTHER SPECIFIED PARTS OF DIGESTIVE TRACT: Chronic | ICD-10-CM

## 2023-11-28 DIAGNOSIS — R27.8 OTHER LACK OF COORDINATION: ICD-10-CM

## 2023-11-28 DIAGNOSIS — I69.392 FACIAL WEAKNESS FOLLOWING CEREBRAL INFARCTION: ICD-10-CM

## 2023-11-28 DIAGNOSIS — E11.9 TYPE 2 DIABETES MELLITUS WITHOUT COMPLICATIONS: ICD-10-CM

## 2023-11-28 DIAGNOSIS — R23.4 CHANGES IN SKIN TEXTURE: ICD-10-CM

## 2023-11-28 DIAGNOSIS — I69.314 FRONTAL LOBE AND EXECUTIVE FUNCTION DEFICIT FOLLOWING CEREBRAL INFARCTION: ICD-10-CM

## 2023-11-28 DIAGNOSIS — I65.21 OCCLUSION AND STENOSIS OF RIGHT CAROTID ARTERY: ICD-10-CM

## 2023-11-28 DIAGNOSIS — G62.9 POLYNEUROPATHY, UNSPECIFIED: ICD-10-CM

## 2023-11-28 DIAGNOSIS — F41.9 ANXIETY DISORDER, UNSPECIFIED: ICD-10-CM

## 2023-11-28 DIAGNOSIS — I69.354 HEMIPLEGIA AND HEMIPARESIS FOLLOWING CEREBRAL INFARCTION AFFECTING LEFT NON-DOMINANT SIDE: ICD-10-CM

## 2023-11-28 DIAGNOSIS — I10 ESSENTIAL (PRIMARY) HYPERTENSION: ICD-10-CM

## 2023-11-28 DIAGNOSIS — I69.398 OTHER SEQUELAE OF CEREBRAL INFARCTION: ICD-10-CM

## 2023-11-28 DIAGNOSIS — M54.30 SCIATICA, UNSPECIFIED SIDE: ICD-10-CM

## 2023-11-28 DIAGNOSIS — E66.9 OBESITY, UNSPECIFIED: ICD-10-CM

## 2023-11-28 DIAGNOSIS — I63.9 CEREBRAL INFARCTION, UNSPECIFIED: ICD-10-CM

## 2023-11-28 DIAGNOSIS — Z51.89 ENCOUNTER FOR OTHER SPECIFIED AFTERCARE: ICD-10-CM

## 2023-11-28 DIAGNOSIS — R26.9 UNSPECIFIED ABNORMALITIES OF GAIT AND MOBILITY: ICD-10-CM

## 2023-11-28 DIAGNOSIS — R20.0 ANESTHESIA OF SKIN: ICD-10-CM

## 2023-11-28 DIAGNOSIS — Z98.890 OTHER SPECIFIED POSTPROCEDURAL STATES: Chronic | ICD-10-CM

## 2023-11-28 DIAGNOSIS — Z79.84 LONG TERM (CURRENT) USE OF ORAL HYPOGLYCEMIC DRUGS: ICD-10-CM

## 2023-11-28 LAB
GLUCOSE BLDC GLUCOMTR-MCNC: 147 MG/DL — HIGH (ref 70–99)
GLUCOSE BLDC GLUCOMTR-MCNC: 147 MG/DL — HIGH (ref 70–99)
GLUCOSE BLDC GLUCOMTR-MCNC: 172 MG/DL — HIGH (ref 70–99)
GLUCOSE BLDC GLUCOMTR-MCNC: 172 MG/DL — HIGH (ref 70–99)
GLUCOSE BLDC GLUCOMTR-MCNC: 173 MG/DL — HIGH (ref 70–99)
GLUCOSE BLDC GLUCOMTR-MCNC: 173 MG/DL — HIGH (ref 70–99)
GLUCOSE BLDC GLUCOMTR-MCNC: 203 MG/DL — HIGH (ref 70–99)
GLUCOSE BLDC GLUCOMTR-MCNC: 203 MG/DL — HIGH (ref 70–99)

## 2023-11-28 PROCEDURE — 36226 PLACE CATH VERTEBRAL ART: CPT

## 2023-11-28 PROCEDURE — 36227 PLACE CATH XTRNL CAROTID: CPT

## 2023-11-28 PROCEDURE — 80053 COMPREHEN METABOLIC PANEL: CPT

## 2023-11-28 PROCEDURE — 97167 OT EVAL HIGH COMPLEX 60 MIN: CPT

## 2023-11-28 PROCEDURE — 99223 1ST HOSP IP/OBS HIGH 75: CPT

## 2023-11-28 PROCEDURE — 85730 THROMBOPLASTIN TIME PARTIAL: CPT

## 2023-11-28 PROCEDURE — C1769: CPT

## 2023-11-28 PROCEDURE — C1894: CPT

## 2023-11-28 PROCEDURE — 85025 COMPLETE CBC W/AUTO DIFF WBC: CPT

## 2023-11-28 PROCEDURE — C1887: CPT

## 2023-11-28 PROCEDURE — 82962 GLUCOSE BLOOD TEST: CPT

## 2023-11-28 PROCEDURE — 85610 PROTHROMBIN TIME: CPT

## 2023-11-28 PROCEDURE — 84443 ASSAY THYROID STIM HORMONE: CPT

## 2023-11-28 PROCEDURE — 36224 PLACE CATH CAROTD ART: CPT

## 2023-11-28 PROCEDURE — 85576 BLOOD PLATELET AGGREGATION: CPT

## 2023-11-28 PROCEDURE — 85027 COMPLETE CBC AUTOMATED: CPT

## 2023-11-28 PROCEDURE — 36415 COLL VENOUS BLD VENIPUNCTURE: CPT

## 2023-11-28 PROCEDURE — 99232 SBSQ HOSP IP/OBS MODERATE 35: CPT

## 2023-11-28 PROCEDURE — 80048 BASIC METABOLIC PNL TOTAL CA: CPT

## 2023-11-28 PROCEDURE — 93005 ELECTROCARDIOGRAM TRACING: CPT

## 2023-11-28 RX ORDER — LOSARTAN POTASSIUM 100 MG/1
100 TABLET, FILM COATED ORAL DAILY
Refills: 0 | Status: DISCONTINUED | OUTPATIENT
Start: 2023-11-29 | End: 2023-12-08

## 2023-11-28 RX ORDER — GLUCAGON INJECTION, SOLUTION 0.5 MG/.1ML
1 INJECTION, SOLUTION SUBCUTANEOUS ONCE
Refills: 0 | Status: DISCONTINUED | OUTPATIENT
Start: 2023-11-28 | End: 2023-12-15

## 2023-11-28 RX ORDER — INSULIN LISPRO 100/ML
VIAL (ML) SUBCUTANEOUS AT BEDTIME
Refills: 0 | Status: DISCONTINUED | OUTPATIENT
Start: 2023-11-28 | End: 2023-12-05

## 2023-11-28 RX ORDER — ACETAMINOPHEN 500 MG
650 TABLET ORAL EVERY 6 HOURS
Refills: 0 | Status: DISCONTINUED | OUTPATIENT
Start: 2023-11-28 | End: 2023-12-15

## 2023-11-28 RX ORDER — DEXTROSE 50 % IN WATER 50 %
12.5 SYRINGE (ML) INTRAVENOUS ONCE
Refills: 0 | Status: DISCONTINUED | OUTPATIENT
Start: 2023-11-28 | End: 2023-12-15

## 2023-11-28 RX ORDER — CLOPIDOGREL BISULFATE 75 MG/1
1 TABLET, FILM COATED ORAL
Qty: 0 | Refills: 0 | DISCHARGE
Start: 2023-11-28

## 2023-11-28 RX ORDER — PANTOPRAZOLE SODIUM 20 MG/1
40 TABLET, DELAYED RELEASE ORAL
Refills: 0 | Status: DISCONTINUED | OUTPATIENT
Start: 2023-11-29 | End: 2023-12-15

## 2023-11-28 RX ORDER — INSULIN LISPRO 100/ML
VIAL (ML) SUBCUTANEOUS
Refills: 0 | Status: DISCONTINUED | OUTPATIENT
Start: 2023-11-29 | End: 2023-12-05

## 2023-11-28 RX ORDER — ASPIRIN/CALCIUM CARB/MAGNESIUM 324 MG
1 TABLET ORAL
Qty: 0 | Refills: 0 | DISCHARGE
Start: 2023-11-28

## 2023-11-28 RX ORDER — GABAPENTIN 400 MG/1
100 CAPSULE ORAL AT BEDTIME
Refills: 0 | Status: DISCONTINUED | OUTPATIENT
Start: 2023-11-28 | End: 2023-12-15

## 2023-11-28 RX ORDER — SENNA PLUS 8.6 MG/1
2 TABLET ORAL AT BEDTIME
Refills: 0 | Status: DISCONTINUED | OUTPATIENT
Start: 2023-11-28 | End: 2023-12-15

## 2023-11-28 RX ORDER — INSULIN LISPRO 100/ML
1 VIAL (ML) SUBCUTANEOUS
Qty: 0 | Refills: 0 | DISCHARGE
Start: 2023-11-28

## 2023-11-28 RX ORDER — HYDROCHLOROTHIAZIDE 25 MG
1 TABLET ORAL
Qty: 0 | Refills: 0 | DISCHARGE
Start: 2023-11-28

## 2023-11-28 RX ORDER — DEXTROSE 50 % IN WATER 50 %
25 SYRINGE (ML) INTRAVENOUS ONCE
Refills: 0 | Status: DISCONTINUED | OUTPATIENT
Start: 2023-11-28 | End: 2023-12-15

## 2023-11-28 RX ORDER — METFORMIN HYDROCHLORIDE 850 MG/1
1000 TABLET ORAL
Refills: 0 | Status: DISCONTINUED | OUTPATIENT
Start: 2023-11-28 | End: 2023-12-15

## 2023-11-28 RX ORDER — SODIUM CHLORIDE 9 MG/ML
1000 INJECTION, SOLUTION INTRAVENOUS
Refills: 0 | Status: DISCONTINUED | OUTPATIENT
Start: 2023-11-28 | End: 2023-12-15

## 2023-11-28 RX ORDER — LOSARTAN/HYDROCHLOROTHIAZIDE 100MG-25MG
1 TABLET ORAL
Refills: 0 | DISCHARGE

## 2023-11-28 RX ORDER — POLYETHYLENE GLYCOL 3350 17 G/17G
17 POWDER, FOR SOLUTION ORAL
Qty: 0 | Refills: 0 | DISCHARGE
Start: 2023-11-28

## 2023-11-28 RX ORDER — DEXTROSE 50 % IN WATER 50 %
15 SYRINGE (ML) INTRAVENOUS ONCE
Refills: 0 | Status: DISCONTINUED | OUTPATIENT
Start: 2023-11-28 | End: 2023-12-15

## 2023-11-28 RX ORDER — ENOXAPARIN SODIUM 100 MG/ML
40 INJECTION SUBCUTANEOUS EVERY 24 HOURS
Refills: 0 | Status: DISCONTINUED | OUTPATIENT
Start: 2023-11-28 | End: 2023-12-15

## 2023-11-28 RX ORDER — SENNA PLUS 8.6 MG/1
2 TABLET ORAL
Qty: 0 | Refills: 0 | DISCHARGE
Start: 2023-11-28

## 2023-11-28 RX ORDER — ASPIRIN/CALCIUM CARB/MAGNESIUM 324 MG
81 TABLET ORAL DAILY
Refills: 0 | Status: DISCONTINUED | OUTPATIENT
Start: 2023-11-29 | End: 2023-12-15

## 2023-11-28 RX ORDER — LOSARTAN POTASSIUM 100 MG/1
1 TABLET, FILM COATED ORAL
Qty: 0 | Refills: 0 | DISCHARGE
Start: 2023-11-28

## 2023-11-28 RX ORDER — POLYETHYLENE GLYCOL 3350 17 G/17G
17 POWDER, FOR SOLUTION ORAL AT BEDTIME
Refills: 0 | Status: DISCONTINUED | OUTPATIENT
Start: 2023-11-28 | End: 2023-12-15

## 2023-11-28 RX ORDER — ATORVASTATIN CALCIUM 80 MG/1
1 TABLET, FILM COATED ORAL
Qty: 0 | Refills: 0 | DISCHARGE
Start: 2023-11-28

## 2023-11-28 RX ORDER — ATORVASTATIN CALCIUM 80 MG/1
40 TABLET, FILM COATED ORAL AT BEDTIME
Refills: 0 | Status: DISCONTINUED | OUTPATIENT
Start: 2023-11-28 | End: 2023-12-15

## 2023-11-28 RX ORDER — CLOPIDOGREL BISULFATE 75 MG/1
75 TABLET, FILM COATED ORAL DAILY
Refills: 0 | Status: DISCONTINUED | OUTPATIENT
Start: 2023-11-29 | End: 2023-12-15

## 2023-11-28 RX ADMIN — LOSARTAN POTASSIUM 100 MILLIGRAM(S): 100 TABLET, FILM COATED ORAL at 04:59

## 2023-11-28 RX ADMIN — Medication 81 MILLIGRAM(S): at 11:38

## 2023-11-28 RX ADMIN — Medication 2: at 16:12

## 2023-11-28 RX ADMIN — Medication 4: at 11:36

## 2023-11-28 RX ADMIN — Medication 2: at 07:49

## 2023-11-28 RX ADMIN — ATORVASTATIN CALCIUM 40 MILLIGRAM(S): 80 TABLET, FILM COATED ORAL at 21:50

## 2023-11-28 RX ADMIN — CLOPIDOGREL BISULFATE 75 MILLIGRAM(S): 75 TABLET, FILM COATED ORAL at 11:38

## 2023-11-28 NOTE — PROGRESS NOTE ADULT - ASSESSMENT
58 year old female w/ PMHx of vertigo, HTN, OA, DM2, shingles and sciatica who presents as a direct transfer from [facility] for further evaluation of right carotid occlusion requiring diagnostic angiogram. Procedure planned this AM 11/24, possible intervention of sever R. carotid occlusion and multi territorial infarct, likely eventual DC to rehab.       #Acute CVA   #Severe R. carotid stenosis   MRI brain performed 11/22 with scattered small acute infarcts involving the superior aspects of the R. frontal lobe, R. parietal and R. caudate   - B/L Carotid US showed high grade R. ICA disease   - CTA neck had shown R. ICA stenosis near completely occlusive with more distal R. ICA with mutiple short segment stenoses and L. ICA 30-35% stenosis  - per [facility] at [place] not Endarterectomy candidate    - Neuro/ Neuro intervention following, S/P Diagnostic Angiogram, no further intervention  - Continue neurochecks q4, BP goal normotension   - Acute Rehab recommended by PT and PMR  - Continue Plavix/ ASA, Lipitor   - TTE performed at [facility] showing 60-65% EF, no PFO noted   - LDL noted 116, TSH .99, A1c 7.6     #HTN  c/w HCTZ 25, Losartan 100  - BP goal normotension     #DM   continue insulin sliding scale     #OA  #Sciatica   Symptomatic care at this time, tylenol for pain   - stable for now, restart gabapentin if needed    DVT Prophylaxis: SCDs   Diet: Carb consistent  Code Status: Full  Dispo: Acute Rehab pending insurance and auth

## 2023-11-28 NOTE — PATIENT PROFILE ADULT - PATIENT REPRESENTATIVE: ( YOU CAN CHOOSE ANY PERSON THAT CAN ASSIST YOU WITH YOUR HEALTH CARE PREFERENCES, DOES NOT HAVE TO BE A SPOUSE, IMMEDIATE FAMILY OR SIGNIFICANT OTHER/PARTNER)
Care After Your Endoscopy  Dr. Mak Haney  [phone]        Activity  • For the next 24 hours: Do not stay alone, drive a car, use electrical/power tools or appliances, drink alcohol, or sign any legal papers.  • Do not do any strenuous activity for 24 hours (for example: bicycling, jogging, and exercising).     Diet   · You may resume a regular diet.    · Start slow with sips of water and increase your oral intake as tolerated.    Medication:   • Continue home medications.  • Take fiber daily.   • Start taking warm bath for thrombosed external hemorrhoid       Special Instructions               You had 3 polyp(s) on today's exam  • Some procedures, such as biopsies or removal of polyps, may cause minimal bleeding for 7-14 days.   • If bleeding becomes excessive, if you have black tarry stools, or you are worried call Dr. Haney.  • If polyps/biopsies were removed, do not take any aspirin, arthritis medication, Ibuprofen, (Nuprin, Advil, Motrin, Aleve) for 10 (ten) days due to possible bleeding.  • You may take Tylenol (acetaminophen).      Call the doctor if you have:  • Fever over 101 degrees (oral).  • Persistent nausea/vomiting (over 12 hours).  • Abnormal pain (sharp, severe abdominal pain).  • Increased pain (bloating, pressure).    Dr. Haney’s office will send a letter with the biopsy results in about a week after your procedure.        declines

## 2023-11-28 NOTE — PATIENT PROFILE ADULT - FALL HARM RISK - HARM RISK INTERVENTIONS

## 2023-11-28 NOTE — H&P ADULT - ASSESSMENT
ASSESSMENT/PLAN  58 year old female with PMH of vertigo, HTN, OA, Type 2 DM, shingles and sciatica who presented to [facility] on 11/23  as a direct transfer from [facility] for further evaluation of right carotid occlusion requiring diagnostic angiogram.  She presented to [facility] initially on 11/21 with progressively worsening left sided weakness, such that she slid off bed 2 days prior to admission. Her imaging showed as stroke with MRI showing scattered small acute infarcts involving the superior aspects of the right frontal lobe and right parietal lobe as well as the right caudate but was outside window for tPA.  Patient underwent a cerebral angiogram on 11/24 and no intervention provided. Patient was started on DAPT and statin and recommended for outpatient monitoring. She remained stable during her hospital stay and was evaluated for admission to acute inpatient rehab. She was admitted to [facility] on 11/28/23.       #Right frontal/parietal/caudate infarcts with right ICA stenosis now with left sided weakness, Gait Instability, ADL impairments and Functional impairments  - Start Comprehensive Rehab Program of PT/OT/SLP  -Continue Aspirin 81mg  -Continue Plavix 75mg   -Continue Atorvastatin 40mg daily     #HTN  -Continue HCTZ 25mg  -Continue Losartan 100mg daily   -TTE with EF of 60-65%    #Type 2 DM  -a1c is 7/7  -FS and ISS  -Home med: Metformin 1000mg BID    #Pain control  - Tylenol PRN  -Home med: Gabapentin 100mg at bedtime PRN    #GI/Bowel Mgmt   - Continue Senna at bedtime   - Miralax     #Bladder management  - Continue to monitor PVR q 8 hours (SC if > 400)  -Monitor UO    #DVT  - Lovenox  - TEDs     #Skin:  -***    FEN   - Diet - Regular + Thins  [CCHO, DASH/TLC]    - Dysphagia  SLP - evaluation and treatment    Precautions / PROPHYLAXIS:   - Falls, Cardiac  - ortho: Weight bearing status: WBAT   - Lungs: Aspiration, Incentive Spirometer   - Pressure injury/Skin: Turn Q2hrs while in bed, OOB to Chair, PT/OT        MEDICAL PROGNOSIS: GOOD              REHAB POTENTIAL: GOOD              ESTIMATED DISPOSITION: HOME WITH HOME CARE              ELOS: 10-14 Days   EXPECTED THERAPY:     P.T. 1hr/day       O.T. 1hr/day      S.L.P. 1hr/day       P&O Unnecessary       EXP FREQUENCY: 5 days per 7 day period     PRESCREEN COMPARISON:   I have reviewed the prescreen information and I have found no relevant changes between the preadmission screening and my post admission evaluation     RATIONALE FOR INPATIENT ADMISSION - Patient demonstrates the following: (check all that apply)  [X] Medically appropriate for rehabilitation admission  [X] Has attainable rehab goals with an appropriate initial discharge plan  [X] Has rehabilitation potential (expected to make a significant improvement within a reasonable period of time)   [X] Requires close medical management by a rehab physician, rehab nursing care, Hospitalist and comprehensive interdisciplinary team (including PT, OT, & or SLP, Prosthetics and Orthotics)   ASSESSMENT/PLAN  58 year old female with PMH of vertigo, HTN, OA, Type 2 DM, shingles and sciatica who presented to [facility] on 11/23  as a direct transfer from [facility] for further evaluation of right carotid occlusion requiring diagnostic angiogram.  She presented to [facility] initially on 11/21 with progressively worsening left sided weakness, such that she slid off bed 2 days prior to admission. Her imaging showed as stroke with MRI showing scattered small acute infarcts involving the superior aspects of the right frontal lobe and right parietal lobe as well as the right caudate but was outside window for tPA.  Patient underwent a cerebral angiogram on 11/24 and no intervention provided. Patient was started on DAPT and statin and recommended for outpatient monitoring. She remained stable during her hospital stay and was evaluated for admission to acute inpatient rehab. She was admitted to [facility] on 11/28/23.       #Right frontal/parietal/caudate infarcts with right ICA stenosis now with left sided weakness, Gait Instability, ADL impairments and Functional impairments  - Start Comprehensive Rehab Program of PT/OT/SLP  -Continue Aspirin 81mg  -Continue Plavix 75mg   -Continue Atorvastatin 40mg daily     #HTN  -Continue HCTZ 25mg  -Continue Losartan 100mg daily   -TTE with EF of 60-65%    #Type 2 DM  -a1c is 7/7  -FS and ISS  -Home med: Metformin 1000mg BID    #Pain control  - Tylenol PRN  -Home med: Gabapentin 100mg at bedtime PRN    #GI/Bowel Mgmt   - Continue Senna at bedtime   - Miralax     #Bladder management  - Continue to monitor PVR q 8 hours (SC if > 400)  -Monitor UO    #DVT  - Lovenox  - TEDs     #Skin:  - intergluteal cleft fissure    FEN   - Diet - Regular + Thins  [CCHO, DASH/TLC]    - Dysphagia  SLP - evaluation and treatment    Precautions / PROPHYLAXIS:   - Falls, Cardiac  - ortho: Weight bearing status: WBAT   - Lungs: Aspiration, Incentive Spirometer   - Pressure injury/Skin: Turn Q2hrs while in bed, OOB to Chair, PT/OT        MEDICAL PROGNOSIS: GOOD              REHAB POTENTIAL: GOOD              ESTIMATED DISPOSITION: HOME WITH HOME CARE              ELOS: 10-14 Days   EXPECTED THERAPY:     P.T. 1hr/day       O.T. 1hr/day      S.L.P. 1hr/day       P&O Unnecessary       EXP FREQUENCY: 5 days per 7 day period     PRESCREEN COMPARISON:   I have reviewed the prescreen information and I have found no relevant changes between the preadmission screening and my post admission evaluation     RATIONALE FOR INPATIENT ADMISSION - Patient demonstrates the following: (check all that apply)  [X] Medically appropriate for rehabilitation admission  [X] Has attainable rehab goals with an appropriate initial discharge plan  [X] Has rehabilitation potential (expected to make a significant improvement within a reasonable period of time)   [X] Requires close medical management by a rehab physician, rehab nursing care, Hospitalist and comprehensive interdisciplinary team (including PT, OT, & or SLP, Prosthetics and Orthotics)

## 2023-11-28 NOTE — H&P ADULT - NSHPPHYSICALEXAM_GEN_ALL_CORE
PHYSICAL EXAM  VITALS  T(C): 37.1 (11-28-23 @ 18:12), Max: 37.1 (11-28-23 @ 18:12)  HR: 74 (11-28-23 @ 18:12) (66 - 75)  BP: 116/76 (11-28-23 @ 18:12) (116/76 - 144/73)  RR: 18 (11-28-23 @ 18:12) (18 - 18)  SpO2: 96% (11-28-23 @ 18:12) (96% - 98%)    Gen - NAD, Comfortable  HEENT - NCAT, EOMI, MMM  Neck - Supple, No limited ROM  Pulm - CTAB, No wheeze, No rhonchi, No crackles  Cardiovascular - RRR, S1S2  Chest - good chest expansion, good respiratory effort  Abdomen - Soft, NT/ND, +BS  Extremities - No Cyanosis, no clubbing, no edema, no calf tenderness  Neuro-     Cognitive - awake, alert, oriented to person, place, date, year, and situation.  Able  to follow command     Communication - Fluent, Comprehensible     Attention: Intact     Judgement: Good evidence of judgement     Memory: Recall 3 objects immediate and 3 min later     Cranial Nerves - left facial weakness, decreased left shoulder shrug     Motor - Left hemiparesis                    LEFT    UE - ShAB 4/5, EF 5/5, EE 5/5,  5/5                    RIGHT UE - ShAB 5/5, EF 5/5, EE 5/5,   5/5                    LEFT    LE - HF 4/5, KE 4/5, DF 5/5, PF 5/5                    RIGHT LE - HF 5/5, KE 5/5, DF 5/5, PF 5/5        Sensory - Intact to LT      Reflexes - DTR Intact, No primitive reflexive     Coordination - FTN impaired to left side     Tone - normal  Psychiatric - Mood stable, Affect WNL  Skin:  intergluteal cleft fissure PHYSICAL EXAM  VITALS  T(C): 37.1 (11-28-23 @ 18:12), Max: 37.1 (11-28-23 @ 18:12)  HR: 74 (11-28-23 @ 18:12) (66 - 75)  BP: 116/76 (11-28-23 @ 18:12) (116/76 - 144/73)  RR: 18 (11-28-23 @ 18:12) (18 - 18)  SpO2: 96% (11-28-23 @ 18:12) (96% - 98%)    Gen - NAD, Comfortable  HEENT - NCAT, EOMI, MMM  Neck - Supple, No limited ROM  Pulm - CTAB, No wheeze, No rhonchi, No crackles  Cardiovascular - RRR, S1S2  Chest - good chest expansion, good respiratory effort  Abdomen - Soft, NT/ND, +BS  Extremities - No Cyanosis, no clubbing, no edema, no calf tenderness  Neuro-     Cognitive - awake, alert, oriented to person, place, date, year, and situation.  Able  to follow command     Communication - Fluent, Comprehensible     Attention: Intact     Judgement: Good evidence of judgement     Memory: Recall 3 objects immediate and 3 min later     Cranial Nerves - VFF, +LR, EOMI, left facial weakness, no dysarthria, no dysphagia      Motor - Left hemiparesis                    LEFT    UE - ShAB 4/5, EF 5/5, EE 5/5,  5/5                    RIGHT UE - ShAB 5/5, EF 5/5, EE 5/5,   5/5                    LEFT    LE - HF 4/5, KE 4/5, DF 5/5, PF 5/5                    RIGHT LE - HF 5/5, KE 5/5, DF 5/5, PF 5/5        Sensory - Intact to LT      Reflexes - DTR Intact, No primitive reflexive     Coordination - FTN impaired to left side     Tone - normal  Psychiatric - Mood stable, Affect WNL  Skin:  intergluteal cleft fissure

## 2023-11-28 NOTE — PROGRESS NOTE ADULT - SUBJECTIVE AND OBJECTIVE BOX
Hospitalist Progress Note    Chief Complaint:  Stroke    SUBJECTIVE / OVERNIGHT EVENTS:  No events overnight, patient seen at bedside, in NAD,  no new complaints reported today. Patient denies chest pain, SOB, abd pain, N/V, fever, chills, dysuria or any other complaints. All remainder ROS negative.     MEDICATIONS  (STANDING):  aspirin  chewable 81 milliGRAM(s) Oral daily  atorvastatin 40 milliGRAM(s) Oral at bedtime  clopidogrel Tablet 75 milliGRAM(s) Oral daily  dextrose 5%. 1000 milliLiter(s) (50 mL/Hr) IV Continuous <Continuous>  dextrose 5%. 1000 milliLiter(s) (100 mL/Hr) IV Continuous <Continuous>  dextrose 50% Injectable 12.5 Gram(s) IV Push once  dextrose 50% Injectable 25 Gram(s) IV Push once  dextrose 50% Injectable 25 Gram(s) IV Push once  glucagon  Injectable 1 milliGRAM(s) IntraMuscular once  hydrochlorothiazide 25 milliGRAM(s) Oral daily  influenza   Vaccine 0.5 milliLiter(s) IntraMuscular once  insulin lispro (ADMELOG) corrective regimen sliding scale   SubCutaneous three times a day before meals  lactated ringers. 1000 milliLiter(s) (75 mL/Hr) IV Continuous <Continuous>  losartan 100 milliGRAM(s) Oral daily  sodium chloride 0.9%. 1000 milliLiter(s) (50 mL/Hr) IV Continuous <Continuous>    MEDICATIONS  (PRN):  acetaminophen     Tablet .. 650 milliGRAM(s) Oral every 6 hours PRN Temp greater or equal to 38C (100.4F), Mild Pain (1 - 3)  aluminum hydroxide/magnesium hydroxide/simethicone Suspension 30 milliLiter(s) Oral every 4 hours PRN Dyspepsia  dextrose Oral Gel 15 Gram(s) Oral once PRN Blood Glucose LESS THAN 70 milliGRAM(s)/deciliter  ondansetron Injectable 4 milliGRAM(s) IV Push every 8 hours PRN Nausea and/or Vomiting  polyethylene glycol 3350 17 Gram(s) Oral at bedtime PRN for constipation  senna 2 Tablet(s) Oral at bedtime PRN for constipation        I&O's Summary    27 Nov 2023 07:01  -  28 Nov 2023 07:00  --------------------------------------------------------  IN: 900 mL / OUT: 900 mL / NET: 0 mL        PHYSICAL EXAM:  Vital Signs Last 24 Hrs  T(C): 37 (28 Nov 2023 10:44), Max: 37 (28 Nov 2023 04:11)  T(F): 98.6 (28 Nov 2023 10:44), Max: 98.6 (28 Nov 2023 04:11)  HR: 66 (28 Nov 2023 10:44) (66 - 83)  BP: 144/73 (28 Nov 2023 10:44) (118/65 - 144/73)  BP(mean): --  RR: 18 (28 Nov 2023 10:44) (18 - 18)  SpO2: 98% (28 Nov 2023 10:44) (96% - 98%)    Parameters below as of 28 Nov 2023 08:24  Patient On (Oxygen Delivery Method): room air          CONSTITUTIONAL: NAD, well groomed   ENMT: Moist oral mucosa  RESPIRATORY: clear to auscultation bilaterally   CARDIOVASCULAR: Regular rate and rhythm, normal S1 and S2, no peripheral edema noted   ABDOMEN: Nontender to palpation, normoactive bowel sounds  MUSCLOSKELETAL:  ROM wnl in all extremities tested, mild decrease in LUE strength particularly hand / fine finger movements   PSYCH: A+O to person, place, and time; affect appropriate  NEUROLOGY: CN 2-12 are intact and symmetric; no sensory deficits, decreased  strength in L .hand with mildly diminished fine finger motions   SKIN: No rashes; no palpable lesions    LABS:                    CAPILLARY BLOOD GLUCOSE      POCT Blood Glucose.: 172 mg/dL (28 Nov 2023 07:46)  POCT Blood Glucose.: 146 mg/dL (27 Nov 2023 21:17)  POCT Blood Glucose.: 209 mg/dL (27 Nov 2023 16:54)  POCT Blood Glucose.: 223 mg/dL (27 Nov 2023 11:50)        RADIOLOGY & ADDITIONAL TESTS:  Results Reviewed: Y  Imaging Personally Reviewed: N  Electrocardiogram Personally Reviewed: N

## 2023-11-28 NOTE — H&P ADULT - NSHPSOCIALHISTORY_GEN_ALL_CORE
SOCIAL HISTORY  Smoking - Denied  EtOH - Denied   Drugs - Denied    FUNCTIONAL HISTORY  Lives with spouse, 2 MALLY  Independent    CURRENT FUNCTIONAL STATUS  - Bed Mobility: min assist   - Transfers: min assist   - Gait: min assist 40' with a walker   - ADLs: min assist SOCIAL HISTORY  Smoking - Denied  EtOH - Denied   Drugs - Denied    FUNCTIONAL HISTORY  Lives with spouse, 2 MALLY, 13 steps inside, has 1st floor set up  Independent    CURRENT FUNCTIONAL STATUS  - Bed Mobility: min assist   - Transfers: min assist   - Gait: min assist 40' with a walker   - ADLs: min assist

## 2023-11-28 NOTE — H&P ADULT - NS ATTEND AMEND GEN_ALL_CORE FT
I have personally seen and examined the patient with the NP. Medical records reviewed. I have made amendments to the documentation where necessary and adjusted the history, physical examination, and plan as documented by the NP.    57 yo RHF with multiple vascular risk factors, including high grade Right ICA stenosis ( near complete occlusion) and recent multiple ischemic right cerebral infarcts, presents to  acute neurorehabilitation program with multiple functional impairments.    DAPT/ statin  Goal LDL <70  Goal HbA1C < 7  BP control  Nutritional evaluation - obesity  Fall precautions

## 2023-11-28 NOTE — DISCHARGE NOTE NURSING/CASE MANAGEMENT/SOCIAL WORK - PATIENT PORTAL LINK FT
You can access the FollowMyHealth Patient Portal offered by [facility] by registering at the following website: [email or web address]. By joining [facility]’s FollowMyHealth portal, you will also be able to view your health information using other applications (apps) compatible with our system.

## 2023-11-28 NOTE — H&P ADULT - HISTORY OF PRESENT ILLNESS
58 year old female with PMH of vertigo, HTN, OA, Type 2 DM, shingles and sciatica who presented to [facility] on 11/23  as a direct transfer from [facility] for further evaluation of right carotid occlusion requiring diagnostic angiogram.  She presented to [facility] initially on 11/21 with progressively worsening left sided weakness, such that she slid off bed 2 days prior to admission. Her imaging showed as stroke with MRI showing scattered small acute infarcts involving the superior aspects of the right frontal lobe and right parietal lobe as well as the right caudate but was outside window for tPA.  Patient underwent a cerebral angiogram on 11/24 and no intervention provided. Patient was started on DAPT and statin and recommended for outpatient monitoring. She remained stable during her hospital stay and was evaluated for admission to acute inpatient rehab. She was admitted to [facility] on 11/28/23.

## 2023-11-28 NOTE — H&P ADULT - NSICDXPASTMEDICALHX_GEN_ALL_CORE_FT
PAST MEDICAL HISTORY:  Hypertension     Obesity, Class II, BMI 35-39.9     Osteoarthritis     Prediabetes now diabetes    [facility], 11/26/20-12/5/20    Spider veins

## 2023-11-28 NOTE — PROGRESS NOTE ADULT - REASON FOR ADMISSION
diagnostic angiogram, left sided weakness

## 2023-11-29 LAB
ALBUMIN SERPL ELPH-MCNC: 3.5 G/DL — SIGNIFICANT CHANGE UP (ref 3.3–5)
ALBUMIN SERPL ELPH-MCNC: 3.5 G/DL — SIGNIFICANT CHANGE UP (ref 3.3–5)
ALP SERPL-CCNC: 54 U/L — SIGNIFICANT CHANGE UP (ref 40–120)
ALP SERPL-CCNC: 54 U/L — SIGNIFICANT CHANGE UP (ref 40–120)
ALT FLD-CCNC: 34 U/L — SIGNIFICANT CHANGE UP (ref 10–45)
ALT FLD-CCNC: 34 U/L — SIGNIFICANT CHANGE UP (ref 10–45)
ANION GAP SERPL CALC-SCNC: 11 MMOL/L — SIGNIFICANT CHANGE UP (ref 5–17)
ANION GAP SERPL CALC-SCNC: 11 MMOL/L — SIGNIFICANT CHANGE UP (ref 5–17)
AST SERPL-CCNC: 22 U/L — SIGNIFICANT CHANGE UP (ref 10–40)
AST SERPL-CCNC: 22 U/L — SIGNIFICANT CHANGE UP (ref 10–40)
BILIRUB SERPL-MCNC: 0.7 MG/DL — SIGNIFICANT CHANGE UP (ref 0.2–1.2)
BILIRUB SERPL-MCNC: 0.7 MG/DL — SIGNIFICANT CHANGE UP (ref 0.2–1.2)
BUN SERPL-MCNC: 18 MG/DL — SIGNIFICANT CHANGE UP (ref 7–23)
BUN SERPL-MCNC: 18 MG/DL — SIGNIFICANT CHANGE UP (ref 7–23)
CALCIUM SERPL-MCNC: 9.6 MG/DL — SIGNIFICANT CHANGE UP (ref 8.4–10.5)
CALCIUM SERPL-MCNC: 9.6 MG/DL — SIGNIFICANT CHANGE UP (ref 8.4–10.5)
CHLORIDE SERPL-SCNC: 100 MMOL/L — SIGNIFICANT CHANGE UP (ref 96–108)
CHLORIDE SERPL-SCNC: 100 MMOL/L — SIGNIFICANT CHANGE UP (ref 96–108)
CO2 SERPL-SCNC: 26 MMOL/L — SIGNIFICANT CHANGE UP (ref 22–31)
CO2 SERPL-SCNC: 26 MMOL/L — SIGNIFICANT CHANGE UP (ref 22–31)
CREAT SERPL-MCNC: 0.79 MG/DL — SIGNIFICANT CHANGE UP (ref 0.5–1.3)
CREAT SERPL-MCNC: 0.79 MG/DL — SIGNIFICANT CHANGE UP (ref 0.5–1.3)
EGFR: 87 ML/MIN/1.73M2 — SIGNIFICANT CHANGE UP
EGFR: 87 ML/MIN/1.73M2 — SIGNIFICANT CHANGE UP
GLUCOSE BLDC GLUCOMTR-MCNC: 156 MG/DL — HIGH (ref 70–99)
GLUCOSE BLDC GLUCOMTR-MCNC: 156 MG/DL — HIGH (ref 70–99)
GLUCOSE BLDC GLUCOMTR-MCNC: 167 MG/DL — HIGH (ref 70–99)
GLUCOSE BLDC GLUCOMTR-MCNC: 167 MG/DL — HIGH (ref 70–99)
GLUCOSE BLDC GLUCOMTR-MCNC: 188 MG/DL — HIGH (ref 70–99)
GLUCOSE BLDC GLUCOMTR-MCNC: 188 MG/DL — HIGH (ref 70–99)
GLUCOSE BLDC GLUCOMTR-MCNC: 194 MG/DL — HIGH (ref 70–99)
GLUCOSE BLDC GLUCOMTR-MCNC: 194 MG/DL — HIGH (ref 70–99)
GLUCOSE SERPL-MCNC: 165 MG/DL — HIGH (ref 70–99)
GLUCOSE SERPL-MCNC: 165 MG/DL — HIGH (ref 70–99)
HCT VFR BLD CALC: 39.2 % — SIGNIFICANT CHANGE UP (ref 34.5–45)
HCT VFR BLD CALC: 39.2 % — SIGNIFICANT CHANGE UP (ref 34.5–45)
HGB BLD-MCNC: 13.4 G/DL — SIGNIFICANT CHANGE UP (ref 11.5–15.5)
HGB BLD-MCNC: 13.4 G/DL — SIGNIFICANT CHANGE UP (ref 11.5–15.5)
MCHC RBC-ENTMCNC: 29.8 PG — SIGNIFICANT CHANGE UP (ref 27–34)
MCHC RBC-ENTMCNC: 29.8 PG — SIGNIFICANT CHANGE UP (ref 27–34)
MCHC RBC-ENTMCNC: 34.2 GM/DL — SIGNIFICANT CHANGE UP (ref 32–36)
MCHC RBC-ENTMCNC: 34.2 GM/DL — SIGNIFICANT CHANGE UP (ref 32–36)
MCV RBC AUTO: 87.1 FL — SIGNIFICANT CHANGE UP (ref 80–100)
MCV RBC AUTO: 87.1 FL — SIGNIFICANT CHANGE UP (ref 80–100)
NRBC # BLD: 0 /100 WBCS — SIGNIFICANT CHANGE UP (ref 0–0)
NRBC # BLD: 0 /100 WBCS — SIGNIFICANT CHANGE UP (ref 0–0)
PLATELET # BLD AUTO: 215 K/UL — SIGNIFICANT CHANGE UP (ref 150–400)
PLATELET # BLD AUTO: 215 K/UL — SIGNIFICANT CHANGE UP (ref 150–400)
POTASSIUM SERPL-MCNC: 3.8 MMOL/L — SIGNIFICANT CHANGE UP (ref 3.5–5.3)
POTASSIUM SERPL-MCNC: 3.8 MMOL/L — SIGNIFICANT CHANGE UP (ref 3.5–5.3)
POTASSIUM SERPL-SCNC: 3.8 MMOL/L — SIGNIFICANT CHANGE UP (ref 3.5–5.3)
POTASSIUM SERPL-SCNC: 3.8 MMOL/L — SIGNIFICANT CHANGE UP (ref 3.5–5.3)
PROT SERPL-MCNC: 7.7 G/DL — SIGNIFICANT CHANGE UP (ref 6–8.3)
PROT SERPL-MCNC: 7.7 G/DL — SIGNIFICANT CHANGE UP (ref 6–8.3)
RBC # BLD: 4.5 M/UL — SIGNIFICANT CHANGE UP (ref 3.8–5.2)
RBC # BLD: 4.5 M/UL — SIGNIFICANT CHANGE UP (ref 3.8–5.2)
RBC # FLD: 13.2 % — SIGNIFICANT CHANGE UP (ref 10.3–14.5)
RBC # FLD: 13.2 % — SIGNIFICANT CHANGE UP (ref 10.3–14.5)
SARS-COV-2 RNA SPEC QL NAA+PROBE: SIGNIFICANT CHANGE UP
SARS-COV-2 RNA SPEC QL NAA+PROBE: SIGNIFICANT CHANGE UP
SODIUM SERPL-SCNC: 137 MMOL/L — SIGNIFICANT CHANGE UP (ref 135–145)
SODIUM SERPL-SCNC: 137 MMOL/L — SIGNIFICANT CHANGE UP (ref 135–145)
WBC # BLD: 7.84 K/UL — SIGNIFICANT CHANGE UP (ref 3.8–10.5)
WBC # BLD: 7.84 K/UL — SIGNIFICANT CHANGE UP (ref 3.8–10.5)
WBC # FLD AUTO: 7.84 K/UL — SIGNIFICANT CHANGE UP (ref 3.8–10.5)
WBC # FLD AUTO: 7.84 K/UL — SIGNIFICANT CHANGE UP (ref 3.8–10.5)

## 2023-11-29 PROCEDURE — 99223 1ST HOSP IP/OBS HIGH 75: CPT

## 2023-11-29 PROCEDURE — 90832 PSYTX W PT 30 MINUTES: CPT

## 2023-11-29 PROCEDURE — 99232 SBSQ HOSP IP/OBS MODERATE 35: CPT

## 2023-11-29 RX ORDER — LANOLIN ALCOHOL/MO/W.PET/CERES
3 CREAM (GRAM) TOPICAL AT BEDTIME
Refills: 0 | Status: DISCONTINUED | OUTPATIENT
Start: 2023-11-29 | End: 2023-12-15

## 2023-11-29 RX ORDER — INFLUENZA VIRUS VACCINE 15; 15; 15; 15 UG/.5ML; UG/.5ML; UG/.5ML; UG/.5ML
0.5 SUSPENSION INTRAMUSCULAR ONCE
Refills: 0 | Status: DISCONTINUED | OUTPATIENT
Start: 2023-11-29 | End: 2023-12-15

## 2023-11-29 RX ORDER — ESCITALOPRAM OXALATE 10 MG/1
5 TABLET, FILM COATED ORAL DAILY
Refills: 0 | Status: DISCONTINUED | OUTPATIENT
Start: 2023-11-29 | End: 2023-12-08

## 2023-11-29 RX ADMIN — PANTOPRAZOLE SODIUM 40 MILLIGRAM(S): 20 TABLET, DELAYED RELEASE ORAL at 05:42

## 2023-11-29 RX ADMIN — Medication 2: at 11:28

## 2023-11-29 RX ADMIN — ATORVASTATIN CALCIUM 40 MILLIGRAM(S): 80 TABLET, FILM COATED ORAL at 21:34

## 2023-11-29 RX ADMIN — CLOPIDOGREL BISULFATE 75 MILLIGRAM(S): 75 TABLET, FILM COATED ORAL at 11:25

## 2023-11-29 RX ADMIN — METFORMIN HYDROCHLORIDE 1000 MILLIGRAM(S): 850 TABLET ORAL at 16:41

## 2023-11-29 RX ADMIN — Medication 81 MILLIGRAM(S): at 11:25

## 2023-11-29 RX ADMIN — METFORMIN HYDROCHLORIDE 1000 MILLIGRAM(S): 850 TABLET ORAL at 05:42

## 2023-11-29 RX ADMIN — LOSARTAN POTASSIUM 100 MILLIGRAM(S): 100 TABLET, FILM COATED ORAL at 05:42

## 2023-11-29 RX ADMIN — Medication 2: at 08:04

## 2023-11-29 RX ADMIN — Medication 2: at 16:42

## 2023-11-29 RX ADMIN — ENOXAPARIN SODIUM 40 MILLIGRAM(S): 100 INJECTION SUBCUTANEOUS at 05:41

## 2023-11-29 NOTE — PROGRESS NOTE ADULT - ASSESSMENT
Pt alert, attentive, intact expressive language, thought processes - circumstantial , no abnormal thought contents noted, mildly depressed affect, euthymic mood, denied AH/VH, denied SI/HI/I/P, calm behavior. Plan: Continue the assessment process. Provide supportive tx.

## 2023-11-29 NOTE — CONSULT NOTE ADULT - ASSESSMENT
58 year old female with PMH of vertigo, HTN, OA, Type 2 DM, shingles and sciatica who presented to [facility] on 11/23  as a direct transfer from [facility] for further evaluation of right carotid occlusion requiring diagnostic angiogram.  She presented to [facility] initially on 11/21 with progressively worsening left sided weakness, such that she slid off bed 2 days prior to admission. Her imaging showed as stroke with MRI showing scattered small acute infarcts involving the superior aspects of the right frontal lobe and right parietal lobe as well as the right caudate but was outside window for tPA.  Patient underwent a cerebral angiogram on 11/24 and no intervention provided. Patient was started on DAPT and statin and recommended for outpatient monitoring. She remained stable during her hospital stay and was evaluated for admission to acute inpatient rehab. She was admitted to [facility] on 11/28/23.       #Right frontal/parietal/caudate infarcts with right ICA stenosis now with left sided weakness, Gait Instability, ADL impairments and Functional impairments  - Start Comprehensive Rehab Program of PT/OT/SLP  -Continue Aspirin 81mg  -Continue Plavix 75mg   -Continue Atorvastatin 40mg daily     #HTN  -Continue HCTZ 25mg  -Continue Losartan 100mg daily   -TTE with EF of 60-65%    #Type 2 DM  -a1c is 7/7  -FS and ISS  -Home med: Metformin 1000mg BID    #Pain control  - Tylenol PRN  -Home med: Gabapentin 100mg at bedtime PRN    #GI/Bowel Mgmt   - Continue Senna at bedtime   - Miralax     #Bladder management  - Continue to monitor PVR q 8 hours (SC if > 400)  -Monitor UO    #DVT  - Lovenox  - TEDs     #Skin:  - intergluteal cleft fissure    FEN   - Diet - Regular + Thins  [CCHO, DASH/TLC]    - Dysphagia  SLP - evaluation and treatment     59 y/o F with PMH of vertigo, HTN, OA, Type 2 DM, shingles and sciatica who presented to [facility] on 11/23 as a direct transfer from [facility] for further evaluation of right carotid occlusion requiring diagnostic angiogram.  She presented to [facility] initially on 11/21 with progressively worsening left sided weakness, such that she slid off bed 2 days prior to admission. Her imaging showed as stroke with MRI showing scattered small acute infarcts involving the superior aspects of the right frontal lobe and right parietal lobe as well as the right caudate but was outside window for tPA.  Patient underwent a cerebral angiogram on 11/24 and no intervention provided. Patient was started on DAPT and statin and recommended for outpatient monitoring. She remained stable during her hospital stay and was evaluated for admission to acute inpatient rehab. She was admitted to [facility] on 11/28/23.     #Right frontal/parietal/caudate infarcts with right ICA stenosis now with left sided weakness, Gait Instability, ADL impairments and Functional impairments  -Continue DAPT with Aspirin 81mg qd, Plavix 75mg qd  -Continue Atorvastatin 40mg qhs  -Start comprehensive rehab program - PT/OT/SLP per rehab team  -Pain management, bowel regimen per rehab - continue gabapentin 100mg qhs prn    #HTN  -Continue HCTZ 25mg qd  -Continue Losartan 100mg qd  -TTE with EF of 60-65%    #Type 2 DM, HbA1c 7.7 (11/22/23)  -FS and ISS  -Home med: Metformin 1000mg BID - resume    DVT ppx - lovenox   GI ppx - PPI

## 2023-11-29 NOTE — CHART NOTE - NSCHARTNOTEFT_GEN_A_CORE
REHABILITATION DIAGNOSIS/IMPAIRMENT GROUP CODE:  CVA    COMORBIDITIES/COMPLICATING CONDITIONS IMPACTING REHABILITATION:  HEALTH ISSUES - PROBLEM Dx:        PAST MEDICAL & SURGICAL HISTORY:  Prediabetes  now diabetes      Hypertension      Osteoarthritis      Spider veins      Obesity, Class II, BMI 35-39.9      [facility], 11/26/20-12/5/20      History of appendectomy  with right oophorecomy 2010      History of hip surgery  pinning of left hip age 12 and hardware removed age 17          Based upon consideration of the patient's impairments, functional status, complicating conditions and any other contributing factors and after information garnered from the assessments of all therapy disciplines involved in treating the patient and other pertinent clinicians:    INTERDISCIPLINARY REHABILITATION INTERVENTIONS:    [ X  ] Transfer Training  [X   ] Bed Mobility  [ X  ] Therapeutic Exercise  [ X  ] Balance/Coordination Exercises  [ X  ] Locomotion retraining  [ X  ] Stairs  [ X  ] Functional Transfer Training  [ X  ] Bowel/Bladder program  [  X ] Pain Management  [ X  ] Skin/Wound Care  [X   ] Visual/Perceptual Training  [X   ] Therapeutic Recreation Activities  [  X ] Neuromuscular Re-education  [  X ] Activities of Daily Living  [ X  ] Speech Exercise  [   ] Swallowing Exercises  [   ] Vital Stim  [   ] Dietary Supplements  [ X  ] Calorie Count  [X   ] Cognitive Exercises  [  X ] Congnitive/Linguistic Treatment  [   ] Behavior Program  [  X ] Neuropsych Therapy  [ X  ] Patient/Family Counseling  [ X  ] Family Training  [ X  ] Community Re-entry  [ X  ] Orthotic Evaluation  [   ] Prosthetic Eval/Training    MEDICAL PROGNOSIS:  Good     REHAB POTENTIAL:  Good     EXPECTED DAILY THERAPY:         PT: 1hr/day       OT: 1hr/day       ST: 1hr/day        EXPECTED INTENSITY OF PROGRAM:  3 hrs/day    EXPECTED FREQUENCY OF PROGRAM:  5 days/week    ESTIMATED LOS:  10-14 days    ESTIMATED DISPOSITION:  home    INTERDISCIPLINARY FUNCTIONAL OUTCOMES/GOALS:           Gait/Mobility: Mod I       Transfers: Supervision       ADLs: Supervision       Functional Transfers: Min Assist       Medication Management: Independent       Communication: Supervision       Cognitive: Min Assist       Dysphagia: Supervision       Bladder: Supervision       Bowel: Supervision

## 2023-11-29 NOTE — PROGRESS NOTE ADULT - SUBJECTIVE AND OBJECTIVE BOX
Pt was seen for 45 minutes. Pt c/o poor sleep. Reviewed chart, approached Pt for an initial consult, introduced the role of neuropsychology in the rehab team, and explained the nature and purpose of the consult. Pt is a 59 y/o female who presented to [facility] on 11/23, transferred from [facility] for further evaluation of right carotid occlusion requiring diagnostic angiogram; imaging showed as stroke with MRI showing scattered small acute infarcts involving the superior aspects of the right frontal lobe and right parietal lobe as well as the right caudate but was outside window for tPA. Pt underwent a cerebral angiogram on 11/24 and no intervention provided. Pt agreed to participate, her attitude was friendly and cooperative. Session focused on establishing rapport with Pt, collecting biographical information, and assessing her current emotional functioning. Pt provided significant information about her medical hx and social hx. Also, Pt responded to all questions during the clinical interview in order to elicit emotional symptoms. Pt reported experiencing  feelings of unreality, guilt (for ruining Thanksgiving day), and poor sleep. Support and encouragement were provided.

## 2023-11-29 NOTE — CONSULT NOTE ADULT - SUBJECTIVE AND OBJECTIVE BOX
Patient is a 58y old  Female who presents with a chief complaint of CVA (28 Nov 2023 13:37)      HPI:  HPI:  58 year old female with PMH of vertigo, HTN, OA, Type 2 DM, shingles and sciatica who presented to [facility] on 11/23  as a direct transfer from [facility] for further evaluation of right carotid occlusion requiring diagnostic angiogram.  She presented to [facility] initially on 11/21 with progressively worsening left sided weakness, such that she slid off bed 2 days prior to admission. Her imaging showed as stroke with MRI showing scattered small acute infarcts involving the superior aspects of the right frontal lobe and right parietal lobe as well as the right caudate but was outside window for tPA.  Patient underwent a cerebral angiogram on 11/24 and no intervention provided. Patient was started on DAPT and statin and recommended for outpatient monitoring. She remained stable during her hospital stay and was evaluated for admission to acute inpatient rehab. She was admitted to [facility] on 11/28/23.    (28 Nov 2023 13:37)      PAST MEDICAL & SURGICAL HISTORY:  Prediabetes  now diabetes      Hypertension      Osteoarthritis      Spider veins      Obesity, Class II, BMI 35-39.9      [facility], 11/26/20-12/5/20      History of appendectomy  with right oophorecomy 2010      History of hip surgery  pinning of left hip age 12 and hardware removed age 17          Father: - at age - with history of   Mother: - at age - with history of           Substance Use (street drugs): (  ) never used  (  ) other:  Tobacco Usage:  (   ) never smoked   (   ) former smoker   (   ) current smoker  (     ) pack year  Alcohol Usage:  Sexual History:   Recent Travel:      Allergies    No Known Allergies    Intolerances        influenza   Vaccine 0.5 milliLiter(s) IntraMuscular once      REVIEW OF SYSTEMS:  CONSTITUTIONAL: No fever, weight loss, or fatigue  EYES: No eye pain, visual disturbances, or discharge  ENMT:  No difficulty hearing, tinnitus, vertigo; No sinus or throat pain  NECK: No pain or stiffness  BREASTS: No pain, masses, or nipple discharge  RESPIRATORY: No cough, wheezing, chills or hemoptysis; No shortness of breath  CARDIOVASCULAR: No chest pain, palpitations, dizziness, or leg swelling  GASTROINTESTINAL: No abdominal or epigastric pain. No nausea, vomiting, or hematemesis; No diarrhea or constipation. No melena or hematochezia.  GENITOURINARY: No dysuria, frequency, hematuria, or incontinence  NEUROLOGICAL: No headaches, memory loss, loss of strength, numbness, or tremors  SKIN: No itching, burning, rashes, or lesions   LYMPH NODES: No enlarged glands  ENDOCRINE: No heat or cold intolerance; No hair loss  MUSCULOSKELETAL: No joint pain or swelling; No muscle, back, or extremity pain  PSYCHIATRIC: No depression, anxiety, mood swings, or difficulty sleeping  HEME/LYMPH: No easy bruising, or bleeding gums  ALLERY AND IMMUNOLOGIC: No hives or eczema    ALL ROS REVIEWED AND NORMAL EXCEPT AS STATED ABOVE    T(C): 36.4 (11-29-23 @ 07:58), Max: 37.1 (11-28-23 @ 18:12)  HR: 65 (11-29-23 @ 07:58) (65 - 78)  BP: 139/84 (11-29-23 @ 07:58) (116/76 - 144/73)  RR: 16 (11-29-23 @ 07:58) (16 - 18)  SpO2: 97% (11-29-23 @ 07:58) (96% - 98%)  Wt(kg): --Vital Signs Last 24 Hrs  T(C): 36.4 (29 Nov 2023 07:58), Max: 37.1 (28 Nov 2023 18:12)  T(F): 97.6 (29 Nov 2023 07:58), Max: 98.7 (28 Nov 2023 18:12)  HR: 65 (29 Nov 2023 07:58) (65 - 78)  BP: 139/84 (29 Nov 2023 07:58) (116/76 - 144/73)  BP(mean): --  RR: 16 (29 Nov 2023 07:58) (16 - 18)  SpO2: 97% (29 Nov 2023 07:58) (96% - 98%)    Parameters below as of 29 Nov 2023 07:58  Patient On (Oxygen Delivery Method): room air        PHYSICAL EXAM:  GENERAL: NAD, well-groomed, well-developed  HEAD:  Atraumatic, Normocephalic  EYES: EOMI, PERRLA, conjunctiva and sclera clear  ENMT: No tonsillar erythema, exudates, or enlargement; Moist mucous membranes, Good dentition, No lesions  NECK: Supple, No JVD, Normal thyroid  NERVOUS SYSTEM:  Alert & Oriented X3, Good concentration; Motor Strength 5/5 B/L upper and lower extremities; DTRs 2+ intact and symmetric  CHEST/LUNG: Clear to percussion bilaterally; No rales, rhonchi, wheezing, or rubs  HEART: Regular rate and rhythm; No murmurs, rubs, or gallops  ABDOMEN: Soft, Nontender, Nondistended; Bowel sounds present  EXTREMITIES:  2+ Peripheral Pulses, No clubbing, cyanosis, or edema  LYMPH: No lymphadenopathy noted  SKIN: No rashes or lesions    LABS:                        13.4   7.84  )-----------( 215      ( 29 Nov 2023 05:56 )             39.2     11-29    137  |  100  |  18  ----------------------------<  165<H>  3.8   |  26  |  0.79    Ca    9.6      29 Nov 2023 05:56    TPro  7.7  /  Alb  3.5  /  TBili  0.7  /  DBili  x   /  AST  22  /  ALT  34  /  AlkPhos  54  11-29      Urinalysis Basic - ( 29 Nov 2023 05:56 )    Color: x / Appearance: x / SG: x / pH: x  Gluc: 165 mg/dL / Ketone: x  / Bili: x / Urobili: x   Blood: x / Protein: x / Nitrite: x   Leuk Esterase: x / RBC: x / WBC x   Sq Epi: x / Non Sq Epi: x / Bacteria: x       CAPILLARY BLOOD GLUCOSE      POCT Blood Glucose.: 167 mg/dL (29 Nov 2023 07:55)  POCT Blood Glucose.: 147 mg/dL (28 Nov 2023 21:48)  POCT Blood Glucose.: 173 mg/dL (28 Nov 2023 16:09)  POCT Blood Glucose.: 203 mg/dL (28 Nov 2023 11:35)        Urinalysis Basic - ( 29 Nov 2023 05:56 )    Color: x / Appearance: x / SG: x / pH: x  Gluc: 165 mg/dL / Ketone: x  / Bili: x / Urobili: x   Blood: x / Protein: x / Nitrite: x   Leuk Esterase: x / RBC: x / WBC x   Sq Epi: x / Non Sq Epi: x / Bacteria: x        RADIOLOGY & ADDITIONAL TESTS:    CTA 11/21 - Skull base and supraclinoid right ICA is very small in caliber, particularly at the level of the proximal cavernous segment. The supraclinoid right ICA is somewhat small in caliber.Small anterior communicating artery visualized. Large bilateral A1 segments. No vascular aneurysm or AVM.    CTA neck 11/21 - Right internal carotid artery origin and proximal segment is essentially including, which appears chronic in nature. More distal right ICA is small in caliber and demonstrates multiple short segment stenoses. Calcified plaque at the origin of the left internal carotid artery results in approximately 30-35% short segment stenosis. Normal flow-related enhancement of the more distal vessel.    CT Head 11/21 - Parenchymal volume loss is identified. Abnormal low attenuation seen involving the high right frontal cortical subcortical region. This likely compatible with an area of old infarct.Noacute hemorrhage mass or mass effect is seenEvaluation of the osseous structures appropriate window appears unremarkableThe visualized paranasal sinuses mastoid and middle ear regions appear clear. IMPRESSION: Abnormal areas of low attenuation involving the high right   frontal cortical and subcortical region as described above.    MR Head 11/22 - Scattered small acute infarcts involving the superior aspects of the right frontal lobe and right parietal lobe as well as the right caudate.    Consultant(s) Notes Reviewed:  [x ] YES  [ ] NO  Care Discussed with Consultants/Other Providers [ x] YES  [ ] NO  Imaging Personally Reviewed:  [ ] YES  [ ] NO Patient is a 58y old  Female who presents with a chief complaint of CVA (2023 13:37)    HPI:  59 y/o F with PMH of vertigo, HTN, OA, Type 2 DM, shingles and sciatica who presented to [facility] on  as a direct transfer from [facility] for further evaluation of right carotid occlusion requiring diagnostic angiogram.  She presented to [facility] initially on  with progressively worsening left sided weakness, such that she slid off bed 2 days prior to admission. Her imaging showed as stroke with MRI showing scattered small acute infarcts involving the superior aspects of the right frontal lobe and right parietal lobe as well as the right caudate but was outside window for tPA.  Patient underwent a cerebral angiogram on  and no intervention provided. Patient was started on DAPT and statin and recommended for outpatient monitoring. She remained stable during her hospital stay and was evaluated for admission to acute inpatient rehab. She was admitted to [facility] on 23. (2023 13:37)    Patient seen and examined at bedside, stable, NAD. denies headache, fever, chills, cp, sob, n/v, abd pain.    PAST MEDICAL & SURGICAL HISTORY:  Prediabetes, now Type 2 DM  Hypertension  Osteoarthritis  Spider veins  Obesity, Class II, BMI 35-39.9  Shingles midback area, 20-20  History of appendectomy with right oophorecomy 2010  History of hip surgery pinning of left hip age 12 and hardware removed age 17    Family History:   Father:  at 77 - ?GIB, transfusion reaction  Mother:  age 86 - PMH T2DM    Social History:   Denies tobacco, EtOH, or illicit drug use. PTA independent in ADLs, IADLs, and ambulation. Lives in private home. Babysits.    Allergies  No Known Allergies  Intolerances  influenza   Vaccine 0.5 milliLiter(s) IntraMuscular once      REVIEW OF SYSTEMS:  CONSTITUTIONAL: +weakness  EYES: No eye pain, visual disturbances, or discharge  ENMT:  No difficulty hearing, tinnitus, vertigo; No sinus or throat pain  NECK: No pain or stiffness  RESPIRATORY: No cough, wheezing, chills or hemoptysis; No shortness of breath  CARDIOVASCULAR: No chest pain, palpitations, dizziness, or leg swelling  GASTROINTESTINAL: No abdominal or epigastric pain. No nausea, vomiting, or hematemesis; No diarrhea or constipation. No melena or hematochezia.  GENITOURINARY: No dysuria, frequency, hematuria, or incontinence  NEUROLOGICAL: No headaches, memory loss, loss of strength, numbness, or tremors  SKIN: No itching, burning, rashes, or lesions   LYMPH NODES: No enlarged glands  ENDOCRINE: No heat or cold intolerance; No hair loss  MUSCULOSKELETAL: No joint pain or swelling; No muscle, back, or extremity pain  PSYCHIATRIC: No depression, anxiety, mood swings, or difficulty sleeping  HEME/LYMPH: No easy bruising, or bleeding gums  ALLERGY AND IMMUNOLOGIC: No hives or eczema    ALL ROS REVIEWED AND NORMAL EXCEPT AS STATED ABOVE    T(C): 36.4 (23 @ 07:58), Max: 37.1 (23 @ 18:12)  HR: 65 (23 @ 07:58) (65 - 78)  BP: 139/84 (23 @ 07:58) (116/76 - 144/73)  RR: 16 (23 @ 07:58) (16 - 18)  SpO2: 97% (23 @ 07:58) (96% - 98%)  Wt(kg): --Vital Signs Last 24 Hrs  T(C): 36.4 (2023 07:58), Max: 37.1 (2023 18:12)  T(F): 97.6 (2023 07:58), Max: 98.7 (2023 18:12)  HR: 65 (:58) (65 - 78)  BP: 139/84 (2023 07:58) (116/76 - 144/73)  BP(mean): --  RR: 16 (2023 07:58) (16 - 18)  SpO2: 97% (2023 07:58) (96% - 98%)    Parameters below as of :58  Patient On (Oxygen Delivery Method): room air        PHYSICAL EXAM:  GENERAL: NAD, well-groomed, well-developed  HEAD:  Atraumatic, Normocephalic  EYES: EOMI, PERRLA, conjunctiva and sclera clear  ENMT: No tonsillar erythema, exudates, or enlargement; Moist mucous membranes, Good dentition, No lesions  NECK: Supple, No JVD, Normal thyroid  NERVOUS SYSTEM:  Alert & Oriented x3, Good concentration; Motor Strength 4/5 B/L upper and lower extremities  CHEST/LUNG: Clear to percussion bilaterally; No rales, rhonchi, wheezing, or rubs  HEART: Regular rate and rhythm; No murmurs, rubs, or gallops  ABDOMEN: Soft, Nontender, Nondistended; Bowel sounds present  EXTREMITIES:  2+ Peripheral Pulses, No clubbing, cyanosis, or edema  LYMPH: No lymphadenopathy noted  SKIN: No rashes or lesions    LABS:                        13.4   7.84  )-----------( 215      ( 2023 05:56 )             39.2         137  |  100  |  18  ----------------------------<  165<H>  3.8   |  26  |  0.79    Ca    9.6      2023 05:56    TPro  7.7  /  Alb  3.5  /  TBili  0.7  /  DBili  x   /  AST  22  /  ALT  34  /  AlkPhos  54        Urinalysis Basic - ( 2023 05:56 )    Color: x / Appearance: x / SG: x / pH: x  Gluc: 165 mg/dL / Ketone: x  / Bili: x / Urobili: x   Blood: x / Protein: x / Nitrite: x   Leuk Esterase: x / RBC: x / WBC x   Sq Epi: x / Non Sq Epi: x / Bacteria: x       CAPILLARY BLOOD GLUCOSE      POCT Blood Glucose.: 167 mg/dL (2023 07:55)  POCT Blood Glucose.: 147 mg/dL (2023 21:48)  POCT Blood Glucose.: 173 mg/dL (2023 16:09)  POCT Blood Glucose.: 203 mg/dL (2023 11:35)        Urinalysis Basic - ( 2023 05:56 )    Color: x / Appearance: x / SG: x / pH: x  Gluc: 165 mg/dL / Ketone: x  / Bili: x / Urobili: x   Blood: x / Protein: x / Nitrite: x   Leuk Esterase: x / RBC: x / WBC x   Sq Epi: x / Non Sq Epi: x / Bacteria: x        RADIOLOGY & ADDITIONAL TESTS:    CTA  - Skull base and supraclinoid right ICA is very small in caliber, particularly at the level of the proximal cavernous segment. The supraclinoid right ICA is somewhat small in caliber.Small anterior communicating artery visualized. Large bilateral A1 segments. No vascular aneurysm or AVM.    CTA neck  - Right internal carotid artery origin and proximal segment is essentially including, which appears chronic in nature. More distal right ICA is small in caliber and demonstrates multiple short segment stenoses. Calcified plaque at the origin of the left internal carotid artery results in approximately 30-35% short segment stenosis. Normal flow-related enhancement of the more distal vessel.    CT Head  - Parenchymal volume loss is identified. Abnormal low attenuation seen involving the high right frontal cortical subcortical region. This likely compatible with an area of old infarct.Noacute hemorrhage mass or mass effect is seenEvaluation of the osseous structures appropriate window appears unremarkableThe visualized paranasal sinuses mastoid and middle ear regions appear clear. IMPRESSION: Abnormal areas of low attenuation involving the high right   frontal cortical and subcortical region as described above.    MR Head  - Scattered small acute infarcts involving the superior aspects of the right frontal lobe and right parietal lobe as well as the right caudate.    Consultant(s) Notes Reviewed:  [x] YES  [ ] NO  Care Discussed with Consultants/Other Providers [x] YES  [ ] NO  Imaging Personally Reviewed:  [x] YES  [ ] NO

## 2023-11-29 NOTE — PROGRESS NOTE ADULT - SUBJECTIVE AND OBJECTIVE BOX
SUBJECTIVE/ROS: Patient evaluated while in the chair. She states she did not sleep so well last night but attributes this to new environment. She also notes feeling anxiety and concerns regarding adjustment to her diagnosis. She denies chest pain, fever, chills, nausea, vomiting, abdominal pain, headache, or BLE pain.     HPI:  58 year old female with PMH of vertigo, HTN, OA, Type 2 DM, shingles and sciatica who presented to [facility] on 11/23  as a direct transfer from [facility] for further evaluation of right carotid occlusion requiring diagnostic angiogram.  She presented to [facility] initially on 11/21 with progressively worsening left sided weakness, such that she slid off bed 2 days prior to admission. Her imaging showed as stroke with MRI showing scattered small acute infarcts involving the superior aspects of the right frontal lobe and right parietal lobe as well as the right caudate but was outside window for tPA.  Patient underwent a cerebral angiogram on 11/24 and no intervention provided. Patient was started on DAPT and statin and recommended for outpatient monitoring. She remained stable during her hospital stay and was evaluated for admission to acute inpatient rehab. She was admitted to [facility] on 11/28/23.       Vital Signs Last 24 Hrs  T(C): 36.4 (29 Nov 2023 07:58), Max: 37.1 (28 Nov 2023 18:12)  T(F): 97.6 (29 Nov 2023 07:58), Max: 98.7 (28 Nov 2023 18:12)  HR: 65 (29 Nov 2023 07:58) (65 - 78)  BP: 139/84 (29 Nov 2023 07:58) (116/76 - 139/84)  BP(mean): --  RR: 16 (29 Nov 2023 07:58) (16 - 18)  SpO2: 97% (29 Nov 2023 07:58) (96% - 98%)    Parameters below as of 29 Nov 2023 07:58  Patient On (Oxygen Delivery Method): room air          PHYSICAL EXAM  Constitutional - NAD, Comfortable  HEENT - NCAT, EOMI  Neck - Supple, No limited ROM  Chest - CTA bilaterally  Cardiovascular - RRR, S1S2  Abdomen -BS+, Soft, NTND  Extremities - No C/C/E, No calf tenderness   Neurologic Exam - aox3, RU/RL 5/5, JORGE/JORGE 4/5 with dystmetria,    RECENT LABS:                          13.4   7.84  )-----------( 215      ( 29 Nov 2023 05:56 )             39.2     11-29    137  |  100  |  18  ----------------------------<  165<H>  3.8   |  26  |  0.79    Ca    9.6      29 Nov 2023 05:56    TPro  7.7  /  Alb  3.5  /  TBili  0.7  /  DBili  x   /  AST  22  /  ALT  34  /  AlkPhos  54  11-29    LIVER FUNCTIONS - ( 29 Nov 2023 05:56 )  Alb: 3.5 g/dL / Pro: 7.7 g/dL / ALK PHOS: 54 U/L / ALT: 34 U/L / AST: 22 U/L / GGT: x               CAPILLARY BLOOD GLUCOSE      POCT Blood Glucose.: 194 mg/dL (29 Nov 2023 11:26)  POCT Blood Glucose.: 167 mg/dL (29 Nov 2023 07:55)  POCT Blood Glucose.: 147 mg/dL (28 Nov 2023 21:48)  POCT Blood Glucose.: 173 mg/dL (28 Nov 2023 16:09)      MEDICATIONS  (STANDING):  aspirin  chewable 81 milliGRAM(s) Oral daily  atorvastatin 40 milliGRAM(s) Oral at bedtime  clopidogrel Tablet 75 milliGRAM(s) Oral daily  dextrose 5%. 1000 milliLiter(s) (50 mL/Hr) IV Continuous <Continuous>  dextrose 5%. 1000 milliLiter(s) (100 mL/Hr) IV Continuous <Continuous>  dextrose 50% Injectable 12.5 Gram(s) IV Push once  dextrose 50% Injectable 25 Gram(s) IV Push once  dextrose 50% Injectable 25 Gram(s) IV Push once  enoxaparin Injectable 40 milliGRAM(s) SubCutaneous every 24 hours  escitalopram 5 milliGRAM(s) Oral daily  glucagon  Injectable 1 milliGRAM(s) IntraMuscular once  hydrochlorothiazide 25 milliGRAM(s) Oral daily  influenza   Vaccine 0.5 milliLiter(s) IntraMuscular once  insulin lispro (ADMELOG) corrective regimen sliding scale   SubCutaneous at bedtime  insulin lispro (ADMELOG) corrective regimen sliding scale   SubCutaneous three times a day before meals  losartan 100 milliGRAM(s) Oral daily  metFORMIN 1000 milliGRAM(s) Oral two times a day  pantoprazole    Tablet 40 milliGRAM(s) Oral before breakfast  senna 2 Tablet(s) Oral at bedtime    MEDICATIONS  (PRN):  acetaminophen     Tablet .. 650 milliGRAM(s) Oral every 6 hours PRN Mild Pain (1 - 3)  dextrose Oral Gel 15 Gram(s) Oral once PRN Blood Glucose LESS THAN 70 milliGRAM(s)/deciliter  gabapentin 100 milliGRAM(s) Oral at bedtime PRN pain  melatonin 3 milliGRAM(s) Oral at bedtime PRN Insomnia  polyethylene glycol 3350 17 Gram(s) Oral at bedtime PRN for constipation

## 2023-11-29 NOTE — PROGRESS NOTE ADULT - ASSESSMENT
ASSESSMENT/PLAN  58 year old female with PMH of vertigo, HTN, OA, Type 2 DM, shingles and sciatica who presented to [facility] on 11/23  as a direct transfer from [facility] for further evaluation of right carotid occlusion requiring diagnostic angiogram.  She presented to [facility] initially on 11/21 with progressively worsening left sided weakness, such that she slid off bed 2 days prior to admission. Her imaging showed as stroke with MRI showing scattered small acute infarcts involving the superior aspects of the right frontal lobe and right parietal lobe as well as the right caudate but was outside window for tPA.  Patient underwent a cerebral angiogram on 11/24 and no intervention provided. Patient was started on DAPT and statin and recommended for outpatient monitoring. She remained stable during her hospital stay and was evaluated for admission to acute inpatient rehab. She was admitted to [facility] on 11/28/23.       #Right frontal/parietal/caudate infarcts with right ICA stenosis now with left sided weakness, Gait Instability, ADL impairments and Functional impairments  - Continue Comprehensive Rehab Program of PT/OT/SLP  -Continue Aspirin 81mg  -Continue Plavix 75mg   -Continue Atorvastatin 40mg daily   -Did not have hypercoagulable workup for stroke etiology (unclear if embolic vs. large vessel atherosclerosis)- heme/onc consulted 11/29    #Sleep/mood  -Start lexapro 5mg daily 11/29  -Start melatonin 3mg PRN at bedtime  -Neuropsych consult  -Rec consult     #HTN  -Continue HCTZ 25mg  -Continue Losartan 100mg daily   -TTE with EF of 60-65%    #Type 2 DM  -a1c is 7/7  -FS and ISS  -Continue Metformin 1000mg BID    #Pain control  - Tylenol PRN  -Home med: Gabapentin 100mg at bedtime PRN    #GI/Bowel Mgmt   - Continue Senna at bedtime   - Miralax     #Bladder management  - Continue to monitor PVR q 8 hours (SC if > 400)  -Monitor UO    #DVT  - Lovenox  - TEDs     #Skin:  - intergluteal cleft fissure    FEN   - Diet - Regular + Thins  [CCHO, DASH/TLC]    - Dysphagia  SLP - evaluation and treatment    Precautions / PROPHYLAXIS:   - Falls, Cardiac  - ortho: Weight bearing status: WBAT   - Lungs: Aspiration, Incentive Spirometer   - Pressure injury/Skin: Turn Q2hrs while in bed, OOB to Chair, PT/OT

## 2023-11-30 DIAGNOSIS — I10 ESSENTIAL (PRIMARY) HYPERTENSION: ICD-10-CM

## 2023-11-30 DIAGNOSIS — G81.94 HEMIPLEGIA, UNSPECIFIED AFFECTING LEFT NONDOMINANT SIDE: ICD-10-CM

## 2023-11-30 DIAGNOSIS — E11.9 TYPE 2 DIABETES MELLITUS WITHOUT COMPLICATIONS: ICD-10-CM

## 2023-11-30 DIAGNOSIS — Z79.899 OTHER LONG TERM (CURRENT) DRUG THERAPY: ICD-10-CM

## 2023-11-30 DIAGNOSIS — I65.21 OCCLUSION AND STENOSIS OF RIGHT CAROTID ARTERY: ICD-10-CM

## 2023-11-30 DIAGNOSIS — I63.9 CEREBRAL INFARCTION, UNSPECIFIED: ICD-10-CM

## 2023-11-30 DIAGNOSIS — E83.42 HYPOMAGNESEMIA: ICD-10-CM

## 2023-11-30 DIAGNOSIS — R29.702 NIHSS SCORE 2: ICD-10-CM

## 2023-11-30 DIAGNOSIS — I63.89 OTHER CEREBRAL INFARCTION: ICD-10-CM

## 2023-11-30 DIAGNOSIS — Z79.84 LONG TERM (CURRENT) USE OF ORAL HYPOGLYCEMIC DRUGS: ICD-10-CM

## 2023-11-30 DIAGNOSIS — M19.90 UNSPECIFIED OSTEOARTHRITIS, UNSPECIFIED SITE: ICD-10-CM

## 2023-11-30 LAB
ALBUMIN SERPL ELPH-MCNC: 3.4 G/DL — SIGNIFICANT CHANGE UP (ref 3.3–5)
ALBUMIN SERPL ELPH-MCNC: 3.4 G/DL — SIGNIFICANT CHANGE UP (ref 3.3–5)
ALP SERPL-CCNC: 54 U/L — SIGNIFICANT CHANGE UP (ref 40–120)
ALP SERPL-CCNC: 54 U/L — SIGNIFICANT CHANGE UP (ref 40–120)
ALT FLD-CCNC: 32 U/L — SIGNIFICANT CHANGE UP (ref 10–45)
ALT FLD-CCNC: 32 U/L — SIGNIFICANT CHANGE UP (ref 10–45)
ANION GAP SERPL CALC-SCNC: 9 MMOL/L — SIGNIFICANT CHANGE UP (ref 5–17)
ANION GAP SERPL CALC-SCNC: 9 MMOL/L — SIGNIFICANT CHANGE UP (ref 5–17)
AST SERPL-CCNC: 19 U/L — SIGNIFICANT CHANGE UP (ref 10–40)
AST SERPL-CCNC: 19 U/L — SIGNIFICANT CHANGE UP (ref 10–40)
BASOPHILS # BLD AUTO: 0.05 K/UL — SIGNIFICANT CHANGE UP (ref 0–0.2)
BASOPHILS # BLD AUTO: 0.05 K/UL — SIGNIFICANT CHANGE UP (ref 0–0.2)
BASOPHILS NFR BLD AUTO: 0.5 % — SIGNIFICANT CHANGE UP (ref 0–2)
BASOPHILS NFR BLD AUTO: 0.5 % — SIGNIFICANT CHANGE UP (ref 0–2)
BILIRUB SERPL-MCNC: 0.6 MG/DL — SIGNIFICANT CHANGE UP (ref 0.2–1.2)
BILIRUB SERPL-MCNC: 0.6 MG/DL — SIGNIFICANT CHANGE UP (ref 0.2–1.2)
BUN SERPL-MCNC: 23 MG/DL — SIGNIFICANT CHANGE UP (ref 7–23)
BUN SERPL-MCNC: 23 MG/DL — SIGNIFICANT CHANGE UP (ref 7–23)
CALCIUM SERPL-MCNC: 9.4 MG/DL — SIGNIFICANT CHANGE UP (ref 8.4–10.5)
CALCIUM SERPL-MCNC: 9.4 MG/DL — SIGNIFICANT CHANGE UP (ref 8.4–10.5)
CHLORIDE SERPL-SCNC: 100 MMOL/L — SIGNIFICANT CHANGE UP (ref 96–108)
CHLORIDE SERPL-SCNC: 100 MMOL/L — SIGNIFICANT CHANGE UP (ref 96–108)
CO2 SERPL-SCNC: 27 MMOL/L — SIGNIFICANT CHANGE UP (ref 22–31)
CO2 SERPL-SCNC: 27 MMOL/L — SIGNIFICANT CHANGE UP (ref 22–31)
CREAT SERPL-MCNC: 1.03 MG/DL — SIGNIFICANT CHANGE UP (ref 0.5–1.3)
CREAT SERPL-MCNC: 1.03 MG/DL — SIGNIFICANT CHANGE UP (ref 0.5–1.3)
EGFR: 63 ML/MIN/1.73M2 — SIGNIFICANT CHANGE UP
EGFR: 63 ML/MIN/1.73M2 — SIGNIFICANT CHANGE UP
EOSINOPHIL # BLD AUTO: 0.2 K/UL — SIGNIFICANT CHANGE UP (ref 0–0.5)
EOSINOPHIL # BLD AUTO: 0.2 K/UL — SIGNIFICANT CHANGE UP (ref 0–0.5)
EOSINOPHIL NFR BLD AUTO: 2.2 % — SIGNIFICANT CHANGE UP (ref 0–6)
EOSINOPHIL NFR BLD AUTO: 2.2 % — SIGNIFICANT CHANGE UP (ref 0–6)
GLUCOSE BLDC GLUCOMTR-MCNC: 154 MG/DL — HIGH (ref 70–99)
GLUCOSE BLDC GLUCOMTR-MCNC: 154 MG/DL — HIGH (ref 70–99)
GLUCOSE BLDC GLUCOMTR-MCNC: 164 MG/DL — HIGH (ref 70–99)
GLUCOSE BLDC GLUCOMTR-MCNC: 164 MG/DL — HIGH (ref 70–99)
GLUCOSE BLDC GLUCOMTR-MCNC: 165 MG/DL — HIGH (ref 70–99)
GLUCOSE BLDC GLUCOMTR-MCNC: 165 MG/DL — HIGH (ref 70–99)
GLUCOSE BLDC GLUCOMTR-MCNC: 235 MG/DL — HIGH (ref 70–99)
GLUCOSE BLDC GLUCOMTR-MCNC: 235 MG/DL — HIGH (ref 70–99)
GLUCOSE SERPL-MCNC: 177 MG/DL — HIGH (ref 70–99)
GLUCOSE SERPL-MCNC: 177 MG/DL — HIGH (ref 70–99)
HCT VFR BLD CALC: 38.7 % — SIGNIFICANT CHANGE UP (ref 34.5–45)
HCT VFR BLD CALC: 38.7 % — SIGNIFICANT CHANGE UP (ref 34.5–45)
HGB BLD-MCNC: 13 G/DL — SIGNIFICANT CHANGE UP (ref 11.5–15.5)
HGB BLD-MCNC: 13 G/DL — SIGNIFICANT CHANGE UP (ref 11.5–15.5)
IMM GRANULOCYTES NFR BLD AUTO: 0.2 % — SIGNIFICANT CHANGE UP (ref 0–0.9)
IMM GRANULOCYTES NFR BLD AUTO: 0.2 % — SIGNIFICANT CHANGE UP (ref 0–0.9)
LYMPHOCYTES # BLD AUTO: 2.56 K/UL — SIGNIFICANT CHANGE UP (ref 1–3.3)
LYMPHOCYTES # BLD AUTO: 2.56 K/UL — SIGNIFICANT CHANGE UP (ref 1–3.3)
LYMPHOCYTES # BLD AUTO: 28.1 % — SIGNIFICANT CHANGE UP (ref 13–44)
LYMPHOCYTES # BLD AUTO: 28.1 % — SIGNIFICANT CHANGE UP (ref 13–44)
MCHC RBC-ENTMCNC: 29.4 PG — SIGNIFICANT CHANGE UP (ref 27–34)
MCHC RBC-ENTMCNC: 29.4 PG — SIGNIFICANT CHANGE UP (ref 27–34)
MCHC RBC-ENTMCNC: 33.6 GM/DL — SIGNIFICANT CHANGE UP (ref 32–36)
MCHC RBC-ENTMCNC: 33.6 GM/DL — SIGNIFICANT CHANGE UP (ref 32–36)
MCV RBC AUTO: 87.6 FL — SIGNIFICANT CHANGE UP (ref 80–100)
MCV RBC AUTO: 87.6 FL — SIGNIFICANT CHANGE UP (ref 80–100)
MONOCYTES # BLD AUTO: 0.78 K/UL — SIGNIFICANT CHANGE UP (ref 0–0.9)
MONOCYTES # BLD AUTO: 0.78 K/UL — SIGNIFICANT CHANGE UP (ref 0–0.9)
MONOCYTES NFR BLD AUTO: 8.6 % — SIGNIFICANT CHANGE UP (ref 2–14)
MONOCYTES NFR BLD AUTO: 8.6 % — SIGNIFICANT CHANGE UP (ref 2–14)
NEUTROPHILS # BLD AUTO: 5.5 K/UL — SIGNIFICANT CHANGE UP (ref 1.8–7.4)
NEUTROPHILS # BLD AUTO: 5.5 K/UL — SIGNIFICANT CHANGE UP (ref 1.8–7.4)
NEUTROPHILS NFR BLD AUTO: 60.4 % — SIGNIFICANT CHANGE UP (ref 43–77)
NEUTROPHILS NFR BLD AUTO: 60.4 % — SIGNIFICANT CHANGE UP (ref 43–77)
NRBC # BLD: 0 /100 WBCS — SIGNIFICANT CHANGE UP (ref 0–0)
NRBC # BLD: 0 /100 WBCS — SIGNIFICANT CHANGE UP (ref 0–0)
PLATELET # BLD AUTO: 243 K/UL — SIGNIFICANT CHANGE UP (ref 150–400)
PLATELET # BLD AUTO: 243 K/UL — SIGNIFICANT CHANGE UP (ref 150–400)
POTASSIUM SERPL-MCNC: 3.6 MMOL/L — SIGNIFICANT CHANGE UP (ref 3.5–5.3)
POTASSIUM SERPL-MCNC: 3.6 MMOL/L — SIGNIFICANT CHANGE UP (ref 3.5–5.3)
POTASSIUM SERPL-SCNC: 3.6 MMOL/L — SIGNIFICANT CHANGE UP (ref 3.5–5.3)
POTASSIUM SERPL-SCNC: 3.6 MMOL/L — SIGNIFICANT CHANGE UP (ref 3.5–5.3)
PROT SERPL-MCNC: 7.4 G/DL — SIGNIFICANT CHANGE UP (ref 6–8.3)
PROT SERPL-MCNC: 7.4 G/DL — SIGNIFICANT CHANGE UP (ref 6–8.3)
RBC # BLD: 4.42 M/UL — SIGNIFICANT CHANGE UP (ref 3.8–5.2)
RBC # BLD: 4.42 M/UL — SIGNIFICANT CHANGE UP (ref 3.8–5.2)
RBC # FLD: 13 % — SIGNIFICANT CHANGE UP (ref 10.3–14.5)
RBC # FLD: 13 % — SIGNIFICANT CHANGE UP (ref 10.3–14.5)
SODIUM SERPL-SCNC: 136 MMOL/L — SIGNIFICANT CHANGE UP (ref 135–145)
SODIUM SERPL-SCNC: 136 MMOL/L — SIGNIFICANT CHANGE UP (ref 135–145)
WBC # BLD: 9.11 K/UL — SIGNIFICANT CHANGE UP (ref 3.8–10.5)
WBC # BLD: 9.11 K/UL — SIGNIFICANT CHANGE UP (ref 3.8–10.5)
WBC # FLD AUTO: 9.11 K/UL — SIGNIFICANT CHANGE UP (ref 3.8–10.5)
WBC # FLD AUTO: 9.11 K/UL — SIGNIFICANT CHANGE UP (ref 3.8–10.5)

## 2023-11-30 PROCEDURE — 99232 SBSQ HOSP IP/OBS MODERATE 35: CPT

## 2023-11-30 RX ADMIN — METFORMIN HYDROCHLORIDE 1000 MILLIGRAM(S): 850 TABLET ORAL at 17:06

## 2023-11-30 RX ADMIN — Medication 4: at 12:00

## 2023-11-30 RX ADMIN — METFORMIN HYDROCHLORIDE 1000 MILLIGRAM(S): 850 TABLET ORAL at 05:38

## 2023-11-30 RX ADMIN — LOSARTAN POTASSIUM 100 MILLIGRAM(S): 100 TABLET, FILM COATED ORAL at 05:38

## 2023-11-30 RX ADMIN — PANTOPRAZOLE SODIUM 40 MILLIGRAM(S): 20 TABLET, DELAYED RELEASE ORAL at 05:38

## 2023-11-30 RX ADMIN — ENOXAPARIN SODIUM 40 MILLIGRAM(S): 100 INJECTION SUBCUTANEOUS at 05:38

## 2023-11-30 RX ADMIN — Medication 2: at 17:06

## 2023-11-30 RX ADMIN — ATORVASTATIN CALCIUM 40 MILLIGRAM(S): 80 TABLET, FILM COATED ORAL at 23:04

## 2023-11-30 RX ADMIN — Medication 2: at 08:16

## 2023-11-30 RX ADMIN — ESCITALOPRAM OXALATE 5 MILLIGRAM(S): 10 TABLET, FILM COATED ORAL at 12:00

## 2023-11-30 RX ADMIN — CLOPIDOGREL BISULFATE 75 MILLIGRAM(S): 75 TABLET, FILM COATED ORAL at 12:00

## 2023-11-30 RX ADMIN — Medication 81 MILLIGRAM(S): at 12:00

## 2023-11-30 NOTE — DIETITIAN INITIAL EVALUATION ADULT - OTHER INFO
Reason for Admission: CVA  History of Present Illness:   58 year old female with PMH of vertigo, HTN, OA, Type 2 DM, shingles and sciatica who presented to [facility] on 11/23  as a direct transfer from [facility] for further evaluation of right carotid occlusion requiring diagnostic angiogram.  She presented to [facility] initially on 11/21 with progressively worsening left sided weakness, such that she slid off bed 2 days prior to admission. Her imaging showed as stroke with MRI showing scattered small acute infarcts involving the superior aspects of the right frontal lobe and right parietal lobe as well as the right caudate but was outside window for tPA.  Patient underwent a cerebral angiogram on 11/24 and no intervention provided. Patient was started on DAPT and statin and recommended for outpatient monitoring. She remained stable during her hospital stay and was evaluated for admission to acute inpatient rehab. She was admitted to [facility] on 11/28/23.     At this time patient tolerating diet w/ adequate appetite/intake consuming % of meals. Observed during lunch patient reports reduced appetite for lunch 2/2 feeling anxious otherwise reports good PO intake. Reviewed preferences and menu alternatives noted in Kardex. No issues w/ dentition or chewing and swallowing current diet texture. Denies N/V/C/D, last BM 11/28 Per nursing flowsheets. Patient w/ Hx HTN recommend Heart-Healthy DASH/TLC meal pattern. Reports weight stable. T2DM noted. A1C: 7.7% (11/22/23). POCT 156-194mg/dL in last 24 hours, insulin regimen reviewed. Addressed with [facility] diet w/ evening snack. Diet education provided on [facility] diet. Provided education on Provided Heart Healthy Consistent Carbohydrate nutrition education was well received as validated by teach back.    Reason for Admission: CVA  History of Present Illness:   58 year old female with PMH of vertigo, HTN, OA, Type 2 DM, shingles and sciatica who presented to [facility] on 11/23  as a direct transfer from [facility] for further evaluation of right carotid occlusion requiring diagnostic angiogram.  She presented to [facility] initially on 11/21 with progressively worsening left sided weakness, such that she slid off bed 2 days prior to admission. Her imaging showed as stroke with MRI showing scattered small acute infarcts involving the superior aspects of the right frontal lobe and right parietal lobe as well as the right caudate but was outside window for tPA.  Patient underwent a cerebral angiogram on 11/24 and no intervention provided. Patient was started on DAPT and statin and recommended for outpatient monitoring. She remained stable during her hospital stay and was evaluated for admission to acute inpatient rehab. She was admitted to [facility] on 11/28/23.     At this time patient tolerating diet w/ adequate appetite/intake consuming % of meals. Observed during lunch patient reports reduced appetite for lunch 2/2 feeling anxious otherwise reports good PO intake. Reviewed preferences and menu alternatives noted in Kardex. No issues w/ dentition or chewing and swallowing current diet texture. Denies N/V/C/D, last BM 11/28 Per nursing flowsheets. Patient w/ Hx HTN recommend Heart-Healthy DASH/TLC meal pattern. Reports weight stable reports UBW 220lb (10/23) height 5'6". T2DM noted. A1C: 7.7% (11/22/23). POCT 156-194mg/dL in last 24 hours, insulin regimen reviewed. Addressed with [facility] diet w/ evening snack. [facility]. Provided education on Provided Heart Healthy Consistent Carbohydrate nutrition education was well received as validated by teach back.

## 2023-11-30 NOTE — CONSULT NOTE ADULT - SUBJECTIVE AND OBJECTIVE BOX
DONTAE DO,  58y Female  MRN: [id number]  ATTENDING: Dr. Joceline Phoenix      HPI:  58 year old female with PMH of vertigo, HTN, OA, Type 2 DM, shingles and sciatica who presented to [facility] on 11/23  as a direct transfer from [facility] for further evaluation of right carotid occlusion requiring diagnostic angiogram.  She presented to [facility] initially on 11/21 with progressively worsening left sided weakness, such that she slid off bed 2 days prior to admission. Her imaging showed as stroke with MRI showing scattered small acute infarcts involving the superior aspects of the right frontal lobe and right parietal lobe as well as the right caudate but was outside window for tPA.  Patient underwent a cerebral angiogram on 11/24 and no intervention provided. Patient was started on DAPT and statin and recommended for outpatient monitoring. She remained stable during her hospital stay and was evaluated for admission to acute inpatient rehab. She was admitted to [facility] on 11/28/23.    (28 Nov 2023 13:37)      PAST MEDICAL & SURGICAL HISTORY:  Prediabetes  now diabetes      Hypertension      Osteoarthritis      Spider veins      Obesity, Class II, BMI 35-39.9      [facility] area, 11/26/20-12/5/20      History of appendectomy  with right oophorecomy 2010      History of hip surgery  pinning of left hip age 12 and hardware removed age 17          MEDICATION:  aspirin  chewable 81 milliGRAM(s) Oral daily  atorvastatin 40 milliGRAM(s) Oral at bedtime  clopidogrel Tablet 75 milliGRAM(s) Oral daily  dextrose 5%. 1000 milliLiter(s) IV Continuous <Continuous>  dextrose 5%. 1000 milliLiter(s) IV Continuous <Continuous>  dextrose 50% Injectable 12.5 Gram(s) IV Push once  dextrose 50% Injectable 25 Gram(s) IV Push once  dextrose 50% Injectable 25 Gram(s) IV Push once  enoxaparin Injectable 40 milliGRAM(s) SubCutaneous every 24 hours  escitalopram 5 milliGRAM(s) Oral daily  glucagon  Injectable 1 milliGRAM(s) IntraMuscular once  hydrochlorothiazide 25 milliGRAM(s) Oral daily  influenza   Vaccine 0.5 milliLiter(s) IntraMuscular once  insulin lispro (ADMELOG) corrective regimen sliding scale   SubCutaneous at bedtime  insulin lispro (ADMELOG) corrective regimen sliding scale   SubCutaneous three times a day before meals  losartan 100 milliGRAM(s) Oral daily  metFORMIN 1000 milliGRAM(s) Oral two times a day  pantoprazole    Tablet 40 milliGRAM(s) Oral before breakfast  senna 2 Tablet(s) Oral at bedtime      ALLERGIES:  No Known Allergies      FAMILY HISTORY:  Reviewed, non-contributory: [ ]   Maternal-  Paternal-    SOCIAL HISTORY:  Tobacco: YES [ ]  ; NO [ ]; Former smoker [ ]  Alcohol:   YES [ ]  ; NO [ ]; Social alcohol user [ ]  Occupation/ marital status/ children:    REVIEW SYSTEMS:  Constitutional: no fever, chills, night sweats, no weight loss  HEENT: denies visual changes; no oral ulcers, dysphagia, no epistaxis;   Respiratory: no dyspnea , wheezing, cough, hemoptysis  Cardiovascular: denies acute chest pain, palpitations  GI: no loss of appetite, dark stools, or abdominal tenderness / pain; no change in bowel habits.  Musculoskeletal: no new back pain, bone/ joint pain ,no extremity swelling  Integumentary: denies pruritus; no skin rash, bruises, no  suspicious skin lesions  Neurologic: denies peripheral numbness, no dizziness, no gait problems.  Heme: no reported easy bruisability; no lymph node enlargement    VITALS:  T(C): 36.9, Max: 36.9 (11-30-23 @ 08:25)  T(F): 98.5, Max: 98.5 (11-30-23 @ 08:25)  HR: 92 (73 - 92)  BP: 124/88 (124/88 - 132/72)  SpO2: 100% (100% - 100%)    PHYSICAL EXAM:  Constitutional: alert, well developed  HEENT: normocephalic, anicteric sclerae, no mucositis or thrush  Respiratory: bilateral clear to auscultation anteriorly  Cardiovascular : S1, S2 regular, rhythmic, no murmurs, gallops or rubs  Abdomen: soft, distended, + normoactive BS, no palpable HS- megaly  Extremities: no tenderness;  -c/c/e, pulses equal bilaterally  Integumentary: no rashes, scars, or lesions suggestive of malignancy  Neurologic: no gross focal deficits    LABS:  (11-30) WBC: 9.11 K/uL,Hemoglobin: 13.0 g/dL, Hematocrit: 38.7 %,    Platelet: 243 K/uL    (11-30) Na: 136 mmol/L ; K: 3.6 mmol/L ; BUN: 23 mg/dL ; Cr: 1.03 mg/dL.          RADIOLOGY:               DONTAE DO,  58y Female  MRN: [id number]  ATTENDING: Dr. Joceline Phoenix      HPI:  58F, PMHx obesity, DM2, HTN, osteoarthritis, sciatica, s/p presentation with left upper extremity motor and sensory weakness on 11/23/2023.  Transfered from [place] to [facility] for angiogram.  MRI consistent with scattered small acute infarct involving superior aspect of the right frontal lobe and right parietal lobe as well as the right caudate but was outside window for tPA.  Cerebral angiogram on 11/24 reviewed; patient started on DAPT and statin and subsequently transfer at [facility] [place] for inpatient rehabilitation.  Hematology consult requested to rule out underlying hypercoagulable state.  Patient denies family history of hypercoagulability.  She has not been taking statins for hypercholesterolemia.    PAST MEDICAL & SURGICAL HISTORY:  Diabetes type II  Hypertension  Osteoarthritis  Spider veins  Obesity, Class II, BMI 35-39.9  Shingles-midback area, 11/26/20-12/5/20  History of appendectomy with right oophorecomy 2010  History of hip surgery-pinning of left hip age 12 and hardware removed age 17    MEDICATION:  aspirin  chewable 81 milliGRAM(s) Oral daily  atorvastatin 40 milliGRAM(s) Oral at bedtime  clopidogrel Tablet 75 milliGRAM(s) Oral daily  dextrose 5%. 1000 milliLiter(s) IV Continuous <Continuous>  dextrose 5%. 1000 milliLiter(s) IV Continuous <Continuous>  dextrose 50% Injectable 12.5 Gram(s) IV Push once  dextrose 50% Injectable 25 Gram(s) IV Push once  dextrose 50% Injectable 25 Gram(s) IV Push once  enoxaparin Injectable 40 milliGRAM(s) SubCutaneous every 24 hours  escitalopram 5 milliGRAM(s) Oral daily  glucagon  Injectable 1 milliGRAM(s) IntraMuscular once  hydrochlorothiazide 25 milliGRAM(s) Oral daily  influenza   Vaccine 0.5 milliLiter(s) IntraMuscular once  insulin lispro (ADMELOG) corrective regimen sliding scale   SubCutaneous at bedtime  insulin lispro (ADMELOG) corrective regimen sliding scale   SubCutaneous three times a day before meals  losartan 100 milliGRAM(s) Oral daily  metFORMIN 1000 milliGRAM(s) Oral two times a day  pantoprazole    Tablet 40 milliGRAM(s) Oral before breakfast  senna 2 Tablet(s) Oral at bedtime    ALLERGIES:  No Known Allergies    FAMILY HISTORY:  Reviewed, non-contributory: [X ]     SOCIAL HISTORY:  Tobacco: YES [ ]  ; NO [ X]; Former smoker [ ]  Alcohol:   YES [ ]  ; NO [ X]; Social alcohol user [ ]  Occupation/ marital status/ children:     REVIEW SYSTEMS:  Constitutional: no fever, chills, night sweats, no weight loss  HEENT: denies visual changes; no oral ulcers, dysphagia, no epistaxis;   Respiratory: no dyspnea , wheezing, cough, hemoptysis  Cardiovascular: denies acute chest pain, palpitations  GI: no loss of appetite, dark stools, or abdominal tenderness / pain; no change in bowel habits.  Musculoskeletal: no new back pain, bone/ joint pain ,no extremity swelling  Integumentary: denies pruritus; no skin rash, bruises, no  suspicious skin lesions  Neurologic: denies peripheral numbness, no dizziness, no gait problems.  Heme: no reported easy bruisability; no lymph node enlargement    VITALS:  T(C): 36.9, Max: 36.9 (11-30-23 @ 08:25)  T(F): 98.5, Max: 98.5 (11-30-23 @ 08:25)  HR: 92 (73 - 92)  BP: 124/88 (124/88 - 132/72)  SpO2: 100% (100% - 100%)    PHYSICAL EXAM:  Constitutional: alert, well developed  HEENT: normocephalic, anicteric sclerae, no mucositis or thrush  Respiratory: bilateral clear to auscultation anteriorly  Cardiovascular : S1, S2 regular, rhythmic, no murmurs, gallops or rubs  Abdomen: soft, distended, + normoactive BS, no palpable HS- megaly  Extremities: no tenderness;  -c/c/e, pulses equal bilaterally  Integumentary: no rashes, scars, or lesions suggestive of malignancy  Neurologic: no gross focal deficits    LABS:  (11-30) WBC: 9.11 K/uL,Hemoglobin: 13.0 g/dL, Hematocrit: 38.7 %,  Platelet: 243 K/uL  (11-30) Na: 136 mmol/L ; K: 3.6 mmol/L ; BUN: 23 mg/dL ; Cr: 1.03 mg/dL.    RADIOLOGY:  ACC: [id number] EXAM:  US DPLX CAROTIDS COMPL BI   ORDERED BY: AMANDA ARCE     PROCEDURE DATE:  11/21/2023          INTERPRETATION:  CLINICAL INFORMATION: Left-sided weakness, suspected   right-sided stroke.    COMPARISON: CTA neck 11/21/2023    TECHNIQUE: Grayscale, color and spectral Doppler examination of both   carotid arteries was performed.    FINDINGS:    Pre-obstructive pattern of flow at the right distal common carotid   artery. Plaque at the right carotid bulb with apparent string sign on   image 1.13, which may correspond to recent CTA findings, reflecting high   grade right ICA disease. Though, there is antegrade flow along   interrogated portions of the right ICA. Correlate with recent CTA   findings.    Plaque of the leftcarotid bulb extending into internal and external   branches without hemodynamically significant stenosis.    Peak systolic velocities are as follows:    RIGHT:  PROX CCA = 47 cm/s  MID CCA = 40 cm/s  DIST CCA = 41 cm/s  PROX ICA = 128 cm/s  MID ICA = 120 cm/s  DIST ICA = 132 cm/s  ECA = 130 cm/s    LEFT:  PROX CCA = 114 cm/s  MID CCA = 102 cm/s  DIST CCA = 69 cm/s  PROX ICA = 139 cm/s  MID ICA = 99 cm/s  DIST ICA = 60 cm/s  ECA = 156 cm/s    Antegrade flow is noted within both vertebral arteries. Elevated velocity   of the left vertebral artery of unclear etiology.    IMPRESSION:    RIGHT: Pre-obstructive pattern of flow at the right distal common carotid   artery. Plaque at the right carotid bulb with apparent string sign on   image 1.13, which may correspond to recent CTA findings, reflecting high   grade right ICA disease. Though, there is antegrade flow along   interrogated portions of the right ICA. Correlate with recent CTA   findings.    LEFT: Plaque of the left carotid bulb extending into internal and   external branches without hemodynamically significant stenosis.    Measurement of carotid stenosis is based on velocity parameters that   correlate the residual internal carotid diameter with that of the more   distal vessel in accordance with a method such as the [place] [employer]   Symptomatic Carotid Endarterectomy Trial (NASCET).

## 2023-11-30 NOTE — DIETITIAN INITIAL EVALUATION ADULT - NSICDXPASTMEDICALHX_GEN_ALL_CORE_FT
PAST MEDICAL HISTORY:  Hypertension     Obesity, Class II, BMI 35-39.9     Osteoarthritis     Prediabetes now diabetes    [facility], 11/26/20-12/5/20    Spider veins

## 2023-11-30 NOTE — PROGRESS NOTE ADULT - SUBJECTIVE AND OBJECTIVE BOX
SUBJECTIVE/ROS: Patient evaluated while in the chair. She is very anxious about her recovery and her current diagnosis. Support provided and reeducated on diagnosis discussed. She denies chest pain, fever, chills, nausea, vomiting, abdominal pain, headache, or BLE pain.     HPI:  58 year old female with PMH of vertigo, HTN, OA, Type 2 DM, shingles and sciatica who presented to [facility] on 11/23  as a direct transfer from [facility] for further evaluation of right carotid occlusion requiring diagnostic angiogram.  She presented to [facility] initially on 11/21 with progressively worsening left sided weakness, such that she slid off bed 2 days prior to admission. Her imaging showed as stroke with MRI showing scattered small acute infarcts involving the superior aspects of the right frontal lobe and right parietal lobe as well as the right caudate but was outside window for tPA.  Patient underwent a cerebral angiogram on 11/24 and no intervention provided. Patient was started on DAPT and statin and recommended for outpatient monitoring. She remained stable during her hospital stay and was evaluated for admission to acute inpatient rehab. She was admitted to [facility] on 11/28/23.       Vital Signs Last 24 Hrs  T(C): 36.9 (30 Nov 2023 08:25), Max: 36.9 (30 Nov 2023 08:25)  T(F): 98.5 (30 Nov 2023 08:25), Max: 98.5 (30 Nov 2023 08:25)  HR: 92 (30 Nov 2023 08:25) (73 - 92)  BP: 124/88 (30 Nov 2023 08:25) (124/88 - 132/72)  BP(mean): --  RR: 16 (30 Nov 2023 08:25) (16 - 16)  SpO2: 100% (30 Nov 2023 08:25) (100% - 100%)    Parameters below as of 30 Nov 2023 08:25  Patient On (Oxygen Delivery Method): room air            PHYSICAL EXAM  Constitutional - NAD, Comfortable  HEENT - NCAT, EOMI  Neck - Supple, No limited ROM  Chest - CTA bilaterally  Cardiovascular - RRR, S1S2  Abdomen -BS+, Soft, NTND  Extremities - No C/C/E, No calf tenderness   Neurologic Exam - aox3, RU/RL 5/5, JORGE/JORGE 4/5 with dystmetria,      LABS:                          13.0   9.11  )-----------( 243      ( 30 Nov 2023 07:13 )             38.7     11-30    136  |  100  |  23  ----------------------------<  177<H>  3.6   |  27  |  1.03    Ca    9.4      30 Nov 2023 07:13    TPro  7.4  /  Alb  3.4  /  TBili  0.6  /  DBili  x   /  AST  19  /  ALT  32  /  AlkPhos  54  11-30    LIVER FUNCTIONS - ( 30 Nov 2023 07:13 )  Alb: 3.4 g/dL / Pro: 7.4 g/dL / ALK PHOS: 54 U/L / ALT: 32 U/L / AST: 19 U/L / GGT: x           CAPILLARY BLOOD GLUCOSE      POCT Blood Glucose.: 235 mg/dL (30 Nov 2023 11:59)  POCT Blood Glucose.: 165 mg/dL (30 Nov 2023 08:15)  POCT Blood Glucose.: 156 mg/dL (29 Nov 2023 21:31)  POCT Blood Glucose.: 188 mg/dL (29 Nov 2023 16:40)        MEDICATIONS  (STANDING):  aspirin  chewable 81 milliGRAM(s) Oral daily  atorvastatin 40 milliGRAM(s) Oral at bedtime  clopidogrel Tablet 75 milliGRAM(s) Oral daily  dextrose 5%. 1000 milliLiter(s) (50 mL/Hr) IV Continuous <Continuous>  dextrose 5%. 1000 milliLiter(s) (100 mL/Hr) IV Continuous <Continuous>  dextrose 50% Injectable 12.5 Gram(s) IV Push once  dextrose 50% Injectable 25 Gram(s) IV Push once  dextrose 50% Injectable 25 Gram(s) IV Push once  enoxaparin Injectable 40 milliGRAM(s) SubCutaneous every 24 hours  escitalopram 5 milliGRAM(s) Oral daily  glucagon  Injectable 1 milliGRAM(s) IntraMuscular once  hydrochlorothiazide 25 milliGRAM(s) Oral daily  influenza   Vaccine 0.5 milliLiter(s) IntraMuscular once  insulin lispro (ADMELOG) corrective regimen sliding scale   SubCutaneous at bedtime  insulin lispro (ADMELOG) corrective regimen sliding scale   SubCutaneous three times a day before meals  losartan 100 milliGRAM(s) Oral daily  metFORMIN 1000 milliGRAM(s) Oral two times a day  pantoprazole    Tablet 40 milliGRAM(s) Oral before breakfast  senna 2 Tablet(s) Oral at bedtime    MEDICATIONS  (PRN):  acetaminophen     Tablet .. 650 milliGRAM(s) Oral every 6 hours PRN Mild Pain (1 - 3)  dextrose Oral Gel 15 Gram(s) Oral once PRN Blood Glucose LESS THAN 70 milliGRAM(s)/deciliter  gabapentin 100 milliGRAM(s) Oral at bedtime PRN pain  melatonin 3 milliGRAM(s) Oral at bedtime PRN Insomnia  polyethylene glycol 3350 17 Gram(s) Oral at bedtime PRN for constipation

## 2023-11-30 NOTE — DIETITIAN INITIAL EVALUATION ADULT - ORAL INTAKE PTA/DIET HISTORY
Pt endorses good appetite and PO intake PTA. NKFA nor food intolerances reported. usually avoids/limits carbohydrates during the week, will eat out restaurants on weekends and reports consuming meals with carbohydrates.

## 2023-11-30 NOTE — DIETITIAN INITIAL EVALUATION ADULT - ADD RECOMMEND
1. Continue Consistent Carbohydrate w/ evening snack, recommend Heart-Healthy DASH/TLC diet.   2. Monitor PO intake, GI tolerance, skin integrity, labs, weight, and bowel movement regularity.   3. Honor food preferences as feasible. Assist with meals PRN and encourage PO intake.  4. Provide ongoing diet education as needed  5. RD remains available upon request and will follow-up per protocol

## 2023-11-30 NOTE — DIETITIAN INITIAL EVALUATION ADULT - PERTINENT MEDS FT
MEDICATIONS  (STANDING):  aspirin  chewable 81 milliGRAM(s) Oral daily  atorvastatin 40 milliGRAM(s) Oral at bedtime  clopidogrel Tablet 75 milliGRAM(s) Oral daily  dextrose 5%. 1000 milliLiter(s) (50 mL/Hr) IV Continuous <Continuous>  dextrose 5%. 1000 milliLiter(s) (100 mL/Hr) IV Continuous <Continuous>  dextrose 50% Injectable 12.5 Gram(s) IV Push once  dextrose 50% Injectable 25 Gram(s) IV Push once  dextrose 50% Injectable 25 Gram(s) IV Push once  enoxaparin Injectable 40 milliGRAM(s) SubCutaneous every 24 hours  escitalopram 5 milliGRAM(s) Oral daily  glucagon  Injectable 1 milliGRAM(s) IntraMuscular once  hydrochlorothiazide 25 milliGRAM(s) Oral daily  influenza   Vaccine 0.5 milliLiter(s) IntraMuscular once  insulin lispro (ADMELOG) corrective regimen sliding scale   SubCutaneous at bedtime  insulin lispro (ADMELOG) corrective regimen sliding scale   SubCutaneous three times a day before meals  losartan 100 milliGRAM(s) Oral daily  metFORMIN 1000 milliGRAM(s) Oral two times a day  pantoprazole    Tablet 40 milliGRAM(s) Oral before breakfast  senna 2 Tablet(s) Oral at bedtime    MEDICATIONS  (PRN):  acetaminophen     Tablet .. 650 milliGRAM(s) Oral every 6 hours PRN Mild Pain (1 - 3)  dextrose Oral Gel 15 Gram(s) Oral once PRN Blood Glucose LESS THAN 70 milliGRAM(s)/deciliter  gabapentin 100 milliGRAM(s) Oral at bedtime PRN pain  melatonin 3 milliGRAM(s) Oral at bedtime PRN Insomnia  polyethylene glycol 3350 17 Gram(s) Oral at bedtime PRN for constipation

## 2023-11-30 NOTE — DIETITIAN INITIAL EVALUATION ADULT - PERTINENT LABORATORY DATA
2 11-30    136  |  100  |  23  ----------------------------<  177<H>  3.6   |  27  |  1.03    Ca    9.4      30 Nov 2023 07:13    TPro  7.4  /  Alb  3.4  /  TBili  0.6  /  DBili  x   /  AST  19  /  ALT  32  /  AlkPhos  54  11-30  POCT Blood Glucose.: 235 mg/dL (11-30-23 @ 11:59)  A1C with Estimated Average Glucose Result: 7.7 % (11-22-23 @ 07:24)  A1C with Estimated Average Glucose Result: 7.6 % (11-21-23 @ 15:15)

## 2023-11-30 NOTE — CONSULT NOTE ADULT - PROBLEM SELECTOR RECOMMENDATION 9
Patient with multiple cerebral infarcts right frontal, periatrial, caudate and right ICA stenosis with left-sided weakness causing functional impairments.  This is likely the picture of uncontrolled hyperlipidemia and HTN, with secondary arterial stenosis and predisposition for acute arterial events rather than an underlying hypercoagulable state which usually presents with acute venous thrombosis.  Advised patient to continue atorvastatin and follow-up cholesterol in ambulatory.  Does not require hypercoagulable workup given proximity of acute thrombotic event, but advised to follow-up in office for further evaluation.  Patient expressed her understanding.

## 2023-11-30 NOTE — PROGRESS NOTE ADULT - ASSESSMENT
ASSESSMENT/PLAN  58 year old female with PMH of vertigo, HTN, OA, Type 2 DM, shingles and sciatica who presented to [facility] on 11/23  as a direct transfer from [facility] for further evaluation of right carotid occlusion requiring diagnostic angiogram.  She presented to [facility] initially on 11/21 with progressively worsening left sided weakness, such that she slid off bed 2 days prior to admission. Her imaging showed as stroke with MRI showing scattered small acute infarcts involving the superior aspects of the right frontal lobe and right parietal lobe as well as the right caudate but was outside window for tPA.  Patient underwent a cerebral angiogram on 11/24 and no intervention provided. Patient was started on DAPT and statin and recommended for outpatient monitoring. She remained stable during her hospital stay and was evaluated for admission to acute inpatient rehab. She was admitted to [facility] on 11/28/23.       #Right frontal/parietal/caudate infarcts with right ICA stenosis now with left sided weakness, Gait Instability, ADL impairments and Functional impairments  - Continue Comprehensive Rehab Program of PT/OT/SLP  -Continue Aspirin 81mg  -Continue Plavix 75mg   -Continue Atorvastatin 40mg daily   -Did not have hypercoagulable workup for stroke etiology (unclear if embolic vs. large vessel atherosclerosis)- heme/onc consulted 11/29    #Sleep/mood  -Continue lexapro 5mg daily 11/29 - for anxiety and neurorecovery   -Continue melatonin 3mg PRN at bedtime  -Neuropsych consult  -Recreation therapy appreciated     #HTN  -Continue HCTZ 25mg  -Continue Losartan 100mg daily   -TTE with EF of 60-65%    #Type 2 DM  -a1c is 7/7  -FS and ISS  -Continue Metformin 1000mg BID    #Pain control  - Tylenol PRN  -Home med: Gabapentin 100mg at bedtime PRN    #GI/Bowel Mgmt   - Continue Senna at bedtime   - Miralax     #Bladder management  - Continue to monitor PVR q 8 hours (SC if > 400)  -Monitor UO    #DVT  - Lovenox  - TEDs     #Skin:  - intergluteal cleft fissure    FEN   - Diet - Regular + Thins  [CCHO, DASH/TLC]    - Dysphagia  SLP - evaluation and treatment    Precautions / PROPHYLAXIS:   - Falls, Cardiac  - ortho: Weight bearing status: WBAT   - Lungs: Aspiration, Incentive Spirometer   - Pressure injury/Skin: Turn Q2hrs while in bed, OOB to Chair, PT/OT

## 2023-12-01 LAB
GLUCOSE BLDC GLUCOMTR-MCNC: 127 MG/DL — HIGH (ref 70–99)
GLUCOSE BLDC GLUCOMTR-MCNC: 127 MG/DL — HIGH (ref 70–99)
GLUCOSE BLDC GLUCOMTR-MCNC: 131 MG/DL — HIGH (ref 70–99)
GLUCOSE BLDC GLUCOMTR-MCNC: 131 MG/DL — HIGH (ref 70–99)
GLUCOSE BLDC GLUCOMTR-MCNC: 178 MG/DL — HIGH (ref 70–99)
GLUCOSE BLDC GLUCOMTR-MCNC: 178 MG/DL — HIGH (ref 70–99)
GLUCOSE BLDC GLUCOMTR-MCNC: 186 MG/DL — HIGH (ref 70–99)
GLUCOSE BLDC GLUCOMTR-MCNC: 186 MG/DL — HIGH (ref 70–99)

## 2023-12-01 PROCEDURE — 99232 SBSQ HOSP IP/OBS MODERATE 35: CPT

## 2023-12-01 RX ADMIN — PANTOPRAZOLE SODIUM 40 MILLIGRAM(S): 20 TABLET, DELAYED RELEASE ORAL at 05:45

## 2023-12-01 RX ADMIN — METFORMIN HYDROCHLORIDE 1000 MILLIGRAM(S): 850 TABLET ORAL at 08:05

## 2023-12-01 RX ADMIN — ATORVASTATIN CALCIUM 40 MILLIGRAM(S): 80 TABLET, FILM COATED ORAL at 22:53

## 2023-12-01 RX ADMIN — Medication 81 MILLIGRAM(S): at 12:14

## 2023-12-01 RX ADMIN — Medication 2: at 08:07

## 2023-12-01 RX ADMIN — Medication 2: at 12:14

## 2023-12-01 RX ADMIN — ESCITALOPRAM OXALATE 5 MILLIGRAM(S): 10 TABLET, FILM COATED ORAL at 12:14

## 2023-12-01 RX ADMIN — ENOXAPARIN SODIUM 40 MILLIGRAM(S): 100 INJECTION SUBCUTANEOUS at 05:44

## 2023-12-01 RX ADMIN — CLOPIDOGREL BISULFATE 75 MILLIGRAM(S): 75 TABLET, FILM COATED ORAL at 12:14

## 2023-12-01 RX ADMIN — METFORMIN HYDROCHLORIDE 1000 MILLIGRAM(S): 850 TABLET ORAL at 17:16

## 2023-12-01 RX ADMIN — LOSARTAN POTASSIUM 100 MILLIGRAM(S): 100 TABLET, FILM COATED ORAL at 05:44

## 2023-12-01 NOTE — PROGRESS NOTE ADULT - SUBJECTIVE AND OBJECTIVE BOX
DONTAE DO, 58y Female  MRN: [id number]  ATTENDING: Joceline Phoenix    HPI:  58F, PMHx obesity, DM2, HTN, osteoarthritis, sciatica, s/p presentation with left upper extremity motor and sensory weakness on 11/23/2023.  Transfered from [place] to [facility] for angiogram.  MRI consistent with scattered small acute infarct involving superior aspect of the right frontal lobe and right parietal lobe as well as the right caudate but was outside window for tPA.  Cerebral angiogram on 11/24 reviewed; patient started on DAPT and statin and subsequently transfer at [facility] for inpatient rehabilitation.  Hematology consult requested to rule out underlying hypercoagulable state.  Patient denies family history of hypercoagulability.  She has not been taking statins for hypercholesterolemia.    MEDICATIONS:  acetaminophen     Tablet .. 650 milliGRAM(s) Oral every 6 hours PRN  aspirin  chewable 81 milliGRAM(s) Oral daily  atorvastatin 40 milliGRAM(s) Oral at bedtime  clopidogrel Tablet 75 milliGRAM(s) Oral daily  dextrose 5%. 1000 milliLiter(s) IV Continuous <Continuous>  dextrose 5%. 1000 milliLiter(s) IV Continuous <Continuous>  dextrose 50% Injectable 25 Gram(s) IV Push once  dextrose 50% Injectable 25 Gram(s) IV Push once  dextrose 50% Injectable 12.5 Gram(s) IV Push once  dextrose Oral Gel 15 Gram(s) Oral once PRN  enoxaparin Injectable 40 milliGRAM(s) SubCutaneous every 24 hours  escitalopram 5 milliGRAM(s) Oral daily  gabapentin 100 milliGRAM(s) Oral at bedtime PRN  glucagon  Injectable 1 milliGRAM(s) IntraMuscular once  hydrochlorothiazide 25 milliGRAM(s) Oral daily  influenza   Vaccine 0.5 milliLiter(s) IntraMuscular once  insulin lispro (ADMELOG) corrective regimen sliding scale   SubCutaneous three times a day before meals  insulin lispro (ADMELOG) corrective regimen sliding scale   SubCutaneous at bedtime  losartan 100 milliGRAM(s) Oral daily  melatonin 3 milliGRAM(s) Oral at bedtime PRN  metFORMIN 1000 milliGRAM(s) Oral two times a day  pantoprazole    Tablet 40 milliGRAM(s) Oral before breakfast  polyethylene glycol 3350 17 Gram(s) Oral at bedtime PRN  senna 2 Tablet(s) Oral at bedtime    All other medications reviewed.    SUBJECTIVE:    VITALS:  T(C): 36.7 (12-01-23 @ 08:11), Max: 37.3 (11-30-23 @ 19:30)  T(F): 98 (12-01-23 @ 08:11), Max: 99.2 (11-30-23 @ 19:30)  HR: 74 (12-01-23 @ 08:11) (74 - 86)  BP: 109/71 (12-01-23 @ 08:11) (109/71 - 134/63)      PHYSICAL EXAM:  Constitutional: alert, well developed  HEENT: normocephalic, anicteric sclerae, no mucositis or thrush  Respiratory: bilateral clear to auscultation anteriorly  Cardiovascular : S1, S2 regular, rhythmic, no murmurs, gallops or rubs  Abdomen: soft, distended, + normoactive BS, no palpable HS- megaly  Extremities: no tenderness;  -c/c/e, pulses equal bilaterally    LABS:  (11-30) WBC: 9.11 K/uL,Hemoglobin: 13.0 g/dL, Hematocrit: 38.7 %,    Platelet: 243 K/uL        RADIOLOGY:     DONTAE DO, 58y Female  MRN: [id number]  ATTENDING: Joceline Phoenix    HPI:  58F, PMHx obesity, DM2, HTN, osteoarthritis, sciatica, s/p presentation with left upper extremity motor and sensory weakness on 11/23/2023.  Transfered from [place] to [facility] for angiogram.  MRI consistent with scattered small acute infarct involving superior aspect of the right frontal lobe and right parietal lobe as well as the right caudate but was outside window for tPA.  Cerebral angiogram on 11/24 reviewed; patient started on DAPT and statin and subsequently transfer at [facility] for inpatient rehabilitation.  Hematology consult requested to rule out underlying hypercoagulable state.  Patient denies family history of hypercoagulability.  She has not been taking statins for hypercholesterolemia.    MEDICATIONS:  acetaminophen     Tablet .. 650 milliGRAM(s) Oral every 6 hours PRN  aspirin  chewable 81 milliGRAM(s) Oral daily  atorvastatin 40 milliGRAM(s) Oral at bedtime  clopidogrel Tablet 75 milliGRAM(s) Oral daily  dextrose 5%. 1000 milliLiter(s) IV Continuous <Continuous>  dextrose 5%. 1000 milliLiter(s) IV Continuous <Continuous>  dextrose 50% Injectable 25 Gram(s) IV Push once  dextrose 50% Injectable 25 Gram(s) IV Push once  dextrose 50% Injectable 12.5 Gram(s) IV Push once  dextrose Oral Gel 15 Gram(s) Oral once PRN  enoxaparin Injectable 40 milliGRAM(s) SubCutaneous every 24 hours  escitalopram 5 milliGRAM(s) Oral daily  gabapentin 100 milliGRAM(s) Oral at bedtime PRN  glucagon  Injectable 1 milliGRAM(s) IntraMuscular once  hydrochlorothiazide 25 milliGRAM(s) Oral daily  influenza   Vaccine 0.5 milliLiter(s) IntraMuscular once  insulin lispro (ADMELOG) corrective regimen sliding scale   SubCutaneous three times a day before meals  insulin lispro (ADMELOG) corrective regimen sliding scale   SubCutaneous at bedtime  losartan 100 milliGRAM(s) Oral daily  melatonin 3 milliGRAM(s) Oral at bedtime PRN  metFORMIN 1000 milliGRAM(s) Oral two times a day  pantoprazole    Tablet 40 milliGRAM(s) Oral before breakfast  polyethylene glycol 3350 17 Gram(s) Oral at bedtime PRN  senna 2 Tablet(s) Oral at bedtime    All other medications reviewed.    SUBJECTIVE:  Stable clinically; offers no new complaints.    VITALS:  T(C): 36.7 (12-01-23 @ 08:11), Max: 37.3 (11-30-23 @ 19:30)  T(F): 98 (12-01-23 @ 08:11), Max: 99.2 (11-30-23 @ 19:30)  HR: 74 (12-01-23 @ 08:11) (74 - 86)  BP: 109/71 (12-01-23 @ 08:11) (109/71 - 134/63)      PHYSICAL EXAM:  Constitutional: alert, well developed  HEENT: normocephalic, anicteric sclerae, no mucositis or thrush  Respiratory: bilateral clear to auscultation anteriorly  Cardiovascular : S1, S2 regular, rhythmic, no murmurs, gallops or rubs  Abdomen: soft, distended, + normoactive BS, no palpable HS- megaly  Extremities: no tenderness;  -c/c/e, pulses equal bilaterally    LABS:  (11-30) WBC: 9.11 K/uL,Hemoglobin: 13.0 g/dL, Hematocrit: 38.7 %,    Platelet: 243 K/uL        RADIOLOGY:

## 2023-12-01 NOTE — PROGRESS NOTE ADULT - ASSESSMENT
ASSESSMENT/PLAN  58 year old female with PMH of vertigo, HTN, OA, Type 2 DM, shingles and sciatica who presented to [facility] on 11/23  as a direct transfer from [facility] system for further evaluation of right carotid occlusion requiring diagnostic angiogram.  She presented to Health system initially on 11/21 with progressively worsening left sided weakness, such that she slid off bed 2 days prior to admission. Her imaging showed as stroke with MRI showing scattered small acute infarcts involving the superior aspects of the right frontal lobe and right parietal lobe as well as the right caudate but was outside window for tPA.  Patient underwent a cerebral angiogram on 11/24 and no intervention provided. Patient was started on DAPT and statin and recommended for outpatient monitoring. She remained stable during her hospital stay and was evaluated for admission to acute inpatient rehab. She was admitted to [facility] on 11/28/23.       #Right frontal/parietal/caudate infarcts with right ICA stenosis now with left sided weakness, Gait Instability, ADL impairments and Functional impairments  - Continue Comprehensive Rehab Program of PT/OT/SLP  -Most likely large vessel atherosclerosis with Right ICA near occlusion of vessel  -Continue Aspirin 81mg  -Continue Plavix 75mg   -Continue Atorvastatin 40mg daily   -Consider hypercoagulable workup for stroke etiology as outpatient- heme/onc consulted 11/29 and recommended outpatient follow up    #Sleep/mood  -Continue lexapro 5mg daily 11/29 - for anxiety and neurorecovery   -Continue melatonin 3mg PRN at bedtime  -Neuropsych consult appreciated   -Recreation therapy appreciated     #HTN  -Continue HCTZ 25mg  -Continue Losartan 100mg daily   -TTE with EF of 60-65%    #Type 2 DM  -a1c is 7/7  -FS and ISS  -Continue Metformin 1000mg BID    #Pain control  - Tylenol PRN  -Home med: Gabapentin 100mg at bedtime PRN    #GI/Bowel Mgmt   - Continue Senna at bedtime   - Miralax     #Bladder management  - Continue to monitor PVR q 8 hours (SC if > 400)  -Monitor UO    #DVT  - Lovenox  - TEDs     #Skin:  - intergluteal cleft fissure - continue barrier cream     FEN   - Diet - Regular + Thins  [CCHO, DASH/TLC]    - Dysphagia  SLP - evaluation and treatment    Precautions / PROPHYLAXIS:   - Falls, Cardiac  - ortho: Weight bearing status: WBAT   - Lungs: Aspiration, Incentive Spirometer   - Pressure injury/Skin: Turn Q2hrs while in bed, OOB to Chair, PT/OT

## 2023-12-01 NOTE — PROGRESS NOTE ADULT - SUBJECTIVE AND OBJECTIVE BOX
SUBJECTIVE/ROS: Patient evaluated while in the chair. She states she feels much better today and less anxious then yesterday. She is participating well in therapy and denies chest pain, fever, chills, nausea, vomiting, abdominal pain, headache, or BLE pain.     HPI:  58 year old female with PMH of vertigo, HTN, OA, Type 2 DM, shingles and sciatica who presented to [facility] on 11/23  as a direct transfer from [facility] for further evaluation of right carotid occlusion requiring diagnostic angiogram.  She presented to [facility] initially on 11/21 with progressively worsening left sided weakness, such that she slid off bed 2 days prior to admission. Her imaging showed as stroke with MRI showing scattered small acute infarcts involving the superior aspects of the right frontal lobe and right parietal lobe as well as the right caudate but was outside window for tPA.  Patient underwent a cerebral angiogram on 11/24 and no intervention provided. Patient was started on DAPT and statin and recommended for outpatient monitoring. She remained stable during her hospital stay and was evaluated for admission to acute inpatient rehab. She was admitted to [facility] on 11/28/23.       Vital Signs Last 24 Hrs  T(C): 36.7 (01 Dec 2023 08:11), Max: 37.3 (30 Nov 2023 19:30)  T(F): 98 (01 Dec 2023 08:11), Max: 99.2 (30 Nov 2023 19:30)  HR: 74 (01 Dec 2023 08:11) (74 - 86)  BP: 109/71 (01 Dec 2023 08:11) (109/71 - 134/63)  BP(mean): --  RR: 16 (01 Dec 2023 08:11) (16 - 16)  SpO2: 97% (01 Dec 2023 08:11) (97% - 99%)    Parameters below as of 01 Dec 2023 08:11  Patient On (Oxygen Delivery Method): room air        PHYSICAL EXAM  Constitutional - NAD, Comfortable  HEENT - NCAT, EOMI  Neck - Supple, No limited ROM  Chest - breathing comfortably   Cardiovascular - RRR, S1S2  Abdomen -BS+, Soft, NTND  Extremities - No C/C/E, No calf tenderness   Neurologic Exam - aox3, RU/RL 5/5, JORGE/JORGE 4/5 with dysmetria      LABS:                          13.0   9.11  )-----------( 243      ( 30 Nov 2023 07:13 )             38.7     11-30    136  |  100  |  23  ----------------------------<  177<H>  3.6   |  27  |  1.03    Ca    9.4      30 Nov 2023 07:13    TPro  7.4  /  Alb  3.4  /  TBili  0.6  /  DBili  x   /  AST  19  /  ALT  32  /  AlkPhos  54  11-30    LIVER FUNCTIONS - ( 30 Nov 2023 07:13 )  Alb: 3.4 g/dL / Pro: 7.4 g/dL / ALK PHOS: 54 U/L / ALT: 32 U/L / AST: 19 U/L / GGT: x               CAPILLARY BLOOD GLUCOSE      POCT Blood Glucose.: 186 mg/dL (01 Dec 2023 08:04)  POCT Blood Glucose.: 154 mg/dL (30 Nov 2023 23:03)  POCT Blood Glucose.: 164 mg/dL (30 Nov 2023 17:05)  POCT Blood Glucose.: 235 mg/dL (30 Nov 2023 11:59)        MEDICATIONS  (STANDING):  aspirin  chewable 81 milliGRAM(s) Oral daily  atorvastatin 40 milliGRAM(s) Oral at bedtime  clopidogrel Tablet 75 milliGRAM(s) Oral daily  dextrose 5%. 1000 milliLiter(s) (50 mL/Hr) IV Continuous <Continuous>  dextrose 5%. 1000 milliLiter(s) (100 mL/Hr) IV Continuous <Continuous>  dextrose 50% Injectable 12.5 Gram(s) IV Push once  dextrose 50% Injectable 25 Gram(s) IV Push once  dextrose 50% Injectable 25 Gram(s) IV Push once  enoxaparin Injectable 40 milliGRAM(s) SubCutaneous every 24 hours  escitalopram 5 milliGRAM(s) Oral daily  glucagon  Injectable 1 milliGRAM(s) IntraMuscular once  hydrochlorothiazide 25 milliGRAM(s) Oral daily  influenza   Vaccine 0.5 milliLiter(s) IntraMuscular once  insulin lispro (ADMELOG) corrective regimen sliding scale   SubCutaneous at bedtime  insulin lispro (ADMELOG) corrective regimen sliding scale   SubCutaneous three times a day before meals  losartan 100 milliGRAM(s) Oral daily  metFORMIN 1000 milliGRAM(s) Oral two times a day  pantoprazole    Tablet 40 milliGRAM(s) Oral before breakfast  senna 2 Tablet(s) Oral at bedtime    MEDICATIONS  (PRN):  acetaminophen     Tablet .. 650 milliGRAM(s) Oral every 6 hours PRN Mild Pain (1 - 3)  dextrose Oral Gel 15 Gram(s) Oral once PRN Blood Glucose LESS THAN 70 milliGRAM(s)/deciliter  gabapentin 100 milliGRAM(s) Oral at bedtime PRN pain  melatonin 3 milliGRAM(s) Oral at bedtime PRN Insomnia  polyethylene glycol 3350 17 Gram(s) Oral at bedtime PRN for constipation

## 2023-12-01 NOTE — PROGRESS NOTE ADULT - SUBJECTIVE AND OBJECTIVE BOX
Medicine Progress Note    Patient is a 58y old  Female who presents with a chief complaint of CVA (01 Dec 2023 09:41)      SUBJECTIVE / OVERNIGHT EVENTS:  seen and examined  Chart reviewed  No overnight events  Limb weakness improving with therapy  BM+  No pain  No complaints    ADDITIONAL REVIEW OF SYSTEMS:  denied fever/chills/CP/SOB/cough/palpitation/dizziness/abdominal pian/nausea/vomiting/diarrhoea/constipation/dysuria/leg or calf pain/headaches.all other ROS neg    MEDICATIONS  (STANDING):  aspirin  chewable 81 milliGRAM(s) Oral daily  atorvastatin 40 milliGRAM(s) Oral at bedtime  clopidogrel Tablet 75 milliGRAM(s) Oral daily  dextrose 5%. 1000 milliLiter(s) (50 mL/Hr) IV Continuous <Continuous>  dextrose 5%. 1000 milliLiter(s) (100 mL/Hr) IV Continuous <Continuous>  dextrose 50% Injectable 12.5 Gram(s) IV Push once  dextrose 50% Injectable 25 Gram(s) IV Push once  dextrose 50% Injectable 25 Gram(s) IV Push once  enoxaparin Injectable 40 milliGRAM(s) SubCutaneous every 24 hours  escitalopram 5 milliGRAM(s) Oral daily  glucagon  Injectable 1 milliGRAM(s) IntraMuscular once  hydrochlorothiazide 25 milliGRAM(s) Oral daily  influenza   Vaccine 0.5 milliLiter(s) IntraMuscular once  insulin lispro (ADMELOG) corrective regimen sliding scale   SubCutaneous three times a day before meals  insulin lispro (ADMELOG) corrective regimen sliding scale   SubCutaneous at bedtime  losartan 100 milliGRAM(s) Oral daily  metFORMIN 1000 milliGRAM(s) Oral two times a day  pantoprazole    Tablet 40 milliGRAM(s) Oral before breakfast  senna 2 Tablet(s) Oral at bedtime    MEDICATIONS  (PRN):  acetaminophen     Tablet .. 650 milliGRAM(s) Oral every 6 hours PRN Mild Pain (1 - 3)  dextrose Oral Gel 15 Gram(s) Oral once PRN Blood Glucose LESS THAN 70 milliGRAM(s)/deciliter  gabapentin 100 milliGRAM(s) Oral at bedtime PRN pain  melatonin 3 milliGRAM(s) Oral at bedtime PRN Insomnia  polyethylene glycol 3350 17 Gram(s) Oral at bedtime PRN for constipation    CAPILLARY BLOOD GLUCOSE      POCT Blood Glucose.: 178 mg/dL (01 Dec 2023 12:12)  POCT Blood Glucose.: 186 mg/dL (01 Dec 2023 08:04)  POCT Blood Glucose.: 154 mg/dL (30 Nov 2023 23:03)  POCT Blood Glucose.: 164 mg/dL (30 Nov 2023 17:05)    I&O's Summary      PHYSICAL EXAM:  Vital Signs Last 24 Hrs  T(C): 36.7 (01 Dec 2023 08:11), Max: 37.3 (30 Nov 2023 19:30)  T(F): 98 (01 Dec 2023 08:11), Max: 99.2 (30 Nov 2023 19:30)  HR: 74 (01 Dec 2023 08:11) (74 - 86)  BP: 109/71 (01 Dec 2023 08:11) (109/71 - 134/63)  BP(mean): --  RR: 16 (01 Dec 2023 08:11) (16 - 16)  SpO2: 97% (01 Dec 2023 08:11) (97% - 99%)    Parameters below as of 01 Dec 2023 08:11  Patient On (Oxygen Delivery Method): room air    GENERAL: Not in distress. Alert    HEENT: clear conjuctiva, MMM. no pallor or icterus  CARDIOVASCULAR: RRR S1, S2. No murmur/rubs/gallop  LUNGS: BLAE+, no rales, no wheezing, no rhonchi.    ABDOMEN: ND. Soft,  NT, no guarding / rebound / rigidity. BS normoactive  BACK: No spine tenderness.  EXTREMITIES: no edema. no leg or calf TP.  SKIN: warm and dry  PSYCHIATRIC: Calm.  No agitation.  CNS: AAO. moves limbs, follows commands. mild left sided weakness    LABS:                        13.0   9.11  )-----------( 243      ( 30 Nov 2023 07:13 )             38.7     11-30    136  |  100  |  23  ----------------------------<  177<H>  3.6   |  27  |  1.03    Ca    9.4      30 Nov 2023 07:13    TPro  7.4  /  Alb  3.4  /  TBili  0.6  /  DBili  x   /  AST  19  /  ALT  32  /  AlkPhos  54  11-30          Urinalysis Basic - ( 30 Nov 2023 07:13 )    Color: x / Appearance: x / SG: x / pH: x  Gluc: 177 mg/dL / Ketone: x  / Bili: x / Urobili: x   Blood: x / Protein: x / Nitrite: x   Leuk Esterase: x / RBC: x / WBC x   Sq Epi: x / Non Sq Epi: x / Bacteria: x        COVID-19 PCR: NotDetec (29 Nov 2023 06:00)      RADIOLOGY & ADDITIONAL TESTS:  Imaging from Last 24 Hours:    Electrocardiogram/QTc Interval:    COORDINATION OF CARE:  Care Discussed with Consultants/Other Providers:

## 2023-12-01 NOTE — PROGRESS NOTE ADULT - PROBLEM SELECTOR PLAN 1
Patient with multiple cerebral infarcts right frontal, periatrial, caudate and right ICA stenosis with left-sided weakness causing functional impairments.  This is likely the picture of uncontrolled hyperlipidemia and HTN, with secondary arterial stenosis and predisposition for acute arterial events rather than an underlying hypercoagulable state which usually presents with acute venous thrombosis.  Advised patient to continue atorvastatin and follow-up cholesterol in ambulatory.  Does not require hypercoagulable workup given proximity of acute thrombotic event, but advised to follow-up in office for further evaluation.  Patient expressed her understanding. Multiple cerebral infarcts right frontal, periatrial, caudate and right ICA stenosis with left-sided weakness causing functional impairments.  Patient started on statins and will have close follow-up of lipid profile as the likely source of current vascular event is uncontrolled hyperlipidemia.  Discussed weight and HTN management.  No need for hypercoagulable workup at this time.

## 2023-12-01 NOTE — PROGRESS NOTE ADULT - ASSESSMENT
59 y/o F with PMH of vertigo, HTN, OA, Type 2 DM, shingles and sciatica who presented to [facility] on 11/23 as a direct transfer from [facility] for further evaluation of right carotid occlusion requiring diagnostic angiogram.  She presented to [facility] initially on 11/21 with progressively worsening left sided weakness, such that she slid off bed 2 days prior to admission. Her imaging showed as stroke with MRI showing scattered small acute infarcts involving the superior aspects of the right frontal lobe and right parietal lobe as well as the right caudate but was outside window for tPA.  Patient underwent a cerebral angiogram on 11/24 and no intervention provided. Patient was started on DAPT and statin and recommended for outpatient monitoring. She remained stable during her hospital stay and was evaluated for admission to acute inpatient rehab. She was admitted to [facility] on 11/28/23.     #Right frontal/parietal/caudate infarcts with right ICA stenosis now with left sided weakness, Gait Instability, ADL impairments and Functional impairments  -Continue DAPT with Aspirin 81mg qd, Plavix 75mg qd  -Continue Atorvastatin 40mg qhs  -Tolerating comprehensive rehab program  - fall, aspiration precautions  - heme cx noted: no need for hypercoag w/u.     # neuropathy  -Pain management, bowel regimen per rehab - continue gabapentin 100mg qhs prn    #HTN  -Continue HCTZ 25mg qd, Losartan 100mg qd  -TTE with EF of 60-65%  - monitor VS including OH and adjust meds    #Type 2 DM, HbA1c 7.7 (11/22/23)  -FS and ISS  -Home med: Metformin 1000mg BID - continued  - can add linagliptin 5 mg daily if BS >180 persistently    DVT ppx - lovenox   GI ppx - PPI    d/w rehab team at [facility] [place]

## 2023-12-02 LAB
GLUCOSE BLDC GLUCOMTR-MCNC: 133 MG/DL — HIGH (ref 70–99)
GLUCOSE BLDC GLUCOMTR-MCNC: 133 MG/DL — HIGH (ref 70–99)
GLUCOSE BLDC GLUCOMTR-MCNC: 136 MG/DL — HIGH (ref 70–99)
GLUCOSE BLDC GLUCOMTR-MCNC: 136 MG/DL — HIGH (ref 70–99)
GLUCOSE BLDC GLUCOMTR-MCNC: 147 MG/DL — HIGH (ref 70–99)
GLUCOSE BLDC GLUCOMTR-MCNC: 147 MG/DL — HIGH (ref 70–99)
GLUCOSE BLDC GLUCOMTR-MCNC: 153 MG/DL — HIGH (ref 70–99)
GLUCOSE BLDC GLUCOMTR-MCNC: 153 MG/DL — HIGH (ref 70–99)

## 2023-12-02 PROCEDURE — 99232 SBSQ HOSP IP/OBS MODERATE 35: CPT

## 2023-12-02 RX ADMIN — SENNA PLUS 2 TABLET(S): 8.6 TABLET ORAL at 22:29

## 2023-12-02 RX ADMIN — Medication 81 MILLIGRAM(S): at 12:12

## 2023-12-02 RX ADMIN — Medication 2: at 12:12

## 2023-12-02 RX ADMIN — METFORMIN HYDROCHLORIDE 1000 MILLIGRAM(S): 850 TABLET ORAL at 17:56

## 2023-12-02 RX ADMIN — LOSARTAN POTASSIUM 100 MILLIGRAM(S): 100 TABLET, FILM COATED ORAL at 05:44

## 2023-12-02 RX ADMIN — ATORVASTATIN CALCIUM 40 MILLIGRAM(S): 80 TABLET, FILM COATED ORAL at 22:27

## 2023-12-02 RX ADMIN — PANTOPRAZOLE SODIUM 40 MILLIGRAM(S): 20 TABLET, DELAYED RELEASE ORAL at 05:44

## 2023-12-02 RX ADMIN — CLOPIDOGREL BISULFATE 75 MILLIGRAM(S): 75 TABLET, FILM COATED ORAL at 12:11

## 2023-12-02 RX ADMIN — METFORMIN HYDROCHLORIDE 1000 MILLIGRAM(S): 850 TABLET ORAL at 07:55

## 2023-12-02 RX ADMIN — ESCITALOPRAM OXALATE 5 MILLIGRAM(S): 10 TABLET, FILM COATED ORAL at 12:11

## 2023-12-02 RX ADMIN — ENOXAPARIN SODIUM 40 MILLIGRAM(S): 100 INJECTION SUBCUTANEOUS at 05:44

## 2023-12-02 NOTE — PROGRESS NOTE ADULT - ASSESSMENT
59 y/o F with PMH of vertigo, HTN, OA, Type 2 DM, shingles and sciatica who presented to [facility] on 11/23 as a direct transfer from [facility] for further evaluation of right carotid occlusion requiring diagnostic angiogram.  She presented to [facility] initially on 11/21 with progressively worsening left sided weakness, such that she slid off bed 2 days prior to admission. Her imaging showed as stroke with MRI showing scattered small acute infarcts involving the superior aspects of the right frontal lobe and right parietal lobe as well as the right caudate but was outside window for tPA.  Patient underwent a cerebral angiogram on 11/24 and no intervention provided. Patient was started on DAPT and statin and recommended for outpatient monitoring. She remained stable during her hospital stay and was evaluated for admission to acute inpatient rehab. She was admitted to [facility] on 11/28/23.     #Right frontal/parietal/caudate infarcts with right ICA stenosis now with left sided weakness, Gait Instability, ADL impairments and Functional impairments  -Continue DAPT with Aspirin 81mg qd, Plavix 75mg qd  -Continue Atorvastatin 40mg qhs  -Tolerating comprehensive rehab program  - fall, aspiration precautions  - heme cx noted: no need for hypercoag w/u.     # neuropathy  -Pain management, bowel regimen per rehab - continue gabapentin 100mg qhs prn    #HTN  -Continue HCTZ 25mg qd, Losartan 100mg qd  -TTE with EF of 60-65%  - monitor VS including OH and adjust meds    #Type 2 DM, HbA1c 7.7 (11/22/23)  -FS and ISS  -Home med: Metformin 1000mg BID - continued  - can add linagliptin 5 mg daily if BS >180 persistently    DVT ppx - lovenox   GI ppx - PPI

## 2023-12-02 NOTE — PROGRESS NOTE ADULT - SUBJECTIVE AND OBJECTIVE BOX
Chief complaint: no new complaints     Patient is a 58y old  Female who presents with a chief complaint of CVA (01 Dec 2023 15:33)      HEALTH ISSUES - PROBLEM Dx:  CVA (cerebrovascular accident)      PAST MEDICAL & SURGICAL HISTORY:  Prediabetes  now diabetes      Hypertension      Osteoarthritis      Spider veins      Obesity, Class II, BMI 35-39.9      [facility], 11/26/20-12/5/20      History of appendectomy  with right oophorecomy 2010      History of hip surgery  pinning of left hip age 12 and hardware removed age 17          VITALS  Vital Signs Last 24 Hrs  T(C): 37.1 (02 Dec 2023 08:03), Max: 37.1 (02 Dec 2023 08:03)  T(F): 98.7 (02 Dec 2023 08:03), Max: 98.7 (02 Dec 2023 08:03)  HR: 70 (02 Dec 2023 08:03) (70 - 73)  BP: 115/72 (02 Dec 2023 08:03) (115/72 - 131/71)  BP(mean): --  RR: 16 (02 Dec 2023 08:03) (16 - 16)  SpO2: 98% (02 Dec 2023 08:03) (97% - 98%)    Parameters below as of 02 Dec 2023 08:03  Patient On (Oxygen Delivery Method): room air          PHYSICAL EXAM  Constitutional - NAD, Comfortable  HEENT - NCAT, EOMI  Neck - Supple, No limited ROM  Chest - CTA bilaterally  Cardiovascular - RRR, S1S2  Abdomen -  Soft, NTND  Extremities - No calf tenderness   Neurologic Exam -                    Cognitive - Awake, Alert     No new focal deficits                    CURRENT MEDICATIONS    MEDICATIONS  (STANDING):  aspirin  chewable 81 milliGRAM(s) Oral daily  atorvastatin 40 milliGRAM(s) Oral at bedtime  clopidogrel Tablet 75 milliGRAM(s) Oral daily  dextrose 5%. 1000 milliLiter(s) (50 mL/Hr) IV Continuous <Continuous>  dextrose 5%. 1000 milliLiter(s) (100 mL/Hr) IV Continuous <Continuous>  dextrose 50% Injectable 12.5 Gram(s) IV Push once  dextrose 50% Injectable 25 Gram(s) IV Push once  dextrose 50% Injectable 25 Gram(s) IV Push once  enoxaparin Injectable 40 milliGRAM(s) SubCutaneous every 24 hours  escitalopram 5 milliGRAM(s) Oral daily  glucagon  Injectable 1 milliGRAM(s) IntraMuscular once  hydrochlorothiazide 25 milliGRAM(s) Oral daily  influenza   Vaccine 0.5 milliLiter(s) IntraMuscular once  insulin lispro (ADMELOG) corrective regimen sliding scale   SubCutaneous at bedtime  insulin lispro (ADMELOG) corrective regimen sliding scale   SubCutaneous three times a day before meals  losartan 100 milliGRAM(s) Oral daily  metFORMIN 1000 milliGRAM(s) Oral two times a day  pantoprazole    Tablet 40 milliGRAM(s) Oral before breakfast  senna 2 Tablet(s) Oral at bedtime    MEDICATIONS  (PRN):  acetaminophen     Tablet .. 650 milliGRAM(s) Oral every 6 hours PRN Mild Pain (1 - 3)  dextrose Oral Gel 15 Gram(s) Oral once PRN Blood Glucose LESS THAN 70 milliGRAM(s)/deciliter  gabapentin 100 milliGRAM(s) Oral at bedtime PRN pain  melatonin 3 milliGRAM(s) Oral at bedtime PRN Insomnia  polyethylene glycol 3350 17 Gram(s) Oral at bedtime PRN for constipation    ASSESSMENT & PLAN          GI/Bowel Management    Management - Toilet Q2  Skin - Turn Q2  Pain - Tylenol PRN  DVT PPX - Lovenox      Continue comprehensive acute rehab program consisting of 3hrs/day of OT/PT and SLP.

## 2023-12-02 NOTE — PROGRESS NOTE ADULT - SUBJECTIVE AND OBJECTIVE BOX
Patient is a 58y old  Female who presents with a chief complaint of CVA (02 Dec 2023 13:00)    Patient seen and examined at bedside. No acute overnight events. Frustrated that she had stroke but is pleased with therapy progress. Denies pain/discomfort. Family will visit this afternoon.     ALLERGIES:  No Known Allergies    MEDICATIONS  (STANDING):  aspirin  chewable 81 milliGRAM(s) Oral daily  atorvastatin 40 milliGRAM(s) Oral at bedtime  clopidogrel Tablet 75 milliGRAM(s) Oral daily  dextrose 5%. 1000 milliLiter(s) (50 mL/Hr) IV Continuous <Continuous>  dextrose 5%. 1000 milliLiter(s) (100 mL/Hr) IV Continuous <Continuous>  dextrose 50% Injectable 12.5 Gram(s) IV Push once  dextrose 50% Injectable 25 Gram(s) IV Push once  dextrose 50% Injectable 25 Gram(s) IV Push once  enoxaparin Injectable 40 milliGRAM(s) SubCutaneous every 24 hours  escitalopram 5 milliGRAM(s) Oral daily  glucagon  Injectable 1 milliGRAM(s) IntraMuscular once  hydrochlorothiazide 25 milliGRAM(s) Oral daily  influenza   Vaccine 0.5 milliLiter(s) IntraMuscular once  insulin lispro (ADMELOG) corrective regimen sliding scale   SubCutaneous at bedtime  insulin lispro (ADMELOG) corrective regimen sliding scale   SubCutaneous three times a day before meals  losartan 100 milliGRAM(s) Oral daily  metFORMIN 1000 milliGRAM(s) Oral two times a day  pantoprazole    Tablet 40 milliGRAM(s) Oral before breakfast  senna 2 Tablet(s) Oral at bedtime    MEDICATIONS  (PRN):  acetaminophen     Tablet .. 650 milliGRAM(s) Oral every 6 hours PRN Mild Pain (1 - 3)  dextrose Oral Gel 15 Gram(s) Oral once PRN Blood Glucose LESS THAN 70 milliGRAM(s)/deciliter  gabapentin 100 milliGRAM(s) Oral at bedtime PRN pain  melatonin 3 milliGRAM(s) Oral at bedtime PRN Insomnia  polyethylene glycol 3350 17 Gram(s) Oral at bedtime PRN for constipation    Vital Signs Last 24 Hrs  T(F): 98.7 (02 Dec 2023 08:03), Max: 98.7 (02 Dec 2023 08:03)  HR: 70 (02 Dec 2023 08:03) (70 - 73)  BP: 115/72 (02 Dec 2023 08:03) (115/72 - 131/71)  RR: 16 (02 Dec 2023 08:03) (16 - 16)  SpO2: 98% (02 Dec 2023 08:03) (97% - 98%)  I&O's Summary    PHYSICAL EXAM:  General: NAD, A/O x 3  ENT: MMM, no tonsilar exudate  Neck: Supple, No JVD  Lungs: Clear to auscultation bilaterally, no wheezes. Good air entry bilaterally   Cardio: RRR, S1/S2, No murmurs  Abdomen: Soft, Nontender, Nondistended; Bowel sounds present  Extremities: No calf tenderness, No pitting edema    LABS:                        13.0   9.11  )-----------( 243      ( 30 Nov 2023 07:13 )             38.7       11-30    136  |  100  |  23  ----------------------------<  177  3.6   |  27  |  1.03    Ca    9.4      30 Nov 2023 07:13    TPro  7.4  /  Alb  3.4  /  TBili  0.6  /  DBili  x   /  AST  19  /  ALT  32  /  AlkPhos  54  11-30 11-22 Chol 196 mg/dL LDL -- HDL 58 mg/dL Trig 125 mg/dL    POCT Blood Glucose.: 153 mg/dL (02 Dec 2023 12:03)  POCT Blood Glucose.: 147 mg/dL (02 Dec 2023 07:53)  POCT Blood Glucose.: 127 mg/dL (01 Dec 2023 22:42)  POCT Blood Glucose.: 131 mg/dL (01 Dec 2023 17:13)      Urinalysis Basic - ( 30 Nov 2023 07:13 )    Color: x / Appearance: x / SG: x / pH: x  Gluc: 177 mg/dL / Ketone: x  / Bili: x / Urobili: x   Blood: x / Protein: x / Nitrite: x   Leuk Esterase: x / RBC: x / WBC x   Sq Epi: x / Non Sq Epi: x / Bacteria: x    COVID-19 PCR: NotDetec (11-29-23 @ 06:00)    RADIOLOGY & ADDITIONAL TESTS:     Care Discussed with Consultants/Other Providers:

## 2023-12-03 LAB
GLUCOSE BLDC GLUCOMTR-MCNC: 143 MG/DL — HIGH (ref 70–99)
GLUCOSE BLDC GLUCOMTR-MCNC: 143 MG/DL — HIGH (ref 70–99)
GLUCOSE BLDC GLUCOMTR-MCNC: 147 MG/DL — HIGH (ref 70–99)
GLUCOSE BLDC GLUCOMTR-MCNC: 147 MG/DL — HIGH (ref 70–99)
GLUCOSE BLDC GLUCOMTR-MCNC: 162 MG/DL — HIGH (ref 70–99)
GLUCOSE BLDC GLUCOMTR-MCNC: 162 MG/DL — HIGH (ref 70–99)
GLUCOSE BLDC GLUCOMTR-MCNC: 167 MG/DL — HIGH (ref 70–99)
GLUCOSE BLDC GLUCOMTR-MCNC: 167 MG/DL — HIGH (ref 70–99)

## 2023-12-03 PROCEDURE — 99232 SBSQ HOSP IP/OBS MODERATE 35: CPT

## 2023-12-03 RX ADMIN — ENOXAPARIN SODIUM 40 MILLIGRAM(S): 100 INJECTION SUBCUTANEOUS at 07:26

## 2023-12-03 RX ADMIN — ESCITALOPRAM OXALATE 5 MILLIGRAM(S): 10 TABLET, FILM COATED ORAL at 12:00

## 2023-12-03 RX ADMIN — METFORMIN HYDROCHLORIDE 1000 MILLIGRAM(S): 850 TABLET ORAL at 08:14

## 2023-12-03 RX ADMIN — Medication 81 MILLIGRAM(S): at 12:00

## 2023-12-03 RX ADMIN — CLOPIDOGREL BISULFATE 75 MILLIGRAM(S): 75 TABLET, FILM COATED ORAL at 12:00

## 2023-12-03 RX ADMIN — Medication 2: at 12:00

## 2023-12-03 RX ADMIN — Medication 2: at 17:29

## 2023-12-03 RX ADMIN — PANTOPRAZOLE SODIUM 40 MILLIGRAM(S): 20 TABLET, DELAYED RELEASE ORAL at 06:38

## 2023-12-03 RX ADMIN — Medication 650 MILLIGRAM(S): at 21:35

## 2023-12-03 RX ADMIN — ATORVASTATIN CALCIUM 40 MILLIGRAM(S): 80 TABLET, FILM COATED ORAL at 21:35

## 2023-12-03 RX ADMIN — METFORMIN HYDROCHLORIDE 1000 MILLIGRAM(S): 850 TABLET ORAL at 17:29

## 2023-12-03 RX ADMIN — SENNA PLUS 2 TABLET(S): 8.6 TABLET ORAL at 21:35

## 2023-12-03 RX ADMIN — Medication 650 MILLIGRAM(S): at 22:31

## 2023-12-03 RX ADMIN — LOSARTAN POTASSIUM 100 MILLIGRAM(S): 100 TABLET, FILM COATED ORAL at 06:38

## 2023-12-03 NOTE — PROGRESS NOTE ADULT - SUBJECTIVE AND OBJECTIVE BOX
Patient is a 58y old  Female who presents with a chief complaint of CVA (03 Dec 2023 12:20)    Patient seen and examined at bedside. No acute overnight events. Slept well. Denies pain.     ALLERGIES:  No Known Allergies    MEDICATIONS  (STANDING):  aspirin  chewable 81 milliGRAM(s) Oral daily  atorvastatin 40 milliGRAM(s) Oral at bedtime  clopidogrel Tablet 75 milliGRAM(s) Oral daily  dextrose 5%. 1000 milliLiter(s) (50 mL/Hr) IV Continuous <Continuous>  dextrose 5%. 1000 milliLiter(s) (100 mL/Hr) IV Continuous <Continuous>  dextrose 50% Injectable 12.5 Gram(s) IV Push once  dextrose 50% Injectable 25 Gram(s) IV Push once  dextrose 50% Injectable 25 Gram(s) IV Push once  enoxaparin Injectable 40 milliGRAM(s) SubCutaneous every 24 hours  escitalopram 5 milliGRAM(s) Oral daily  glucagon  Injectable 1 milliGRAM(s) IntraMuscular once  hydrochlorothiazide 25 milliGRAM(s) Oral daily  influenza   Vaccine 0.5 milliLiter(s) IntraMuscular once  insulin lispro (ADMELOG) corrective regimen sliding scale   SubCutaneous three times a day before meals  insulin lispro (ADMELOG) corrective regimen sliding scale   SubCutaneous at bedtime  losartan 100 milliGRAM(s) Oral daily  metFORMIN 1000 milliGRAM(s) Oral two times a day  pantoprazole    Tablet 40 milliGRAM(s) Oral before breakfast  senna 2 Tablet(s) Oral at bedtime    MEDICATIONS  (PRN):  acetaminophen     Tablet .. 650 milliGRAM(s) Oral every 6 hours PRN Mild Pain (1 - 3)  dextrose Oral Gel 15 Gram(s) Oral once PRN Blood Glucose LESS THAN 70 milliGRAM(s)/deciliter  gabapentin 100 milliGRAM(s) Oral at bedtime PRN pain  melatonin 3 milliGRAM(s) Oral at bedtime PRN Insomnia  polyethylene glycol 3350 17 Gram(s) Oral at bedtime PRN for constipation    Vital Signs Last 24 Hrs  T(F): 98.3 (03 Dec 2023 08:15), Max: 99.2 (02 Dec 2023 19:24)  HR: 76 (03 Dec 2023 08:15) (76 - 81)  BP: 126/69 (03 Dec 2023 08:15) (125/71 - 128/73)  RR: 16 (03 Dec 2023 08:15) (16 - 16)  SpO2: 97% (03 Dec 2023 08:15) (97% - 98%)  I&O's Summary    PHYSICAL EXAM:  General: NAD, A/O x 3  ENT: MMM, no tonsilar exudate  Neck: Supple, No JVD  Lungs: Clear to auscultation bilaterally, no wheezes. Good air entry bilaterally   Cardio: RRR, S1/S2, No murmurs  Abdomen: Soft, Nontender, Nondistended; Bowel sounds present  Extremities: No calf tenderness, No pitting edema    LABS:      11-22 Chol 196 mg/dL LDL -- HDL 58 mg/dL Trig 125 mg/dL    POCT Blood Glucose.: 167 mg/dL (03 Dec 2023 11:57)  POCT Blood Glucose.: 143 mg/dL (03 Dec 2023 07:56)  POCT Blood Glucose.: 133 mg/dL (02 Dec 2023 22:22)  POCT Blood Glucose.: 136 mg/dL (02 Dec 2023 17:55)    COVID-19 PCR: NotDetec (11-29-23 @ 06:00)    RADIOLOGY & ADDITIONAL TESTS:     Care Discussed with Consultants/Other Providers:

## 2023-12-03 NOTE — PROGRESS NOTE ADULT - SUBJECTIVE AND OBJECTIVE BOX
Chief complaint: no new complaints     Patient is a 58y old  Female who presents with a chief complaint of CVA (02 Dec 2023 13:13)        HEALTH ISSUES - PROBLEM Dx:  CVA (cerebrovascular accident)               PAST MEDICAL & SURGICAL HISTORY:  Prediabetes  now diabetes      Hypertension      Osteoarthritis      Spider veins      Obesity, Class II, BMI 35-39.9      [facility], 11/26/20-12/5/20      History of appendectomy  with right oophorecomy 2010      History of hip surgery  pinning of left hip age 12 and hardware removed age 17        VITALS  Vital Signs Last 24 Hrs  T(C): 36.8 (03 Dec 2023 08:15), Max: 37.3 (02 Dec 2023 19:24)  T(F): 98.3 (03 Dec 2023 08:15), Max: 99.2 (02 Dec 2023 19:24)  HR: 76 (03 Dec 2023 08:15) (76 - 81)  BP: 126/69 (03 Dec 2023 08:15) (125/71 - 128/73)  BP(mean): --  RR: 16 (03 Dec 2023 08:15) (16 - 16)  SpO2: 97% (03 Dec 2023 08:15) (97% - 98%)    Parameters below as of 03 Dec 2023 08:15  Patient On (Oxygen Delivery Method): room air          PHYSICAL EXAM  Constitutional - NAD, Comfortable  HEENT - NCAT, EOMI  Neck - Supple, No limited ROM  Chest - CTA bilaterally  Cardiovascular - RRR, S1S2  Abdomen - Soft, NTND  Extremities -  No calf tenderness   Neurologic Exam -                    Cognitive - Awake, Alert     No new focal deficits                CURRENT MEDICATIONS    MEDICATIONS  (STANDING):  aspirin  chewable 81 milliGRAM(s) Oral daily  atorvastatin 40 milliGRAM(s) Oral at bedtime  clopidogrel Tablet 75 milliGRAM(s) Oral daily  dextrose 5%. 1000 milliLiter(s) (50 mL/Hr) IV Continuous <Continuous>  dextrose 5%. 1000 milliLiter(s) (100 mL/Hr) IV Continuous <Continuous>  dextrose 50% Injectable 12.5 Gram(s) IV Push once  dextrose 50% Injectable 25 Gram(s) IV Push once  dextrose 50% Injectable 25 Gram(s) IV Push once  enoxaparin Injectable 40 milliGRAM(s) SubCutaneous every 24 hours  escitalopram 5 milliGRAM(s) Oral daily  glucagon  Injectable 1 milliGRAM(s) IntraMuscular once  hydrochlorothiazide 25 milliGRAM(s) Oral daily  influenza   Vaccine 0.5 milliLiter(s) IntraMuscular once  insulin lispro (ADMELOG) corrective regimen sliding scale   SubCutaneous at bedtime  insulin lispro (ADMELOG) corrective regimen sliding scale   SubCutaneous three times a day before meals  losartan 100 milliGRAM(s) Oral daily  metFORMIN 1000 milliGRAM(s) Oral two times a day  pantoprazole    Tablet 40 milliGRAM(s) Oral before breakfast  senna 2 Tablet(s) Oral at bedtime    MEDICATIONS  (PRN):  acetaminophen     Tablet .. 650 milliGRAM(s) Oral every 6 hours PRN Mild Pain (1 - 3)  dextrose Oral Gel 15 Gram(s) Oral once PRN Blood Glucose LESS THAN 70 milliGRAM(s)/deciliter  gabapentin 100 milliGRAM(s) Oral at bedtime PRN pain  melatonin 3 milliGRAM(s) Oral at bedtime PRN Insomnia  polyethylene glycol 3350 17 Gram(s) Oral at bedtime PRN for constipation    ASSESSMENT & PLAN          GI/Bowel Management    Management - Toilet Q2  Skin - Turn Q2  Pain - Tylenol PRN  DVT PPX - Lovenox       Continue comprehensive acute rehab program consisting of 3hrs/day of OT/PT and SLP. Chief complaint: no new complaints     Patient is a 58y old  Female who presents with a chief complaint of CVA (02 Dec 2023 13:13)        HEALTH ISSUES - PROBLEM Dx:  CVA (cerebrovascular accident)               PAST MEDICAL & SURGICAL HISTORY:  Prediabetes  now diabetes      Hypertension      Osteoarthritis      Spider veins      Obesity, Class II, BMI 35-39.9      [facility], 11/26/20-12/5/20      History of appendectomy  with right oophorecomy 2010      History of hip surgery  pinning of left hip age 12 and hardware removed age 17        VITALS  Vital Signs Last 24 Hrs  T(C): 36.8 (03 Dec 2023 08:15), Max: 37.3 (02 Dec 2023 19:24)  T(F): 98.3 (03 Dec 2023 08:15), Max: 99.2 (02 Dec 2023 19:24)  HR: 76 (03 Dec 2023 08:15) (76 - 81)  BP: 126/69 (03 Dec 2023 08:15) (125/71 - 128/73)  BP(mean): --  RR: 16 (03 Dec 2023 08:15) (16 - 16)  SpO2: 97% (03 Dec 2023 08:15) (97% - 98%)    Parameters below as of 03 Dec 2023 08:15  Patient On (Oxygen Delivery Method): room air          PHYSICAL EXAM  Constitutional - NAD, Comfortable  HEENT - NCAT, EOMI  Neck - Supple, No limited ROM  Chest - CTA bilaterally  Cardiovascular - RRR, S1S2  Abdomen - Soft, NTND  Extremities -  No calf tenderness   Neurologic Exam -                    Cognitive - Awake, Alert     No new focal deficits                CURRENT MEDICATIONS    MEDICATIONS  (STANDING):  aspirin  chewable 81 milliGRAM(s) Oral daily  atorvastatin 40 milliGRAM(s) Oral at bedtime  clopidogrel Tablet 75 milliGRAM(s) Oral daily  dextrose 5%. 1000 milliLiter(s) (50 mL/Hr) IV Continuous <Continuous>  dextrose 5%. 1000 milliLiter(s) (100 mL/Hr) IV Continuous <Continuous>  dextrose 50% Injectable 12.5 Gram(s) IV Push once  dextrose 50% Injectable 25 Gram(s) IV Push once  dextrose 50% Injectable 25 Gram(s) IV Push once  enoxaparin Injectable 40 milliGRAM(s) SubCutaneous every 24 hours  escitalopram 5 milliGRAM(s) Oral daily  glucagon  Injectable 1 milliGRAM(s) IntraMuscular once  hydrochlorothiazide 25 milliGRAM(s) Oral daily  influenza   Vaccine 0.5 milliLiter(s) IntraMuscular once  insulin lispro (ADMELOG) corrective regimen sliding scale   SubCutaneous at bedtime  insulin lispro (ADMELOG) corrective regimen sliding scale   SubCutaneous three times a day before meals  losartan 100 milliGRAM(s) Oral daily  metFORMIN 1000 milliGRAM(s) Oral two times a day  pantoprazole    Tablet 40 milliGRAM(s) Oral before breakfast  senna 2 Tablet(s) Oral at bedtime    MEDICATIONS  (PRN):  acetaminophen     Tablet .. 650 milliGRAM(s) Oral every 6 hours PRN Mild Pain (1 - 3)  dextrose Oral Gel 15 Gram(s) Oral once PRN Blood Glucose LESS THAN 70 milliGRAM(s)/deciliter  gabapentin 100 milliGRAM(s) Oral at bedtime PRN pain  melatonin 3 milliGRAM(s) Oral at bedtime PRN Insomnia  polyethylene glycol 3350 17 Gram(s) Oral at bedtime PRN for constipation    ASSESSMENT & PLAN          GI/Bowel Management    Management - Toilet Q2  Skin - Turn Q2  Pain - Tylenol PRN  DVT PPX - Lovenox       Continue comprehensive acute rehab program consisting of 3hrs/day of OT/PT and SLP. Chief complaint: no new complaints     Patient is a 58y old  Female who presents with a chief complaint of CVA (02 Dec 2023 13:13)        HEALTH ISSUES - PROBLEM Dx:  CVA (cerebrovascular accident)               PAST MEDICAL & SURGICAL HISTORY:  Prediabetes  now diabetes      Hypertension      Osteoarthritis      Spider veins      Obesity, Class II, BMI 35-39.9      [facility], 11/26/20-12/5/20      History of appendectomy  with right oophorecomy 2010      History of hip surgery  pinning of left hip age 12 and hardware removed age 17        VITALS  Vital Signs Last 24 Hrs  T(C): 36.8 (03 Dec 2023 08:15), Max: 37.3 (02 Dec 2023 19:24)  T(F): 98.3 (03 Dec 2023 08:15), Max: 99.2 (02 Dec 2023 19:24)  HR: 76 (03 Dec 2023 08:15) (76 - 81)  BP: 126/69 (03 Dec 2023 08:15) (125/71 - 128/73)  BP(mean): --  RR: 16 (03 Dec 2023 08:15) (16 - 16)  SpO2: 97% (03 Dec 2023 08:15) (97% - 98%)    Parameters below as of 03 Dec 2023 08:15  Patient On (Oxygen Delivery Method): room air          PHYSICAL EXAM  Constitutional - NAD, Comfortable  HEENT - NCAT, EOMI  Neck - Supple, No limited ROM  Chest - CTA bilaterally  Cardiovascular - RRR, S1S2  Abdomen - Soft, NTND  Extremities -  No calf tenderness   Neurologic Exam -                    Cognitive - Awake, Alert     No new focal deficits                CURRENT MEDICATIONS    MEDICATIONS  (STANDING):  aspirin  chewable 81 milliGRAM(s) Oral daily  atorvastatin 40 milliGRAM(s) Oral at bedtime  clopidogrel Tablet 75 milliGRAM(s) Oral daily  dextrose 5%. 1000 milliLiter(s) (50 mL/Hr) IV Continuous <Continuous>  dextrose 5%. 1000 milliLiter(s) (100 mL/Hr) IV Continuous <Continuous>  dextrose 50% Injectable 12.5 Gram(s) IV Push once  dextrose 50% Injectable 25 Gram(s) IV Push once  dextrose 50% Injectable 25 Gram(s) IV Push once  enoxaparin Injectable 40 milliGRAM(s) SubCutaneous every 24 hours  escitalopram 5 milliGRAM(s) Oral daily  glucagon  Injectable 1 milliGRAM(s) IntraMuscular once  hydrochlorothiazide 25 milliGRAM(s) Oral daily  influenza   Vaccine 0.5 milliLiter(s) IntraMuscular once  insulin lispro (ADMELOG) corrective regimen sliding scale   SubCutaneous at bedtime  insulin lispro (ADMELOG) corrective regimen sliding scale   SubCutaneous three times a day before meals  losartan 100 milliGRAM(s) Oral daily  metFORMIN 1000 milliGRAM(s) Oral two times a day  pantoprazole    Tablet 40 milliGRAM(s) Oral before breakfast  senna 2 Tablet(s) Oral at bedtime    MEDICATIONS  (PRN):  acetaminophen     Tablet .. 650 milliGRAM(s) Oral every 6 hours PRN Mild Pain (1 - 3)  dextrose Oral Gel 15 Gram(s) Oral once PRN Blood Glucose LESS THAN 70 milliGRAM(s)/deciliter  gabapentin 100 milliGRAM(s) Oral at bedtime PRN pain  melatonin 3 milliGRAM(s) Oral at bedtime PRN Insomnia  polyethylene glycol 3350 17 Gram(s) Oral at bedtime PRN for constipation    ASSESSMENT & PLAN          GI/Bowel Management    Management - Toilet Q2  Skin - Turn Q2  Pain - Tylenol PRN  DVT PPX - Lovenox       Continue comprehensive acute rehab program consisting of 3hrs/day of OT/PT and SLP.

## 2023-12-03 NOTE — PROGRESS NOTE ADULT - ASSESSMENT
57 y/o F with PMH of vertigo, HTN, OA, Type 2 DM, shingles and sciatica who presented to [facility] on 11/23 as a direct transfer from [facility] for further evaluation of right carotid occlusion requiring diagnostic angiogram.  She presented to [facility] initially on 11/21 with progressively worsening left sided weakness, such that she slid off bed 2 days prior to admission. Her imaging showed as stroke with MRI showing scattered small acute infarcts involving the superior aspects of the right frontal lobe and right parietal lobe as well as the right caudate but was outside window for tPA.  Patient underwent a cerebral angiogram on 11/24 and no intervention provided. Patient was started on DAPT and statin and recommended for outpatient monitoring. She remained stable during her hospital stay and was evaluated for admission to acute inpatient rehab. She was admitted to [facility] on 11/28/23.     #Right frontal/parietal/caudate infarcts with right ICA stenosis now with left sided weakness, Gait Instability, ADL impairments and Functional impairments  -Continue DAPT with Aspirin 81mg qd, Plavix 75mg qd  -Continue Atorvastatin 40mg qhs  -Tolerating comprehensive rehab program  - fall, aspiration precautions  - heme cx noted: no need for hypercoag w/u.     # neuropathy  -Pain management, bowel regimen per rehab - continue gabapentin 100mg qhs prn    #HTN  -Continue HCTZ 25mg qd, Losartan 100mg qd  -TTE with EF of 60-65%  - monitor VS including OH and adjust meds    #Type 2 DM, HbA1c 7.7 (11/22/23)  -FS and ISS  -Home med: Metformin 1000mg BID - continued  - can add linagliptin 5 mg daily if BS >180 persistently    DVT ppx - lovenox   GI ppx - PPI 57 y/o F with PMH of vertigo, HTN, OA, Type 2 DM, shingles and sciatica who presented to [facility] on 11/23 as a direct transfer from [facility] for further evaluation of right carotid occlusion requiring diagnostic angiogram.  She presented to [facility] initially on 11/21 with progressively worsening left sided weakness, such that she slid off bed 2 days prior to admission. Her imaging showed as stroke with MRI showing scattered small acute infarcts involving the superior aspects of the right frontal lobe and right parietal lobe as well as the right caudate but was outside window for tPA.  Patient underwent a cerebral angiogram on 11/24 and no intervention provided. Patient was started on DAPT and statin and recommended for outpatient monitoring. She remained stable during her hospital stay and was evaluated for admission to acute inpatient rehab. She was admitted to [facility] on 11/28/23.     #Right frontal/parietal/caudate infarcts with right ICA stenosis now with left sided weakness, Gait Instability, ADL impairments and Functional impairments  -Continue DAPT with Aspirin 81mg qd, Plavix 75mg qd  -Continue Atorvastatin 40mg qhs  -Tolerating comprehensive rehab program  - fall, aspiration precautions  - heme cx noted: no need for hypercoag w/u.     # neuropathy  -Pain management, bowel regimen per rehab - continue gabapentin 100mg qhs prn    #HTN  -Continue HCTZ 25mg qd, Losartan 100mg qd  -TTE with EF of 60-65%  - monitor VS including OH and adjust meds    #Type 2 DM, HbA1c 7.7 (11/22/23)  -FS and ISS  -Home med: Metformin 1000mg BID - continued  - can add linagliptin 5 mg daily if BS >180 persistently    DVT ppx - lovenox   GI ppx - PPI 59 y/o F with PMH of vertigo, HTN, OA, Type 2 DM, shingles and sciatica who presented to [facility] on 11/23 as a direct transfer from [facility] for further evaluation of right carotid occlusion requiring diagnostic angiogram.  She presented to [facility] initially on 11/21 with progressively worsening left sided weakness, such that she slid off bed 2 days prior to admission. Her imaging showed as stroke with MRI showing scattered small acute infarcts involving the superior aspects of the right frontal lobe and right parietal lobe as well as the right caudate but was outside window for tPA.  Patient underwent a cerebral angiogram on 11/24 and no intervention provided. Patient was started on DAPT and statin and recommended for outpatient monitoring. She remained stable during her hospital stay and was evaluated for admission to acute inpatient rehab. She was admitted to [facility] on 11/28/23.     #Right frontal/parietal/caudate infarcts with right ICA stenosis now with left sided weakness, Gait Instability, ADL impairments and Functional impairments  -Continue DAPT with Aspirin 81mg qd, Plavix 75mg qd  -Continue Atorvastatin 40mg qhs  -Tolerating comprehensive rehab program  - fall, aspiration precautions  - heme cx noted: no need for hypercoag w/u.     # neuropathy  -Pain management, bowel regimen per rehab - continue gabapentin 100mg qhs prn    #HTN  -Continue HCTZ 25mg qd, Losartan 100mg qd  -TTE with EF of 60-65%  - monitor VS including OH and adjust meds    #Type 2 DM, HbA1c 7.7 (11/22/23)  -FS and ISS  -Home med: Metformin 1000mg BID - continued  - can add linagliptin 5 mg daily if BS >180 persistently    DVT ppx - lovenox   GI ppx - PPI

## 2023-12-04 LAB
ALBUMIN SERPL ELPH-MCNC: 3.4 G/DL — SIGNIFICANT CHANGE UP (ref 3.3–5)
ALBUMIN SERPL ELPH-MCNC: 3.4 G/DL — SIGNIFICANT CHANGE UP (ref 3.3–5)
ALP SERPL-CCNC: 53 U/L — SIGNIFICANT CHANGE UP (ref 40–120)
ALP SERPL-CCNC: 53 U/L — SIGNIFICANT CHANGE UP (ref 40–120)
ALT FLD-CCNC: 33 U/L — SIGNIFICANT CHANGE UP (ref 10–45)
ALT FLD-CCNC: 33 U/L — SIGNIFICANT CHANGE UP (ref 10–45)
ANION GAP SERPL CALC-SCNC: 11 MMOL/L — SIGNIFICANT CHANGE UP (ref 5–17)
ANION GAP SERPL CALC-SCNC: 11 MMOL/L — SIGNIFICANT CHANGE UP (ref 5–17)
AST SERPL-CCNC: 22 U/L — SIGNIFICANT CHANGE UP (ref 10–40)
AST SERPL-CCNC: 22 U/L — SIGNIFICANT CHANGE UP (ref 10–40)
BASOPHILS # BLD AUTO: 0.06 K/UL — SIGNIFICANT CHANGE UP (ref 0–0.2)
BASOPHILS # BLD AUTO: 0.06 K/UL — SIGNIFICANT CHANGE UP (ref 0–0.2)
BASOPHILS NFR BLD AUTO: 0.9 % — SIGNIFICANT CHANGE UP (ref 0–2)
BASOPHILS NFR BLD AUTO: 0.9 % — SIGNIFICANT CHANGE UP (ref 0–2)
BILIRUB SERPL-MCNC: 0.5 MG/DL — SIGNIFICANT CHANGE UP (ref 0.2–1.2)
BILIRUB SERPL-MCNC: 0.5 MG/DL — SIGNIFICANT CHANGE UP (ref 0.2–1.2)
BUN SERPL-MCNC: 24 MG/DL — HIGH (ref 7–23)
BUN SERPL-MCNC: 24 MG/DL — HIGH (ref 7–23)
CALCIUM SERPL-MCNC: 9.3 MG/DL — SIGNIFICANT CHANGE UP (ref 8.4–10.5)
CALCIUM SERPL-MCNC: 9.3 MG/DL — SIGNIFICANT CHANGE UP (ref 8.4–10.5)
CHLORIDE SERPL-SCNC: 99 MMOL/L — SIGNIFICANT CHANGE UP (ref 96–108)
CHLORIDE SERPL-SCNC: 99 MMOL/L — SIGNIFICANT CHANGE UP (ref 96–108)
CO2 SERPL-SCNC: 26 MMOL/L — SIGNIFICANT CHANGE UP (ref 22–31)
CO2 SERPL-SCNC: 26 MMOL/L — SIGNIFICANT CHANGE UP (ref 22–31)
CREAT SERPL-MCNC: 0.94 MG/DL — SIGNIFICANT CHANGE UP (ref 0.5–1.3)
CREAT SERPL-MCNC: 0.94 MG/DL — SIGNIFICANT CHANGE UP (ref 0.5–1.3)
EGFR: 70 ML/MIN/1.73M2 — SIGNIFICANT CHANGE UP
EGFR: 70 ML/MIN/1.73M2 — SIGNIFICANT CHANGE UP
EOSINOPHIL # BLD AUTO: 0.19 K/UL — SIGNIFICANT CHANGE UP (ref 0–0.5)
EOSINOPHIL # BLD AUTO: 0.19 K/UL — SIGNIFICANT CHANGE UP (ref 0–0.5)
EOSINOPHIL NFR BLD AUTO: 2.7 % — SIGNIFICANT CHANGE UP (ref 0–6)
EOSINOPHIL NFR BLD AUTO: 2.7 % — SIGNIFICANT CHANGE UP (ref 0–6)
GLUCOSE BLDC GLUCOMTR-MCNC: 119 MG/DL — HIGH (ref 70–99)
GLUCOSE BLDC GLUCOMTR-MCNC: 119 MG/DL — HIGH (ref 70–99)
GLUCOSE BLDC GLUCOMTR-MCNC: 125 MG/DL — HIGH (ref 70–99)
GLUCOSE BLDC GLUCOMTR-MCNC: 125 MG/DL — HIGH (ref 70–99)
GLUCOSE BLDC GLUCOMTR-MCNC: 130 MG/DL — HIGH (ref 70–99)
GLUCOSE BLDC GLUCOMTR-MCNC: 130 MG/DL — HIGH (ref 70–99)
GLUCOSE BLDC GLUCOMTR-MCNC: 198 MG/DL — HIGH (ref 70–99)
GLUCOSE BLDC GLUCOMTR-MCNC: 198 MG/DL — HIGH (ref 70–99)
GLUCOSE SERPL-MCNC: 140 MG/DL — HIGH (ref 70–99)
GLUCOSE SERPL-MCNC: 140 MG/DL — HIGH (ref 70–99)
HCT VFR BLD CALC: 35.4 % — SIGNIFICANT CHANGE UP (ref 34.5–45)
HCT VFR BLD CALC: 35.4 % — SIGNIFICANT CHANGE UP (ref 34.5–45)
HGB BLD-MCNC: 12 G/DL — SIGNIFICANT CHANGE UP (ref 11.5–15.5)
HGB BLD-MCNC: 12 G/DL — SIGNIFICANT CHANGE UP (ref 11.5–15.5)
IMM GRANULOCYTES NFR BLD AUTO: 0.3 % — SIGNIFICANT CHANGE UP (ref 0–0.9)
IMM GRANULOCYTES NFR BLD AUTO: 0.3 % — SIGNIFICANT CHANGE UP (ref 0–0.9)
LYMPHOCYTES # BLD AUTO: 2.23 K/UL — SIGNIFICANT CHANGE UP (ref 1–3.3)
LYMPHOCYTES # BLD AUTO: 2.23 K/UL — SIGNIFICANT CHANGE UP (ref 1–3.3)
LYMPHOCYTES # BLD AUTO: 31.9 % — SIGNIFICANT CHANGE UP (ref 13–44)
LYMPHOCYTES # BLD AUTO: 31.9 % — SIGNIFICANT CHANGE UP (ref 13–44)
MCHC RBC-ENTMCNC: 29.5 PG — SIGNIFICANT CHANGE UP (ref 27–34)
MCHC RBC-ENTMCNC: 29.5 PG — SIGNIFICANT CHANGE UP (ref 27–34)
MCHC RBC-ENTMCNC: 33.9 GM/DL — SIGNIFICANT CHANGE UP (ref 32–36)
MCHC RBC-ENTMCNC: 33.9 GM/DL — SIGNIFICANT CHANGE UP (ref 32–36)
MCV RBC AUTO: 87 FL — SIGNIFICANT CHANGE UP (ref 80–100)
MCV RBC AUTO: 87 FL — SIGNIFICANT CHANGE UP (ref 80–100)
MONOCYTES # BLD AUTO: 0.56 K/UL — SIGNIFICANT CHANGE UP (ref 0–0.9)
MONOCYTES # BLD AUTO: 0.56 K/UL — SIGNIFICANT CHANGE UP (ref 0–0.9)
MONOCYTES NFR BLD AUTO: 8 % — SIGNIFICANT CHANGE UP (ref 2–14)
MONOCYTES NFR BLD AUTO: 8 % — SIGNIFICANT CHANGE UP (ref 2–14)
NEUTROPHILS # BLD AUTO: 3.93 K/UL — SIGNIFICANT CHANGE UP (ref 1.8–7.4)
NEUTROPHILS # BLD AUTO: 3.93 K/UL — SIGNIFICANT CHANGE UP (ref 1.8–7.4)
NEUTROPHILS NFR BLD AUTO: 56.2 % — SIGNIFICANT CHANGE UP (ref 43–77)
NEUTROPHILS NFR BLD AUTO: 56.2 % — SIGNIFICANT CHANGE UP (ref 43–77)
NRBC # BLD: 0 /100 WBCS — SIGNIFICANT CHANGE UP (ref 0–0)
NRBC # BLD: 0 /100 WBCS — SIGNIFICANT CHANGE UP (ref 0–0)
PLATELET # BLD AUTO: 217 K/UL — SIGNIFICANT CHANGE UP (ref 150–400)
PLATELET # BLD AUTO: 217 K/UL — SIGNIFICANT CHANGE UP (ref 150–400)
POTASSIUM SERPL-MCNC: 3.5 MMOL/L — SIGNIFICANT CHANGE UP (ref 3.5–5.3)
POTASSIUM SERPL-MCNC: 3.5 MMOL/L — SIGNIFICANT CHANGE UP (ref 3.5–5.3)
POTASSIUM SERPL-SCNC: 3.5 MMOL/L — SIGNIFICANT CHANGE UP (ref 3.5–5.3)
POTASSIUM SERPL-SCNC: 3.5 MMOL/L — SIGNIFICANT CHANGE UP (ref 3.5–5.3)
PROT SERPL-MCNC: 7.4 G/DL — SIGNIFICANT CHANGE UP (ref 6–8.3)
PROT SERPL-MCNC: 7.4 G/DL — SIGNIFICANT CHANGE UP (ref 6–8.3)
RBC # BLD: 4.07 M/UL — SIGNIFICANT CHANGE UP (ref 3.8–5.2)
RBC # BLD: 4.07 M/UL — SIGNIFICANT CHANGE UP (ref 3.8–5.2)
RBC # FLD: 13 % — SIGNIFICANT CHANGE UP (ref 10.3–14.5)
RBC # FLD: 13 % — SIGNIFICANT CHANGE UP (ref 10.3–14.5)
SODIUM SERPL-SCNC: 136 MMOL/L — SIGNIFICANT CHANGE UP (ref 135–145)
SODIUM SERPL-SCNC: 136 MMOL/L — SIGNIFICANT CHANGE UP (ref 135–145)
WBC # BLD: 6.99 K/UL — SIGNIFICANT CHANGE UP (ref 3.8–10.5)
WBC # BLD: 6.99 K/UL — SIGNIFICANT CHANGE UP (ref 3.8–10.5)
WBC # FLD AUTO: 6.99 K/UL — SIGNIFICANT CHANGE UP (ref 3.8–10.5)
WBC # FLD AUTO: 6.99 K/UL — SIGNIFICANT CHANGE UP (ref 3.8–10.5)

## 2023-12-04 PROCEDURE — 96116 NUBHVL XM PHYS/QHP 1ST HR: CPT

## 2023-12-04 PROCEDURE — 99233 SBSQ HOSP IP/OBS HIGH 50: CPT

## 2023-12-04 PROCEDURE — 99232 SBSQ HOSP IP/OBS MODERATE 35: CPT

## 2023-12-04 RX ADMIN — Medication 81 MILLIGRAM(S): at 11:04

## 2023-12-04 RX ADMIN — Medication 2: at 11:47

## 2023-12-04 RX ADMIN — SENNA PLUS 2 TABLET(S): 8.6 TABLET ORAL at 20:26

## 2023-12-04 RX ADMIN — CLOPIDOGREL BISULFATE 75 MILLIGRAM(S): 75 TABLET, FILM COATED ORAL at 11:04

## 2023-12-04 RX ADMIN — ATORVASTATIN CALCIUM 40 MILLIGRAM(S): 80 TABLET, FILM COATED ORAL at 20:26

## 2023-12-04 RX ADMIN — ENOXAPARIN SODIUM 40 MILLIGRAM(S): 100 INJECTION SUBCUTANEOUS at 05:39

## 2023-12-04 RX ADMIN — METFORMIN HYDROCHLORIDE 1000 MILLIGRAM(S): 850 TABLET ORAL at 05:39

## 2023-12-04 RX ADMIN — METFORMIN HYDROCHLORIDE 1000 MILLIGRAM(S): 850 TABLET ORAL at 17:16

## 2023-12-04 RX ADMIN — LOSARTAN POTASSIUM 100 MILLIGRAM(S): 100 TABLET, FILM COATED ORAL at 05:40

## 2023-12-04 RX ADMIN — ESCITALOPRAM OXALATE 5 MILLIGRAM(S): 10 TABLET, FILM COATED ORAL at 11:04

## 2023-12-04 RX ADMIN — PANTOPRAZOLE SODIUM 40 MILLIGRAM(S): 20 TABLET, DELAYED RELEASE ORAL at 05:39

## 2023-12-04 NOTE — PROGRESS NOTE ADULT - SUBJECTIVE AND OBJECTIVE BOX
Patient is a 58y old  Female who presents with a chief complaint of CVA (03 Dec 2023 12:54)    Patient seen and examined at bedside. No acute overnight events. Slept well. Feels more energetic today.     ALLERGIES:  No Known Allergies    MEDICATIONS  (STANDING):  aspirin  chewable 81 milliGRAM(s) Oral daily  atorvastatin 40 milliGRAM(s) Oral at bedtime  clopidogrel Tablet 75 milliGRAM(s) Oral daily  dextrose 5%. 1000 milliLiter(s) (50 mL/Hr) IV Continuous <Continuous>  dextrose 5%. 1000 milliLiter(s) (100 mL/Hr) IV Continuous <Continuous>  dextrose 50% Injectable 25 Gram(s) IV Push once  dextrose 50% Injectable 25 Gram(s) IV Push once  dextrose 50% Injectable 12.5 Gram(s) IV Push once  enoxaparin Injectable 40 milliGRAM(s) SubCutaneous every 24 hours  escitalopram 5 milliGRAM(s) Oral daily  glucagon  Injectable 1 milliGRAM(s) IntraMuscular once  hydrochlorothiazide 25 milliGRAM(s) Oral daily  influenza   Vaccine 0.5 milliLiter(s) IntraMuscular once  insulin lispro (ADMELOG) corrective regimen sliding scale   SubCutaneous at bedtime  insulin lispro (ADMELOG) corrective regimen sliding scale   SubCutaneous three times a day before meals  losartan 100 milliGRAM(s) Oral daily  metFORMIN 1000 milliGRAM(s) Oral two times a day  pantoprazole    Tablet 40 milliGRAM(s) Oral before breakfast  senna 2 Tablet(s) Oral at bedtime    MEDICATIONS  (PRN):  acetaminophen     Tablet .. 650 milliGRAM(s) Oral every 6 hours PRN Mild Pain (1 - 3)  dextrose Oral Gel 15 Gram(s) Oral once PRN Blood Glucose LESS THAN 70 milliGRAM(s)/deciliter  gabapentin 100 milliGRAM(s) Oral at bedtime PRN pain  melatonin 3 milliGRAM(s) Oral at bedtime PRN Insomnia  polyethylene glycol 3350 17 Gram(s) Oral at bedtime PRN for constipation    Vital Signs Last 24 Hrs  T(F): 98.6 (04 Dec 2023 07:43), Max: 98.6 (04 Dec 2023 07:43)  HR: 75 (04 Dec 2023 07:43) (75 - 78)  BP: 123/75 (04 Dec 2023 07:43) (121/70 - 123/75)  RR: 16 (04 Dec 2023 07:43) (16 - 16)  SpO2: 96% (04 Dec 2023 07:43) (96% - 99%)  I&O's Summary    PHYSICAL EXAM:  General: NAD, awake, alert   ENT: MMM, no tonsilar exudate  Neck: Supple, No JVD  Lungs: Clear to auscultation bilaterally, no wheezes. Good air entry bilaterally   Cardio: RRR, S1/S2, No murmurs  Abdomen: Soft, Nontender, Nondistended; Bowel sounds present  Extremities: No calf tenderness, No pitting edema    LABS:                        12.0   6.99  )-----------( 217      ( 04 Dec 2023 06:25 )             35.4       12-04    136  |  99  |  24  ----------------------------<  140  3.5   |  26  |  0.94    Ca    9.3      04 Dec 2023 06:25    TPro  7.4  /  Alb  3.4  /  TBili  0.5  /  DBili  x   /  AST  22  /  ALT  33  /  AlkPhos  53  12-04     11-22 Chol 196 mg/dL LDL -- HDL 58 mg/dL Trig 125 mg/dL    POCT Blood Glucose.: 198 mg/dL (04 Dec 2023 11:45)  POCT Blood Glucose.: 130 mg/dL (04 Dec 2023 07:48)  POCT Blood Glucose.: 147 mg/dL (03 Dec 2023 21:32)  POCT Blood Glucose.: 162 mg/dL (03 Dec 2023 17:27)  POCT Blood Glucose.: 167 mg/dL (03 Dec 2023 11:57)    Urinalysis Basic - ( 04 Dec 2023 06:25 )    Color: x / Appearance: x / SG: x / pH: x  Gluc: 140 mg/dL / Ketone: x  / Bili: x / Urobili: x   Blood: x / Protein: x / Nitrite: x   Leuk Esterase: x / RBC: x / WBC x   Sq Epi: x / Non Sq Epi: x / Bacteria: x    COVID-19 PCR: NotDetec (11-29-23 @ 06:00)    RADIOLOGY & ADDITIONAL TESTS:     Care Discussed with Consultants/Other Providers:

## 2023-12-04 NOTE — PROGRESS NOTE ADULT - SUBJECTIVE AND OBJECTIVE BOX
CHIEF COMPLAINT: no new complaints, improving daily functionally       HISTORY OF PRESENT ILLNESS    58 year old female with PMH of vertigo, HTN, OA, Type 2 DM, shingles and sciatica who presented to [facility] on 11/23  as a direct transfer from [facility] for further evaluation of right carotid occlusion requiring diagnostic angiogram.  She presented to [facility] initially on 11/21 with progressively worsening left sided weakness, such that she slid off bed 2 days prior to admission. Her imaging showed as stroke with MRI showing scattered small acute infarcts involving the superior aspects of the right frontal lobe and right parietal lobe as well as the right caudate but was outside window for tPA.  Patient underwent a cerebral angiogram on 11/24 and no intervention provided. Patient was started on DAPT and statin and recommended for outpatient monitoring. She remained stable during her hospital stay and was evaluated for admission to acute inpatient rehab. She was admitted to [facility] on 11/28/23.    (28 Nov 2023 13:37)        PAST MEDICAL & SURGICAL HISTORY:  Prediabetes  now diabetes      Hypertension      Osteoarthritis      Spider veins      Obesity, Class II, BMI 35-39.9      [facility], 11/26/20-12/5/20      History of appendectomy  with right oophorecomy 2010      History of hip surgery  pinning of left hip age 12 and hardware removed age 17        VITALS  Vital Signs Last 24 Hrs  T(C): 37 (04 Dec 2023 07:43), Max: 37 (04 Dec 2023 07:43)  T(F): 98.6 (04 Dec 2023 07:43), Max: 98.6 (04 Dec 2023 07:43)  HR: 75 (04 Dec 2023 07:43) (75 - 78)  BP: 123/75 (04 Dec 2023 07:43) (121/70 - 123/75)  BP(mean): --  RR: 16 (04 Dec 2023 07:43) (16 - 16)  SpO2: 96% (04 Dec 2023 07:43) (96% - 99%)    Parameters below as of 04 Dec 2023 07:43  Patient On (Oxygen Delivery Method): room air          PHYSICAL EXAM  Constitutional - NAD, Comfortable  HEENT - NCAT, EOMI  Neck - Supple, No limited ROM  Chest - CTA bilaterally  Cardiovascular - RRR, S1S2  Abdomen -  Soft, NTND  Extremities - No calf tenderness     Neurologic Exam - improving left hemiparesis                   RECENT LABS                        12.0   6.99  )-----------( 217      ( 04 Dec 2023 06:25 )             35.4     12-04    136  |  99  |  24<H>  ----------------------------<  140<H>  3.5   |  26  |  0.94    Ca    9.3      04 Dec 2023 06:25    TPro  7.4  /  Alb  3.4  /  TBili  0.5  /  DBili  x   /  AST  22  /  ALT  33  /  AlkPhos  53  12-04      LIVER FUNCTIONS - ( 04 Dec 2023 06:25 )  Alb: 3.4 g/dL / Pro: 7.4 g/dL / ALK PHOS: 53 U/L / ALT: 33 U/L / AST: 22 U/L / GGT: x           Urinalysis Basic - ( 04 Dec 2023 06:25 )    Color: x / Appearance: x / SG: x / pH: x  Gluc: 140 mg/dL / Ketone: x  / Bili: x / Urobili: x   Blood: x / Protein: x / Nitrite: x   Leuk Esterase: x / RBC: x / WBC x   Sq Epi: x / Non Sq Epi: x / Bacteria: x                  Urinalysis Basic - ( 04 Dec 2023 06:25 )    Color: x / Appearance: x / SG: x / pH: x  Gluc: 140 mg/dL / Ketone: x  / Bili: x / Urobili: x   Blood: x / Protein: x / Nitrite: x   Leuk Esterase: x / RBC: x / WBC x   Sq Epi: x / Non Sq Epi: x / Bacteria: x          CURRENT MEDICATIONS  MEDICATIONS  (STANDING):  aspirin  chewable 81 milliGRAM(s) Oral daily  atorvastatin 40 milliGRAM(s) Oral at bedtime  clopidogrel Tablet 75 milliGRAM(s) Oral daily  dextrose 5%. 1000 milliLiter(s) (50 mL/Hr) IV Continuous <Continuous>  dextrose 5%. 1000 milliLiter(s) (100 mL/Hr) IV Continuous <Continuous>  dextrose 50% Injectable 12.5 Gram(s) IV Push once  dextrose 50% Injectable 25 Gram(s) IV Push once  dextrose 50% Injectable 25 Gram(s) IV Push once  enoxaparin Injectable 40 milliGRAM(s) SubCutaneous every 24 hours  escitalopram 5 milliGRAM(s) Oral daily  glucagon  Injectable 1 milliGRAM(s) IntraMuscular once  hydrochlorothiazide 25 milliGRAM(s) Oral daily  influenza   Vaccine 0.5 milliLiter(s) IntraMuscular once  insulin lispro (ADMELOG) corrective regimen sliding scale   SubCutaneous at bedtime  insulin lispro (ADMELOG) corrective regimen sliding scale   SubCutaneous three times a day before meals  losartan 100 milliGRAM(s) Oral daily  metFORMIN 1000 milliGRAM(s) Oral two times a day  pantoprazole    Tablet 40 milliGRAM(s) Oral before breakfast  senna 2 Tablet(s) Oral at bedtime    MEDICATIONS  (PRN):  acetaminophen     Tablet .. 650 milliGRAM(s) Oral every 6 hours PRN Mild Pain (1 - 3)  dextrose Oral Gel 15 Gram(s) Oral once PRN Blood Glucose LESS THAN 70 milliGRAM(s)/deciliter  gabapentin 100 milliGRAM(s) Oral at bedtime PRN pain  melatonin 3 milliGRAM(s) Oral at bedtime PRN Insomnia  polyethylene glycol 3350 17 Gram(s) Oral at bedtime PRN for constipation     CHIEF COMPLAINT: no new complaints, improving daily functionally       HISTORY OF PRESENT ILLNESS    58 year old female with PMH of vertigo, HTN, OA, Type 2 DM, shingles and sciatica who presented to [facility] on 11/23  as a direct transfer from [facility] for further evaluation of right carotid occlusion requiring diagnostic angiogram.  She presented to [facility] initially on 11/21 with progressively worsening left sided weakness, such that she slid off bed 2 days prior to admission. Her imaging showed as stroke with MRI showing scattered small acute infarcts involving the superior aspects of the right frontal lobe and right parietal lobe as well as the right caudate but was outside window for tPA.  Patient underwent a cerebral angiogram on 11/24 and no intervention provided. Patient was started on DAPT and statin and recommended for outpatient monitoring. She remained stable during her hospital stay and was evaluated for admission to acute inpatient rehab. She was admitted to [facility] on 11/28/23.    (28 Nov 2023 13:37)        PAST MEDICAL & SURGICAL HISTORY:  Prediabetes  now diabetes      Hypertension      Osteoarthritis      Spider veins      Obesity, Class II, BMI 35-39.9      [facility], 11/26/20-12/5/20      History of appendectomy  with right oophorecomy 2010      History of hip surgery  pinning of left hip age 12 and hardware removed age 17        VITALS  Vital Signs Last 24 Hrs  T(C): 37 (04 Dec 2023 07:43), Max: 37 (04 Dec 2023 07:43)  T(F): 98.6 (04 Dec 2023 07:43), Max: 98.6 (04 Dec 2023 07:43)  HR: 75 (04 Dec 2023 07:43) (75 - 78)  BP: 123/75 (04 Dec 2023 07:43) (121/70 - 123/75)  BP(mean): --  RR: 16 (04 Dec 2023 07:43) (16 - 16)  SpO2: 96% (04 Dec 2023 07:43) (96% - 99%)    Parameters below as of 04 Dec 2023 07:43  Patient On (Oxygen Delivery Method): room air          PHYSICAL EXAM  Constitutional - NAD, Comfortable  HEENT - NCAT, EOMI  Neck - Supple, No limited ROM  Chest - CTA bilaterally  Cardiovascular - RRR, S1S2  Abdomen -  Soft, NTND  Extremities - No calf tenderness     Neurologic Exam - improving left hemiparesis                   RECENT LABS                        12.0   6.99  )-----------( 217      ( 04 Dec 2023 06:25 )             35.4     12-04    136  |  99  |  24<H>  ----------------------------<  140<H>  3.5   |  26  |  0.94    Ca    9.3      04 Dec 2023 06:25    TPro  7.4  /  Alb  3.4  /  TBili  0.5  /  DBili  x   /  AST  22  /  ALT  33  /  AlkPhos  53  12-04      LIVER FUNCTIONS - ( 04 Dec 2023 06:25 )  Alb: 3.4 g/dL / Pro: 7.4 g/dL / ALK PHOS: 53 U/L / ALT: 33 U/L / AST: 22 U/L / GGT: x           Urinalysis Basic - ( 04 Dec 2023 06:25 )    Color: x / Appearance: x / SG: x / pH: x  Gluc: 140 mg/dL / Ketone: x  / Bili: x / Urobili: x   Blood: x / Protein: x / Nitrite: x   Leuk Esterase: x / RBC: x / WBC x   Sq Epi: x / Non Sq Epi: x / Bacteria: x                  Urinalysis Basic - ( 04 Dec 2023 06:25 )    Color: x / Appearance: x / SG: x / pH: x  Gluc: 140 mg/dL / Ketone: x  / Bili: x / Urobili: x   Blood: x / Protein: x / Nitrite: x   Leuk Esterase: x / RBC: x / WBC x   Sq Epi: x / Non Sq Epi: x / Bacteria: x          CURRENT MEDICATIONS  MEDICATIONS  (STANDING):  aspirin  chewable 81 milliGRAM(s) Oral daily  atorvastatin 40 milliGRAM(s) Oral at bedtime  clopidogrel Tablet 75 milliGRAM(s) Oral daily  dextrose 5%. 1000 milliLiter(s) (50 mL/Hr) IV Continuous <Continuous>  dextrose 5%. 1000 milliLiter(s) (100 mL/Hr) IV Continuous <Continuous>  dextrose 50% Injectable 12.5 Gram(s) IV Push once  dextrose 50% Injectable 25 Gram(s) IV Push once  dextrose 50% Injectable 25 Gram(s) IV Push once  enoxaparin Injectable 40 milliGRAM(s) SubCutaneous every 24 hours  escitalopram 5 milliGRAM(s) Oral daily  glucagon  Injectable 1 milliGRAM(s) IntraMuscular once  hydrochlorothiazide 25 milliGRAM(s) Oral daily  influenza   Vaccine 0.5 milliLiter(s) IntraMuscular once  insulin lispro (ADMELOG) corrective regimen sliding scale   SubCutaneous at bedtime  insulin lispro (ADMELOG) corrective regimen sliding scale   SubCutaneous three times a day before meals  losartan 100 milliGRAM(s) Oral daily  metFORMIN 1000 milliGRAM(s) Oral two times a day  pantoprazole    Tablet 40 milliGRAM(s) Oral before breakfast  senna 2 Tablet(s) Oral at bedtime    MEDICATIONS  (PRN):  acetaminophen     Tablet .. 650 milliGRAM(s) Oral every 6 hours PRN Mild Pain (1 - 3)  dextrose Oral Gel 15 Gram(s) Oral once PRN Blood Glucose LESS THAN 70 milliGRAM(s)/deciliter  gabapentin 100 milliGRAM(s) Oral at bedtime PRN pain  melatonin 3 milliGRAM(s) Oral at bedtime PRN Insomnia  polyethylene glycol 3350 17 Gram(s) Oral at bedtime PRN for constipation

## 2023-12-04 NOTE — CONSULT NOTE ADULT - SUBJECTIVE AND OBJECTIVE BOX
Pt is a 58 year-old, right-handed female with PMHx of vertigo, HTN, OA, Type 2 DM, shingles and sciatica who presented to [facility] on 11/23  as a direct transfer from [facility] for further evaluation of right carotid occlusion requiring diagnostic angiogram.  She presented to [facility] initially on 11/21 with progressively worsening left sided weakness, such that she slid off bed 2 days prior to admission. Her imaging showed as stroke with MRI showing scattered small acute infarcts involving the superior aspects of the right frontal lobe and right parietal lobe as well as the right caudate but was outside window for tPA.  Pt underwent a cerebral angiogram on 11/24 and no intervention provided. Pt was started on DAPT and statin and recommended for outpatient monitoring. She remained stable during her hospital stay and was evaluated for admission to acute inpatient rehab. She was admitted to [facility] on 11/28/23. PMHx: As noted above. Current meds: Please see list in Pt’s chart. Social Hx: Pt is  and has three children. She completed a year of college and is a . Pt lacks hx of mental illness and substance abuse. She is Yars[employer]. Pt enjoys cooking, dining out, travelling, and playing cards.     Findings: Pt was seen for an initial assessment of her cognitive and emotional functioning. The Modified MMSE (3MS) was administered; Pt’s results (97/100) were in the Normal range. Her scores in standardized mood measures suggested minimal levels of anxiety and depression (GAD7 = 2/21; PHQ9 = 0/27). Pt was alert, fully Ox3, and her attitude was cooperative and friendly. Attn/Conc: Simple auditory attention - intact.  Concentration/Working memory for reversed counting and spelling – intact (7/7). Language: Pt’s speech was of normal volume, pitch and pace. Naming - intact. Sentence repetition - intact. Auditory Comprehension - intact. Reading - intact. Writing - intact. Memory: Encoding of 3 words was intact (3/3); short-delayed verbal recall – mildly impaired (2/3, improving to 3/3 with saturated cueing); long-delayed verbal recall – intact (3/3). LTM was intact for US presidents (4/4). Visual memory – intact. Visuospatial: Visuomotor integration – intact for copy of a 2D figure, minimal distortion was noted. Figure-ground discrimination was mildly impaired (6/4) for the Poppelreuter figure. Executive Functions: Motor Planning - intact. Organizational skills - intact. Abstract reasoning - closely intact (5/6, for similarities). Initiation/Phonemic Fluency - intact. Verbal problem solving – closely intact. Emotional functioning: Affect - full range. Mood - euthymic ("better"); Pt reported experiencing sadness, anxiety, poor sleep. On mood measures she additionally reported worry and catastrophization. Thought processes were .  No abnormal thought contents were observed.  Pt denied any AH/VH. Pt also denied SI/HI/I/P. Insight - WFL. Judgment - fair.    Assessment:    FIM scores: Social Interaction ; Problem Solving ; Memory .  Plan: Individual supportive psychotherapy to monitor cognition, affect/mood, and behavior. Cognitive remediation during speech therapy sessions. Participation in recreation/art therapy in order to have pleasure and mastery experiences and regain/reestablish a sense of routine.  Time spent with Pt,  minutes.   Pt is a 58 year-old, right-handed female with PMHx of vertigo, HTN, OA, Type 2 DM, shingles and sciatica who presented to [facility] on 11/23  as a direct transfer from [facility] for further evaluation of right carotid occlusion requiring diagnostic angiogram.  She presented to [facility] initially on 11/21 with progressively worsening left sided weakness, such that she slid off bed 2 days prior to admission. Her imaging showed as stroke with MRI showing scattered small acute infarcts involving the superior aspects of the right frontal lobe and right parietal lobe as well as the right caudate but was outside window for tPA.  Pt underwent a cerebral angiogram on 11/24 and no intervention provided. Pt was started on DAPT and statin and recommended for outpatient monitoring. She remained stable during her hospital stay and was evaluated for admission to acute inpatient rehab. She was admitted to [facility] on 11/28/23. PMHx: As noted above. Current meds: Please see list in Pt’s chart. Social Hx: Pt is  and has three children. She completed a year of college and is a . Pt lacks hx of mental illness and substance abuse. She is [employer]. Pt enjoys cooking, dining out, travelling, and playing cards.     Findings: Pt was seen for an initial assessment of her cognitive and emotional functioning. The Modified MMSE (3MS) was administered; Pt’s results (97/100) were in the Normal range. Her scores in standardized mood measures suggested minimal levels of anxiety and depression (GAD7 = 2/21; PHQ9 = 0/27). Pt was alert, fully Ox3, and her attitude was cooperative and friendly. Attn/Conc: Simple auditory attention - intact.  Concentration/Working memory for reversed counting and spelling – intact (7/7). Language: Pt’s speech was of normal volume, pitch and pace. Naming - intact. Sentence repetition - intact. Auditory Comprehension - intact. Reading - intact. Writing - intact. Memory: Encoding of 3 words was intact (3/3); short-delayed verbal recall – mildly impaired (2/3, improving to 3/3 with saturated cueing); long-delayed verbal recall – intact (3/3). LTM was intact for US presidents (4/4). Visual memory – intact. Visuospatial: Visuomotor integration – intact for copy of a 2D figure, minimal distortion was noted. Figure-ground discrimination was mildly impaired (6/4) for the Poppelreuter figure. Executive Functions: Motor Planning - intact. Organizational skills - intact. Abstract reasoning - closely intact (5/6, for similarities). Initiation/Phonemic Fluency - intact. Verbal problem solving – closely intact. Emotional functioning: Affect - full range. Mood - euthymic ("better"); Pt reported experiencing sadness, anxiety, poor sleep. On mood measures she additionally reported worry and catastrophization. Thought processes were .  No abnormal thought contents were observed.  Pt denied any AH/VH. Pt also denied SI/HI/I/P. Insight - WFL. Judgment - fair.    Assessment:    FIM scores: Social Interaction ; Problem Solving ; Memory .  Plan: Individual supportive psychotherapy to monitor cognition, affect/mood, and behavior. Cognitive remediation during speech therapy sessions. Participation in recreation/art therapy in order to have pleasure and mastery experiences and regain/reestablish a sense of routine.  Time spent with Pt,  minutes.   Pt is a 58 year-old, right-handed female with PMHx of vertigo, HTN, OA, Type 2 DM, shingles and sciatica who presented to [facility] on 11/23  as a direct transfer from [facility] for further evaluation of right carotid occlusion requiring diagnostic angiogram.  She presented to [facility] initially on 11/21 with progressively worsening left sided weakness, such that she slid off bed 2 days prior to admission. Her imaging showed as stroke with MRI showing scattered small acute infarcts involving the superior aspects of the right frontal lobe and right parietal lobe as well as the right caudate but was outside window for tPA.  Pt underwent a cerebral angiogram on 11/24 and no intervention provided. Pt was started on DAPT and statin and recommended for outpatient monitoring. She remained stable during her hospital stay and was evaluated for admission to acute inpatient rehab. She was admitted to [facility] on 11/28/23. PMHx: As noted above. Current meds: Please see list in Pt’s chart. Social Hx: Pt is  and has three children. She completed a year of college and is a . Pt lacks hx of mental illness and substance abuse. She is Denominational. Pt enjoys cooking, dining out, travelling, and playing cards.     Findings: Pt was seen for an initial assessment of her cognitive and emotional functioning. The Modified MMSE (3MS) was administered; Pt’s results (97/100) were in the Normal range. Her scores in standardized mood measures suggested minimal levels of anxiety and depression (GAD7 = 2/21; PHQ9 = 0/27). Pt was alert, fully Ox3, and her attitude was cooperative and friendly. Attn/Conc: Simple auditory attention - intact.  Concentration/Working memory for reversed counting and spelling – intact (7/7). Language: Pt’s speech was of normal volume, pitch and pace. Naming - intact. Sentence repetition - intact. Auditory Comprehension - intact. Reading - intact. Writing - intact. Memory: Encoding of 3 words was intact (3/3); short-delayed verbal recall – mildly impaired (2/3, improving to 3/3 with saturated cueing); long-delayed verbal recall – intact (3/3). LTM was intact for US presidents (4/4). Visual memory – intact. Visuospatial: Visuomotor integration – intact for copy of a 2D figure, minimal distortion was noted. Figure-ground discrimination was mildly impaired (6/4) for the Poppelreuter figure. Executive Functions: Motor Planning - intact. Organizational skills - intact. Abstract reasoning - closely intact (5/6, for similarities). Initiation/Phonemic Fluency - intact. Verbal problem solving – closely intact. Emotional functioning: Affect - full range. Mood - euthymic ("better"); Pt reported experiencing sadness, anxiety, poor sleep. On mood measures she additionally reported worry and catastrophization. Thought processes were .  No abnormal thought contents were observed.  Pt denied any AH/VH. Pt also denied SI/HI/I/P. Insight - WFL. Judgment - fair.     Pt is a 58 year-old, right-handed female with PMHx of vertigo, HTN, OA, Type 2 DM, shingles and sciatica who presented to [facility] on 11/23  as a direct transfer from [facility] for further evaluation of right carotid occlusion requiring diagnostic angiogram.  She presented to [facility] initially on 11/21 with progressively worsening left sided weakness, such that she slid off bed 2 days prior to admission. Her imaging showed as stroke with MRI showing scattered small acute infarcts involving the superior aspects of the right frontal lobe and right parietal lobe as well as the right caudate but was outside window for tPA.  Pt underwent a cerebral angiogram on 11/24 and no intervention provided. Pt was started on DAPT and statin and recommended for outpatient monitoring. She remained stable during her hospital stay and was evaluated for admission to acute inpatient rehab. She was admitted to [facility] on 11/28/23. PMHx: As noted above. Current meds: Please see list in Pt’s chart. Social Hx: Pt is  and has three children. She completed a year of college and is a . Pt lacks hx of mental illness and substance abuse. She is [employer]. Pt enjoys cooking, dining out, travelling, and playing cards.     Findings: Pt was seen for an initial assessment of her cognitive and emotional functioning. The Modified MMSE (3MS) was administered; Pt’s results (97/100) were in the Normal range. Her scores in standardized mood measures suggested minimal levels of anxiety and depression (GAD7 = 2/21; PHQ9 = 0/27). Pt was alert, fully Ox3, and her attitude was cooperative and friendly. Attn/Conc: Simple auditory attention - intact.  Concentration/Working memory for reversed counting and spelling – intact (7/7). Language: Pt’s speech was of normal volume, pitch and pace. Naming - intact. Sentence repetition - intact. Auditory Comprehension - intact. Reading - intact. Writing - intact. Memory: Encoding of 3 words was intact (3/3); short-delayed verbal recall – mildly impaired (2/3, improving to 3/3 with saturated cueing); long-delayed verbal recall – intact (3/3). LTM was intact for US presidents (4/4). Visual memory – intact. Visuospatial: Visuomotor integration – intact for copy of a 2D figure, minimal distortion was noted. Figure-ground discrimination was mildly impaired (6/4) for the Poppelreuter figure. Executive Functions: Motor Planning - intact. Organizational skills - intact. Abstract reasoning - closely intact (5/6, for similarities). Initiation/Phonemic Fluency - intact. Verbal problem solving – closely intact. Emotional functioning: Affect - full range. Mood - euthymic ("better"); Pt reported experiencing sadness, anxiety, poor sleep. On mood measures she additionally reported worry and catastrophization. Thought processes were .  No abnormal thought contents were observed.  Pt denied any AH/VH. Pt also denied SI/HI/I/P. Insight - WFL. Judgment - fair.     Pt is a 58 year-old, right-handed female with PMHx of vertigo, HTN, OA, Type 2 DM, shingles and sciatica who presented to [facility] on 11/23  as a direct transfer from [facility] for further evaluation of right carotid occlusion requiring diagnostic angiogram.  She presented to [facility] initially on 11/21 with progressively worsening left sided weakness, such that she slid off bed 2 days prior to admission. Her imaging showed as stroke with MRI showing scattered small acute infarcts involving the superior aspects of the right frontal lobe and right parietal lobe as well as the right caudate but was outside window for tPA.  Pt underwent a cerebral angiogram on 11/24 and no intervention provided. Pt was started on DAPT and statin and recommended for outpatient monitoring. She remained stable during her hospital stay and was evaluated for admission to acute inpatient rehab. She was admitted to [facility] on 11/28/23. PMHx: As noted above. Current meds: Please see list in Pt’s chart. Social Hx: Pt is  and has three children. She completed a year of college and is a . Pt lacks hx of mental illness and substance abuse. She is Bahai. Pt enjoys cooking, dining out, travelling, and playing cards.     Findings: Pt was seen for an initial assessment of her cognitive and emotional functioning. The Modified MMSE (3MS) was administered; Pt’s results (97/100) were in the Normal range. Her scores in standardized mood measures suggested minimal levels of anxiety and depression (GAD7 = 2/21; PHQ9 = 0/27). Pt was alert, fully Ox3, and her attitude was cooperative and friendly. Attn/Conc: Simple auditory attention - intact.  Concentration/Working memory for reversed counting and spelling – intact (7/7). Language: Pt’s speech was of normal volume, pitch and pace. Naming - intact. Sentence repetition - intact. Auditory Comprehension - intact. Reading - intact. Writing - intact. Memory: Encoding of 3 words was intact (3/3); short-delayed verbal recall – mildly impaired (2/3, improving to 3/3 with saturated cueing); long-delayed verbal recall – intact (3/3). LTM was intact for US presidents (4/4). Visual memory – intact. Visuospatial: Visuomotor integration – intact for copy of a 2D figure, minimal distortion was noted. Figure-ground discrimination was mildly impaired (6/4) for the Poppelreuter figure. Executive Functions: Motor Planning - intact. Organizational skills - intact. Abstract reasoning - closely intact (5/6, for similarities). Initiation/Phonemic Fluency - intact. Verbal problem solving – closely intact. Emotional functioning: Affect - full range. Mood - euthymic ("better"); Pt reported experiencing sadness, anxiety, poor sleep. On mood measures she additionally reported worry and catastrophization. Thought processes were goal-directed.  No abnormal thought contents were observed.  Pt denied any AH/VH. Pt also denied SI/HI/I/P. Insight - WFL. Judgment - fair.     Pt is a 58 year-old, right-handed female with PMHx of vertigo, HTN, OA, Type 2 DM, shingles and sciatica who presented to [facility] on 11/23  as a direct transfer from [facility] for further evaluation of right carotid occlusion requiring diagnostic angiogram.  She presented to [facility] initially on 11/21 with progressively worsening left sided weakness, such that she slid off bed 2 days prior to admission. Her imaging showed as stroke with MRI showing scattered small acute infarcts involving the superior aspects of the right frontal lobe and right parietal lobe as well as the right caudate but was outside window for tPA.  Pt underwent a cerebral angiogram on 11/24 and no intervention provided. Pt was started on DAPT and statin and recommended for outpatient monitoring. She remained stable during her hospital stay and was evaluated for admission to acute inpatient rehab. She was admitted to [facility] on 11/28/23. PMHx: As noted above. Current meds: Please see list in Pt’s chart. Social Hx: Pt is  and has three children. She completed a year of college and is a . Pt lacks hx of mental illness and substance abuse. She is Evangel[employer]. Pt enjoys cooking, dining out, travelling, and playing cards.     Findings: Pt was seen for an initial assessment of her cognitive and emotional functioning. The Modified MMSE (3MS) was administered; Pt’s results (97/100) were in the Normal range. Her scores in standardized mood measures suggested minimal levels of anxiety and depression (GAD7 = 2/21; PHQ9 = 0/27). Pt was alert, fully Ox3, and her attitude was cooperative and friendly. Attn/Conc: Simple auditory attention - intact.  Concentration/Working memory for reversed counting and spelling – intact (7/7). Language: Pt’s speech was of normal volume, pitch and pace. Naming - intact. Sentence repetition - intact. Auditory Comprehension - intact. Reading - intact. Writing - intact. Memory: Encoding of 3 words was intact (3/3); short-delayed verbal recall – mildly impaired (2/3, improving to 3/3 with saturated cueing); long-delayed verbal recall – intact (3/3). LTM was intact for US presidents (4/4). Visual memory – intact. Visuospatial: Visuomotor integration – intact for copy of a 2D figure, minimal distortion was noted. Figure-ground discrimination was mildly impaired (6/4) for the Poppelreuter figure. Executive Functions: Motor Planning - intact. Organizational skills - intact. Abstract reasoning - closely intact (5/6, for similarities). Initiation/Phonemic Fluency - intact. Verbal problem solving – closely intact. Emotional functioning: Affect - full range. Mood - euthymic ("better"); Pt reported experiencing sadness, anxiety, poor sleep. On mood measures she additionally reported worry and catastrophization. Thought processes were goal-directed.  No abnormal thought contents were observed.  Pt denied any AH/VH. Pt also denied SI/HI/I/P. Insight - WFL. Judgment - fair.

## 2023-12-04 NOTE — CONSULT NOTE ADULT - ASSESSMENT
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 Pt exhibits difficulty with short-term memory for auditory verbal material, and aspects of higher-level cognitive functions (including problem solving and abstract reasoning). Pt's affect is broad in range and she reports a few adjustment symptoms in response to her current medical condition. FIM scores: Social Interaction 6; Problem Solving 6; Memory 6.    Plan: Individual supportive psychotherapy to monitor cognition, affect/mood, and behavior. Cognitive remediation during speech therapy sessions. Participation in recreation/art therapy in order to have pleasure and mastery experiences and regain/reestablish a sense of routine.    Time spent with Pt, 40 minutes.   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 Pt exhibits difficulty with short-term memory for auditory verbal material, and aspects of higher-level cognitive functions (including problem solving and abstract reasoning). Pt's affect is broad in range and she reports a few adjustment symptoms in response to her current medical condition. FIM scores: Social Interaction 6; Problem Solving 6; Memory 6.    Plan: Individual supportive psychotherapy to monitor cognition, affect/mood, and behavior. Cognitive remediation during speech therapy sessions is recommended. Participation in recreation/art therapy in order to have pleasure and mastery experiences and regain/reestablish a sense of routine.    Time spent with Pt, 40 minutes.

## 2023-12-04 NOTE — PROGRESS NOTE ADULT - ASSESSMENT
ASSESSMENT/PLAN  58 year old female with PMH of vertigo, HTN, OA, Type 2 DM, shingles and sciatica who presented to [facility] on 11/23  as a direct transfer from [facility] for further evaluation of right carotid occlusion requiring diagnostic angiogram.  She presented to [facility] initially on 11/21 with progressively worsening left sided weakness, such that she slid off bed 2 days prior to admission. Her imaging showed as stroke with MRI showing scattered small acute infarcts involving the superior aspects of the right frontal lobe and right parietal lobe as well as the right caudate but was outside window for tPA.  Patient underwent a cerebral angiogram on 11/24 and no intervention provided. Patient was started on DAPT and statin and recommended for outpatient monitoring. She remained stable during her hospital stay and was evaluated for admission to acute inpatient rehab. She was admitted to [facility] on 11/28/23.       #Right frontal/parietal/caudate infarcts with right ICA stenosis now with left sided weakness, Gait Instability, ADL impairments and Functional impairments  - Continue Comprehensive Rehab Program of PT/OT/SLP  -Most likely large vessel atherosclerosis with Right ICA near occlusion of vessel  -Continue Aspirin 81mg  -Continue Plavix 75mg   -Continue Atorvastatin 40mg daily   -Consider hypercoagulable workup for stroke etiology as outpatient- heme/onc consulted 11/29 and recommended outpatient follow up    #Sleep/mood  -Continue lexapro 5mg daily 11/29 - for anxiety and neurorecovery   -Continue melatonin 3mg PRN at bedtime  -Neuropsych consult appreciated   -Recreation therapy appreciated     #HTN  -Continue HCTZ 25mg  -Continue Losartan 100mg daily   -TTE with EF of 60-65%    #Type 2 DM  -a1c is 7/7  -FS and ISS  -Continue Metformin 1000mg BID    #Pain control  - Tylenol PRN  -Home med: Gabapentin 100mg at bedtime PRN    #GI/Bowel Mgmt   - Continue Senna at bedtime   - Miralax     #Bladder management  - Continue to monitor PVR q 8 hours (SC if > 400)  -Monitor UO    #DVT  - Lovenox  - TEDs     #Skin:  - intergluteal cleft fissure - continue barrier cream     FEN   - Diet - Regular + Thins  [CCHO, DASH/TLC]    - Dysphagia  SLP - evaluation and treatment    Precautions / PROPHYLAXIS:   - Falls, Cardiac  - ortho: Weight bearing status: WBAT   - Lungs: Aspiration, Incentive Spirometer   - Pressure injury/Skin: Turn Q2hrs while in bed, OOB to Chair, PT/OT      Multidisciplinary team meeting today:  patient's functional goals and needs, functional and clinical  progress were discussed, barriers to discharge were identified. Anticipate discharge home with home care, 24/7 supervision for safety.     EDOD 12/15/23    57 min spent        ASSESSMENT/PLAN  58 year old female with PMH of vertigo, HTN, OA, Type 2 DM, shingles and sciatica who presented to [facility] on 11/23  as a direct transfer from [facility] for further evaluation of right carotid occlusion requiring diagnostic angiogram.  She presented to [facility] initially on 11/21 with progressively worsening left sided weakness, such that she slid off bed 2 days prior to admission. Her imaging showed as stroke with MRI showing scattered small acute infarcts involving the superior aspects of the right frontal lobe and right parietal lobe as well as the right caudate but was outside window for tPA.  Patient underwent a cerebral angiogram on 11/24 and no intervention provided. Patient was started on DAPT and statin and recommended for outpatient monitoring. She remained stable during her hospital stay and was evaluated for admission to acute inpatient rehab. She was admitted to [facility] on 11/28/23.       #Right frontal/parietal/caudate infarcts with right ICA stenosis now with left sided weakness, Gait Instability, ADL impairments and Functional impairments  - Continue Comprehensive Rehab Program of PT/OT/SLP  -Most likely large vessel atherosclerosis with Right ICA near occlusion of vessel  -Continue Aspirin 81mg  -Continue Plavix 75mg   -Continue Atorvastatin 40mg daily   -Consider hypercoagulable workup for stroke etiology as outpatient- heme/onc consulted 11/29 and recommended outpatient follow up    #Sleep/mood  -Continue lexapro 5mg daily 11/29 - for anxiety and neurorecovery   -Continue melatonin 3mg PRN at bedtime  -Neuropsych consult appreciated   -Recreation therapy appreciated     #HTN  -Continue HCTZ 25mg  -Continue Losartan 100mg daily   -TTE with EF of 60-65%    #Type 2 DM  -a1c is 7/7  -FS and ISS  -Continue Metformin 1000mg BID    #Pain control  - Tylenol PRN  -Home med: Gabapentin 100mg at bedtime PRN    #GI/Bowel Mgmt   - Continue Senna at bedtime   - Miralax     #Bladder management  - Continue to monitor PVR q 8 hours (SC if > 400)  -Monitor UO    #DVT  - Lovenox  - TEDs     #Skin:  - intergluteal cleft fissure - continue barrier cream     FEN   - Diet - Regular + Thins  [CCHO, DASH/TLC]    - Dysphagia  SLP - evaluation and treatment    Precautions / PROPHYLAXIS:   - Falls, Cardiac  - ortho: Weight bearing status: WBAT   - Lungs: Aspiration, Incentive Spirometer   - Pressure injury/Skin: Turn Q2hrs while in bed, OOB to Chair, PT/OT      Multidisciplinary team meeting today:  patient's functional goals and needs, functional and clinical  progress were discussed, barriers to discharge were identified. Anticipate discharge home with home care, 24/7 supervision for safety.     EDOD 12/15/23    57 min spent

## 2023-12-04 NOTE — PROGRESS NOTE ADULT - ASSESSMENT
59 y/o F with PMH of vertigo, HTN, OA, Type 2 DM, shingles and sciatica who presented to [facility] on 11/23 as a direct transfer from [facility] for further evaluation of right carotid occlusion requiring diagnostic angiogram.  She presented to [facility] initially on 11/21 with progressively worsening left sided weakness, such that she slid off bed 2 days prior to admission. Her imaging showed as stroke with MRI showing scattered small acute infarcts involving the superior aspects of the right frontal lobe and right parietal lobe as well as the right caudate but was outside window for tPA.  Patient underwent a cerebral angiogram on 11/24 and no intervention provided. Patient was started on DAPT and statin and recommended for outpatient monitoring. She remained stable during her hospital stay and was evaluated for admission to acute inpatient rehab. She was admitted to [facility] on 11/28/23.     #Right frontal/parietal/caudate infarcts with right ICA stenosis now with left sided weakness, Gait Instability, ADL impairments and Functional impairments  -Continue DAPT with Aspirin 81mg qd, Plavix 75mg qd  -Continue Atorvastatin 40mg qhs  -Tolerating comprehensive rehab program  - fall, aspiration precautions  - heme cx noted: no need for hypercoag w/u.     # neuropathy  -Pain management, bowel regimen per rehab - continue gabapentin 100mg qhs prn    #HTN  -Continue HCTZ 25mg qd, Losartan 100mg qd  -TTE with EF of 60-65%  - monitor VS including OH and adjust meds    #Type 2 DM, HbA1c 7.7 (11/22/23)  -FS and ISS  -Home med: Metformin 1000mg BID - continued  - can add linagliptin 5 mg daily if BS >180 persistently    DVT ppx - lovenox   GI ppx - PPI 57 y/o F with PMH of vertigo, HTN, OA, Type 2 DM, shingles and sciatica who presented to [facility] on 11/23 as a direct transfer from [facility] for further evaluation of right carotid occlusion requiring diagnostic angiogram.  She presented to [facility] initially on 11/21 with progressively worsening left sided weakness, such that she slid off bed 2 days prior to admission. Her imaging showed as stroke with MRI showing scattered small acute infarcts involving the superior aspects of the right frontal lobe and right parietal lobe as well as the right caudate but was outside window for tPA.  Patient underwent a cerebral angiogram on 11/24 and no intervention provided. Patient was started on DAPT and statin and recommended for outpatient monitoring. She remained stable during her hospital stay and was evaluated for admission to acute inpatient rehab. She was admitted to [facility] on 11/28/23.     #Right frontal/parietal/caudate infarcts with right ICA stenosis now with left sided weakness, Gait Instability, ADL impairments and Functional impairments  -Continue DAPT with Aspirin 81mg qd, Plavix 75mg qd  -Continue Atorvastatin 40mg qhs  -Tolerating comprehensive rehab program  - fall, aspiration precautions  - heme cx noted: no need for hypercoag w/u.     # neuropathy  -Pain management, bowel regimen per rehab - continue gabapentin 100mg qhs prn    #HTN  -Continue HCTZ 25mg qd, Losartan 100mg qd  -TTE with EF of 60-65%  - monitor VS including OH and adjust meds    #Type 2 DM, HbA1c 7.7 (11/22/23)  -FS and ISS  -Home med: Metformin 1000mg BID - continued  - can add linagliptin 5 mg daily if BS >180 persistently    DVT ppx - lovenox   GI ppx - PPI

## 2023-12-05 LAB
GLUCOSE BLDC GLUCOMTR-MCNC: 127 MG/DL — HIGH (ref 70–99)
GLUCOSE BLDC GLUCOMTR-MCNC: 127 MG/DL — HIGH (ref 70–99)
GLUCOSE BLDC GLUCOMTR-MCNC: 151 MG/DL — HIGH (ref 70–99)
GLUCOSE BLDC GLUCOMTR-MCNC: 151 MG/DL — HIGH (ref 70–99)

## 2023-12-05 PROCEDURE — 99232 SBSQ HOSP IP/OBS MODERATE 35: CPT

## 2023-12-05 RX ADMIN — ENOXAPARIN SODIUM 40 MILLIGRAM(S): 100 INJECTION SUBCUTANEOUS at 05:35

## 2023-12-05 RX ADMIN — ATORVASTATIN CALCIUM 40 MILLIGRAM(S): 80 TABLET, FILM COATED ORAL at 19:48

## 2023-12-05 RX ADMIN — Medication 81 MILLIGRAM(S): at 16:48

## 2023-12-05 RX ADMIN — METFORMIN HYDROCHLORIDE 1000 MILLIGRAM(S): 850 TABLET ORAL at 05:36

## 2023-12-05 RX ADMIN — PANTOPRAZOLE SODIUM 40 MILLIGRAM(S): 20 TABLET, DELAYED RELEASE ORAL at 05:36

## 2023-12-05 RX ADMIN — ESCITALOPRAM OXALATE 5 MILLIGRAM(S): 10 TABLET, FILM COATED ORAL at 16:47

## 2023-12-05 RX ADMIN — METFORMIN HYDROCHLORIDE 1000 MILLIGRAM(S): 850 TABLET ORAL at 16:48

## 2023-12-05 RX ADMIN — LOSARTAN POTASSIUM 100 MILLIGRAM(S): 100 TABLET, FILM COATED ORAL at 05:36

## 2023-12-05 RX ADMIN — CLOPIDOGREL BISULFATE 75 MILLIGRAM(S): 75 TABLET, FILM COATED ORAL at 16:48

## 2023-12-05 RX ADMIN — Medication 2: at 08:20

## 2023-12-05 NOTE — PROGRESS NOTE ADULT - ASSESSMENT
ASSESSMENT/PLAN  58 year old female with PMH of vertigo, HTN, OA, Type 2 DM, shingles and sciatica who presented to [facility] on 11/23  as a direct transfer from [facility] for further evaluation of right carotid occlusion requiring diagnostic angiogram.  She presented to [facility] initially on 11/21 with progressively worsening left sided weakness, such that she slid off bed 2 days prior to admission. Her imaging showed as stroke with MRI showing scattered small acute infarcts involving the superior aspects of the right frontal lobe and right parietal lobe as well as the right caudate but was outside window for tPA.  Patient underwent a cerebral angiogram on 11/24 and no intervention provided. Patient was started on DAPT and statin and recommended for outpatient monitoring. She remained stable during her hospital stay and was evaluated for admission to acute inpatient rehab. She was admitted to [facility] on 11/28/23.       #Right frontal/parietal/caudate infarcts with right ICA stenosis now with left sided weakness, Gait Instability, ADL impairments and Functional impairments  - Continue Comprehensive Rehab Program of PT/OT/SLP  -Most likely large vessel atherosclerosis with Right ICA near occlusion of vessel  -Continue Aspirin 81mg  -Continue Plavix 75mg   -Continue Atorvastatin 40mg daily   -Consider hypercoagulable workup for stroke etiology as outpatient- heme/onc consulted 11/29 and recommended outpatient follow up    #Sleep/mood  -Continue lexapro 5mg daily 11/29 - for anxiety and neurorecovery   -Continue melatonin 3mg PRN at bedtime  -Neuropsych consult appreciated   -Recreation therapy appreciated     #HTN  -Continue HCTZ 25mg  -Continue Losartan 100mg daily   -TTE with EF of 60-65%    #Type 2 DM  -a1c is 7/7  -FS and ISS  -Continue Metformin 1000mg BID    #Pain control  - Tylenol PRN  -Home med: Gabapentin 100mg at bedtime PRN    #GI/Bowel Mgmt   - Continue Senna at bedtime   - Miralax PRN    #Bladder management  - Continue to monitor PVR q 8 hours (SC if > 400)  -Monitor UO    #DVT  - Lovenox  - TEDs     #Skin:  - intergluteal cleft fissure - continue barrier cream     FEN   - Diet - Regular + Thins  [CCHO, DASH/TLC]    - Dysphagia  SLP - evaluation and treatment    Precautions / PROPHYLAXIS:   - Falls, Cardiac  - ortho: Weight bearing status: WBAT   - Lungs: Aspiration, Incentive Spirometer   - Pressure injury/Skin: Turn Q2hrs while in bed, OOB to Chair, PT/OT      Multidisciplinary team meeting 12/4:  patient's functional goals and needs, functional and clinical  progress were discussed, barriers to discharge were identified. Anticipate discharge home with home care, 24/7 supervision for safety.     EDOD 12/15/23         ASSESSMENT/PLAN  58 year old female with PMH of vertigo, HTN, OA, Type 2 DM, shingles and sciatica who presented to [facility] on 11/23  as a direct transfer from [facility] for further evaluation of right carotid occlusion requiring diagnostic angiogram.  She presented to [facility] initially on 11/21 with progressively worsening left sided weakness, such that she slid off bed 2 days prior to admission. Her imaging showed as stroke with MRI showing scattered small acute infarcts involving the superior aspects of the right frontal lobe and right parietal lobe as well as the right caudate but was outside window for tPA.  Patient underwent a cerebral angiogram on 11/24 and no intervention provided. Patient was started on DAPT and statin and recommended for outpatient monitoring. She remained stable during her hospital stay and was evaluated for admission to acute inpatient rehab. She was admitted to [facility] on 11/28/23.       #Right frontal/parietal/caudate infarcts with right ICA stenosis now with left sided weakness, Gait Instability, ADL impairments and Functional impairments  - Continue Comprehensive Rehab Program of PT/OT/SLP  -Most likely large vessel atherosclerosis with Right ICA near occlusion of vessel  -Continue Aspirin 81mg  -Continue Plavix 75mg   -Continue Atorvastatin 40mg daily   -Consider hypercoagulable workup for stroke etiology as outpatient- heme/onc consulted 11/29 and recommended outpatient follow up    #Sleep/mood  -Continue lexapro 5mg daily 11/29 - for anxiety and neurorecovery   -Continue melatonin 3mg PRN at bedtime  -Neuropsych consult appreciated   -Recreation therapy appreciated     #HTN  -Continue HCTZ 25mg  -Continue Losartan 100mg daily   -TTE with EF of 60-65%    #Type 2 DM  -a1c is 7/7  -FS and ISS  -Continue Metformin 1000mg BID    #Pain control  - Tylenol PRN  -Home med: Gabapentin 100mg at bedtime PRN    #GI/Bowel Mgmt   - Continue Senna at bedtime   - Miralax PRN    #Bladder management  - Continue to monitor PVR q 8 hours (SC if > 400)  -Monitor UO    #DVT  - Lovenox  - TEDs     #Skin:  - intergluteal cleft fissure - continue barrier cream     FEN   - Diet - Regular + Thins  [CCHO, DASH/TLC]    - Dysphagia  SLP - evaluation and treatment    Precautions / PROPHYLAXIS:   - Falls, Cardiac  - ortho: Weight bearing status: WBAT   - Lungs: Aspiration, Incentive Spirometer   - Pressure injury/Skin: Turn Q2hrs while in bed, OOB to Chair, PT/OT      Multidisciplinary team meeting 12/4:  patient's functional goals and needs, functional and clinical  progress were discussed, barriers to discharge were identified. Anticipate discharge home with home care, 24/7 supervision for safety.     EDOD 12/15/23

## 2023-12-05 NOTE — PROGRESS NOTE ADULT - ASSESSMENT
57 y/o F with PMH of vertigo, HTN, OA, Type 2 DM, shingles and sciatica who presented to [facility] on 11/23 as a direct transfer from [facility] for further evaluation of right carotid occlusion requiring diagnostic angiogram.  She presented to [facility] initially on 11/21 with progressively worsening left sided weakness, such that she slid off bed 2 days prior to admission. Her imaging showed as stroke with MRI showing scattered small acute infarcts involving the superior aspects of the right frontal lobe and right parietal lobe as well as the right caudate but was outside window for tPA.  Patient underwent a cerebral angiogram on 11/24 and no intervention provided. Patient was started on DAPT and statin and recommended for outpatient monitoring. She remained stable during her hospital stay and was evaluated for admission to acute inpatient rehab. She was admitted to [facility] on 11/28/23- pt/ot/dvt ppx    #Right frontal/parietal/caudate infarcts with right ICA stenosis now with left sided weakness  -Continue DAPT with Aspirin , Plavix   -Continue Atorvastatin       # neuropathy  - gabapentin     #HTN  -Continue HCTZ  Losartan   -TTE with EF of 60-65%      #Type 2 DM, HbA1c 7.7 (11/22/23)  -d/c FS and ISS  -Home med: Metformin 1000mg BID - continued      DVT ppx - Lovenox     GI ppx - PPI    will follow  d/w rehab team  59 y/o F with PMH of vertigo, HTN, OA, Type 2 DM, shingles and sciatica who presented to [facility] on 11/23 as a direct transfer from [facility] for further evaluation of right carotid occlusion requiring diagnostic angiogram.  She presented to [facility] initially on 11/21 with progressively worsening left sided weakness, such that she slid off bed 2 days prior to admission. Her imaging showed as stroke with MRI showing scattered small acute infarcts involving the superior aspects of the right frontal lobe and right parietal lobe as well as the right caudate but was outside window for tPA.  Patient underwent a cerebral angiogram on 11/24 and no intervention provided. Patient was started on DAPT and statin and recommended for outpatient monitoring. She remained stable during her hospital stay and was evaluated for admission to acute inpatient rehab. She was admitted to [facility] on 11/28/23- pt/ot/dvt ppx    #Right frontal/parietal/caudate infarcts with right ICA stenosis now with left sided weakness  -Continue DAPT with Aspirin , Plavix   -Continue Atorvastatin       # neuropathy  - gabapentin     #HTN  -Continue HCTZ  Losartan   -TTE with EF of 60-65%      #Type 2 DM, HbA1c 7.7 (11/22/23)  -d/c FS and ISS  -Home med: Metformin 1000mg BID - continued      DVT ppx - Lovenox     GI ppx - PPI    will follow  d/w rehab team

## 2023-12-05 NOTE — PROGRESS NOTE ADULT - SUBJECTIVE AND OBJECTIVE BOX
58y old  Female who presents with a chief complaint of CVA     Patient seen and examined at bedside. No acute overnight events.     Vital Signs Last 24 Hrs  T(C): 37.2 (05 Dec 2023 06:43), Max: 37.4 (04 Dec 2023 20:15)  T(F): 99 (05 Dec 2023 06:43), Max: 99.4 (04 Dec 2023 20:15)  HR: 76 (05 Dec 2023 06:43) (76 - 85)  BP: 142/67 (05 Dec 2023 06:43) (117/70 - 142/67)  BP(mean): --  RR: 16 (05 Dec 2023 06:43) (16 - 16)  SpO2: 99% (05 Dec 2023 06:43) (98% - 99%)    Parameters below as of 05 Dec 2023 06:43  Patient On (Oxygen Delivery Method): room air    GENERAL- NAD  EAR/NOSE/MOUTH/THROAT -   MMM  EYES- CRISELDA, conjunctiva and Sclera clear  NECK- supple  RESPIRATORY-  clear to auscultation bilaterally, non laboured breathing  CARDIOVASCULAR - SIS2, RRR  GI - soft NT BS present  EXTREMITIES- no pedal edema  NEUROLOGY- no new gross focal deficits  PSYCHIATRY- AAO X 3                12.0                 136  | 26   | 24           6.99  >-----------< 217     ------------------------< 140                   35.4                 3.5  | 99   | 0.94                                         Ca 9.3   Mg x     Ph x        CAPILLARY BLOOD GLUCOSE      POCT Blood Glucose.: 151 mg/dL (05 Dec 2023 08:17)  POCT Blood Glucose.: 119 mg/dL (04 Dec 2023 20:23)  POCT Blood Glucose.: 125 mg/dL (04 Dec 2023 17:15)  POCT Blood Glucose.: 198 mg/dL (04 Dec 2023 11:45)    MEDICATIONS  (STANDING):  aspirin  chewable 81 milliGRAM(s) Oral daily  atorvastatin 40 milliGRAM(s) Oral at bedtime  clopidogrel Tablet 75 milliGRAM(s) Oral daily  enoxaparin Injectable 40 milliGRAM(s) SubCutaneous every 24 hours  escitalopram 5 milliGRAM(s) Oral daily  glucagon  Injectable 1 milliGRAM(s) IntraMuscular once  hydrochlorothiazide 25 milliGRAM(s) Oral daily  influenza   Vaccine 0.5 milliLiter(s) IntraMuscular once  insulin lispro (ADMELOG) corrective regimen sliding scale   SubCutaneous at bedtime  insulin lispro (ADMELOG) corrective regimen sliding scale   SubCutaneous three times a day before meals  losartan 100 milliGRAM(s) Oral daily  metFORMIN 1000 milliGRAM(s) Oral two times a day  pantoprazole    Tablet 40 milliGRAM(s) Oral before breakfast  senna 2 Tablet(s) Oral at bedtime    MEDICATIONS  (PRN):  acetaminophen     Tablet .. 650 milliGRAM(s) Oral every 6 hours PRN Mild Pain (1 - 3)  dextrose Oral Gel 15 Gram(s) Oral once PRN Blood Glucose LESS THAN 70 milliGRAM(s)/deciliter  gabapentin 100 milliGRAM(s) Oral at bedtime PRN pain  melatonin 3 milliGRAM(s) Oral at bedtime PRN Insomnia  polyethylene glycol 3350 17 Gram(s) Oral at bedtime PRN for constipation

## 2023-12-05 NOTE — PROGRESS NOTE ADULT - SUBJECTIVE AND OBJECTIVE BOX
SUBJECTIVE/ROS: Patient evaluated while in the chair. She states she is feeling well and is participating well in therapy. She denies chest pain, fever, chills, nausea, vomiting, abdominal pain, headache, or BLE pain.     HPI:  58 year old female with PMH of vertigo, HTN, OA, Type 2 DM, shingles and sciatica who presented to [facility] on 11/23  as a direct transfer from [facility] for further evaluation of right carotid occlusion requiring diagnostic angiogram.  She presented to [facility] initially on 11/21 with progressively worsening left sided weakness, such that she slid off bed 2 days prior to admission. Her imaging showed as stroke with MRI showing scattered small acute infarcts involving the superior aspects of the right frontal lobe and right parietal lobe as well as the right caudate but was outside window for tPA.  Patient underwent a cerebral angiogram on 11/24 and no intervention provided. Patient was started on DAPT and statin and recommended for outpatient monitoring. She remained stable during her hospital stay and was evaluated for admission to acute inpatient rehab. She was admitted to [facility] on 11/28/23.         PHYSICAL EXAM    Vital Signs Last 24 Hrs  T(C): 37.2 (05 Dec 2023 06:43), Max: 37.4 (04 Dec 2023 20:15)  T(F): 99 (05 Dec 2023 06:43), Max: 99.4 (04 Dec 2023 20:15)  HR: 76 (05 Dec 2023 06:43) (76 - 85)  BP: 142/67 (05 Dec 2023 06:43) (117/70 - 142/67)  BP(mean): --  RR: 16 (05 Dec 2023 06:43) (16 - 16)  SpO2: 99% (05 Dec 2023 06:43) (98% - 99%)    Parameters below as of 05 Dec 2023 06:43  Patient On (Oxygen Delivery Method): room air    PHYSICAL EXAM  Constitutional - NAD, Comfortable  HEENT - NCAT, EOMI  Neck - Supple, No limited ROM  Chest - breathing comfortably   Cardiovascular - RRR, S1S2  Abdomen -BS+, Soft, NTND  Extremities - No C/C/E, No calf tenderness   Neurologic Exam - aox3, RU/RL 5/5, JORGE/JORGE 4/5 with dysmetria    RECENT LABS:                          12.0   6.99  )-----------( 217      ( 04 Dec 2023 06:25 )             35.4     12-04    136  |  99  |  24<H>  ----------------------------<  140<H>  3.5   |  26  |  0.94    Ca    9.3      04 Dec 2023 06:25    TPro  7.4  /  Alb  3.4  /  TBili  0.5  /  DBili  x   /  AST  22  /  ALT  33  /  AlkPhos  53  12-04    LIVER FUNCTIONS - ( 04 Dec 2023 06:25 )  Alb: 3.4 g/dL / Pro: 7.4 g/dL / ALK PHOS: 53 U/L / ALT: 33 U/L / AST: 22 U/L / GGT: x               CAPILLARY BLOOD GLUCOSE      POCT Blood Glucose.: 151 mg/dL (05 Dec 2023 08:17)  POCT Blood Glucose.: 119 mg/dL (04 Dec 2023 20:23)  POCT Blood Glucose.: 125 mg/dL (04 Dec 2023 17:15)  POCT Blood Glucose.: 198 mg/dL (04 Dec 2023 11:45)      MEDICATIONS  (STANDING):  aspirin  chewable 81 milliGRAM(s) Oral daily  atorvastatin 40 milliGRAM(s) Oral at bedtime  clopidogrel Tablet 75 milliGRAM(s) Oral daily  dextrose 5%. 1000 milliLiter(s) (50 mL/Hr) IV Continuous <Continuous>  dextrose 5%. 1000 milliLiter(s) (100 mL/Hr) IV Continuous <Continuous>  dextrose 50% Injectable 12.5 Gram(s) IV Push once  dextrose 50% Injectable 25 Gram(s) IV Push once  dextrose 50% Injectable 25 Gram(s) IV Push once  enoxaparin Injectable 40 milliGRAM(s) SubCutaneous every 24 hours  escitalopram 5 milliGRAM(s) Oral daily  glucagon  Injectable 1 milliGRAM(s) IntraMuscular once  hydrochlorothiazide 25 milliGRAM(s) Oral daily  influenza   Vaccine 0.5 milliLiter(s) IntraMuscular once  insulin lispro (ADMELOG) corrective regimen sliding scale   SubCutaneous at bedtime  insulin lispro (ADMELOG) corrective regimen sliding scale   SubCutaneous three times a day before meals  losartan 100 milliGRAM(s) Oral daily  metFORMIN 1000 milliGRAM(s) Oral two times a day  pantoprazole    Tablet 40 milliGRAM(s) Oral before breakfast  senna 2 Tablet(s) Oral at bedtime    MEDICATIONS  (PRN):  acetaminophen     Tablet .. 650 milliGRAM(s) Oral every 6 hours PRN Mild Pain (1 - 3)  dextrose Oral Gel 15 Gram(s) Oral once PRN Blood Glucose LESS THAN 70 milliGRAM(s)/deciliter  gabapentin 100 milliGRAM(s) Oral at bedtime PRN pain  melatonin 3 milliGRAM(s) Oral at bedtime PRN Insomnia  polyethylene glycol 3350 17 Gram(s) Oral at bedtime PRN for constipation   SUBJECTIVE/ROS: Patient evaluated while in the chair. She states she is feeling well and is participating well in therapy. She denies chest pain, fever, chills, nausea, vomiting, abdominal pain, headache, or BLE pain.     HPI:  58 year old female with PMH of vertigo, HTN, OA, Type 2 DM, shingles and sciatica who presented to [facility] on 11/23  as a direct transfer from [facility] for further evaluation of right carotid occlusion requiring diagnostic angiogram.  She presented to [facility] initially on 11/21 with progressively worsening left sided weakness, such that she slid off bed 2 days prior to admission. Her imaging showed as stroke with MRI showing scattered small acute infarcts involving the superior aspects of the right frontal lobe and right parietal lobe as well as the right caudate but was outside window for tPA.  Patient underwent a cerebral angiogram on 11/24 and no intervention provided. Patient was started on DAPT and statin and recommended for outpatient monitoring. She remained stable during her hospital stay and was evaluated for admission to acute inpatient rehab. She was admitted to [facility] on 11/28/23.         PHYSICAL EXAM    Vital Signs Last 24 Hrs  T(C): 37.2 (05 Dec 2023 06:43), Max: 37.4 (04 Dec 2023 20:15)  T(F): 99 (05 Dec 2023 06:43), Max: 99.4 (04 Dec 2023 20:15)  HR: 76 (05 Dec 2023 06:43) (76 - 85)  BP: 142/67 (05 Dec 2023 06:43) (117/70 - 142/67)  BP(mean): --  RR: 16 (05 Dec 2023 06:43) (16 - 16)  SpO2: 99% (05 Dec 2023 06:43) (98% - 99%)    Parameters below as of 05 Dec 2023 06:43  Patient On (Oxygen Delivery Method): room air    PHYSICAL EXAM  Constitutional - NAD, Comfortable  HEENT - NCAT, EOMI  Neck - Supple, No limited ROM  Chest - breathing comfortably   Cardiovascular - RRR, S1S2  Abdomen -BS+, Soft, NTND  Extremities - No C/C/E, No calf tenderness   Neurologic Exam - aox3, RU/RL 5/5, JORGE/JORGE 4/5 with dysmetria    RECENT LABS:                          12.0   6.99  )-----------( 217      ( 04 Dec 2023 06:25 )             35.4     12-04    136  |  99  |  24<H>  ----------------------------<  140<H>  3.5   |  26  |  0.94    Ca    9.3      04 Dec 2023 06:25    TPro  7.4  /  Alb  3.4  /  TBili  0.5  /  DBili  x   /  AST  22  /  ALT  33  /  AlkPhos  53  12-04    LIVER FUNCTIONS - ( 04 Dec 2023 06:25 )  Alb: 3.4 g/dL / Pro: 7.4 g/dL / ALK PHOS: 53 U/L / ALT: 33 U/L / AST: 22 U/L / GGT: x               CAPILLARY BLOOD GLUCOSE      POCT Blood Glucose.: 151 mg/dL (05 Dec 2023 08:17)  POCT Blood Glucose.: 119 mg/dL (04 Dec 2023 20:23)  POCT Blood Glucose.: 125 mg/dL (04 Dec 2023 17:15)  POCT Blood Glucose.: 198 mg/dL (04 Dec 2023 11:45)      MEDICATIONS  (STANDING):  aspirin  chewable 81 milliGRAM(s) Oral daily  atorvastatin 40 milliGRAM(s) Oral at bedtime  clopidogrel Tablet 75 milliGRAM(s) Oral daily  dextrose 5%. 1000 milliLiter(s) (50 mL/Hr) IV Continuous <Continuous>  dextrose 5%. 1000 milliLiter(s) (100 mL/Hr) IV Continuous <Continuous>  dextrose 50% Injectable 12.5 Gram(s) IV Push once  dextrose 50% Injectable 25 Gram(s) IV Push once  dextrose 50% Injectable 25 Gram(s) IV Push once  enoxaparin Injectable 40 milliGRAM(s) SubCutaneous every 24 hours  escitalopram 5 milliGRAM(s) Oral daily  glucagon  Injectable 1 milliGRAM(s) IntraMuscular once  hydrochlorothiazide 25 milliGRAM(s) Oral daily  influenza   Vaccine 0.5 milliLiter(s) IntraMuscular once  insulin lispro (ADMELOG) corrective regimen sliding scale   SubCutaneous at bedtime  insulin lispro (ADMELOG) corrective regimen sliding scale   SubCutaneous three times a day before meals  losartan 100 milliGRAM(s) Oral daily  metFORMIN 1000 milliGRAM(s) Oral two times a day  pantoprazole    Tablet 40 milliGRAM(s) Oral before breakfast  senna 2 Tablet(s) Oral at bedtime    MEDICATIONS  (PRN):  acetaminophen     Tablet .. 650 milliGRAM(s) Oral every 6 hours PRN Mild Pain (1 - 3)  dextrose Oral Gel 15 Gram(s) Oral once PRN Blood Glucose LESS THAN 70 milliGRAM(s)/deciliter  gabapentin 100 milliGRAM(s) Oral at bedtime PRN pain  melatonin 3 milliGRAM(s) Oral at bedtime PRN Insomnia  polyethylene glycol 3350 17 Gram(s) Oral at bedtime PRN for constipation

## 2023-12-06 PROCEDURE — 99232 SBSQ HOSP IP/OBS MODERATE 35: CPT

## 2023-12-06 RX ADMIN — LOSARTAN POTASSIUM 100 MILLIGRAM(S): 100 TABLET, FILM COATED ORAL at 05:25

## 2023-12-06 RX ADMIN — ENOXAPARIN SODIUM 40 MILLIGRAM(S): 100 INJECTION SUBCUTANEOUS at 05:23

## 2023-12-06 RX ADMIN — METFORMIN HYDROCHLORIDE 1000 MILLIGRAM(S): 850 TABLET ORAL at 17:16

## 2023-12-06 RX ADMIN — CLOPIDOGREL BISULFATE 75 MILLIGRAM(S): 75 TABLET, FILM COATED ORAL at 12:11

## 2023-12-06 RX ADMIN — Medication 81 MILLIGRAM(S): at 12:10

## 2023-12-06 RX ADMIN — PANTOPRAZOLE SODIUM 40 MILLIGRAM(S): 20 TABLET, DELAYED RELEASE ORAL at 05:25

## 2023-12-06 RX ADMIN — ATORVASTATIN CALCIUM 40 MILLIGRAM(S): 80 TABLET, FILM COATED ORAL at 20:15

## 2023-12-06 RX ADMIN — METFORMIN HYDROCHLORIDE 1000 MILLIGRAM(S): 850 TABLET ORAL at 05:26

## 2023-12-06 RX ADMIN — ESCITALOPRAM OXALATE 5 MILLIGRAM(S): 10 TABLET, FILM COATED ORAL at 12:10

## 2023-12-06 NOTE — PROGRESS NOTE ADULT - ASSESSMENT
ASSESSMENT/PLAN  58 year old female with PMH of vertigo, HTN, OA, Type 2 DM, shingles and sciatica who presented to [facility] on 11/23  as a direct transfer from [facility] for further evaluation of right carotid occlusion requiring diagnostic angiogram.  She presented to [facility] initially on 11/21 with progressively worsening left sided weakness, such that she slid off bed 2 days prior to admission. Her imaging showed as stroke with MRI showing scattered small acute infarcts involving the superior aspects of the right frontal lobe and right parietal lobe as well as the right caudate but was outside window for tPA.  Patient underwent a cerebral angiogram on 11/24 and no intervention provided. Patient was started on DAPT and statin and recommended for outpatient monitoring. She remained stable during her hospital stay and was evaluated for admission to acute inpatient rehab. She was admitted to [facility] on 11/28/23.       #Right frontal/parietal/caudate infarcts with right ICA stenosis now with left sided weakness, Gait Instability, ADL impairments and Functional impairments  - Continue Comprehensive Rehab Program of PT/OT/SLP  -Most likely large vessel atherosclerosis with Right ICA near occlusion of vessel  -Continue Aspirin 81mg  -Continue Plavix 75mg   -Continue Atorvastatin 40mg daily   -Consider hypercoagulable workup for stroke etiology as outpatient- heme/onc consulted 11/29 and recommended outpatient follow up    #Sleep/mood  -Continue lexapro 5mg daily 11/29 - for anxiety and neurorecovery   -Continue melatonin 3mg PRN at bedtime  -Neuropsych consult appreciated   -Recreation therapy appreciated     #HTN  -Continue HCTZ 25mg  -Continue Losartan 100mg daily   -TTE with EF of 60-65%    #Type 2 DM  -a1c is 7/7  -FS and ISS  -Continue Metformin 1000mg BID    #Pain control  - Tylenol PRN  -Home med: Gabapentin 100mg at bedtime PRN    #GI/Bowel Mgmt   - Continue Senna at bedtime   - Miralax PRN    #Bladder management  - Continue to monitor PVR q 8 hours (SC if > 400)  -Monitor UO    #DVT  - Lovenox  - TEDs     #Skin:  - intergluteal cleft fissure - continue barrier cream     FEN   - Diet - Regular + Thins  [CCHO, DASH/TLC]    - Dysphagia  SLP - evaluation and treatment    Precautions / PROPHYLAXIS:   - Falls, Cardiac  - ortho: Weight bearing status: WBAT   - Lungs: Aspiration, Incentive Spirometer   - Pressure injury/Skin: Turn Q2hrs while in bed, OOB to Chair, PT/OT      Multidisciplinary team meeting 12/4:  patient's functional goals and needs, functional and clinical  progress were discussed, barriers to discharge were identified. Anticipate discharge home with home care, 24/7 supervision for safety.     EDOD 12/15/23         ASSESSMENT/PLAN  58 year old female with PMH of vertigo, HTN, OA, Type 2 DM, shingles and sciatica who presented to [facility] on 11/23  as a direct transfer from [facility] for further evaluation of right carotid occlusion requiring diagnostic angiogram.  She presented to [facility] initially on 11/21 with progressively worsening left sided weakness, such that she slid off bed 2 days prior to admission. Her imaging showed as stroke with MRI showing scattered small acute infarcts involving the superior aspects of the right frontal lobe and right parietal lobe as well as the right caudate but was outside window for tPA.  Patient underwent a cerebral angiogram on 11/24 and no intervention provided. Patient was started on DAPT and statin and recommended for outpatient monitoring. She remained stable during her hospital stay and was evaluated for admission to acute inpatient rehab. She was admitted to [facility] on 11/28/23.       #Right frontal/parietal/caudate infarcts with right ICA stenosis now with left sided weakness, Gait Instability, ADL impairments and Functional impairments  - Continue Comprehensive Rehab Program of PT/OT/SLP  -Most likely large vessel atherosclerosis with Right ICA near occlusion of vessel  -Continue Aspirin 81mg  -Continue Plavix 75mg   -Continue Atorvastatin 40mg daily   -Consider hypercoagulable workup for stroke etiology as outpatient- heme/onc consulted 11/29 and recommended outpatient follow up    #Sleep/mood  -Continue lexapro 5mg daily 11/29 - for anxiety and neurorecovery   -Continue melatonin 3mg PRN at bedtime  -Neuropsych consult appreciated   -Recreation therapy appreciated     #HTN  -Continue HCTZ 25mg  -Continue Losartan 100mg daily   -TTE with EF of 60-65%    #Type 2 DM  -a1c is 7/7  -FS and ISS  -Continue Metformin 1000mg BID    #Pain control  - Tylenol PRN  -Home med: Gabapentin 100mg at bedtime PRN    #GI/Bowel Mgmt   - Continue Senna at bedtime   - Miralax PRN    #Bladder management  - Continue to monitor PVR q 8 hours (SC if > 400)  -Monitor UO    #DVT  - Lovenox  - TEDs     #Skin:  - intergluteal cleft fissure - continue barrier cream     FEN   - Diet - Regular + Thins  [CCHO, DASH/TLC]    - Dysphagia  SLP - evaluation and treatment    Precautions / PROPHYLAXIS:   - Falls, Cardiac  - ortho: Weight bearing status: WBAT   - Lungs: Aspiration, Incentive Spirometer   - Pressure injury/Skin: Turn Q2hrs while in bed, OOB to Chair, PT/OT      Multidisciplinary team meeting 12/4:  patient's functional goals and needs, functional and clinical  progress were discussed, barriers to discharge were identified. Anticipate discharge home with home care, 24/7 supervision for safety.     EDOD 12/15/23

## 2023-12-06 NOTE — PROGRESS NOTE ADULT - ASSESSMENT
59 y/o F with PMH of vertigo, HTN, OA, Type 2 DM, shingles and sciatica who presented to [facility] on 11/23 as a direct transfer from [facility] for further evaluation of right carotid occlusion requiring diagnostic angiogram.  She presented to [facility] initially on 11/21 with progressively worsening left sided weakness, such that she slid off bed 2 days prior to admission. Her imaging showed as stroke with MRI showing scattered small acute infarcts involving the superior aspects of the right frontal lobe and right parietal lobe as well as the right caudate but was outside window for tPA.  Patient underwent a cerebral angiogram on 11/24 and no intervention provided. Patient was started on DAPT and statin and recommended for outpatient monitoring. She remained stable during her hospital stay and was evaluated for admission to acute inpatient rehab. She was admitted to [facility] on 11/28/23- pt/ot/dvt ppx    #Right frontal/parietal/caudate infarcts with right ICA stenosis now with left sided weakness  -Continue DAPT with Aspirin , Plavix   -Continue Atorvastatin     # neuropathy  - gabapentin     #HTN  -Continue HCTZ  Losartan   -TTE with EF of 60-65%    #Type 2 DM, HbA1c 7.7 (11/22/23)  -d/c FS and ISS 12/5/23  -Home med: Metformin 1000mg BID - continued    DVT ppx - Lovenox     GI ppx - PPI    will follow  d/w rehab team  59 y/o F with PMH of vertigo, HTN, OA, Type 2 DM, shingles and sciatica who presented to [facility] on 11/23 as a direct transfer from [facility] for further evaluation of right carotid occlusion requiring diagnostic angiogram.  She presented to [facility] initially on 11/21 with progressively worsening left sided weakness, such that she slid off bed 2 days prior to admission. Her imaging showed as stroke with MRI showing scattered small acute infarcts involving the superior aspects of the right frontal lobe and right parietal lobe as well as the right caudate but was outside window for tPA.  Patient underwent a cerebral angiogram on 11/24 and no intervention provided. Patient was started on DAPT and statin and recommended for outpatient monitoring. She remained stable during her hospital stay and was evaluated for admission to acute inpatient rehab. She was admitted to [facility] on 11/28/23- pt/ot/dvt ppx    #Right frontal/parietal/caudate infarcts with right ICA stenosis now with left sided weakness  -Continue DAPT with Aspirin , Plavix   -Continue Atorvastatin     # neuropathy  - gabapentin     #HTN  -Continue HCTZ  Losartan   -TTE with EF of 60-65%    #Type 2 DM, HbA1c 7.7 (11/22/23)  -d/c FS and ISS 12/5/23  -Home med: Metformin 1000mg BID - continued    DVT ppx - Lovenox     GI ppx - PPI    will follow  d/w rehab team

## 2023-12-06 NOTE — PROGRESS NOTE ADULT - SUBJECTIVE AND OBJECTIVE BOX
58y old  Female who presents with a chief complaint of CVA     Patient seen and examined at bedside. No acute overnight events.   sitting up had shower, eating breakfast    Vital Signs Last 24 Hrs  T(C): 36.7 (06 Dec 2023 07:39), Max: 36.8 (05 Dec 2023 19:40)  T(F): 98 (06 Dec 2023 07:39), Max: 98.2 (05 Dec 2023 19:40)  HR: 72 (06 Dec 2023 07:39) (61 - 72)  BP: 126/76 (06 Dec 2023 07:39) (108/67 - 126/76)  BP(mean): --  RR: 16 (06 Dec 2023 07:39) (16 - 16)  SpO2: 99% (06 Dec 2023 07:39) (96% - 99%)    Parameters below as of 06 Dec 2023 07:39  Patient On (Oxygen Delivery Method): room air        GENERAL- NAD  EAR/NOSE/MOUTH/THROAT -   MMM  EYES- CRISELDA, conjunctiva and Sclera clear  NECK- supple  RESPIRATORY-  clear to auscultation bilaterally, non laboured breathing  CARDIOVASCULAR - SIS2, RRR  GI - soft NT BS present  EXTREMITIES- no pedal edema  NEUROLOGY- no new gross focal deficits  PSYCHIATRY- AAO X 3          MEDICATIONS  (STANDING):  aspirin  chewable 81 milliGRAM(s) Oral daily  atorvastatin 40 milliGRAM(s) Oral at bedtime  clopidogrel Tablet 75 milliGRAM(s) Oral daily  enoxaparin Injectable 40 milliGRAM(s) SubCutaneous every 24 hours  escitalopram 5 milliGRAM(s) Oral daily  glucagon  Injectable 1 milliGRAM(s) IntraMuscular once  hydrochlorothiazide 25 milliGRAM(s) Oral daily  influenza   Vaccine 0.5 milliLiter(s) IntraMuscular once  losartan 100 milliGRAM(s) Oral daily  metFORMIN 1000 milliGRAM(s) Oral two times a day  pantoprazole    Tablet 40 milliGRAM(s) Oral before breakfast  senna 2 Tablet(s) Oral at bedtime    MEDICATIONS  (PRN):  acetaminophen     Tablet .. 650 milliGRAM(s) Oral every 6 hours PRN Mild Pain (1 - 3)  dextrose Oral Gel 15 Gram(s) Oral once PRN Blood Glucose LESS THAN 70 milliGRAM(s)/deciliter  gabapentin 100 milliGRAM(s) Oral at bedtime PRN pain  melatonin 3 milliGRAM(s) Oral at bedtime PRN Insomnia  polyethylene glycol 3350 17 Gram(s) Oral at bedtime PRN for constipation

## 2023-12-06 NOTE — PROGRESS NOTE ADULT - SUBJECTIVE AND OBJECTIVE BOX
SUBJECTIVE/ROS: Patient evaluated while in the chair. No acute events overnight. She denies chest pain, fever, chills, nausea, vomiting, abdominal pain, headache, or BLE pain.     HPI:  58 year old female with PMH of vertigo, HTN, OA, Type 2 DM, shingles and sciatica who presented to [facility] on 11/23  as a direct transfer from [facility] for further evaluation of right carotid occlusion requiring diagnostic angiogram.  She presented to [facility] initially on 11/21 with progressively worsening left sided weakness, such that she slid off bed 2 days prior to admission. Her imaging showed as stroke with MRI showing scattered small acute infarcts involving the superior aspects of the right frontal lobe and right parietal lobe as well as the right caudate but was outside window for tPA.  Patient underwent a cerebral angiogram on 11/24 and no intervention provided. Patient was started on DAPT and statin and recommended for outpatient monitoring. She remained stable during her hospital stay and was evaluated for admission to acute inpatient rehab. She was admitted to [facility] on 11/28/23.         Vital Signs Last 24 Hrs  T(C): 36.7 (06 Dec 2023 07:39), Max: 36.8 (05 Dec 2023 11:31)  T(F): 98 (06 Dec 2023 07:39), Max: 98.2 (05 Dec 2023 11:31)  HR: 72 (06 Dec 2023 07:39) (61 - 78)  BP: 126/76 (06 Dec 2023 07:39) (108/67 - 126/76)  BP(mean): --  RR: 16 (06 Dec 2023 07:39) (16 - 16)  SpO2: 99% (06 Dec 2023 07:39) (96% - 99%)    Parameters below as of 06 Dec 2023 07:39  Patient On (Oxygen Delivery Method): room air        PHYSICAL EXAM  Constitutional - NAD, Comfortable  HEENT - NCAT, EOMI  Neck - Supple, No limited ROM  Chest - breathing comfortably   Cardiovascular - RRR, S1S2  Abdomen -BS+, Soft, NTND  Extremities - No C/C/E, No calf tenderness   Neurologic Exam - aox3, RU/RL 5/5, JORGE/JORGE 4/5 with dysmetria    RECENT LABS:                          12.0   6.99  )-----------( 217      ( 04 Dec 2023 06:25 )             35.4     12-04    136  |  99  |  24<H>  ----------------------------<  140<H>  3.5   |  26  |  0.94    Ca    9.3      04 Dec 2023 06:25    TPro  7.4  /  Alb  3.4  /  TBili  0.5  /  DBili  x   /  AST  22  /  ALT  33  /  AlkPhos  53  12-04    LIVER FUNCTIONS - ( 04 Dec 2023 06:25 )  Alb: 3.4 g/dL / Pro: 7.4 g/dL / ALK PHOS: 53 U/L / ALT: 33 U/L / AST: 22 U/L / GGT: x                 MEDICATIONS  (STANDING):  aspirin  chewable 81 milliGRAM(s) Oral daily  atorvastatin 40 milliGRAM(s) Oral at bedtime  clopidogrel Tablet 75 milliGRAM(s) Oral daily  dextrose 5%. 1000 milliLiter(s) (50 mL/Hr) IV Continuous <Continuous>  dextrose 5%. 1000 milliLiter(s) (100 mL/Hr) IV Continuous <Continuous>  dextrose 50% Injectable 12.5 Gram(s) IV Push once  dextrose 50% Injectable 25 Gram(s) IV Push once  dextrose 50% Injectable 25 Gram(s) IV Push once  enoxaparin Injectable 40 milliGRAM(s) SubCutaneous every 24 hours  escitalopram 5 milliGRAM(s) Oral daily  glucagon  Injectable 1 milliGRAM(s) IntraMuscular once  hydrochlorothiazide 25 milliGRAM(s) Oral daily  influenza   Vaccine 0.5 milliLiter(s) IntraMuscular once  insulin lispro (ADMELOG) corrective regimen sliding scale   SubCutaneous at bedtime  insulin lispro (ADMELOG) corrective regimen sliding scale   SubCutaneous three times a day before meals  losartan 100 milliGRAM(s) Oral daily  metFORMIN 1000 milliGRAM(s) Oral two times a day  pantoprazole    Tablet 40 milliGRAM(s) Oral before breakfast  senna 2 Tablet(s) Oral at bedtime    MEDICATIONS  (PRN):  acetaminophen     Tablet .. 650 milliGRAM(s) Oral every 6 hours PRN Mild Pain (1 - 3)  dextrose Oral Gel 15 Gram(s) Oral once PRN Blood Glucose LESS THAN 70 milliGRAM(s)/deciliter  gabapentin 100 milliGRAM(s) Oral at bedtime PRN pain  melatonin 3 milliGRAM(s) Oral at bedtime PRN Insomnia  polyethylene glycol 3350 17 Gram(s) Oral at bedtime PRN for constipation   SUBJECTIVE/ROS: Patient evaluated while in the chair. No acute events overnight. She denies chest pain, fever, chills, nausea, vomiting, abdominal pain, headache, or BLE pain.     HPI:  58 year old female with PMH of vertigo, HTN, OA, Type 2 DM, shingles and sciatica who presented to [facility] on 11/23  as a direct transfer from [facility] for further evaluation of right carotid occlusion requiring diagnostic angiogram.  She presented to [facility] initially on 11/21 with progressively worsening left sided weakness, such that she slid off bed 2 days prior to admission. Her imaging showed as stroke with MRI showing scattered small acute infarcts involving the superior aspects of the right frontal lobe and right parietal lobe as well as the right caudate but was outside window for tPA.  Patient underwent a cerebral angiogram on 11/24 and no intervention provided. Patient was started on DAPT and statin and recommended for outpatient monitoring. She remained stable during her hospital stay and was evaluated for admission to acute inpatient rehab. She was admitted to [facility] on 11/28/23.         Vital Signs Last 24 Hrs  T(C): 36.7 (06 Dec 2023 07:39), Max: 36.8 (05 Dec 2023 11:31)  T(F): 98 (06 Dec 2023 07:39), Max: 98.2 (05 Dec 2023 11:31)  HR: 72 (06 Dec 2023 07:39) (61 - 78)  BP: 126/76 (06 Dec 2023 07:39) (108/67 - 126/76)  BP(mean): --  RR: 16 (06 Dec 2023 07:39) (16 - 16)  SpO2: 99% (06 Dec 2023 07:39) (96% - 99%)    Parameters below as of 06 Dec 2023 07:39  Patient On (Oxygen Delivery Method): room air        PHYSICAL EXAM  Constitutional - NAD, Comfortable  HEENT - NCAT, EOMI  Neck - Supple, No limited ROM  Chest - breathing comfortably   Cardiovascular - RRR, S1S2  Abdomen -BS+, Soft, NTND  Extremities - No C/C/E, No calf tenderness   Neurologic Exam - aox3, RU/RL 5/5, JORGE/JORGE 4/5 with dysmetria    RECENT LABS:                          12.0   6.99  )-----------( 217      ( 04 Dec 2023 06:25 )             35.4     12-04    136  |  99  |  24<H>  ----------------------------<  140<H>  3.5   |  26  |  0.94    Ca    9.3      04 Dec 2023 06:25    TPro  7.4  /  Alb  3.4  /  TBili  0.5  /  DBili  x   /  AST  22  /  ALT  33  /  AlkPhos  53  12-04    LIVER FUNCTIONS - ( 04 Dec 2023 06:25 )  Alb: 3.4 g/dL / Pro: 7.4 g/dL / ALK PHOS: 53 U/L / ALT: 33 U/L / AST: 22 U/L / GGT: x                 MEDICATIONS  (STANDING):  aspirin  chewable 81 milliGRAM(s) Oral daily  atorvastatin 40 milliGRAM(s) Oral at bedtime  clopidogrel Tablet 75 milliGRAM(s) Oral daily  dextrose 5%. 1000 milliLiter(s) (50 mL/Hr) IV Continuous <Continuous>  dextrose 5%. 1000 milliLiter(s) (100 mL/Hr) IV Continuous <Continuous>  dextrose 50% Injectable 12.5 Gram(s) IV Push once  dextrose 50% Injectable 25 Gram(s) IV Push once  dextrose 50% Injectable 25 Gram(s) IV Push once  enoxaparin Injectable 40 milliGRAM(s) SubCutaneous every 24 hours  escitalopram 5 milliGRAM(s) Oral daily  glucagon  Injectable 1 milliGRAM(s) IntraMuscular once  hydrochlorothiazide 25 milliGRAM(s) Oral daily  influenza   Vaccine 0.5 milliLiter(s) IntraMuscular once  insulin lispro (ADMELOG) corrective regimen sliding scale   SubCutaneous at bedtime  insulin lispro (ADMELOG) corrective regimen sliding scale   SubCutaneous three times a day before meals  losartan 100 milliGRAM(s) Oral daily  metFORMIN 1000 milliGRAM(s) Oral two times a day  pantoprazole    Tablet 40 milliGRAM(s) Oral before breakfast  senna 2 Tablet(s) Oral at bedtime    MEDICATIONS  (PRN):  acetaminophen     Tablet .. 650 milliGRAM(s) Oral every 6 hours PRN Mild Pain (1 - 3)  dextrose Oral Gel 15 Gram(s) Oral once PRN Blood Glucose LESS THAN 70 milliGRAM(s)/deciliter  gabapentin 100 milliGRAM(s) Oral at bedtime PRN pain  melatonin 3 milliGRAM(s) Oral at bedtime PRN Insomnia  polyethylene glycol 3350 17 Gram(s) Oral at bedtime PRN for constipation

## 2023-12-07 LAB
ALBUMIN SERPL ELPH-MCNC: 3.6 G/DL — SIGNIFICANT CHANGE UP (ref 3.3–5)
ALBUMIN SERPL ELPH-MCNC: 3.6 G/DL — SIGNIFICANT CHANGE UP (ref 3.3–5)
ALP SERPL-CCNC: 56 U/L — SIGNIFICANT CHANGE UP (ref 40–120)
ALP SERPL-CCNC: 56 U/L — SIGNIFICANT CHANGE UP (ref 40–120)
ALT FLD-CCNC: 45 U/L — SIGNIFICANT CHANGE UP (ref 10–45)
ALT FLD-CCNC: 45 U/L — SIGNIFICANT CHANGE UP (ref 10–45)
ANION GAP SERPL CALC-SCNC: 9 MMOL/L — SIGNIFICANT CHANGE UP (ref 5–17)
ANION GAP SERPL CALC-SCNC: 9 MMOL/L — SIGNIFICANT CHANGE UP (ref 5–17)
AST SERPL-CCNC: 28 U/L — SIGNIFICANT CHANGE UP (ref 10–40)
AST SERPL-CCNC: 28 U/L — SIGNIFICANT CHANGE UP (ref 10–40)
BASOPHILS # BLD AUTO: 0.05 K/UL — SIGNIFICANT CHANGE UP (ref 0–0.2)
BASOPHILS # BLD AUTO: 0.05 K/UL — SIGNIFICANT CHANGE UP (ref 0–0.2)
BASOPHILS NFR BLD AUTO: 0.7 % — SIGNIFICANT CHANGE UP (ref 0–2)
BASOPHILS NFR BLD AUTO: 0.7 % — SIGNIFICANT CHANGE UP (ref 0–2)
BILIRUB SERPL-MCNC: 0.6 MG/DL — SIGNIFICANT CHANGE UP (ref 0.2–1.2)
BILIRUB SERPL-MCNC: 0.6 MG/DL — SIGNIFICANT CHANGE UP (ref 0.2–1.2)
BUN SERPL-MCNC: 20 MG/DL — SIGNIFICANT CHANGE UP (ref 7–23)
BUN SERPL-MCNC: 20 MG/DL — SIGNIFICANT CHANGE UP (ref 7–23)
CALCIUM SERPL-MCNC: 9.5 MG/DL — SIGNIFICANT CHANGE UP (ref 8.4–10.5)
CALCIUM SERPL-MCNC: 9.5 MG/DL — SIGNIFICANT CHANGE UP (ref 8.4–10.5)
CHLORIDE SERPL-SCNC: 101 MMOL/L — SIGNIFICANT CHANGE UP (ref 96–108)
CHLORIDE SERPL-SCNC: 101 MMOL/L — SIGNIFICANT CHANGE UP (ref 96–108)
CO2 SERPL-SCNC: 29 MMOL/L — SIGNIFICANT CHANGE UP (ref 22–31)
CO2 SERPL-SCNC: 29 MMOL/L — SIGNIFICANT CHANGE UP (ref 22–31)
CREAT SERPL-MCNC: 1.09 MG/DL — SIGNIFICANT CHANGE UP (ref 0.5–1.3)
CREAT SERPL-MCNC: 1.09 MG/DL — SIGNIFICANT CHANGE UP (ref 0.5–1.3)
EGFR: 59 ML/MIN/1.73M2 — LOW
EGFR: 59 ML/MIN/1.73M2 — LOW
EOSINOPHIL # BLD AUTO: 0.19 K/UL — SIGNIFICANT CHANGE UP (ref 0–0.5)
EOSINOPHIL # BLD AUTO: 0.19 K/UL — SIGNIFICANT CHANGE UP (ref 0–0.5)
EOSINOPHIL NFR BLD AUTO: 2.6 % — SIGNIFICANT CHANGE UP (ref 0–6)
EOSINOPHIL NFR BLD AUTO: 2.6 % — SIGNIFICANT CHANGE UP (ref 0–6)
GLUCOSE SERPL-MCNC: 154 MG/DL — HIGH (ref 70–99)
GLUCOSE SERPL-MCNC: 154 MG/DL — HIGH (ref 70–99)
HCT VFR BLD CALC: 36 % — SIGNIFICANT CHANGE UP (ref 34.5–45)
HCT VFR BLD CALC: 36 % — SIGNIFICANT CHANGE UP (ref 34.5–45)
HGB BLD-MCNC: 12.4 G/DL — SIGNIFICANT CHANGE UP (ref 11.5–15.5)
HGB BLD-MCNC: 12.4 G/DL — SIGNIFICANT CHANGE UP (ref 11.5–15.5)
IMM GRANULOCYTES NFR BLD AUTO: 0.4 % — SIGNIFICANT CHANGE UP (ref 0–0.9)
IMM GRANULOCYTES NFR BLD AUTO: 0.4 % — SIGNIFICANT CHANGE UP (ref 0–0.9)
LYMPHOCYTES # BLD AUTO: 2.34 K/UL — SIGNIFICANT CHANGE UP (ref 1–3.3)
LYMPHOCYTES # BLD AUTO: 2.34 K/UL — SIGNIFICANT CHANGE UP (ref 1–3.3)
LYMPHOCYTES # BLD AUTO: 31.7 % — SIGNIFICANT CHANGE UP (ref 13–44)
LYMPHOCYTES # BLD AUTO: 31.7 % — SIGNIFICANT CHANGE UP (ref 13–44)
MCHC RBC-ENTMCNC: 29.3 PG — SIGNIFICANT CHANGE UP (ref 27–34)
MCHC RBC-ENTMCNC: 29.3 PG — SIGNIFICANT CHANGE UP (ref 27–34)
MCHC RBC-ENTMCNC: 34.4 GM/DL — SIGNIFICANT CHANGE UP (ref 32–36)
MCHC RBC-ENTMCNC: 34.4 GM/DL — SIGNIFICANT CHANGE UP (ref 32–36)
MCV RBC AUTO: 85.1 FL — SIGNIFICANT CHANGE UP (ref 80–100)
MCV RBC AUTO: 85.1 FL — SIGNIFICANT CHANGE UP (ref 80–100)
MONOCYTES # BLD AUTO: 0.56 K/UL — SIGNIFICANT CHANGE UP (ref 0–0.9)
MONOCYTES # BLD AUTO: 0.56 K/UL — SIGNIFICANT CHANGE UP (ref 0–0.9)
MONOCYTES NFR BLD AUTO: 7.6 % — SIGNIFICANT CHANGE UP (ref 2–14)
MONOCYTES NFR BLD AUTO: 7.6 % — SIGNIFICANT CHANGE UP (ref 2–14)
NEUTROPHILS # BLD AUTO: 4.21 K/UL — SIGNIFICANT CHANGE UP (ref 1.8–7.4)
NEUTROPHILS # BLD AUTO: 4.21 K/UL — SIGNIFICANT CHANGE UP (ref 1.8–7.4)
NEUTROPHILS NFR BLD AUTO: 57 % — SIGNIFICANT CHANGE UP (ref 43–77)
NEUTROPHILS NFR BLD AUTO: 57 % — SIGNIFICANT CHANGE UP (ref 43–77)
NRBC # BLD: 0 /100 WBCS — SIGNIFICANT CHANGE UP (ref 0–0)
NRBC # BLD: 0 /100 WBCS — SIGNIFICANT CHANGE UP (ref 0–0)
PLATELET # BLD AUTO: 243 K/UL — SIGNIFICANT CHANGE UP (ref 150–400)
PLATELET # BLD AUTO: 243 K/UL — SIGNIFICANT CHANGE UP (ref 150–400)
POTASSIUM SERPL-MCNC: 4.2 MMOL/L — SIGNIFICANT CHANGE UP (ref 3.5–5.3)
POTASSIUM SERPL-MCNC: 4.2 MMOL/L — SIGNIFICANT CHANGE UP (ref 3.5–5.3)
POTASSIUM SERPL-SCNC: 4.2 MMOL/L — SIGNIFICANT CHANGE UP (ref 3.5–5.3)
POTASSIUM SERPL-SCNC: 4.2 MMOL/L — SIGNIFICANT CHANGE UP (ref 3.5–5.3)
PROT SERPL-MCNC: 7.8 G/DL — SIGNIFICANT CHANGE UP (ref 6–8.3)
PROT SERPL-MCNC: 7.8 G/DL — SIGNIFICANT CHANGE UP (ref 6–8.3)
RBC # BLD: 4.23 M/UL — SIGNIFICANT CHANGE UP (ref 3.8–5.2)
RBC # BLD: 4.23 M/UL — SIGNIFICANT CHANGE UP (ref 3.8–5.2)
RBC # FLD: 12.9 % — SIGNIFICANT CHANGE UP (ref 10.3–14.5)
RBC # FLD: 12.9 % — SIGNIFICANT CHANGE UP (ref 10.3–14.5)
SODIUM SERPL-SCNC: 139 MMOL/L — SIGNIFICANT CHANGE UP (ref 135–145)
SODIUM SERPL-SCNC: 139 MMOL/L — SIGNIFICANT CHANGE UP (ref 135–145)
WBC # BLD: 7.38 K/UL — SIGNIFICANT CHANGE UP (ref 3.8–10.5)
WBC # BLD: 7.38 K/UL — SIGNIFICANT CHANGE UP (ref 3.8–10.5)
WBC # FLD AUTO: 7.38 K/UL — SIGNIFICANT CHANGE UP (ref 3.8–10.5)
WBC # FLD AUTO: 7.38 K/UL — SIGNIFICANT CHANGE UP (ref 3.8–10.5)

## 2023-12-07 PROCEDURE — 99232 SBSQ HOSP IP/OBS MODERATE 35: CPT

## 2023-12-07 RX ADMIN — Medication 81 MILLIGRAM(S): at 11:28

## 2023-12-07 RX ADMIN — METFORMIN HYDROCHLORIDE 1000 MILLIGRAM(S): 850 TABLET ORAL at 17:21

## 2023-12-07 RX ADMIN — PANTOPRAZOLE SODIUM 40 MILLIGRAM(S): 20 TABLET, DELAYED RELEASE ORAL at 05:11

## 2023-12-07 RX ADMIN — ESCITALOPRAM OXALATE 5 MILLIGRAM(S): 10 TABLET, FILM COATED ORAL at 11:29

## 2023-12-07 RX ADMIN — METFORMIN HYDROCHLORIDE 1000 MILLIGRAM(S): 850 TABLET ORAL at 07:28

## 2023-12-07 RX ADMIN — ENOXAPARIN SODIUM 40 MILLIGRAM(S): 100 INJECTION SUBCUTANEOUS at 05:11

## 2023-12-07 RX ADMIN — ATORVASTATIN CALCIUM 40 MILLIGRAM(S): 80 TABLET, FILM COATED ORAL at 20:34

## 2023-12-07 RX ADMIN — LOSARTAN POTASSIUM 100 MILLIGRAM(S): 100 TABLET, FILM COATED ORAL at 05:11

## 2023-12-07 RX ADMIN — CLOPIDOGREL BISULFATE 75 MILLIGRAM(S): 75 TABLET, FILM COATED ORAL at 11:31

## 2023-12-07 NOTE — CHART NOTE - NSCHARTNOTEFT_GEN_A_CORE
Nutrition Follow Up Note  Hospital Course   (Per Electronic Medical Record)    Source:  Patient [X]  Nursing Staff [X]   Medical Record [X]      Diet: Diet, Consistent Carbohydrate w/Evening Snack:   DASH/TLC {Sodium & Cholesterol Restricted} (11-30-23 @ 13:36) [Active]    At this time patient tolerating diet w/ adequate appetite/intake consuming % of meals. Enjoying meals provided. No issues w/ dentition or chewing and swallowing current diet texture. Denies N/V/C/D, last BM 12/6 patient reports good intake of fiber (salad, fresh fruit cups and apples) at meals. Reports good acceptance of Provided Heart Healthy Consistent Carbohydrate meal pattern.     Current Weight: Unable to obtain  Recommend obtaining weekly weight    Pertinent Medications: MEDICATIONS  (STANDING):  aspirin  chewable 81 milliGRAM(s) Oral daily  atorvastatin 40 milliGRAM(s) Oral at bedtime  clopidogrel Tablet 75 milliGRAM(s) Oral daily  dextrose 5%. 1000 milliLiter(s) (50 mL/Hr) IV Continuous <Continuous>  dextrose 5%. 1000 milliLiter(s) (100 mL/Hr) IV Continuous <Continuous>  dextrose 50% Injectable 12.5 Gram(s) IV Push once  dextrose 50% Injectable 25 Gram(s) IV Push once  dextrose 50% Injectable 25 Gram(s) IV Push once  enoxaparin Injectable 40 milliGRAM(s) SubCutaneous every 24 hours  escitalopram 5 milliGRAM(s) Oral daily  glucagon  Injectable 1 milliGRAM(s) IntraMuscular once  hydrochlorothiazide 25 milliGRAM(s) Oral daily  influenza   Vaccine 0.5 milliLiter(s) IntraMuscular once  losartan 100 milliGRAM(s) Oral daily  metFORMIN 1000 milliGRAM(s) Oral two times a day  pantoprazole    Tablet 40 milliGRAM(s) Oral before breakfast  senna 2 Tablet(s) Oral at bedtime    MEDICATIONS  (PRN):  acetaminophen     Tablet .. 650 milliGRAM(s) Oral every 6 hours PRN Mild Pain (1 - 3)  dextrose Oral Gel 15 Gram(s) Oral once PRN Blood Glucose LESS THAN 70 milliGRAM(s)/deciliter  gabapentin 100 milliGRAM(s) Oral at bedtime PRN pain  melatonin 3 milliGRAM(s) Oral at bedtime PRN Insomnia  polyethylene glycol 3350 17 Gram(s) Oral at bedtime PRN for constipation      Pertinent Labs:  12-07 Na139 mmol/L Glu 154 mg/dL<H> K+ 4.2 mmol/L Cr  1.09 mg/dL BUN 20 mg/dL 12-07 Alb 3.6 g/dL 11-22 Chol 196 mg/dL LDL --    HDL 58 mg/dL Trig 125 mg/dL    Skin: No pressure injury per nursing flowsheets. Moisture Associated Dematitis (Skin Folds) Per nursing flowsheets     Edema: No edema noted per nursing flowsheet    Last Bowel Movement: on 12/6 Per nursing flowsheets       Estimated Needs:   [X] No Change Since Previous Assessment    Previous Nutrition Diagnosis:   Altered Nutrition Related Lab Values  related to T2DM  as evidenced by elevated A1c 7.7%    Nutrition Diagnosis is [X] Ongoing - addressed w/ Heart-Healthy DASH/TLC Consistent Carbohydrate meal pattern      New Nutrition Diagnosis: [X] Not Applicable    Interventions:   1. Recommend continuing with current plan of care    Monitoring & Evaluation:   [X] Weights   [X] PO Intake   [X] Skin Integrity   [X] Follow Up (Per Protocol)  [X] Tolerance to Diet Prescription   [X] Other: Labs     Registered Dietitian/Nutritionist Remains Available.  Kayley Briseno RD    Phone# [phone] Nutrition Follow Up Note  Hospital Course   (Per Electronic Medical Record)    Source:  Patient [X]  Nursing Staff [X]   Medical Record [X]      Diet: Diet, Consistent Carbohydrate w/Evening Snack:   DASH/TLC {Sodium & Cholesterol Restricted} (11-30-23 @ 13:36) [Active]    At this time patient tolerating diet w/ adequate appetite/intake consuming % of meals. Enjoying meals provided. No issues w/ dentition or chewing and swallowing current diet texture. Denies N/V/C/D, last BM 12/6 patient reports good intake of fiber (salad, fresh fruit cups and apples) at meals. Reports good acceptance of Provided Heart Healthy Consistent Carbohydrate meal pattern.     Current Weight: Unable to obtain  Recommend obtaining weekly weight    Pertinent Medications: MEDICATIONS  (STANDING):  aspirin  chewable 81 milliGRAM(s) Oral daily  atorvastatin 40 milliGRAM(s) Oral at bedtime  clopidogrel Tablet 75 milliGRAM(s) Oral daily  dextrose 5%. 1000 milliLiter(s) (50 mL/Hr) IV Continuous <Continuous>  dextrose 5%. 1000 milliLiter(s) (100 mL/Hr) IV Continuous <Continuous>  dextrose 50% Injectable 12.5 Gram(s) IV Push once  dextrose 50% Injectable 25 Gram(s) IV Push once  dextrose 50% Injectable 25 Gram(s) IV Push once  enoxaparin Injectable 40 milliGRAM(s) SubCutaneous every 24 hours  escitalopram 5 milliGRAM(s) Oral daily  glucagon  Injectable 1 milliGRAM(s) IntraMuscular once  hydrochlorothiazide 25 milliGRAM(s) Oral daily  influenza   Vaccine 0.5 milliLiter(s) IntraMuscular once  losartan 100 milliGRAM(s) Oral daily  metFORMIN 1000 milliGRAM(s) Oral two times a day  pantoprazole    Tablet 40 milliGRAM(s) Oral before breakfast  senna 2 Tablet(s) Oral at bedtime    MEDICATIONS  (PRN):  acetaminophen     Tablet .. 650 milliGRAM(s) Oral every 6 hours PRN Mild Pain (1 - 3)  dextrose Oral Gel 15 Gram(s) Oral once PRN Blood Glucose LESS THAN 70 milliGRAM(s)/deciliter  gabapentin 100 milliGRAM(s) Oral at bedtime PRN pain  melatonin 3 milliGRAM(s) Oral at bedtime PRN Insomnia  polyethylene glycol 3350 17 Gram(s) Oral at bedtime PRN for constipation      Pertinent Labs:  12-07 Na139 mmol/L Glu 154 mg/dL<H> K+ 4.2 mmol/L Cr  1.09 mg/dL BUN 20 mg/dL 12-07 Alb 3.6 g/dL 11-22 Chol 196 mg/dL LDL --    HDL 58 mg/dL Trig 125 mg/dL    Skin: No pressure injury per nursing flowsheets. Moisture Associated Dematitis (Skin Folds) Per nursing flowsheets     Edema: No edema noted per nursing flowsheet    Last Bowel Movement: on 12/6 Per nursing flowsheets       Estimated Needs:   [X] No Change Since Previous Assessment    Previous Nutrition Diagnosis:   Altered Nutrition Related Lab Values  related to T2DM  as evidenced by elevated A1c 7.7%    Nutrition Diagnosis is [X] Ongoing - addressed w/ Heart-Healthy DASH/TLC Consistent Carbohydrate meal pattern      New Nutrition Diagnosis: [X] Not Applicable    Interventions:   1. Recommend continuing with current plan of care    Monitoring & Evaluation:   [X] Weights   [X] PO Intake   [X] Skin Integrity   [X] Follow Up (Per Protocol)  [X] Tolerance to Diet Prescription   [X] Other: Labs     Registered Dietitian/Nutritionist Remains Available.  Kayley Briseno RD    Phone# [phone]

## 2023-12-07 NOTE — PROGRESS NOTE ADULT - ASSESSMENT
57 y/o F with PMH of vertigo, HTN, OA, Type 2 DM, shingles and sciatica who presented to [facility] on 11/23 as a direct transfer from [facility] for further evaluation of right carotid occlusion requiring diagnostic angiogram.  She presented to [facility] initially on 11/21 with progressively worsening left sided weakness, such that she slid off bed 2 days prior to admission. Her imaging showed as stroke with MRI showing scattered small acute infarcts involving the superior aspects of the right frontal lobe and right parietal lobe as well as the right caudate but was outside window for tPA.  Patient underwent a cerebral angiogram on 11/24 and no intervention provided. Patient was started on DAPT and statin and recommended for outpatient monitoring. She remained stable during her hospital stay and was evaluated for admission to acute inpatient rehab. She was admitted to [facility] on 11/28/23- pt/ot/dvt ppx    #Right frontal/parietal/caudate infarcts with right ICA stenosis now with left sided weakness  -Continue DAPT with Aspirin , Plavix   -Continue Atorvastatin     # neuropathy  - gabapentin     #HTN  -Continue HCTZ  Losartan   -TTE with EF of 60-65%    #Type 2 DM, HbA1c 7.7 (11/22/23)  -d/c FS and ISS 12/5/23  -Home med: Metformin 1000mg BID - continued    DVT ppx - Lovenox     GI ppx - PPI    will follow  d/w rehab team  59 y/o F with PMH of vertigo, HTN, OA, Type 2 DM, shingles and sciatica who presented to [facility] on 11/23 as a direct transfer from [facility] for further evaluation of right carotid occlusion requiring diagnostic angiogram.  She presented to [facility] initially on 11/21 with progressively worsening left sided weakness, such that she slid off bed 2 days prior to admission. Her imaging showed as stroke with MRI showing scattered small acute infarcts involving the superior aspects of the right frontal lobe and right parietal lobe as well as the right caudate but was outside window for tPA.  Patient underwent a cerebral angiogram on 11/24 and no intervention provided. Patient was started on DAPT and statin and recommended for outpatient monitoring. She remained stable during her hospital stay and was evaluated for admission to acute inpatient rehab. She was admitted to [facility] on 11/28/23- pt/ot/dvt ppx    #Right frontal/parietal/caudate infarcts with right ICA stenosis now with left sided weakness  -Continue DAPT with Aspirin , Plavix   -Continue Atorvastatin     # neuropathy  - gabapentin     #HTN  -Continue HCTZ  Losartan   -TTE with EF of 60-65%    #Type 2 DM, HbA1c 7.7 (11/22/23)  -d/c FS and ISS 12/5/23  -Home med: Metformin 1000mg BID - continued    DVT ppx - Lovenox     GI ppx - PPI    will follow  d/w rehab team

## 2023-12-07 NOTE — PROGRESS NOTE ADULT - ASSESSMENT
ASSESSMENT/PLAN  58 year old female with PMH of vertigo, HTN, OA, Type 2 DM, shingles and sciatica who presented to [facility] on 11/23  as a direct transfer from [facility] for further evaluation of right carotid occlusion requiring diagnostic angiogram.  She presented to [facility] initially on 11/21 with progressively worsening left sided weakness, such that she slid off bed 2 days prior to admission. Her imaging showed as stroke with MRI showing scattered small acute infarcts involving the superior aspects of the right frontal lobe and right parietal lobe as well as the right caudate but was outside window for tPA.  Patient underwent a cerebral angiogram on 11/24 and no intervention provided. Patient was started on DAPT and statin and recommended for outpatient monitoring. She remained stable during her hospital stay and was evaluated for admission to acute inpatient rehab. She was admitted to [facility] on 11/28/23.       #Right frontal/parietal/caudate infarcts with right ICA stenosis now with left sided weakness, Gait Instability, ADL impairments and Functional impairments  - Continue Comprehensive Rehab Program of PT/OT/SLP  -Most likely large vessel atherosclerosis with Right ICA near occlusion of vessel  -Continue Aspirin 81mg  -Continue Plavix 75mg   -Continue Atorvastatin 40mg daily     #Sleep/mood  -Continue lexapro 5mg daily 11/29 - for anxiety and neurorecovery   -Continue melatonin 3mg PRN at bedtime  -Neuropsych consult appreciated   -Recreation therapy appreciated     #HTN  -Continue HCTZ 25mg  -Continue Losartan 100mg daily   -TTE with EF of 60-65%    #Type 2 DM  -a1c is 7/7  -FS and ISS  -Continue Metformin 1000mg BID    #Pain control  - Tylenol PRN  -Home med: Gabapentin 100mg at bedtime PRN    #GI/Bowel Mgmt   - Continue Senna at bedtime   - Miralax PRN    #Bladder management  - Continue to monitor PVR q 8 hours (SC if > 400)  -Monitor UO    #DVT  - Lovenox  - TEDs     #Skin:  - intergluteal cleft fissure - continue barrier cream     FEN   - Diet - Regular + Thins  [CCHO, DASH/TLC]    - Dysphagia  SLP - evaluation and treatment    Precautions / PROPHYLAXIS:   - Falls, Cardiac  - ortho: Weight bearing status: WBAT   - Lungs: Aspiration, Incentive Spirometer   - Pressure injury/Skin: Turn Q2hrs while in bed, OOB to Chair, PT/OT      Multidisciplinary team meeting 12/4:  patient's functional goals and needs, functional and clinical  progress were discussed, barriers to discharge were identified. Anticipate discharge home with home care, 24/7 supervision for safety.     EDOD 12/15/23         ASSESSMENT/PLAN  58 year old female with PMH of vertigo, HTN, OA, Type 2 DM, shingles and sciatica who presented to [facility] on 11/23  as a direct transfer from [facility] for further evaluation of right carotid occlusion requiring diagnostic angiogram.  She presented to [facility] initially on 11/21 with progressively worsening left sided weakness, such that she slid off bed 2 days prior to admission. Her imaging showed as stroke with MRI showing scattered small acute infarcts involving the superior aspects of the right frontal lobe and right parietal lobe as well as the right caudate but was outside window for tPA.  Patient underwent a cerebral angiogram on 11/24 and no intervention provided. Patient was started on DAPT and statin and recommended for outpatient monitoring. She remained stable during her hospital stay and was evaluated for admission to acute inpatient rehab. She was admitted to [facility] on 11/28/23.       #Right frontal/parietal/caudate infarcts with right ICA stenosis now with left sided weakness, Gait Instability, ADL impairments and Functional impairments  - Continue Comprehensive Rehab Program of PT/OT/SLP  -Most likely large vessel atherosclerosis with Right ICA near occlusion of vessel  -Continue Aspirin 81mg  -Continue Plavix 75mg   -Continue Atorvastatin 40mg daily     #Sleep/mood  -Continue lexapro 5mg daily 11/29 - for anxiety and neurorecovery   -Continue melatonin 3mg PRN at bedtime  -Neuropsych consult appreciated   -Recreation therapy appreciated     #HTN  -Continue HCTZ 25mg  -Continue Losartan 100mg daily   -TTE with EF of 60-65%    #Type 2 DM  -a1c is 7/7  -FS and ISS  -Continue Metformin 1000mg BID    #Pain control  - Tylenol PRN  -Home med: Gabapentin 100mg at bedtime PRN    #GI/Bowel Mgmt   - Continue Senna at bedtime   - Miralax PRN    #Bladder management  - Continue to monitor PVR q 8 hours (SC if > 400)  -Monitor UO    #DVT  - Lovenox  - TEDs     #Skin:  - intergluteal cleft fissure - continue barrier cream     FEN   - Diet - Regular + Thins  [CCHO, DASH/TLC]    - Dysphagia  SLP - evaluation and treatment    Precautions / PROPHYLAXIS:   - Falls, Cardiac  - ortho: Weight bearing status: WBAT   - Lungs: Aspiration, Incentive Spirometer   - Pressure injury/Skin: Turn Q2hrs while in bed, OOB to Chair, PT/OT      Multidisciplinary team meeting 12/4:  patient's functional goals and needs, functional and clinical  progress were discussed, barriers to discharge were identified. Anticipate discharge home with home care, 24/7 supervision for safety.     EDOD 12/15/23

## 2023-12-07 NOTE — PROGRESS NOTE ADULT - SUBJECTIVE AND OBJECTIVE BOX
58y old  Female who presents with a chief complaint of CVA     Patient seen and examined at bedside. No acute overnight events.   no n/v, no sob    Vital Signs Last 24 Hrs  T(C): 36.7 (06 Dec 2023 07:39), Max: 36.8 (05 Dec 2023 19:40)  T(F): 98 (06 Dec 2023 07:39), Max: 98.2 (05 Dec 2023 19:40)  HR: 72 (06 Dec 2023 07:39) (61 - 72)  BP: 126/76 (06 Dec 2023 07:39) (108/67 - 126/76)  BP(mean): --  RR: 16 (06 Dec 2023 07:39) (16 - 16)  SpO2: 99% (06 Dec 2023 07:39) (96% - 99%)    Parameters below as of 06 Dec 2023 07:39  Patient On (Oxygen Delivery Method): room air      GENERAL- NAD  EAR/NOSE/MOUTH/THROAT -   MMM  EYES- CRISELDA, conjunctiva and Sclera clear  NECK- supple  RESPIRATORY-  clear to auscultation bilaterally, non laboured breathing  CARDIOVASCULAR - SIS2, RRR  GI - soft NT BS present  EXTREMITIES- no pedal edema  NEUROLOGY- no new gross focal deficits  PSYCHIATRY- AAO X 3                  12.4                 139  | 29   | 20           7.38  >-----------< 243     ------------------------< 154                   36.0                 4.2  | 101  | 1.09                                         Ca 9.5   Mg x     Ph x        MEDICATIONS  (STANDING):  aspirin  chewable 81 milliGRAM(s) Oral daily  atorvastatin 40 milliGRAM(s) Oral at bedtime  clopidogrel Tablet 75 milliGRAM(s) Oral daily  enoxaparin Injectable 40 milliGRAM(s) SubCutaneous every 24 hours  escitalopram 5 milliGRAM(s) Oral daily  glucagon  Injectable 1 milliGRAM(s) IntraMuscular once  hydrochlorothiazide 25 milliGRAM(s) Oral daily  influenza   Vaccine 0.5 milliLiter(s) IntraMuscular once  losartan 100 milliGRAM(s) Oral daily  metFORMIN 1000 milliGRAM(s) Oral two times a day  pantoprazole    Tablet 40 milliGRAM(s) Oral before breakfast  senna 2 Tablet(s) Oral at bedtime    MEDICATIONS  (PRN):  acetaminophen     Tablet .. 650 milliGRAM(s) Oral every 6 hours PRN Mild Pain (1 - 3)  dextrose Oral Gel 15 Gram(s) Oral once PRN Blood Glucose LESS THAN 70 milliGRAM(s)/deciliter  gabapentin 100 milliGRAM(s) Oral at bedtime PRN pain  melatonin 3 milliGRAM(s) Oral at bedtime PRN Insomnia  polyethylene glycol 3350 17 Gram(s) Oral at bedtime PRN for constipation

## 2023-12-07 NOTE — PROGRESS NOTE ADULT - SUBJECTIVE AND OBJECTIVE BOX
SUBJECTIVE/ROS: Patient evaluated while in the chair. She states she feels overall improved. She has no new complaints today and denies chest pain, fever, chills, nausea, vomiting, abdominal pain, headache, or BLE pain.     HPI:  58 year old female with PMH of vertigo, HTN, OA, Type 2 DM, shingles and sciatica who presented to [facility] on 11/23  as a direct transfer from [facility] for further evaluation of right carotid occlusion requiring diagnostic angiogram.  She presented to [facility] initially on 11/21 with progressively worsening left sided weakness, such that she slid off bed 2 days prior to admission. Her imaging showed as stroke with MRI showing scattered small acute infarcts involving the superior aspects of the right frontal lobe and right parietal lobe as well as the right caudate but was outside window for tPA.  Patient underwent a cerebral angiogram on 11/24 and no intervention provided. Patient was started on DAPT and statin and recommended for outpatient monitoring. She remained stable during her hospital stay and was evaluated for admission to acute inpatient rehab. She was admitted to [facility] on 11/28/23.       Vital Signs Last 24 Hrs  T(C): 36.7 (06 Dec 2023 20:08), Max: 36.7 (06 Dec 2023 20:08)  T(F): 98.1 (06 Dec 2023 20:08), Max: 98.1 (06 Dec 2023 20:08)  HR: 77 (07 Dec 2023 05:00) (74 - 77)  BP: 122/73 (07 Dec 2023 05:00) (122/73 - 136/78)  BP(mean): --  RR: 16 (06 Dec 2023 20:08) (16 - 16)  SpO2: 99% (06 Dec 2023 20:08) (99% - 99%)    Parameters below as of 06 Dec 2023 20:08  Patient On (Oxygen Delivery Method): room air        PHYSICAL EXAM  Constitutional - NAD, Comfortable  HEENT - NCAT, EOMI  Neck - Supple, No limited ROM  Chest - breathing comfortably   Cardiovascular - RRR, S1S2  Abdomen -BS+, Soft, NTND  Extremities - No C/C/E, No calf tenderness   Neurologic Exam - aox3, RU/RL 5/5, JORGE/JORGE 4/5 with dysmetria        LABS:                          12.4   7.38  )-----------( 243      ( 07 Dec 2023 07:35 )             36.0     12-07    139  |  101  |  20  ----------------------------<  154<H>  4.2   |  29  |  1.09    Ca    9.5      07 Dec 2023 07:35    TPro  7.8  /  Alb  3.6  /  TBili  0.6  /  DBili  x   /  AST  28  /  ALT  45  /  AlkPhos  56  12-07    LIVER FUNCTIONS - ( 07 Dec 2023 07:35 )  Alb: 3.6 g/dL / Pro: 7.8 g/dL / ALK PHOS: 56 U/L / ALT: 45 U/L / AST: 28 U/L / GGT: x                 MEDICATIONS  (STANDING):  aspirin  chewable 81 milliGRAM(s) Oral daily  atorvastatin 40 milliGRAM(s) Oral at bedtime  clopidogrel Tablet 75 milliGRAM(s) Oral daily  dextrose 5%. 1000 milliLiter(s) (50 mL/Hr) IV Continuous <Continuous>  dextrose 5%. 1000 milliLiter(s) (100 mL/Hr) IV Continuous <Continuous>  dextrose 50% Injectable 12.5 Gram(s) IV Push once  dextrose 50% Injectable 25 Gram(s) IV Push once  dextrose 50% Injectable 25 Gram(s) IV Push once  enoxaparin Injectable 40 milliGRAM(s) SubCutaneous every 24 hours  escitalopram 5 milliGRAM(s) Oral daily  glucagon  Injectable 1 milliGRAM(s) IntraMuscular once  hydrochlorothiazide 25 milliGRAM(s) Oral daily  influenza   Vaccine 0.5 milliLiter(s) IntraMuscular once  insulin lispro (ADMELOG) corrective regimen sliding scale   SubCutaneous at bedtime  insulin lispro (ADMELOG) corrective regimen sliding scale   SubCutaneous three times a day before meals  losartan 100 milliGRAM(s) Oral daily  metFORMIN 1000 milliGRAM(s) Oral two times a day  pantoprazole    Tablet 40 milliGRAM(s) Oral before breakfast  senna 2 Tablet(s) Oral at bedtime    MEDICATIONS  (PRN):  acetaminophen     Tablet .. 650 milliGRAM(s) Oral every 6 hours PRN Mild Pain (1 - 3)  dextrose Oral Gel 15 Gram(s) Oral once PRN Blood Glucose LESS THAN 70 milliGRAM(s)/deciliter  gabapentin 100 milliGRAM(s) Oral at bedtime PRN pain  melatonin 3 milliGRAM(s) Oral at bedtime PRN Insomnia  polyethylene glycol 3350 17 Gram(s) Oral at bedtime PRN for constipation   SUBJECTIVE/ROS: Patient evaluated while in the chair. She states she feels overall improved. She has no new complaints today and denies chest pain, fever, chills, nausea, vomiting, abdominal pain, headache, or BLE pain.     HPI:  58 year old female with PMH of vertigo, HTN, OA, Type 2 DM, shingles and sciatica who presented to [facility] on 11/23  as a direct transfer from [facility] for further evaluation of right carotid occlusion requiring diagnostic angiogram.  She presented to [facility] initially on 11/21 with progressively worsening left sided weakness, such that she slid off bed 2 days prior to admission. Her imaging showed as stroke with MRI showing scattered small acute infarcts involving the superior aspects of the right frontal lobe and right parietal lobe as well as the right caudate but was outside window for tPA.  Patient underwent a cerebral angiogram on 11/24 and no intervention provided. Patient was started on DAPT and statin and recommended for outpatient monitoring. She remained stable during her hospital stay and was evaluated for admission to acute inpatient rehab. She was admitted to [facility] on 11/28/23.       Vital Signs Last 24 Hrs  T(C): 36.7 (06 Dec 2023 20:08), Max: 36.7 (06 Dec 2023 20:08)  T(F): 98.1 (06 Dec 2023 20:08), Max: 98.1 (06 Dec 2023 20:08)  HR: 77 (07 Dec 2023 05:00) (74 - 77)  BP: 122/73 (07 Dec 2023 05:00) (122/73 - 136/78)  BP(mean): --  RR: 16 (06 Dec 2023 20:08) (16 - 16)  SpO2: 99% (06 Dec 2023 20:08) (99% - 99%)    Parameters below as of 06 Dec 2023 20:08  Patient On (Oxygen Delivery Method): room air        PHYSICAL EXAM  Constitutional - NAD, Comfortable  HEENT - NCAT, EOMI  Neck - Supple, No limited ROM  Chest - breathing comfortably   Cardiovascular - RRR, S1S2  Abdomen -BS+, Soft, NTND  Extremities - No C/C/E, No calf tenderness   Neurologic Exam - aox3, RU/RL 5/5, JORGE/JORGE 4/5 with dysmetria        LABS:                          12.4   7.38  )-----------( 243      ( 07 Dec 2023 07:35 )             36.0     12-07    139  |  101  |  20  ----------------------------<  154<H>  4.2   |  29  |  1.09    Ca    9.5      07 Dec 2023 07:35    TPro  7.8  /  Alb  3.6  /  TBili  0.6  /  DBili  x   /  AST  28  /  ALT  45  /  AlkPhos  56  12-07    LIVER FUNCTIONS - ( 07 Dec 2023 07:35 )  Alb: 3.6 g/dL / Pro: 7.8 g/dL / ALK PHOS: 56 U/L / ALT: 45 U/L / AST: 28 U/L / GGT: x                 MEDICATIONS  (STANDING):  aspirin  chewable 81 milliGRAM(s) Oral daily  atorvastatin 40 milliGRAM(s) Oral at bedtime  clopidogrel Tablet 75 milliGRAM(s) Oral daily  dextrose 5%. 1000 milliLiter(s) (50 mL/Hr) IV Continuous <Continuous>  dextrose 5%. 1000 milliLiter(s) (100 mL/Hr) IV Continuous <Continuous>  dextrose 50% Injectable 12.5 Gram(s) IV Push once  dextrose 50% Injectable 25 Gram(s) IV Push once  dextrose 50% Injectable 25 Gram(s) IV Push once  enoxaparin Injectable 40 milliGRAM(s) SubCutaneous every 24 hours  escitalopram 5 milliGRAM(s) Oral daily  glucagon  Injectable 1 milliGRAM(s) IntraMuscular once  hydrochlorothiazide 25 milliGRAM(s) Oral daily  influenza   Vaccine 0.5 milliLiter(s) IntraMuscular once  insulin lispro (ADMELOG) corrective regimen sliding scale   SubCutaneous at bedtime  insulin lispro (ADMELOG) corrective regimen sliding scale   SubCutaneous three times a day before meals  losartan 100 milliGRAM(s) Oral daily  metFORMIN 1000 milliGRAM(s) Oral two times a day  pantoprazole    Tablet 40 milliGRAM(s) Oral before breakfast  senna 2 Tablet(s) Oral at bedtime    MEDICATIONS  (PRN):  acetaminophen     Tablet .. 650 milliGRAM(s) Oral every 6 hours PRN Mild Pain (1 - 3)  dextrose Oral Gel 15 Gram(s) Oral once PRN Blood Glucose LESS THAN 70 milliGRAM(s)/deciliter  gabapentin 100 milliGRAM(s) Oral at bedtime PRN pain  melatonin 3 milliGRAM(s) Oral at bedtime PRN Insomnia  polyethylene glycol 3350 17 Gram(s) Oral at bedtime PRN for constipation

## 2023-12-08 PROCEDURE — 99232 SBSQ HOSP IP/OBS MODERATE 35: CPT

## 2023-12-08 RX ORDER — LOSARTAN POTASSIUM 100 MG/1
50 TABLET, FILM COATED ORAL DAILY
Refills: 0 | Status: DISCONTINUED | OUTPATIENT
Start: 2023-12-09 | End: 2023-12-15

## 2023-12-08 RX ORDER — ESCITALOPRAM OXALATE 10 MG/1
10 TABLET, FILM COATED ORAL DAILY
Refills: 0 | Status: DISCONTINUED | OUTPATIENT
Start: 2023-12-08 | End: 2023-12-15

## 2023-12-08 RX ADMIN — ENOXAPARIN SODIUM 40 MILLIGRAM(S): 100 INJECTION SUBCUTANEOUS at 05:54

## 2023-12-08 RX ADMIN — ATORVASTATIN CALCIUM 40 MILLIGRAM(S): 80 TABLET, FILM COATED ORAL at 21:42

## 2023-12-08 RX ADMIN — ESCITALOPRAM OXALATE 10 MILLIGRAM(S): 10 TABLET, FILM COATED ORAL at 12:40

## 2023-12-08 RX ADMIN — Medication 81 MILLIGRAM(S): at 12:36

## 2023-12-08 RX ADMIN — METFORMIN HYDROCHLORIDE 1000 MILLIGRAM(S): 850 TABLET ORAL at 07:44

## 2023-12-08 RX ADMIN — CLOPIDOGREL BISULFATE 75 MILLIGRAM(S): 75 TABLET, FILM COATED ORAL at 12:36

## 2023-12-08 RX ADMIN — PANTOPRAZOLE SODIUM 40 MILLIGRAM(S): 20 TABLET, DELAYED RELEASE ORAL at 05:53

## 2023-12-08 RX ADMIN — LOSARTAN POTASSIUM 100 MILLIGRAM(S): 100 TABLET, FILM COATED ORAL at 05:54

## 2023-12-08 RX ADMIN — METFORMIN HYDROCHLORIDE 1000 MILLIGRAM(S): 850 TABLET ORAL at 17:09

## 2023-12-08 NOTE — PROGRESS NOTE ADULT - SUBJECTIVE AND OBJECTIVE BOX
SUBJECTIVE/ROS: Patient evaluated while in the chair. She has more movement in her left arm noted during therapy. She was noted to have a lower BP during therapy - in the 90s systolic and she has associated fatigue. She otherwise denies chest pain, fever, chills, nausea, vomiting, abdominal pain, headache, or BLE pain.     HPI:  58 year old female with PMH of vertigo, HTN, OA, Type 2 DM, shingles and sciatica who presented to [facility] on 11/23  as a direct transfer from [facility] for further evaluation of right carotid occlusion requiring diagnostic angiogram.  She presented to [facility] initially on 11/21 with progressively worsening left sided weakness, such that she slid off bed 2 days prior to admission. Her imaging showed as stroke with MRI showing scattered small acute infarcts involving the superior aspects of the right frontal lobe and right parietal lobe as well as the right caudate but was outside window for tPA.  Patient underwent a cerebral angiogram on 11/24 and no intervention provided. Patient was started on DAPT and statin and recommended for outpatient monitoring. She remained stable during her hospital stay and was evaluated for admission to acute inpatient rehab. She was admitted to [facility] on 11/28/23.     Vital Signs Last 24 Hrs  T(C): 36.8 (07 Dec 2023 20:22), Max: 36.8 (07 Dec 2023 20:22)  T(F): 98.2 (07 Dec 2023 20:22), Max: 98.2 (07 Dec 2023 20:22)  HR: 73 (08 Dec 2023 05:15) (64 - 75)  BP: 134/82 (08 Dec 2023 05:15) (96/61 - 134/82)  BP(mean): --  RR: 16 (07 Dec 2023 20:22) (16 - 16)  SpO2: 99% (07 Dec 2023 20:22) (99% - 99%)    Parameters below as of 07 Dec 2023 20:22  Patient On (Oxygen Delivery Method): room air        PHYSICAL EXAM  Constitutional - NAD, Comfortable  HEENT - NCAT, EOMI  Neck - Supple, No limited ROM  Chest - breathing comfortably   Cardiovascular - RRR, S1S2  Abdomen -BS+, Soft, NTND  Extremities - No C/C/E, No calf tenderness   Neurologic Exam - aox3, RU/RL 5/5, JORGE/JORGE 4/5 with dysmetria        LABS:                          12.4   7.38  )-----------( 243      ( 07 Dec 2023 07:35 )             36.0     12-07    139  |  101  |  20  ----------------------------<  154<H>  4.2   |  29  |  1.09    Ca    9.5      07 Dec 2023 07:35    TPro  7.8  /  Alb  3.6  /  TBili  0.6  /  DBili  x   /  AST  28  /  ALT  45  /  AlkPhos  56  12-07    LIVER FUNCTIONS - ( 07 Dec 2023 07:35 )  Alb: 3.6 g/dL / Pro: 7.8 g/dL / ALK PHOS: 56 U/L / ALT: 45 U/L / AST: 28 U/L / GGT: x               MEDICATIONS  (STANDING):  aspirin  chewable 81 milliGRAM(s) Oral daily  atorvastatin 40 milliGRAM(s) Oral at bedtime  clopidogrel Tablet 75 milliGRAM(s) Oral daily  dextrose 5%. 1000 milliLiter(s) (50 mL/Hr) IV Continuous <Continuous>  dextrose 5%. 1000 milliLiter(s) (100 mL/Hr) IV Continuous <Continuous>  dextrose 50% Injectable 12.5 Gram(s) IV Push once  dextrose 50% Injectable 25 Gram(s) IV Push once  dextrose 50% Injectable 25 Gram(s) IV Push once  enoxaparin Injectable 40 milliGRAM(s) SubCutaneous every 24 hours  escitalopram 10 milliGRAM(s) Oral daily  glucagon  Injectable 1 milliGRAM(s) IntraMuscular once  influenza   Vaccine 0.5 milliLiter(s) IntraMuscular once  losartan 50 milliGRAM(s) Oral daily  metFORMIN 1000 milliGRAM(s) Oral two times a day  pantoprazole    Tablet 40 milliGRAM(s) Oral before breakfast  senna 2 Tablet(s) Oral at bedtime    MEDICATIONS  (PRN):  acetaminophen     Tablet .. 650 milliGRAM(s) Oral every 6 hours PRN Mild Pain (1 - 3)  dextrose Oral Gel 15 Gram(s) Oral once PRN Blood Glucose LESS THAN 70 milliGRAM(s)/deciliter  gabapentin 100 milliGRAM(s) Oral at bedtime PRN pain  melatonin 3 milliGRAM(s) Oral at bedtime PRN Insomnia  polyethylene glycol 3350 17 Gram(s) Oral at bedtime PRN for constipation   SUBJECTIVE/ROS: Patient evaluated while in the chair. She has more movement in her left arm noted during therapy. She was noted to have a lower BP during therapy - in the 90s systolic and she has associated fatigue. She otherwise denies chest pain, fever, chills, nausea, vomiting, abdominal pain, headache, or BLE pain.     HPI:  58 year old female with PMH of vertigo, HTN, OA, Type 2 DM, shingles and sciatica who presented to [facility] on 11/23  as a direct transfer from [facility] for further evaluation of right carotid occlusion requiring diagnostic angiogram.  She presented to [facility] initially on 11/21 with progressively worsening left sided weakness, such that she slid off bed 2 days prior to admission. Her imaging showed as stroke with MRI showing scattered small acute infarcts involving the superior aspects of the right frontal lobe and right parietal lobe as well as the right caudate but was outside window for tPA.  Patient underwent a cerebral angiogram on 11/24 and no intervention provided. Patient was started on DAPT and statin and recommended for outpatient monitoring. She remained stable during her hospital stay and was evaluated for admission to acute inpatient rehab. She was admitted to [facility] on 11/28/23.     Vital Signs Last 24 Hrs  T(C): 36.8 (07 Dec 2023 20:22), Max: 36.8 (07 Dec 2023 20:22)  T(F): 98.2 (07 Dec 2023 20:22), Max: 98.2 (07 Dec 2023 20:22)  HR: 73 (08 Dec 2023 05:15) (64 - 75)  BP: 134/82 (08 Dec 2023 05:15) (96/61 - 134/82)  BP(mean): --  RR: 16 (07 Dec 2023 20:22) (16 - 16)  SpO2: 99% (07 Dec 2023 20:22) (99% - 99%)    Parameters below as of 07 Dec 2023 20:22  Patient On (Oxygen Delivery Method): room air        PHYSICAL EXAM  Constitutional - NAD, Comfortable  HEENT - NCAT, EOMI  Neck - Supple, No limited ROM  Chest - breathing comfortably   Cardiovascular - RRR, S1S2  Abdomen -BS+, Soft, NTND  Extremities - No C/C/E, No calf tenderness   Neurologic Exam - aox3, RU/RL 5/5, JORGE/JORGE 4/5 with dysmetria        LABS:                          12.4   7.38  )-----------( 243      ( 07 Dec 2023 07:35 )             36.0     12-07    139  |  101  |  20  ----------------------------<  154<H>  4.2   |  29  |  1.09    Ca    9.5      07 Dec 2023 07:35    TPro  7.8  /  Alb  3.6  /  TBili  0.6  /  DBili  x   /  AST  28  /  ALT  45  /  AlkPhos  56  12-07    LIVER FUNCTIONS - ( 07 Dec 2023 07:35 )  Alb: 3.6 g/dL / Pro: 7.8 g/dL / ALK PHOS: 56 U/L / ALT: 45 U/L / AST: 28 U/L / GGT: x               MEDICATIONS  (STANDING):  aspirin  chewable 81 milliGRAM(s) Oral daily  atorvastatin 40 milliGRAM(s) Oral at bedtime  clopidogrel Tablet 75 milliGRAM(s) Oral daily  dextrose 5%. 1000 milliLiter(s) (50 mL/Hr) IV Continuous <Continuous>  dextrose 5%. 1000 milliLiter(s) (100 mL/Hr) IV Continuous <Continuous>  dextrose 50% Injectable 12.5 Gram(s) IV Push once  dextrose 50% Injectable 25 Gram(s) IV Push once  dextrose 50% Injectable 25 Gram(s) IV Push once  enoxaparin Injectable 40 milliGRAM(s) SubCutaneous every 24 hours  escitalopram 10 milliGRAM(s) Oral daily  glucagon  Injectable 1 milliGRAM(s) IntraMuscular once  influenza   Vaccine 0.5 milliLiter(s) IntraMuscular once  losartan 50 milliGRAM(s) Oral daily  metFORMIN 1000 milliGRAM(s) Oral two times a day  pantoprazole    Tablet 40 milliGRAM(s) Oral before breakfast  senna 2 Tablet(s) Oral at bedtime    MEDICATIONS  (PRN):  acetaminophen     Tablet .. 650 milliGRAM(s) Oral every 6 hours PRN Mild Pain (1 - 3)  dextrose Oral Gel 15 Gram(s) Oral once PRN Blood Glucose LESS THAN 70 milliGRAM(s)/deciliter  gabapentin 100 milliGRAM(s) Oral at bedtime PRN pain  melatonin 3 milliGRAM(s) Oral at bedtime PRN Insomnia  polyethylene glycol 3350 17 Gram(s) Oral at bedtime PRN for constipation

## 2023-12-08 NOTE — PROGRESS NOTE ADULT - SUBJECTIVE AND OBJECTIVE BOX
58y old  Female who presents with a chief complaint of CVA     Patient seen and examined at bedside. noted low BP in PT today, with mild dizziness.   No acute overnight events.   no n/v, no sob    Vital Signs Last 24 Hrs  T(C): 36.8 (07 Dec 2023 20:22), Max: 36.8 (07 Dec 2023 20:22)  T(F): 98.2 (07 Dec 2023 20:22), Max: 98.2 (07 Dec 2023 20:22)  HR: 73 (08 Dec 2023 05:15) (64 - 75)  BP: 134/82 (08 Dec 2023 05:15) (96/61 - 134/82)  BP(mean): --  RR: 16 (07 Dec 2023 20:22) (16 - 16)  SpO2: 99% (07 Dec 2023 20:22) (99% - 99%)    Parameters below as of 07 Dec 2023 20:22  Patient On (Oxygen Delivery Method): room air        GENERAL- NAD  EAR/NOSE/MOUTH/THROAT -   MMM  EYES- CRISELDA, conjunctiva and Sclera clear  NECK- supple  RESPIRATORY-  clear to auscultation bilaterally, non laboured breathing  CARDIOVASCULAR - SIS2, RRR  GI - soft NT BS present  EXTREMITIES- no pedal edema  NEUROLOGY- no new gross focal deficits  PSYCHIATRY- AAO X 3                  12.4                 139  | 29   | 20           7.38  >-----------< 243     ------------------------< 154                   36.0                 4.2  | 101  | 1.09                                         Ca 9.5   Mg x     Ph x        MEDICATIONS  (STANDING):  aspirin  chewable 81 milliGRAM(s) Oral daily  atorvastatin 40 milliGRAM(s) Oral at bedtime  clopidogrel Tablet 75 milliGRAM(s) Oral daily  enoxaparin Injectable 40 milliGRAM(s) SubCutaneous every 24 hours  escitalopram 5 milliGRAM(s) Oral daily  glucagon  Injectable 1 milliGRAM(s) IntraMuscular once  hydrochlorothiazide 25 milliGRAM(s) Oral daily  influenza   Vaccine 0.5 milliLiter(s) IntraMuscular once  losartan 100 milliGRAM(s) Oral daily  metFORMIN 1000 milliGRAM(s) Oral two times a day  pantoprazole    Tablet 40 milliGRAM(s) Oral before breakfast  senna 2 Tablet(s) Oral at bedtime    MEDICATIONS  (PRN):  acetaminophen     Tablet .. 650 milliGRAM(s) Oral every 6 hours PRN Mild Pain (1 - 3)  dextrose Oral Gel 15 Gram(s) Oral once PRN Blood Glucose LESS THAN 70 milliGRAM(s)/deciliter  gabapentin 100 milliGRAM(s) Oral at bedtime PRN pain  melatonin 3 milliGRAM(s) Oral at bedtime PRN Insomnia  polyethylene glycol 3350 17 Gram(s) Oral at bedtime PRN for constipation

## 2023-12-08 NOTE — PROGRESS NOTE ADULT - ASSESSMENT
ASSESSMENT/PLAN  58 year old female with PMH of vertigo, HTN, OA, Type 2 DM, shingles and sciatica who presented to [facility] on 11/23  as a direct transfer from [facility] for further evaluation of right carotid occlusion requiring diagnostic angiogram.  She presented to [facility] initially on 11/21 with progressively worsening left sided weakness, such that she slid off bed 2 days prior to admission. Her imaging showed as stroke with MRI showing scattered small acute infarcts involving the superior aspects of the right frontal lobe and right parietal lobe as well as the right caudate but was outside window for tPA.  Patient underwent a cerebral angiogram on 11/24 and no intervention provided. Patient was started on DAPT and statin and recommended for outpatient monitoring. She remained stable during her hospital stay and was evaluated for admission to acute inpatient rehab. She was admitted to [facility] on 11/28/23.       #Right frontal/parietal/caudate infarcts with right ICA stenosis now with left sided weakness, Gait Instability, ADL impairments and Functional impairments  - Continue Comprehensive Rehab Program of PT/OT/SLP  -Most likely large vessel atherosclerosis with Right ICA near occlusion of vessel  -Continue Aspirin 81mg  -Continue Plavix 75mg   -Continue Atorvastatin 40mg daily     #Sleep/mood  -Increase lexapro to 10mg daily12/8 - for anxiety and neurorecovery   -Continue melatonin 3mg PRN at bedtime  -Neuropsych consult appreciated   -Recreation therapy appreciated     #HTN  -Stop HCTZ 25mg 12/8  -Reduce Losartan to 50mg daily 12/8  -TTE with EF of 60-65%  -Monitor BP    #Type 2 DM  -a1c is 7.7  -Continue Metformin 1000mg BID    #Pain control  - Tylenol PRN  -Home med: Gabapentin 100mg at bedtime PRN    #GI/Bowel Mgmt   - Continue Senna at bedtime   - Miralax PRN    #DVT  - Lovenox  - TEDs     #Skin:  - intergluteal cleft fissure - continue barrier cream     FEN   - Diet - Regular + Thins  [CCHO, DASH/TLC]    - Dysphagia  SLP - evaluation and treatment    Precautions / PROPHYLAXIS:   - Falls, Cardiac  - ortho: Weight bearing status: WBAT   - Lungs: Aspiration, Incentive Spirometer   - Pressure injury/Skin: Turn Q2hrs while in bed, OOB to Chair, PT/OT      Multidisciplinary team meeting 12/4:  patient's functional goals and needs, functional and clinical  progress were discussed, barriers to discharge were identified. Anticipate discharge home with home care, 24/7 supervision for safety.     EDOD 12/15/23         ASSESSMENT/PLAN  58 year old female with PMH of vertigo, HTN, OA, Type 2 DM, shingles and sciatica who presented to [facility] on 11/23  as a direct transfer from [facility] for further evaluation of right carotid occlusion requiring diagnostic angiogram.  She presented to [facility] initially on 11/21 with progressively worsening left sided weakness, such that she slid off bed 2 days prior to admission. Her imaging showed as stroke with MRI showing scattered small acute infarcts involving the superior aspects of the right frontal lobe and right parietal lobe as well as the right caudate but was outside window for tPA.  Patient underwent a cerebral angiogram on 11/24 and no intervention provided. Patient was started on DAPT and statin and recommended for outpatient monitoring. She remained stable during her hospital stay and was evaluated for admission to acute inpatient rehab. She was admitted to [facility] on 11/28/23.       #Right frontal/parietal/caudate infarcts with right ICA stenosis now with left sided weakness, Gait Instability, ADL impairments and Functional impairments  - Continue Comprehensive Rehab Program of PT/OT/SLP  -Most likely large vessel atherosclerosis with Right ICA near occlusion of vessel  -Continue Aspirin 81mg  -Continue Plavix 75mg   -Continue Atorvastatin 40mg daily     #Sleep/mood  -Increase lexapro to 10mg daily12/8 - for anxiety and neurorecovery   -Continue melatonin 3mg PRN at bedtime  -Neuropsych consult appreciated   -Recreation therapy appreciated     #HTN  -Stop HCTZ 25mg 12/8  -Reduce Losartan to 50mg daily 12/8  -TTE with EF of 60-65%  -Monitor BP    #Type 2 DM  -a1c is 7.7  -Continue Metformin 1000mg BID    #Pain control  - Tylenol PRN  -Home med: Gabapentin 100mg at bedtime PRN    #GI/Bowel Mgmt   - Continue Senna at bedtime   - Miralax PRN    #DVT  - Lovenox  - TEDs     #Skin:  - intergluteal cleft fissure - continue barrier cream     FEN   - Diet - Regular + Thins  [CCHO, DASH/TLC]    - Dysphagia  SLP - evaluation and treatment    Precautions / PROPHYLAXIS:   - Falls, Cardiac  - ortho: Weight bearing status: WBAT   - Lungs: Aspiration, Incentive Spirometer   - Pressure injury/Skin: Turn Q2hrs while in bed, OOB to Chair, PT/OT      Multidisciplinary team meeting 12/4:  patient's functional goals and needs, functional and clinical  progress were discussed, barriers to discharge were identified. Anticipate discharge home with home care, 24/7 supervision for safety.     EDOD 12/15/23

## 2023-12-09 PROCEDURE — 99232 SBSQ HOSP IP/OBS MODERATE 35: CPT

## 2023-12-09 RX ADMIN — METFORMIN HYDROCHLORIDE 1000 MILLIGRAM(S): 850 TABLET ORAL at 17:03

## 2023-12-09 RX ADMIN — PANTOPRAZOLE SODIUM 40 MILLIGRAM(S): 20 TABLET, DELAYED RELEASE ORAL at 05:26

## 2023-12-09 RX ADMIN — SENNA PLUS 2 TABLET(S): 8.6 TABLET ORAL at 21:54

## 2023-12-09 RX ADMIN — ENOXAPARIN SODIUM 40 MILLIGRAM(S): 100 INJECTION SUBCUTANEOUS at 05:24

## 2023-12-09 RX ADMIN — METFORMIN HYDROCHLORIDE 1000 MILLIGRAM(S): 850 TABLET ORAL at 07:38

## 2023-12-09 RX ADMIN — ESCITALOPRAM OXALATE 10 MILLIGRAM(S): 10 TABLET, FILM COATED ORAL at 12:21

## 2023-12-09 RX ADMIN — ATORVASTATIN CALCIUM 40 MILLIGRAM(S): 80 TABLET, FILM COATED ORAL at 21:54

## 2023-12-09 RX ADMIN — Medication 81 MILLIGRAM(S): at 12:21

## 2023-12-09 RX ADMIN — LOSARTAN POTASSIUM 50 MILLIGRAM(S): 100 TABLET, FILM COATED ORAL at 05:25

## 2023-12-09 RX ADMIN — CLOPIDOGREL BISULFATE 75 MILLIGRAM(S): 75 TABLET, FILM COATED ORAL at 12:21

## 2023-12-09 NOTE — PROGRESS NOTE ADULT - SUBJECTIVE AND OBJECTIVE BOX
Patient is a 58y old  Female who presents with a chief complaint of CVA (09 Dec 2023 09:45)      Patient seen and examined at bedside. no acute medical complaints.     ALLERGIES:  No Known Allergies    MEDICATIONS  (STANDING):  aspirin  chewable 81 milliGRAM(s) Oral daily  atorvastatin 40 milliGRAM(s) Oral at bedtime  clopidogrel Tablet 75 milliGRAM(s) Oral daily  dextrose 5%. 1000 milliLiter(s) (50 mL/Hr) IV Continuous <Continuous>  dextrose 5%. 1000 milliLiter(s) (100 mL/Hr) IV Continuous <Continuous>  dextrose 50% Injectable 12.5 Gram(s) IV Push once  dextrose 50% Injectable 25 Gram(s) IV Push once  dextrose 50% Injectable 25 Gram(s) IV Push once  enoxaparin Injectable 40 milliGRAM(s) SubCutaneous every 24 hours  escitalopram 10 milliGRAM(s) Oral daily  glucagon  Injectable 1 milliGRAM(s) IntraMuscular once  influenza   Vaccine 0.5 milliLiter(s) IntraMuscular once  losartan 50 milliGRAM(s) Oral daily  metFORMIN 1000 milliGRAM(s) Oral two times a day  pantoprazole    Tablet 40 milliGRAM(s) Oral before breakfast  senna 2 Tablet(s) Oral at bedtime    MEDICATIONS  (PRN):  acetaminophen     Tablet .. 650 milliGRAM(s) Oral every 6 hours PRN Mild Pain (1 - 3)  dextrose Oral Gel 15 Gram(s) Oral once PRN Blood Glucose LESS THAN 70 milliGRAM(s)/deciliter  gabapentin 100 milliGRAM(s) Oral at bedtime PRN pain  melatonin 3 milliGRAM(s) Oral at bedtime PRN Insomnia  polyethylene glycol 3350 17 Gram(s) Oral at bedtime PRN for constipation    Vital Signs Last 24 Hrs  T(F): 98.7 (09 Dec 2023 08:45), Max: 98.7 (09 Dec 2023 08:45)  HR: 72 (09 Dec 2023 08:45) (65 - 72)  BP: 114/67 (09 Dec 2023 08:45) (114/67 - 122/74)  RR: 16 (09 Dec 2023 08:45) (16 - 16)  SpO2: 99% (09 Dec 2023 08:45) (99% - 99%)  I&O's Summary        PHYSICAL EXAM:  General: NAD  ENT: MMM, no scleral icterus  Neck: Supple, No JVD  Lungs: Clear to auscultation bilaterally, no wheezes, rales, rhonchi  Cardio: RRR, S1/S2  Abdomen: Soft, Nontender, Nondistended; Bowel sounds present  Extremities: No calf tenderness, No pitting edema    LABS:                        12.4   7.38  )-----------( 243      ( 07 Dec 2023 07:35 )             36.0       12-07    139  |  101  |  20  ----------------------------<  154  4.2   |  29  |  1.09    Ca    9.5      07 Dec 2023 07:35    TPro  7.8  /  Alb  3.6  /  TBili  0.6  /  DBili  x   /  AST  28  /  ALT  45  /  AlkPhos  56  12-07                11-22 Chol 196 mg/dL LDL -- HDL 58 mg/dL Trig 125 mg/dL                  Urinalysis Basic - ( 07 Dec 2023 07:35 )    Color: x / Appearance: x / SG: x / pH: x  Gluc: 154 mg/dL / Ketone: x  / Bili: x / Urobili: x   Blood: x / Protein: x / Nitrite: x   Leuk Esterase: x / RBC: x / WBC x   Sq Epi: x / Non Sq Epi: x / Bacteria: x        COVID-19 PCR: NotDetec (11-29-23 @ 06:00)      RADIOLOGY & ADDITIONAL TESTS:    Care Discussed with Consultants/Other Providers: yes, rehab

## 2023-12-09 NOTE — PROGRESS NOTE ADULT - ASSESSMENT
57 y/o F with PMH of vertigo, HTN, OA, Type 2 DM, shingles and sciatica who presented to [facility] on 11/23 as a direct transfer from [facility] for further evaluation of right carotid occlusion requiring diagnostic angiogram.  She presented to [facility] initially on 11/21 with progressively worsening left sided weakness, such that she slid off bed 2 days prior to admission. Her imaging showed as stroke with MRI showing scattered small acute infarcts involving the superior aspects of the right frontal lobe and right parietal lobe as well as the right caudate but was outside window for tPA.  Patient underwent a cerebral angiogram on 11/24 and no intervention provided. Patient was started on DAPT and statin and recommended for outpatient monitoring. She remained stable during her hospital stay and was evaluated for admission to acute inpatient rehab. She was admitted to [facility] on 11/28/23- pt/ot/dvt ppx.    #Right frontal/parietal/caudate infarcts with right ICA stenosis now with left sided weakness  -Continue DAPT with Aspirin, Plavix   -Continue Atorvastatin    #Neuropathy  -Continue gabapentin     #HTN with OH  -Off HCTZ, continue Losartan 25mg qd - 12/8/23  -TTE with EF of 60-65%    #Type 2 DM, HbA1c 7.7 (11/22/23)  -Off FS, ISS  -Home med: Metformin 1000mg BID - continued    DVT ppx - Lovenox  GI ppx - PPI 57 y/o F with PMH of vertigo, HTN, OA, Type 2 DM, shingles and sciatica who presented to [facility] on 11/23 as a direct transfer from [facility] for further evaluation of right carotid occlusion requiring diagnostic angiogram.  She presented to [facility] initially on 11/21 with progressively worsening left sided weakness, such that she slid off bed 2 days prior to admission. Her imaging showed as stroke with MRI showing scattered small acute infarcts involving the superior aspects of the right frontal lobe and right parietal lobe as well as the right caudate but was outside window for tPA.  Patient underwent a cerebral angiogram on 11/24 and no intervention provided. Patient was started on DAPT and statin and recommended for outpatient monitoring. She remained stable during her hospital stay and was evaluated for admission to acute inpatient rehab. She was admitted to [facility] on 11/28/23- pt/ot/dvt ppx.    #Right frontal/parietal/caudate infarcts with right ICA stenosis now with left sided weakness  -Continue DAPT with Aspirin, Plavix   -Continue Atorvastatin    #Neuropathy  -Continue gabapentin     #HTN with OH  -Off HCTZ, continue Losartan 25mg qd - 12/8/23  -TTE with EF of 60-65%    #Type 2 DM, HbA1c 7.7 (11/22/23)  -Off FS, ISS  -Home med: Metformin 1000mg BID - continued    DVT ppx - Lovenox  GI ppx - PPI 57 y/o F with PMH of vertigo, HTN, OA, Type 2 DM, shingles and sciatica who presented to [facility] on 11/23 as a direct transfer from [facility] for further evaluation of right carotid occlusion requiring diagnostic angiogram.  She presented to [facility] initially on 11/21 with progressively worsening left sided weakness, such that she slid off bed 2 days prior to admission. Her imaging showed as stroke with MRI showing scattered small acute infarcts involving the superior aspects of the right frontal lobe and right parietal lobe as well as the right caudate but was outside window for tPA.  Patient underwent a cerebral angiogram on 11/24 and no intervention provided. Patient was started on DAPT and statin and recommended for outpatient monitoring. She remained stable during her hospital stay and was evaluated for admission to acute inpatient rehab. She was admitted to [facility] on 11/28/23- pt/ot/dvt ppx.    #Right frontal/parietal/caudate infarcts with right ICA stenosis now with left sided weakness  -Continue DAPT with Aspirin, Plavix   -Continue Atorvastatin    #Neuropathy  -Continue gabapentin     #HTN with OH  -Off HCTZ, continue Losartan 50mg qd   -TTE with EF of 60-65%    #Type 2 DM, HbA1c 7.7 (11/22/23)  -Off FS, ISS  -Home med: Metformin 1000mg BID - continued    DVT ppx - Lovenox  GI ppx - PPI 59 y/o F with PMH of vertigo, HTN, OA, Type 2 DM, shingles and sciatica who presented to [facility] on 11/23 as a direct transfer from [facility] for further evaluation of right carotid occlusion requiring diagnostic angiogram.  She presented to [facility] initially on 11/21 with progressively worsening left sided weakness, such that she slid off bed 2 days prior to admission. Her imaging showed as stroke with MRI showing scattered small acute infarcts involving the superior aspects of the right frontal lobe and right parietal lobe as well as the right caudate but was outside window for tPA.  Patient underwent a cerebral angiogram on 11/24 and no intervention provided. Patient was started on DAPT and statin and recommended for outpatient monitoring. She remained stable during her hospital stay and was evaluated for admission to acute inpatient rehab. She was admitted to [facility] on 11/28/23- pt/ot/dvt ppx.    #Right frontal/parietal/caudate infarcts with right ICA stenosis now with left sided weakness  -Continue DAPT with Aspirin, Plavix   -Continue Atorvastatin    #Neuropathy  -Continue gabapentin     #HTN with OH  -Off HCTZ, continue Losartan 50mg qd   -TTE with EF of 60-65%    #Type 2 DM, HbA1c 7.7 (11/22/23)  -Off FS, ISS  -Home med: Metformin 1000mg BID - continued    DVT ppx - Lovenox  GI ppx - PPI

## 2023-12-09 NOTE — PROGRESS NOTE ADULT - SUBJECTIVE AND OBJECTIVE BOX
Cc: CVA    HPI: Patient with no new medical issues today.  Pain controlled, no chest pain, no N/V, no Fevers/Chills. No other new ROS  Has been tolerating rehabilitation program.    acetaminophen     Tablet .. 650 milliGRAM(s) Oral every 6 hours PRN  aspirin  chewable 81 milliGRAM(s) Oral daily  atorvastatin 40 milliGRAM(s) Oral at bedtime  clopidogrel Tablet 75 milliGRAM(s) Oral daily  dextrose 5%. 1000 milliLiter(s) IV Continuous <Continuous>  dextrose 5%. 1000 milliLiter(s) IV Continuous <Continuous>  dextrose 50% Injectable 25 Gram(s) IV Push once  dextrose 50% Injectable 25 Gram(s) IV Push once  dextrose 50% Injectable 12.5 Gram(s) IV Push once  dextrose Oral Gel 15 Gram(s) Oral once PRN  enoxaparin Injectable 40 milliGRAM(s) SubCutaneous every 24 hours  escitalopram 10 milliGRAM(s) Oral daily  gabapentin 100 milliGRAM(s) Oral at bedtime PRN  glucagon  Injectable 1 milliGRAM(s) IntraMuscular once  influenza   Vaccine 0.5 milliLiter(s) IntraMuscular once  losartan 50 milliGRAM(s) Oral daily  melatonin 3 milliGRAM(s) Oral at bedtime PRN  metFORMIN 1000 milliGRAM(s) Oral two times a day  pantoprazole    Tablet 40 milliGRAM(s) Oral before breakfast  polyethylene glycol 3350 17 Gram(s) Oral at bedtime PRN  senna 2 Tablet(s) Oral at bedtime      T(C): 37.1 (12-09-23 @ 08:45), Max: 37.1 (12-09-23 @ 08:45)  HR: 72 (12-09-23 @ 08:45) (65 - 72)  BP: 114/67 (12-09-23 @ 08:45) (114/67 - 122/74)  RR: 16 (12-09-23 @ 08:45) (16 - 16)  SpO2: 99% (12-09-23 @ 08:45) (99% - 99%)    In NAD  HEENT- EOMI  Heart- Well Perfused  Lungs- No resp distress, no use of accessory resp muscles  Abd- + BS, NT  Ext- No calf pain  Neuro- Exam unchanged  Psych- Affect wnl          Imp: Patient with diagnosis of    CVA      admitted for comprehensive acute rehabilitation.    Plan:  - Continue therapies  - DVT prophylaxis  - Skin- Turn q2h, check skin daily  - Continue current medications; patient medically stable.   - Patient is stable to continue current rehabilitation program.

## 2023-12-09 NOTE — PROGRESS NOTE ADULT - ASSESSMENT
From primary team notes:  ASSESSMENT/PLAN  58 year old female with PMH of vertigo, HTN, OA, Type 2 DM, shingles and sciatica who presented to [facility] on 11/23  as a direct transfer from [facility] for further evaluation of right carotid occlusion requiring diagnostic angiogram.  She presented to [facility] initially on 11/21 with progressively worsening left sided weakness, such that she slid off bed 2 days prior to admission. Her imaging showed as stroke with MRI showing scattered small acute infarcts involving the superior aspects of the right frontal lobe and right parietal lobe as well as the right caudate but was outside window for tPA.  Patient underwent a cerebral angiogram on 11/24 and no intervention provided. Patient was started on DAPT and statin and recommended for outpatient monitoring. She remained stable during her hospital stay and was evaluated for admission to acute inpatient rehab. She was admitted to [facility] on 11/28/23.       #Right frontal/parietal/caudate infarcts with right ICA stenosis now with left sided weakness, Gait Instability, ADL impairments and Functional impairments  - Continue Comprehensive Rehab Program of PT/OT/SLP  -Most likely large vessel atherosclerosis with Right ICA near occlusion of vessel  -Continue Aspirin 81mg  -Continue Plavix 75mg   -Continue Atorvastatin 40mg daily     #Sleep/mood  -Increase lexapro to 10mg daily12/8 - for anxiety and neurorecovery   -Continue melatonin 3mg PRN at bedtime  -Neuropsych consult appreciated   -Recreation therapy appreciated     #HTN  -Stop HCTZ 25mg 12/8  -Reduce Losartan to 50mg daily 12/8  -TTE with EF of 60-65%  -Monitor BP    #Type 2 DM  -a1c is 7.7  -Continue Metformin 1000mg BID    #Pain control  - Tylenol PRN  -Home med: Gabapentin 100mg at bedtime PRN    #GI/Bowel Mgmt   - Continue Senna at bedtime   - Miralax PRN    #DVT  - Lovenox  - TEDs     #Skin:  - intergluteal cleft fissure - continue barrier cream     FEN   - Diet - Regular + Thins  [CCHO, DASH/TLC]    - Dysphagia  SLP - evaluation and treatment    Precautions / PROPHYLAXIS:   - Falls, Cardiac  - ortho: Weight bearing status: WBAT   - Lungs: Aspiration, Incentive Spirometer   - Pressure injury/Skin: Turn Q2hrs while in bed, OOB to Chair, PT/OT      Multidisciplinary team meeting 12/4:  patient's functional goals and needs, functional and clinical  progress were discussed, barriers to discharge were identified. Anticipate discharge home with home care, 24/7 supervision for safety.     EDOD 12/15/23         From primary team notes:  ASSESSMENT/PLAN  58 year old female with PMH of vertigo, HTN, OA, Type 2 DM, shingles and sciatica who presented to [facility] on 11/23  as a direct transfer from [facility] for further evaluation of right carotid occlusion requiring diagnostic angiogram.  She presented to [facility] initially on 11/21 with progressively worsening left sided weakness, such that she slid off bed 2 days prior to admission. Her imaging showed as stroke with MRI showing scattered small acute infarcts involving the superior aspects of the right frontal lobe and right parietal lobe as well as the right caudate but was outside window for tPA.  Patient underwent a cerebral angiogram on 11/24 and no intervention provided. Patient was started on DAPT and statin and recommended for outpatient monitoring. She remained stable during her hospital stay and was evaluated for admission to acute inpatient rehab. She was admitted to [facility] on 11/28/23.       #Right frontal/parietal/caudate infarcts with right ICA stenosis now with left sided weakness, Gait Instability, ADL impairments and Functional impairments  - Continue Comprehensive Rehab Program of PT/OT/SLP  -Most likely large vessel atherosclerosis with Right ICA near occlusion of vessel  -Continue Aspirin 81mg  -Continue Plavix 75mg   -Continue Atorvastatin 40mg daily     #Sleep/mood  -Increase lexapro to 10mg daily12/8 - for anxiety and neurorecovery   -Continue melatonin 3mg PRN at bedtime  -Neuropsych consult appreciated   -Recreation therapy appreciated     #HTN  -Stop HCTZ 25mg 12/8  -Reduce Losartan to 50mg daily 12/8  -TTE with EF of 60-65%  -Monitor BP    #Type 2 DM  -a1c is 7.7  -Continue Metformin 1000mg BID    #Pain control  - Tylenol PRN  -Home med: Gabapentin 100mg at bedtime PRN    #GI/Bowel Mgmt   - Continue Senna at bedtime   - Miralax PRN    #DVT  - Lovenox  - TEDs     #Skin:  - intergluteal cleft fissure - continue barrier cream     FEN   - Diet - Regular + Thins  [CCHO, DASH/TLC]    - Dysphagia  SLP - evaluation and treatment    Precautions / PROPHYLAXIS:   - Falls, Cardiac  - ortho: Weight bearing status: WBAT   - Lungs: Aspiration, Incentive Spirometer   - Pressure injury/Skin: Turn Q2hrs while in bed, OOB to Chair, PT/OT      Multidisciplinary team meeting 12/4:  patient's functional goals and needs, functional and clinical  progress were discussed, barriers to discharge were identified. Anticipate discharge home with home care, 24/7 supervision for safety.     EDOD 12/15/23

## 2023-12-10 PROCEDURE — 99232 SBSQ HOSP IP/OBS MODERATE 35: CPT

## 2023-12-10 RX ADMIN — ENOXAPARIN SODIUM 40 MILLIGRAM(S): 100 INJECTION SUBCUTANEOUS at 05:19

## 2023-12-10 RX ADMIN — Medication 81 MILLIGRAM(S): at 12:07

## 2023-12-10 RX ADMIN — METFORMIN HYDROCHLORIDE 1000 MILLIGRAM(S): 850 TABLET ORAL at 07:35

## 2023-12-10 RX ADMIN — CLOPIDOGREL BISULFATE 75 MILLIGRAM(S): 75 TABLET, FILM COATED ORAL at 12:58

## 2023-12-10 RX ADMIN — PANTOPRAZOLE SODIUM 40 MILLIGRAM(S): 20 TABLET, DELAYED RELEASE ORAL at 05:19

## 2023-12-10 RX ADMIN — METFORMIN HYDROCHLORIDE 1000 MILLIGRAM(S): 850 TABLET ORAL at 17:11

## 2023-12-10 RX ADMIN — LOSARTAN POTASSIUM 50 MILLIGRAM(S): 100 TABLET, FILM COATED ORAL at 05:19

## 2023-12-10 RX ADMIN — ESCITALOPRAM OXALATE 10 MILLIGRAM(S): 10 TABLET, FILM COATED ORAL at 12:08

## 2023-12-10 RX ADMIN — ATORVASTATIN CALCIUM 40 MILLIGRAM(S): 80 TABLET, FILM COATED ORAL at 20:21

## 2023-12-10 NOTE — PROGRESS NOTE ADULT - ASSESSMENT
From primary team notes:  ASSESSMENT/PLAN  58 year old female with PMH of vertigo, HTN, OA, Type 2 DM, shingles and sciatica who presented to [facility] on 11/23  as a direct transfer from [facility] for further evaluation of right carotid occlusion requiring diagnostic angiogram.  She presented to [facility] initially on 11/21 with progressively worsening left sided weakness, such that she slid off bed 2 days prior to admission. Her imaging showed as stroke with MRI showing scattered small acute infarcts involving the superior aspects of the right frontal lobe and right parietal lobe as well as the right caudate but was outside window for tPA.  Patient underwent a cerebral angiogram on 11/24 and no intervention provided. Patient was started on DAPT and statin and recommended for outpatient monitoring. She remained stable during her hospital stay and was evaluated for admission to acute inpatient rehab. She was admitted to [facility] on 11/28/23.       #Right frontal/parietal/caudate infarcts with right ICA stenosis now with left sided weakness, Gait Instability, ADL impairments and Functional impairments  - Continue Comprehensive Rehab Program of PT/OT/SLP  -Most likely large vessel atherosclerosis with Right ICA near occlusion of vessel  -Continue Aspirin 81mg  -Continue Plavix 75mg   -Continue Atorvastatin 40mg daily     #Sleep/mood  -Increase lexapro to 10mg daily12/8 - for anxiety and neurorecovery   -Continue melatonin 3mg PRN at bedtime  -Neuropsych consult appreciated   -Recreation therapy appreciated     #HTN  -Stop HCTZ 25mg 12/8  -Reduce Losartan to 50mg daily 12/8  -TTE with EF of 60-65%  -Monitor BP    #Type 2 DM  -a1c is 7.7  -Continue Metformin 1000mg BID    #Pain control  - Tylenol PRN  -Home med: Gabapentin 100mg at bedtime PRN    #GI/Bowel Mgmt   - Continue Senna at bedtime   - Miralax PRN    #DVT  - Lovenox  - TEDs     #Skin:  - intergluteal cleft fissure - continue barrier cream     FEN   - Diet - Regular + Thins  [CCHO, DASH/TLC]    - Dysphagia  SLP - evaluation and treatment    Precautions / PROPHYLAXIS:   - Falls, Cardiac  - ortho: Weight bearing status: WBAT   - Lungs: Aspiration, Incentive Spirometer   - Pressure injury/Skin: Turn Q2hrs while in bed, OOB to Chair, PT/OT      Multidisciplinary team meeting 12/4:  patient's functional goals and needs, functional and clinical  progress were discussed, barriers to discharge were identified. Anticipate discharge home with home care, 24/7 supervision for safety.     EDOD 12/15/23         From primary team notes:  ASSESSMENT/PLAN  58 year old female with PMH of vertigo, HTN, OA, Type 2 DM, shingles and sciatica who presented to [facility] on 11/23  as a direct transfer from [facility] for further evaluation of right carotid occlusion requiring diagnostic angiogram.  She presented to [facility] initially on 11/21 with progressively worsening left sided weakness, such that she slid off bed 2 days prior to admission. Her imaging showed as stroke with MRI showing scattered small acute infarcts involving the superior aspects of the right frontal lobe and right parietal lobe as well as the right caudate but was outside window for tPA.  Patient underwent a cerebral angiogram on 11/24 and no intervention provided. Patient was started on DAPT and statin and recommended for outpatient monitoring. She remained stable during her hospital stay and was evaluated for admission to acute inpatient rehab. She was admitted to [facility] on 11/28/23.       #Right frontal/parietal/caudate infarcts with right ICA stenosis now with left sided weakness, Gait Instability, ADL impairments and Functional impairments  - Continue Comprehensive Rehab Program of PT/OT/SLP  -Most likely large vessel atherosclerosis with Right ICA near occlusion of vessel  -Continue Aspirin 81mg  -Continue Plavix 75mg   -Continue Atorvastatin 40mg daily     #Sleep/mood  -Increase lexapro to 10mg daily12/8 - for anxiety and neurorecovery   -Continue melatonin 3mg PRN at bedtime  -Neuropsych consult appreciated   -Recreation therapy appreciated     #HTN  -Stop HCTZ 25mg 12/8  -Reduce Losartan to 50mg daily 12/8  -TTE with EF of 60-65%  -Monitor BP    #Type 2 DM  -a1c is 7.7  -Continue Metformin 1000mg BID    #Pain control  - Tylenol PRN  -Home med: Gabapentin 100mg at bedtime PRN    #GI/Bowel Mgmt   - Continue Senna at bedtime   - Miralax PRN    #DVT  - Lovenox  - TEDs     #Skin:  - intergluteal cleft fissure - continue barrier cream     FEN   - Diet - Regular + Thins  [CCHO, DASH/TLC]    - Dysphagia  SLP - evaluation and treatment    Precautions / PROPHYLAXIS:   - Falls, Cardiac  - ortho: Weight bearing status: WBAT   - Lungs: Aspiration, Incentive Spirometer   - Pressure injury/Skin: Turn Q2hrs while in bed, OOB to Chair, PT/OT      Multidisciplinary team meeting 12/4:  patient's functional goals and needs, functional and clinical  progress were discussed, barriers to discharge were identified. Anticipate discharge home with home care, 24/7 supervision for safety.     EDOD 12/15/23

## 2023-12-10 NOTE — PROGRESS NOTE ADULT - ASSESSMENT
59 y/o F with PMH of vertigo, HTN, OA, Type 2 DM, shingles and sciatica who presented to [facility] on 11/23 as a direct transfer from [facility] for further evaluation of right carotid occlusion requiring diagnostic angiogram.  She presented to [facility] initially on 11/21 with progressively worsening left sided weakness, such that she slid off bed 2 days prior to admission. Her imaging showed as stroke with MRI showing scattered small acute infarcts involving the superior aspects of the right frontal lobe and right parietal lobe as well as the right caudate but was outside window for tPA.  Patient underwent a cerebral angiogram on 11/24 and no intervention provided. Patient was started on DAPT and statin and recommended for outpatient monitoring. She remained stable during her hospital stay and was evaluated for admission to acute inpatient rehab. She was admitted to [facility] on 11/28/23- pt/ot/dvt ppx.    #Right frontal/parietal/caudate infarcts with right ICA stenosis now with left sided weakness  -Continue DAPT with Aspirin, Plavix   -Continue Atorvastatin    #Neuropathy  -Continue gabapentin     #HTN with [place]  -Off HCTZ, continue Losartan 50mg qd   -TTE with EF of 60-65%    #Type 2 DM, HbA1c 7.7 (11/22/23)  -Off FS, ISS  -Home med: Metformin 1000mg BID - continued    DVT ppx - Lovenox  GI ppx - PPI   57 y/o F with PMH of vertigo, HTN, OA, Type 2 DM, shingles and sciatica who presented to [facility] on 11/23 as a direct transfer from [facility] for further evaluation of right carotid occlusion requiring diagnostic angiogram.  She presented to [facility] initially on 11/21 with progressively worsening left sided weakness, such that she slid off bed 2 days prior to admission. Her imaging showed as stroke with MRI showing scattered small acute infarcts involving the superior aspects of the right frontal lobe and right parietal lobe as well as the right caudate but was outside window for tPA.  Patient underwent a cerebral angiogram on 11/24 and no intervention provided. Patient was started on DAPT and statin and recommended for outpatient monitoring. She remained stable during her hospital stay and was evaluated for admission to acute inpatient rehab. She was admitted to [facility] on 11/28/23- pt/ot/dvt ppx.    #Right frontal/parietal/caudate infarcts with right ICA stenosis now with left sided weakness  -Continue DAPT with Aspirin, Plavix   -Continue Atorvastatin    #Neuropathy  -Continue gabapentin     #HTN with [place]  -Off HCTZ, continue Losartan 50mg qd   -TTE with EF of 60-65%    #Type 2 DM, HbA1c 7.7 (11/22/23)  -Off FS, ISS  -Home med: Metformin 1000mg BID - continued    DVT ppx - Lovenox  GI ppx - PPI

## 2023-12-10 NOTE — PROGRESS NOTE ADULT - SUBJECTIVE AND OBJECTIVE BOX
Cc: CVA    HPI: Patient with no new medical issues today.  Pain controlled, no chest pain, no N/V, no Fevers/Chills. No other new ROS  Has been tolerating rehabilitation program.    acetaminophen     Tablet .. 650 milliGRAM(s) Oral every 6 hours PRN  aspirin  chewable 81 milliGRAM(s) Oral daily  atorvastatin 40 milliGRAM(s) Oral at bedtime  clopidogrel Tablet 75 milliGRAM(s) Oral daily  dextrose 5%. 1000 milliLiter(s) IV Continuous <Continuous>  dextrose 5%. 1000 milliLiter(s) IV Continuous <Continuous>  dextrose 50% Injectable 25 Gram(s) IV Push once  dextrose 50% Injectable 25 Gram(s) IV Push once  dextrose 50% Injectable 12.5 Gram(s) IV Push once  dextrose Oral Gel 15 Gram(s) Oral once PRN  enoxaparin Injectable 40 milliGRAM(s) SubCutaneous every 24 hours  escitalopram 10 milliGRAM(s) Oral daily  gabapentin 100 milliGRAM(s) Oral at bedtime PRN  glucagon  Injectable 1 milliGRAM(s) IntraMuscular once  influenza   Vaccine 0.5 milliLiter(s) IntraMuscular once  losartan 50 milliGRAM(s) Oral daily  melatonin 3 milliGRAM(s) Oral at bedtime PRN  metFORMIN 1000 milliGRAM(s) Oral two times a day  pantoprazole    Tablet 40 milliGRAM(s) Oral before breakfast  polyethylene glycol 3350 17 Gram(s) Oral at bedtime PRN  senna 2 Tablet(s) Oral at bedtime    ICU Vital Signs Last 24 Hrs  T(C): 36.4 (10 Dec 2023 07:58), Max: 37.1 (09 Dec 2023 21:53)  T(F): 97.6 (10 Dec 2023 07:58), Max: 98.7 (09 Dec 2023 21:53)  HR: 71 (10 Dec 2023 07:58) (70 - 72)  BP: 108/67 (10 Dec 2023 07:58) (108/67 - 135/82)  RR: 16 (10 Dec 2023 07:58) (16 - 16)  SpO2: 97% (10 Dec 2023 07:58) (97% - 98%)    O2 Parameters below as of 10 Dec 2023 07:58  Patient On (Oxygen Delivery Method): room air            In NAD  HEENT- EOMI  Heart- Well Perfused  Lungs- No resp distress, no use of accessory resp muscles  Neuro- Exam unchanged  Psych- Affect wnl          Imp: Patient with diagnosis of    CVA      admitted for comprehensive acute rehabilitation.    Plan:  - Continue therapies  - DVT prophylaxis  - Skin- Turn q2h, check skin daily  - Continue current medications; patient medically stable.   - Patient is stable to continue current rehabilitation program.

## 2023-12-10 NOTE — PROGRESS NOTE ADULT - SUBJECTIVE AND OBJECTIVE BOX
Patient is a 58y old  Female who presents with a chief complaint of CVA (10 Dec 2023 09:35)      Patient seen and examined at bedside. feeling well, no acute medical complaints.     ALLERGIES:  No Known Allergies    MEDICATIONS  (STANDING):  aspirin  chewable 81 milliGRAM(s) Oral daily  atorvastatin 40 milliGRAM(s) Oral at bedtime  clopidogrel Tablet 75 milliGRAM(s) Oral daily  dextrose 5%. 1000 milliLiter(s) (50 mL/Hr) IV Continuous <Continuous>  dextrose 5%. 1000 milliLiter(s) (100 mL/Hr) IV Continuous <Continuous>  dextrose 50% Injectable 12.5 Gram(s) IV Push once  dextrose 50% Injectable 25 Gram(s) IV Push once  dextrose 50% Injectable 25 Gram(s) IV Push once  enoxaparin Injectable 40 milliGRAM(s) SubCutaneous every 24 hours  escitalopram 10 milliGRAM(s) Oral daily  glucagon  Injectable 1 milliGRAM(s) IntraMuscular once  influenza   Vaccine 0.5 milliLiter(s) IntraMuscular once  losartan 50 milliGRAM(s) Oral daily  metFORMIN 1000 milliGRAM(s) Oral two times a day  pantoprazole    Tablet 40 milliGRAM(s) Oral before breakfast  senna 2 Tablet(s) Oral at bedtime    MEDICATIONS  (PRN):  acetaminophen     Tablet .. 650 milliGRAM(s) Oral every 6 hours PRN Mild Pain (1 - 3)  dextrose Oral Gel 15 Gram(s) Oral once PRN Blood Glucose LESS THAN 70 milliGRAM(s)/deciliter  gabapentin 100 milliGRAM(s) Oral at bedtime PRN pain  melatonin 3 milliGRAM(s) Oral at bedtime PRN Insomnia  polyethylene glycol 3350 17 Gram(s) Oral at bedtime PRN for constipation    Vital Signs Last 24 Hrs  T(F): 97.6 (10 Dec 2023 07:58), Max: 98.7 (09 Dec 2023 21:53)  HR: 71 (10 Dec 2023 07:58) (70 - 72)  BP: 108/67 (10 Dec 2023 07:58) (108/67 - 135/82)  RR: 16 (10 Dec 2023 07:58) (16 - 16)  SpO2: 97% (10 Dec 2023 07:58) (97% - 98%)  I&O's Summary      PHYSICAL EXAM:  General: NAD  ENT: MMM, no scleral icterus  Neck: Supple, No JVD  Lungs: Clear to auscultation bilaterally, no wheezes, rales, rhonchi  Cardio: RRR, S1/S2  Abdomen: Soft, Nontender, Nondistended; Bowel sounds present  Extremities: No calf tenderness, No pitting edema    LABS:                        11-22 Chol 196 mg/dL LDL -- HDL 58 mg/dL Trig 125 mg/dL                      COVID-19 PCR: NotDetec (11-29-23 @ 06:00)      RADIOLOGY & ADDITIONAL TESTS:    Care Discussed with Consultants/Other Providers: yes, rehab

## 2023-12-11 ENCOUNTER — TRANSCRIPTION ENCOUNTER (OUTPATIENT)
Age: 58
End: 2023-12-11

## 2023-12-11 LAB
ALBUMIN SERPL ELPH-MCNC: 3.5 G/DL — SIGNIFICANT CHANGE UP (ref 3.3–5)
ALBUMIN SERPL ELPH-MCNC: 3.5 G/DL — SIGNIFICANT CHANGE UP (ref 3.3–5)
ALP SERPL-CCNC: 53 U/L — SIGNIFICANT CHANGE UP (ref 40–120)
ALP SERPL-CCNC: 53 U/L — SIGNIFICANT CHANGE UP (ref 40–120)
ALT FLD-CCNC: 35 U/L — SIGNIFICANT CHANGE UP (ref 10–45)
ALT FLD-CCNC: 35 U/L — SIGNIFICANT CHANGE UP (ref 10–45)
ANION GAP SERPL CALC-SCNC: 10 MMOL/L — SIGNIFICANT CHANGE UP (ref 5–17)
ANION GAP SERPL CALC-SCNC: 10 MMOL/L — SIGNIFICANT CHANGE UP (ref 5–17)
AST SERPL-CCNC: 17 U/L — SIGNIFICANT CHANGE UP (ref 10–40)
AST SERPL-CCNC: 17 U/L — SIGNIFICANT CHANGE UP (ref 10–40)
BASOPHILS # BLD AUTO: 0.04 K/UL — SIGNIFICANT CHANGE UP (ref 0–0.2)
BASOPHILS # BLD AUTO: 0.04 K/UL — SIGNIFICANT CHANGE UP (ref 0–0.2)
BASOPHILS NFR BLD AUTO: 0.6 % — SIGNIFICANT CHANGE UP (ref 0–2)
BASOPHILS NFR BLD AUTO: 0.6 % — SIGNIFICANT CHANGE UP (ref 0–2)
BILIRUB SERPL-MCNC: 0.4 MG/DL — SIGNIFICANT CHANGE UP (ref 0.2–1.2)
BILIRUB SERPL-MCNC: 0.4 MG/DL — SIGNIFICANT CHANGE UP (ref 0.2–1.2)
BUN SERPL-MCNC: 23 MG/DL — SIGNIFICANT CHANGE UP (ref 7–23)
BUN SERPL-MCNC: 23 MG/DL — SIGNIFICANT CHANGE UP (ref 7–23)
CALCIUM SERPL-MCNC: 9.6 MG/DL — SIGNIFICANT CHANGE UP (ref 8.4–10.5)
CALCIUM SERPL-MCNC: 9.6 MG/DL — SIGNIFICANT CHANGE UP (ref 8.4–10.5)
CHLORIDE SERPL-SCNC: 101 MMOL/L — SIGNIFICANT CHANGE UP (ref 96–108)
CHLORIDE SERPL-SCNC: 101 MMOL/L — SIGNIFICANT CHANGE UP (ref 96–108)
CO2 SERPL-SCNC: 27 MMOL/L — SIGNIFICANT CHANGE UP (ref 22–31)
CO2 SERPL-SCNC: 27 MMOL/L — SIGNIFICANT CHANGE UP (ref 22–31)
CREAT SERPL-MCNC: 0.94 MG/DL — SIGNIFICANT CHANGE UP (ref 0.5–1.3)
CREAT SERPL-MCNC: 0.94 MG/DL — SIGNIFICANT CHANGE UP (ref 0.5–1.3)
EGFR: 71 ML/MIN/1.73M2 — SIGNIFICANT CHANGE UP
EGFR: 71 ML/MIN/1.73M2 — SIGNIFICANT CHANGE UP
EOSINOPHIL # BLD AUTO: 0.18 K/UL — SIGNIFICANT CHANGE UP (ref 0–0.5)
EOSINOPHIL # BLD AUTO: 0.18 K/UL — SIGNIFICANT CHANGE UP (ref 0–0.5)
EOSINOPHIL NFR BLD AUTO: 2.5 % — SIGNIFICANT CHANGE UP (ref 0–6)
EOSINOPHIL NFR BLD AUTO: 2.5 % — SIGNIFICANT CHANGE UP (ref 0–6)
GLUCOSE SERPL-MCNC: 120 MG/DL — HIGH (ref 70–99)
GLUCOSE SERPL-MCNC: 120 MG/DL — HIGH (ref 70–99)
HCT VFR BLD CALC: 34.9 % — SIGNIFICANT CHANGE UP (ref 34.5–45)
HCT VFR BLD CALC: 34.9 % — SIGNIFICANT CHANGE UP (ref 34.5–45)
HGB BLD-MCNC: 11.9 G/DL — SIGNIFICANT CHANGE UP (ref 11.5–15.5)
HGB BLD-MCNC: 11.9 G/DL — SIGNIFICANT CHANGE UP (ref 11.5–15.5)
IMM GRANULOCYTES NFR BLD AUTO: 0.1 % — SIGNIFICANT CHANGE UP (ref 0–0.9)
IMM GRANULOCYTES NFR BLD AUTO: 0.1 % — SIGNIFICANT CHANGE UP (ref 0–0.9)
LYMPHOCYTES # BLD AUTO: 2.29 K/UL — SIGNIFICANT CHANGE UP (ref 1–3.3)
LYMPHOCYTES # BLD AUTO: 2.29 K/UL — SIGNIFICANT CHANGE UP (ref 1–3.3)
LYMPHOCYTES # BLD AUTO: 31.9 % — SIGNIFICANT CHANGE UP (ref 13–44)
LYMPHOCYTES # BLD AUTO: 31.9 % — SIGNIFICANT CHANGE UP (ref 13–44)
MCHC RBC-ENTMCNC: 29.4 PG — SIGNIFICANT CHANGE UP (ref 27–34)
MCHC RBC-ENTMCNC: 29.4 PG — SIGNIFICANT CHANGE UP (ref 27–34)
MCHC RBC-ENTMCNC: 34.1 GM/DL — SIGNIFICANT CHANGE UP (ref 32–36)
MCHC RBC-ENTMCNC: 34.1 GM/DL — SIGNIFICANT CHANGE UP (ref 32–36)
MCV RBC AUTO: 86.2 FL — SIGNIFICANT CHANGE UP (ref 80–100)
MCV RBC AUTO: 86.2 FL — SIGNIFICANT CHANGE UP (ref 80–100)
MONOCYTES # BLD AUTO: 0.62 K/UL — SIGNIFICANT CHANGE UP (ref 0–0.9)
MONOCYTES # BLD AUTO: 0.62 K/UL — SIGNIFICANT CHANGE UP (ref 0–0.9)
MONOCYTES NFR BLD AUTO: 8.6 % — SIGNIFICANT CHANGE UP (ref 2–14)
MONOCYTES NFR BLD AUTO: 8.6 % — SIGNIFICANT CHANGE UP (ref 2–14)
NEUTROPHILS # BLD AUTO: 4.04 K/UL — SIGNIFICANT CHANGE UP (ref 1.8–7.4)
NEUTROPHILS # BLD AUTO: 4.04 K/UL — SIGNIFICANT CHANGE UP (ref 1.8–7.4)
NEUTROPHILS NFR BLD AUTO: 56.3 % — SIGNIFICANT CHANGE UP (ref 43–77)
NEUTROPHILS NFR BLD AUTO: 56.3 % — SIGNIFICANT CHANGE UP (ref 43–77)
NRBC # BLD: 0 /100 WBCS — SIGNIFICANT CHANGE UP (ref 0–0)
NRBC # BLD: 0 /100 WBCS — SIGNIFICANT CHANGE UP (ref 0–0)
PLATELET # BLD AUTO: 250 K/UL — SIGNIFICANT CHANGE UP (ref 150–400)
PLATELET # BLD AUTO: 250 K/UL — SIGNIFICANT CHANGE UP (ref 150–400)
POTASSIUM SERPL-MCNC: 3.9 MMOL/L — SIGNIFICANT CHANGE UP (ref 3.5–5.3)
POTASSIUM SERPL-MCNC: 3.9 MMOL/L — SIGNIFICANT CHANGE UP (ref 3.5–5.3)
POTASSIUM SERPL-SCNC: 3.9 MMOL/L — SIGNIFICANT CHANGE UP (ref 3.5–5.3)
POTASSIUM SERPL-SCNC: 3.9 MMOL/L — SIGNIFICANT CHANGE UP (ref 3.5–5.3)
PROT SERPL-MCNC: 7.4 G/DL — SIGNIFICANT CHANGE UP (ref 6–8.3)
PROT SERPL-MCNC: 7.4 G/DL — SIGNIFICANT CHANGE UP (ref 6–8.3)
RBC # BLD: 4.05 M/UL — SIGNIFICANT CHANGE UP (ref 3.8–5.2)
RBC # BLD: 4.05 M/UL — SIGNIFICANT CHANGE UP (ref 3.8–5.2)
RBC # FLD: 13 % — SIGNIFICANT CHANGE UP (ref 10.3–14.5)
RBC # FLD: 13 % — SIGNIFICANT CHANGE UP (ref 10.3–14.5)
SODIUM SERPL-SCNC: 138 MMOL/L — SIGNIFICANT CHANGE UP (ref 135–145)
SODIUM SERPL-SCNC: 138 MMOL/L — SIGNIFICANT CHANGE UP (ref 135–145)
WBC # BLD: 7.18 K/UL — SIGNIFICANT CHANGE UP (ref 3.8–10.5)
WBC # BLD: 7.18 K/UL — SIGNIFICANT CHANGE UP (ref 3.8–10.5)
WBC # FLD AUTO: 7.18 K/UL — SIGNIFICANT CHANGE UP (ref 3.8–10.5)
WBC # FLD AUTO: 7.18 K/UL — SIGNIFICANT CHANGE UP (ref 3.8–10.5)

## 2023-12-11 PROCEDURE — 99232 SBSQ HOSP IP/OBS MODERATE 35: CPT

## 2023-12-11 RX ADMIN — Medication 81 MILLIGRAM(S): at 11:07

## 2023-12-11 RX ADMIN — ATORVASTATIN CALCIUM 40 MILLIGRAM(S): 80 TABLET, FILM COATED ORAL at 21:29

## 2023-12-11 RX ADMIN — METFORMIN HYDROCHLORIDE 1000 MILLIGRAM(S): 850 TABLET ORAL at 17:39

## 2023-12-11 RX ADMIN — ENOXAPARIN SODIUM 40 MILLIGRAM(S): 100 INJECTION SUBCUTANEOUS at 05:08

## 2023-12-11 RX ADMIN — LOSARTAN POTASSIUM 50 MILLIGRAM(S): 100 TABLET, FILM COATED ORAL at 05:08

## 2023-12-11 RX ADMIN — PANTOPRAZOLE SODIUM 40 MILLIGRAM(S): 20 TABLET, DELAYED RELEASE ORAL at 05:08

## 2023-12-11 RX ADMIN — METFORMIN HYDROCHLORIDE 1000 MILLIGRAM(S): 850 TABLET ORAL at 08:15

## 2023-12-11 RX ADMIN — ESCITALOPRAM OXALATE 10 MILLIGRAM(S): 10 TABLET, FILM COATED ORAL at 11:07

## 2023-12-11 RX ADMIN — CLOPIDOGREL BISULFATE 75 MILLIGRAM(S): 75 TABLET, FILM COATED ORAL at 11:08

## 2023-12-11 NOTE — DISCHARGE NOTE PROVIDER - CARE PROVIDER_API CALL
Toñito Rankin  Interventional Neuroradiology  [address] [place] [id number]  Phone: [phone]  Fax: [phone]  Follow Up Time: 1 week   Toñito Rankin  Interventional Neuroradiology  [address] [place] [id number]  Phone: [phone]  Fax: [phone]  Follow Up Time: 1 week

## 2023-12-11 NOTE — PROGRESS NOTE ADULT - SUBJECTIVE AND OBJECTIVE BOX
SUBJECTIVE/ROS: Patient evaluated while in the chair. She states she is feeling well. No acute events overnight. She denies chest pain, fever, chills, nausea, vomiting, abdominal pain, headache, or BLE pain.     HPI:  58 year old female with PMH of vertigo, HTN, OA, Type 2 DM, shingles and sciatica who presented to [facility] on 11/23  as a direct transfer from [facility] for further evaluation of right carotid occlusion requiring diagnostic angiogram.  She presented to [facility] initially on 11/21 with progressively worsening left sided weakness, such that she slid off bed 2 days prior to admission. Her imaging showed as stroke with MRI showing scattered small acute infarcts involving the superior aspects of the right frontal lobe and right parietal lobe as well as the right caudate but was outside window for tPA.  Patient underwent a cerebral angiogram on 11/24 and no intervention provided. Patient was started on DAPT and statin and recommended for outpatient monitoring. She remained stable during her hospital stay and was evaluated for admission to acute inpatient rehab. She was admitted to [facility] on 11/28/23.     Vital Signs Last 24 Hrs  T(C): 37.4 (11 Dec 2023 08:56), Max: 37.4 (11 Dec 2023 08:56)  T(F): 99.3 (11 Dec 2023 08:56), Max: 99.3 (11 Dec 2023 08:56)  HR: 70 (11 Dec 2023 08:56) (62 - 70)  BP: 114/73 (11 Dec 2023 08:56) (114/73 - 128/78)  BP(mean): --  RR: 16 (11 Dec 2023 08:56) (16 - 16)  SpO2: 98% (11 Dec 2023 08:56) (98% - 98%)    Parameters below as of 11 Dec 2023 08:56  Patient On (Oxygen Delivery Method): room air          PHYSICAL EXAM  Constitutional - NAD, Comfortable  HEENT - NCAT, EOMI  Neck - Supple, No limited ROM  Chest - breathing comfortably   Cardiovascular - RRR, S1S2  Abdomen -BS+, Soft, NTND  Extremities - No C/C/E, No calf tenderness   Neurologic Exam - aox3, RU/RL 5/5, JORGE/JORGE 4/5 with dysmetria          LABS:                          11.9   7.18  )-----------( 250      ( 11 Dec 2023 05:32 )             34.9     12-11    138  |  101  |  23  ----------------------------<  120<H>  3.9   |  27  |  0.94    Ca    9.6      11 Dec 2023 05:32    TPro  7.4  /  Alb  3.5  /  TBili  0.4  /  DBili  x   /  AST  17  /  ALT  35  /  AlkPhos  53  12-11    LIVER FUNCTIONS - ( 11 Dec 2023 05:32 )  Alb: 3.5 g/dL / Pro: 7.4 g/dL / ALK PHOS: 53 U/L / ALT: 35 U/L / AST: 17 U/L / GGT: x                   MEDICATIONS  (STANDING):  aspirin  chewable 81 milliGRAM(s) Oral daily  atorvastatin 40 milliGRAM(s) Oral at bedtime  clopidogrel Tablet 75 milliGRAM(s) Oral daily  dextrose 5%. 1000 milliLiter(s) (50 mL/Hr) IV Continuous <Continuous>  dextrose 5%. 1000 milliLiter(s) (100 mL/Hr) IV Continuous <Continuous>  dextrose 50% Injectable 25 Gram(s) IV Push once  dextrose 50% Injectable 25 Gram(s) IV Push once  dextrose 50% Injectable 12.5 Gram(s) IV Push once  enoxaparin Injectable 40 milliGRAM(s) SubCutaneous every 24 hours  escitalopram 10 milliGRAM(s) Oral daily  glucagon  Injectable 1 milliGRAM(s) IntraMuscular once  influenza   Vaccine 0.5 milliLiter(s) IntraMuscular once  losartan 50 milliGRAM(s) Oral daily  metFORMIN 1000 milliGRAM(s) Oral two times a day  pantoprazole    Tablet 40 milliGRAM(s) Oral before breakfast  senna 2 Tablet(s) Oral at bedtime    MEDICATIONS  (PRN):  acetaminophen     Tablet .. 650 milliGRAM(s) Oral every 6 hours PRN Mild Pain (1 - 3)  dextrose Oral Gel 15 Gram(s) Oral once PRN Blood Glucose LESS THAN 70 milliGRAM(s)/deciliter  gabapentin 100 milliGRAM(s) Oral at bedtime PRN pain  melatonin 3 milliGRAM(s) Oral at bedtime PRN Insomnia  polyethylene glycol 3350 17 Gram(s) Oral at bedtime PRN for constipation   SUBJECTIVE/ROS: Patient evaluated while in the chair. She states she is feeling well. No acute events overnight. She denies chest pain, fever, chills, nausea, vomiting, abdominal pain, headache, or BLE pain.     HPI:  58 year old female with PMH of vertigo, HTN, OA, Type 2 DM, shingles and sciatica who presented to [facility] on 11/23  as a direct transfer from [facility] for further evaluation of right carotid occlusion requiring diagnostic angiogram.  She presented to [facility] initially on 11/21 with progressively worsening left sided weakness, such that she slid off bed 2 days prior to admission. Her imaging showed as stroke with MRI showing scattered small acute infarcts involving the superior aspects of the right frontal lobe and right parietal lobe as well as the right caudate but was outside window for tPA.  Patient underwent a cerebral angiogram on 11/24 and no intervention provided. Patient was started on DAPT and statin and recommended for outpatient monitoring. She remained stable during her hospital stay and was evaluated for admission to acute inpatient rehab. She was admitted to [facility] on 11/28/23.     Vital Signs Last 24 Hrs  T(C): 37.4 (11 Dec 2023 08:56), Max: 37.4 (11 Dec 2023 08:56)  T(F): 99.3 (11 Dec 2023 08:56), Max: 99.3 (11 Dec 2023 08:56)  HR: 70 (11 Dec 2023 08:56) (62 - 70)  BP: 114/73 (11 Dec 2023 08:56) (114/73 - 128/78)  BP(mean): --  RR: 16 (11 Dec 2023 08:56) (16 - 16)  SpO2: 98% (11 Dec 2023 08:56) (98% - 98%)    Parameters below as of 11 Dec 2023 08:56  Patient On (Oxygen Delivery Method): room air          PHYSICAL EXAM  Constitutional - NAD, Comfortable  HEENT - NCAT, EOMI  Neck - Supple, No limited ROM  Chest - breathing comfortably   Cardiovascular - RRR, S1S2  Abdomen -BS+, Soft, NTND  Extremities - No C/C/E, No calf tenderness   Neurologic Exam - aox3, RU/RL 5/5, JORGE/JORGE 4/5 with dysmetria          LABS:                          11.9   7.18  )-----------( 250      ( 11 Dec 2023 05:32 )             34.9     12-11    138  |  101  |  23  ----------------------------<  120<H>  3.9   |  27  |  0.94    Ca    9.6      11 Dec 2023 05:32    TPro  7.4  /  Alb  3.5  /  TBili  0.4  /  DBili  x   /  AST  17  /  ALT  35  /  AlkPhos  53  12-11    LIVER FUNCTIONS - ( 11 Dec 2023 05:32 )  Alb: 3.5 g/dL / Pro: 7.4 g/dL / ALK PHOS: 53 U/L / ALT: 35 U/L / AST: 17 U/L / GGT: x                   MEDICATIONS  (STANDING):  aspirin  chewable 81 milliGRAM(s) Oral daily  atorvastatin 40 milliGRAM(s) Oral at bedtime  clopidogrel Tablet 75 milliGRAM(s) Oral daily  dextrose 5%. 1000 milliLiter(s) (50 mL/Hr) IV Continuous <Continuous>  dextrose 5%. 1000 milliLiter(s) (100 mL/Hr) IV Continuous <Continuous>  dextrose 50% Injectable 25 Gram(s) IV Push once  dextrose 50% Injectable 25 Gram(s) IV Push once  dextrose 50% Injectable 12.5 Gram(s) IV Push once  enoxaparin Injectable 40 milliGRAM(s) SubCutaneous every 24 hours  escitalopram 10 milliGRAM(s) Oral daily  glucagon  Injectable 1 milliGRAM(s) IntraMuscular once  influenza   Vaccine 0.5 milliLiter(s) IntraMuscular once  losartan 50 milliGRAM(s) Oral daily  metFORMIN 1000 milliGRAM(s) Oral two times a day  pantoprazole    Tablet 40 milliGRAM(s) Oral before breakfast  senna 2 Tablet(s) Oral at bedtime    MEDICATIONS  (PRN):  acetaminophen     Tablet .. 650 milliGRAM(s) Oral every 6 hours PRN Mild Pain (1 - 3)  dextrose Oral Gel 15 Gram(s) Oral once PRN Blood Glucose LESS THAN 70 milliGRAM(s)/deciliter  gabapentin 100 milliGRAM(s) Oral at bedtime PRN pain  melatonin 3 milliGRAM(s) Oral at bedtime PRN Insomnia  polyethylene glycol 3350 17 Gram(s) Oral at bedtime PRN for constipation

## 2023-12-11 NOTE — DISCHARGE NOTE PROVIDER - PROVIDER TOKENS
PROVIDER:[TOKEN:[[id number]],FOLLOWUP:[1 week]] PROVIDER:[TOKEN:[[id number]],FOLLOWUP:[1 week]]

## 2023-12-11 NOTE — DISCHARGE NOTE PROVIDER - NSDCCPCAREPLAN_GEN_ALL_CORE_FT
PRINCIPAL DISCHARGE DIAGNOSIS  Diagnosis: CVA (cerebrovascular accident)  Assessment and Plan of Treatment: -Most likely large vessel atherosclerosis with Right ICA near occlusion of vessel  -Continue Aspirin 81mg  -Continue Plavix 75mg   -Continue Atorvastatin 40mg daily         SECONDARY DISCHARGE DIAGNOSES  Diagnosis: Hypertension  Assessment and Plan of Treatment: -Stop HCTZ 25mg 12/8  -Reduce Losartan to 50mg daily      Diagnosis: Type 2 diabetes mellitus  Assessment and Plan of Treatment: -a1c is 7.7  -Continue Metformin 1000mg BID    Diagnosis: Mood disorder  Assessment and Plan of Treatment: -Continue lexapro 10mg daily  - for anxiety and neurorecovery   -Continue melatonin 3mg PRN at bedtime     PRINCIPAL DISCHARGE DIAGNOSIS  Diagnosis: CVA (cerebrovascular accident)  Assessment and Plan of Treatment: -Most likely large vessel atherosclerosis with Right ICA near occlusion of vessel  -Continue Aspirin 81mg  -Continue Plavix 75mg   -Continue Atorvastatin 40mg daily   -Follow up with neurology and neurosurgery for further management         SECONDARY DISCHARGE DIAGNOSES  Diagnosis: Hypertension  Assessment and Plan of Treatment: -Stop HCTZ 25mg   -Reduce Losartan to 50mg daily      Diagnosis: Type 2 diabetes mellitus  Assessment and Plan of Treatment: -a1c is 7.7  -Continue Metformin 1000mg BID  -Follow up with your PCP    Diagnosis: Mood disorder  Assessment and Plan of Treatment: -Continue lexapro 10mg daily  - for anxiety and neurorecovery   -Continue melatonin 3mg PRN at bedtime

## 2023-12-11 NOTE — PROGRESS NOTE ADULT - ASSESSMENT
59 y/o F with PMH of vertigo, HTN, OA, Type 2 DM, shingles and sciatica who presented to [facility] on 11/23 as a direct transfer from [facility] for further evaluation of right carotid occlusion requiring diagnostic angiogram.  She presented to [facility] initially on 11/21 with progressively worsening left sided weakness, such that she slid off bed 2 days prior to admission. Her imaging showed as stroke with MRI showing scattered small acute infarcts involving the superior aspects of the right frontal lobe and right parietal lobe as well as the right caudate but was outside window for tPA.  Patient underwent a cerebral angiogram on 11/24 and no intervention provided. Patient was started on DAPT and statin and recommended for outpatient monitoring. She remained stable during her hospital stay and was evaluated for admission to acute inpatient rehab. She was admitted to [facility] on 11/28/23- pt/ot/dvt ppx.    #Right frontal/parietal/caudate infarcts with right ICA stenosis now with left sided weakness  -Continue DAPT with Aspirin, Plavix   -Continue Atorvastatin    #Neuropathy  -Continue gabapentin    #HTN with OH  -Off HCTZ, continue Losartan 50mg qd   -TTE with EF of 60-65%    #Type 2 DM, HbA1c 7.7 (11/22/23)  -Off FS, ISS  -Home med: Metformin 1000mg BID - continued    DVT ppx - Lovenox  GI ppx - PPI   57 y/o F with PMH of vertigo, HTN, OA, Type 2 DM, shingles and sciatica who presented to [facility] on 11/23 as a direct transfer from [facility] for further evaluation of right carotid occlusion requiring diagnostic angiogram.  She presented to [facility] initially on 11/21 with progressively worsening left sided weakness, such that she slid off bed 2 days prior to admission. Her imaging showed as stroke with MRI showing scattered small acute infarcts involving the superior aspects of the right frontal lobe and right parietal lobe as well as the right caudate but was outside window for tPA.  Patient underwent a cerebral angiogram on 11/24 and no intervention provided. Patient was started on DAPT and statin and recommended for outpatient monitoring. She remained stable during her hospital stay and was evaluated for admission to acute inpatient rehab. She was admitted to [facility] on 11/28/23- pt/ot/dvt ppx.    #Right frontal/parietal/caudate infarcts with right ICA stenosis now with left sided weakness  -Continue DAPT with Aspirin, Plavix   -Continue Atorvastatin    #Neuropathy  -Continue gabapentin    #HTN with OH  -Off HCTZ, continue Losartan 50mg qd   -TTE with EF of 60-65%    #Type 2 DM, HbA1c 7.7 (11/22/23)  -Off FS, ISS  -Home med: Metformin 1000mg BID - continued    DVT ppx - Lovenox  GI ppx - PPI

## 2023-12-11 NOTE — DISCHARGE NOTE PROVIDER - HOSPITAL COURSE
HPI:  58 year old female with PMH of vertigo, HTN, OA, Type 2 DM, shingles and sciatica who presented to [facility] on 11/23  as a direct transfer from [facility] for further evaluation of right carotid occlusion requiring diagnostic angiogram.  She presented to [facility] initially on 11/21 with progressively worsening left sided weakness, such that she slid off bed 2 days prior to admission. Her imaging showed as stroke with MRI showing scattered small acute infarcts involving the superior aspects of the right frontal lobe and right parietal lobe as well as the right caudate but was outside window for tPA.  Patient underwent a cerebral angiogram on 11/24 and no intervention provided. Patient was started on DAPT and statin and recommended for outpatient monitoring. She remained stable during her hospital stay and was evaluated for admission to acute inpatient rehab. She was admitted to [facility] on 11/28/23.     Rehab course significant for anxiety and patient started on lexpro for mood and neurorecovry. Her SBP was low during therapy and BP medications adjusted with good result.     All other medical co-morbidites were stable.    Pt tolerated course of inpatient pt/ot/slp rehab with significant functional improvements and met rehab goals prior to discharge.     Pt was medically cleared on 12/15/23  for discharge to Home.        HPI:  58 year old female with PMH of vertigo, HTN, OA, Type 2 DM, shingles and sciatica who presented to [facility] on 11/23  [facility] further evaluation of right carotid occlusion requiring diagnostic angiogram.  She [facility] to [facility] system [facility] on 11/21 with progressively worsening left sided weakness, such that she slid off bed 2 days prior to admission. Her imaging showed as stroke with MRI showing scattered small acute infarcts involving the superior aspects of the right frontal lobe and right parietal lobe as well as the right caudate but was outside window for tPA.  Patient underwent a cerebral angiogram on 11/24 and no intervention provided. Patient was started on DAPT and statin and recommended for outpatient monitoring. She remained stable during her hospital stay and was evaluated for admission to acute inpatient rehab. She was admitted to [facility] on 11/28/23.     Rehab course significant for anxiety and patient started on lexpro for mood and neurorecovry. Her SBP was low during therapy and BP medications adjusted with good result.     All other medical co-morbidites were stable.    Pt tolerated course of inpatient pt/ot/slp rehab with significant functional improvements and met rehab goals prior to discharge.     Pt was medically cleared on 12/15/23  for discharge to Home.

## 2023-12-11 NOTE — PROGRESS NOTE ADULT - ASSESSMENT
ASSESSMENT/PLAN  58 year old female with PMH of vertigo, HTN, OA, Type 2 DM, shingles and sciatica who presented to [facility] on 11/23  as a direct transfer from [facility] for further evaluation of right carotid occlusion requiring diagnostic angiogram.  She presented to [facility] initially on 11/21 with progressively worsening left sided weakness, such that she slid off bed 2 days prior to admission. Her imaging showed as stroke with MRI showing scattered small acute infarcts involving the superior aspects of the right frontal lobe and right parietal lobe as well as the right caudate but was outside window for tPA.  Patient underwent a cerebral angiogram on 11/24 and no intervention provided. Patient was started on DAPT and statin and recommended for outpatient monitoring. She remained stable during her hospital stay and was evaluated for admission to acute inpatient rehab. She was admitted to [facility] on 11/28/23.       #Right frontal/parietal/caudate infarcts with right ICA stenosis now with left sided weakness, Gait Instability, ADL impairments and Functional impairments  - Continue Comprehensive Rehab Program of PT/OT/SLP  -Most likely large vessel atherosclerosis with Right ICA near occlusion of vessel  -Continue Aspirin 81mg  -Continue Plavix 75mg   -Continue Atorvastatin 40mg daily     #Sleep/mood  -Continue lexapro 10mg daily 12/8 - for anxiety and neurorecovery   -Continue melatonin 3mg PRN at bedtime  -Neuropsych consult appreciated   -Recreation therapy appreciated     #HTN  -Stop HCTZ 25mg 12/8  -Reduce Losartan to 50mg daily 12/8 - BP improved   -TTE with EF of 60-65%  -Monitor BP    #Type 2 DM  -a1c is 7.7  -Continue Metformin 1000mg BID    #Pain control  - Tylenol PRN  -Home med: Gabapentin 100mg at bedtime PRN    #GI/Bowel Mgmt   - Continue Senna at bedtime   - Miralax PRN    #DVT  - Lovenox  - TEDs     #Skin:  - intergluteal cleft fissure - continue barrier cream     FEN   - Diet - Regular + Thins  [CCHO, DASH/TLC]      Precautions / PROPHYLAXIS:   - Falls, Cardiac    TEAM MEETING 12/11/23  SW:  Lives with  in private home- family training with   OT: SV for adls, CG for shower transfers   PT: SV for transfers, 100-120' with cane and SV, 12 steps with 2 hands on 1 rail  SLP: not on service   barriers:  left hand/leg weakness  EDOD: Home 12/15/23         ASSESSMENT/PLAN  58 year old female with PMH of vertigo, HTN, OA, Type 2 DM, shingles and sciatica who presented to [facility] on 11/23  as a direct transfer from [facility] for further evaluation of right carotid occlusion requiring diagnostic angiogram.  She presented to [facility] initially on 11/21 with progressively worsening left sided weakness, such that she slid off bed 2 days prior to admission. Her imaging showed as stroke with MRI showing scattered small acute infarcts involving the superior aspects of the right frontal lobe and right parietal lobe as well as the right caudate but was outside window for tPA.  Patient underwent a cerebral angiogram on 11/24 and no intervention provided. Patient was started on DAPT and statin and recommended for outpatient monitoring. She remained stable during her hospital stay and was evaluated for admission to acute inpatient rehab. She was admitted to [facility] on 11/28/23.       #Right frontal/parietal/caudate infarcts with right ICA stenosis now with left sided weakness, Gait Instability, ADL impairments and Functional impairments  - Continue Comprehensive Rehab Program of PT/OT/SLP  -Most likely large vessel atherosclerosis with Right ICA near occlusion of vessel  -Continue Aspirin 81mg  -Continue Plavix 75mg   -Continue Atorvastatin 40mg daily     #Sleep/mood  -Continue lexapro 10mg daily 12/8 - for anxiety and neurorecovery   -Continue melatonin 3mg PRN at bedtime  -Neuropsych consult appreciated   -Recreation therapy appreciated     #HTN  -Stop HCTZ 25mg 12/8  -Reduce Losartan to 50mg daily 12/8 - BP improved   -TTE with EF of 60-65%  -Monitor BP    #Type 2 DM  -a1c is 7.7  -Continue Metformin 1000mg BID    #Pain control  - Tylenol PRN  -Home med: Gabapentin 100mg at bedtime PRN    #GI/Bowel Mgmt   - Continue Senna at bedtime   - Miralax PRN    #DVT  - Lovenox  - TEDs     #Skin:  - intergluteal cleft fissure - continue barrier cream     FEN   - Diet - Regular + Thins  [CCHO, DASH/TLC]      Precautions / PROPHYLAXIS:   - Falls, Cardiac    TEAM MEETING 12/11/23  SW:  Lives with  in private home- family training with   OT: SV for adls, CG for shower transfers   PT: SV for transfers, 100-120' with cane and SV, 12 steps with 2 hands on 1 rail  SLP: not on service   barriers:  left hand/leg weakness  EDOD: Home 12/15/23

## 2023-12-11 NOTE — PROGRESS NOTE ADULT - SUBJECTIVE AND OBJECTIVE BOX
Patient is a 58y old  Female who presents with a chief complaint of CVA (10 Dec 2023 10:11)      Patient seen and examined at bedside. feeling well, NAD. denies headache, fever, chills, cp, sob, n/v, abd pain.      ALLERGIES:  No Known Allergies    MEDICATIONS  (STANDING):  aspirin  chewable 81 milliGRAM(s) Oral daily  atorvastatin 40 milliGRAM(s) Oral at bedtime  clopidogrel Tablet 75 milliGRAM(s) Oral daily  dextrose 5%. 1000 milliLiter(s) (50 mL/Hr) IV Continuous <Continuous>  dextrose 5%. 1000 milliLiter(s) (100 mL/Hr) IV Continuous <Continuous>  dextrose 50% Injectable 25 Gram(s) IV Push once  dextrose 50% Injectable 25 Gram(s) IV Push once  dextrose 50% Injectable 12.5 Gram(s) IV Push once  enoxaparin Injectable 40 milliGRAM(s) SubCutaneous every 24 hours  escitalopram 10 milliGRAM(s) Oral daily  glucagon  Injectable 1 milliGRAM(s) IntraMuscular once  influenza   Vaccine 0.5 milliLiter(s) IntraMuscular once  losartan 50 milliGRAM(s) Oral daily  metFORMIN 1000 milliGRAM(s) Oral two times a day  pantoprazole    Tablet 40 milliGRAM(s) Oral before breakfast  senna 2 Tablet(s) Oral at bedtime    MEDICATIONS  (PRN):  acetaminophen     Tablet .. 650 milliGRAM(s) Oral every 6 hours PRN Mild Pain (1 - 3)  dextrose Oral Gel 15 Gram(s) Oral once PRN Blood Glucose LESS THAN 70 milliGRAM(s)/deciliter  gabapentin 100 milliGRAM(s) Oral at bedtime PRN pain  melatonin 3 milliGRAM(s) Oral at bedtime PRN Insomnia  polyethylene glycol 3350 17 Gram(s) Oral at bedtime PRN for constipation    Vital Signs Last 24 Hrs  T(F): 99.3 (11 Dec 2023 08:56), Max: 99.3 (11 Dec 2023 08:56)  HR: 70 (11 Dec 2023 08:56) (62 - 70)  BP: 114/73 (11 Dec 2023 08:56) (114/73 - 128/78)  RR: 16 (11 Dec 2023 08:56) (16 - 16)  SpO2: 98% (11 Dec 2023 08:56) (98% - 98%)  I&O's Summary      PHYSICAL EXAM:  General: NAD  ENT: MMM, no scleral icterus  Neck: Supple, No JVD  Lungs: Clear to auscultation bilaterally, no wheezes, rales, rhonchi  Cardio: RRR, S1/S2  Abdomen: Soft, Nontender, Nondistended; Bowel sounds present  Extremities: No calf tenderness, No pitting edema    LABS:                        11.9   7.18  )-----------( 250      ( 11 Dec 2023 05:32 )             34.9       12-11    138  |  101  |  23  ----------------------------<  120  3.9   |  27  |  0.94    Ca    9.6      11 Dec 2023 05:32    TPro  7.4  /  Alb  3.5  /  TBili  0.4  /  DBili  x   /  AST  17  /  ALT  35  /  AlkPhos  53  12-11                11-22 Chol 196 mg/dL LDL -- HDL 58 mg/dL Trig 125 mg/dL                  Urinalysis Basic - ( 11 Dec 2023 05:32 )    Color: x / Appearance: x / SG: x / pH: x  Gluc: 120 mg/dL / Ketone: x  / Bili: x / Urobili: x   Blood: x / Protein: x / Nitrite: x   Leuk Esterase: x / RBC: x / WBC x   Sq Epi: x / Non Sq Epi: x / Bacteria: x        COVID-19 PCR: NotDetec (11-29-23 @ 06:00)      RADIOLOGY & ADDITIONAL TESTS:    Care Discussed with Consultants/Other Providers: yes, rehab

## 2023-12-11 NOTE — DISCHARGE NOTE PROVIDER - NSDCMRMEDTOKEN_GEN_ALL_CORE_FT
aspirin 81 mg oral tablet, chewable: 1 tab(s) orally once a day  atorvastatin 40 mg oral tablet: 1 tab(s) orally once a day (at bedtime)  clopidogrel 75 mg oral tablet: 1 tab(s) orally once a day  hydroCHLOROthiazide 25 mg oral tablet: 1 tab(s) orally once a day  insulin lispro 100 units/mL injectable solution: 1 unit(s) injectable 4 times a day (before meals and at bedtime) 2 Unit(s) if Glucose 151 - 200  4 Unit(s) if Glucose 201 - 250  6 Unit(s) if Glucose 251 - 300  8 Unit(s) if Glucose 301 - 350  10 Unit(s) if Glucose 351 - 400  12 Unit(s) if Glucose Greater Than 400  losartan 100 mg oral tablet: 1 tab(s) orally once a day  polyethylene glycol 3350 oral powder for reconstitution: 17 gram(s) orally once a day (at bedtime) As needed for constipation  senna leaf extract oral tablet: 2 tab(s) orally once a day (at bedtime) As needed for constipation   acetaminophen 325 mg oral tablet: 2 tab(s) orally every 6 hours As needed Mild Pain (1 - 3)  aspirin 81 mg oral tablet, chewable: 1 tab(s) orally once a day  atorvastatin 40 mg oral tablet: 1 tab(s) orally once a day (at bedtime)  clopidogrel 75 mg oral tablet: 1 tab(s) orally once a day  escitalopram 10 mg oral tablet: 1 tab(s) orally once a day  gabapentin 100 mg oral capsule: 1 cap(s) orally once a day (at bedtime) As needed pain  losartan 50 mg oral tablet: 1 tab(s) orally once a day  melatonin 3 mg oral tablet: 1 tab(s) orally once a day (at bedtime) As needed Insomnia  metFORMIN 1000 mg oral tablet: 1 tab(s) orally 2 times a day  pantoprazole 40 mg oral delayed release tablet: 1 tab(s) orally once a day (before a meal)  polyethylene glycol 3350 oral powder for reconstitution: 17 gram(s) orally once a day (at bedtime) As needed for constipation  senna leaf extract oral tablet: 2 tab(s) orally once a day (at bedtime)

## 2023-12-12 PROCEDURE — 99232 SBSQ HOSP IP/OBS MODERATE 35: CPT

## 2023-12-12 RX ADMIN — ENOXAPARIN SODIUM 40 MILLIGRAM(S): 100 INJECTION SUBCUTANEOUS at 05:22

## 2023-12-12 RX ADMIN — CLOPIDOGREL BISULFATE 75 MILLIGRAM(S): 75 TABLET, FILM COATED ORAL at 11:50

## 2023-12-12 RX ADMIN — Medication 81 MILLIGRAM(S): at 11:50

## 2023-12-12 RX ADMIN — ESCITALOPRAM OXALATE 10 MILLIGRAM(S): 10 TABLET, FILM COATED ORAL at 11:50

## 2023-12-12 RX ADMIN — ATORVASTATIN CALCIUM 40 MILLIGRAM(S): 80 TABLET, FILM COATED ORAL at 21:41

## 2023-12-12 RX ADMIN — METFORMIN HYDROCHLORIDE 1000 MILLIGRAM(S): 850 TABLET ORAL at 17:24

## 2023-12-12 RX ADMIN — METFORMIN HYDROCHLORIDE 1000 MILLIGRAM(S): 850 TABLET ORAL at 05:22

## 2023-12-12 RX ADMIN — PANTOPRAZOLE SODIUM 40 MILLIGRAM(S): 20 TABLET, DELAYED RELEASE ORAL at 05:22

## 2023-12-12 RX ADMIN — LOSARTAN POTASSIUM 50 MILLIGRAM(S): 100 TABLET, FILM COATED ORAL at 05:22

## 2023-12-12 NOTE — PROGRESS NOTE ADULT - SUBJECTIVE AND OBJECTIVE BOX
SUBJECTIVE/ROS: Patient evaluated while in the chair. No acute events overnight. She denies chest pain, fever, chills, nausea, vomiting, abdominal pain, headache, or BLE pain.     HPI:  58 year old female with PMH of vertigo, HTN, OA, Type 2 DM, shingles and sciatica who presented to [facility] on 11/23  as a direct transfer from [facility] for further evaluation of right carotid occlusion requiring diagnostic angiogram.  She presented to [facility] initially on 11/21 with progressively worsening left sided weakness, such that she slid off bed 2 days prior to admission. Her imaging showed as stroke with MRI showing scattered small acute infarcts involving the superior aspects of the right frontal lobe and right parietal lobe as well as the right caudate but was outside window for tPA.  Patient underwent a cerebral angiogram on 11/24 and no intervention provided. Patient was started on DAPT and statin and recommended for outpatient monitoring. She remained stable during her hospital stay and was evaluated for admission to acute inpatient rehab. She was admitted to [facility] on 11/28/23.     Vital Signs Last 24 Hrs  T(C): 36.7 (12 Dec 2023 08:32), Max: 36.7 (11 Dec 2023 20:19)  T(F): 98 (12 Dec 2023 08:32), Max: 98 (11 Dec 2023 20:19)  HR: 69 (12 Dec 2023 08:32) (69 - 73)  BP: 105/67 (12 Dec 2023 08:32) (105/67 - 120/70)  BP(mean): --  RR: 16 (12 Dec 2023 08:32) (16 - 16)  SpO2: 100% (12 Dec 2023 08:32) (98% - 100%)    Parameters below as of 12 Dec 2023 08:32  Patient On (Oxygen Delivery Method): room air          PHYSICAL EXAM  Constitutional - NAD, Comfortable  HEENT - NCAT, EOMI  Neck - Supple, No limited ROM  Chest - breathing comfortably   Cardiovascular - RRR, S1S2  Abdomen -BS+, Soft, NTND  Extremities - No C/C/E, No calf tenderness   Neurologic Exam - aox3, RU/RL 5/5, JORGE/JORGE 4/5 with dysmetria          LABS:                          11.9   7.18  )-----------( 250      ( 11 Dec 2023 05:32 )             34.9     12-11    138  |  101  |  23  ----------------------------<  120<H>  3.9   |  27  |  0.94    Ca    9.6      11 Dec 2023 05:32    TPro  7.4  /  Alb  3.5  /  TBili  0.4  /  DBili  x   /  AST  17  /  ALT  35  /  AlkPhos  53  12-11    LIVER FUNCTIONS - ( 11 Dec 2023 05:32 )  Alb: 3.5 g/dL / Pro: 7.4 g/dL / ALK PHOS: 53 U/L / ALT: 35 U/L / AST: 17 U/L / GGT: x                   MEDICATIONS  (STANDING):  aspirin  chewable 81 milliGRAM(s) Oral daily  atorvastatin 40 milliGRAM(s) Oral at bedtime  clopidogrel Tablet 75 milliGRAM(s) Oral daily  dextrose 5%. 1000 milliLiter(s) (50 mL/Hr) IV Continuous <Continuous>  dextrose 5%. 1000 milliLiter(s) (100 mL/Hr) IV Continuous <Continuous>  dextrose 50% Injectable 25 Gram(s) IV Push once  dextrose 50% Injectable 25 Gram(s) IV Push once  dextrose 50% Injectable 12.5 Gram(s) IV Push once  enoxaparin Injectable 40 milliGRAM(s) SubCutaneous every 24 hours  escitalopram 10 milliGRAM(s) Oral daily  glucagon  Injectable 1 milliGRAM(s) IntraMuscular once  influenza   Vaccine 0.5 milliLiter(s) IntraMuscular once  losartan 50 milliGRAM(s) Oral daily  metFORMIN 1000 milliGRAM(s) Oral two times a day  pantoprazole    Tablet 40 milliGRAM(s) Oral before breakfast  senna 2 Tablet(s) Oral at bedtime    MEDICATIONS  (PRN):  acetaminophen     Tablet .. 650 milliGRAM(s) Oral every 6 hours PRN Mild Pain (1 - 3)  dextrose Oral Gel 15 Gram(s) Oral once PRN Blood Glucose LESS THAN 70 milliGRAM(s)/deciliter  gabapentin 100 milliGRAM(s) Oral at bedtime PRN pain  melatonin 3 milliGRAM(s) Oral at bedtime PRN Insomnia  polyethylene glycol 3350 17 Gram(s) Oral at bedtime PRN for constipation   SUBJECTIVE/ROS: Patient evaluated while in the chair. No acute events overnight. She denies chest pain, fever, chills, nausea, vomiting, abdominal pain, headache, or BLE pain.     HPI:  58 year old female with PMH of vertigo, HTN, OA, Type 2 DM, shingles and sciatica who presented to [facility] on 11/23  as a direct transfer from [facility] for further evaluation of right carotid occlusion requiring diagnostic angiogram.  She presented to [facility] initially on 11/21 with progressively worsening left sided weakness, such that she slid off bed 2 days prior to admission. Her imaging showed as stroke with MRI showing scattered small acute infarcts involving the superior aspects of the right frontal lobe and right parietal lobe as well as the right caudate but was outside window for tPA.  Patient underwent a cerebral angiogram on 11/24 and no intervention provided. Patient was started on DAPT and statin and recommended for outpatient monitoring. She remained stable during her hospital stay and was evaluated for admission to acute inpatient rehab. She was admitted to [facility] on 11/28/23.     Vital Signs Last 24 Hrs  T(C): 36.7 (12 Dec 2023 08:32), Max: 36.7 (11 Dec 2023 20:19)  T(F): 98 (12 Dec 2023 08:32), Max: 98 (11 Dec 2023 20:19)  HR: 69 (12 Dec 2023 08:32) (69 - 73)  BP: 105/67 (12 Dec 2023 08:32) (105/67 - 120/70)  BP(mean): --  RR: 16 (12 Dec 2023 08:32) (16 - 16)  SpO2: 100% (12 Dec 2023 08:32) (98% - 100%)    Parameters below as of 12 Dec 2023 08:32  Patient On (Oxygen Delivery Method): room air          PHYSICAL EXAM  Constitutional - NAD, Comfortable  HEENT - NCAT, EOMI  Neck - Supple, No limited ROM  Chest - breathing comfortably   Cardiovascular - RRR, S1S2  Abdomen -BS+, Soft, NTND  Extremities - No C/C/E, No calf tenderness   Neurologic Exam - aox3, RU/RL 5/5, JORGE/JORGE 4/5 with dysmetria          LABS:                          11.9   7.18  )-----------( 250      ( 11 Dec 2023 05:32 )             34.9     12-11    138  |  101  |  23  ----------------------------<  120<H>  3.9   |  27  |  0.94    Ca    9.6      11 Dec 2023 05:32    TPro  7.4  /  Alb  3.5  /  TBili  0.4  /  DBili  x   /  AST  17  /  ALT  35  /  AlkPhos  53  12-11    LIVER FUNCTIONS - ( 11 Dec 2023 05:32 )  Alb: 3.5 g/dL / Pro: 7.4 g/dL / ALK PHOS: 53 U/L / ALT: 35 U/L / AST: 17 U/L / GGT: x                   MEDICATIONS  (STANDING):  aspirin  chewable 81 milliGRAM(s) Oral daily  atorvastatin 40 milliGRAM(s) Oral at bedtime  clopidogrel Tablet 75 milliGRAM(s) Oral daily  dextrose 5%. 1000 milliLiter(s) (50 mL/Hr) IV Continuous <Continuous>  dextrose 5%. 1000 milliLiter(s) (100 mL/Hr) IV Continuous <Continuous>  dextrose 50% Injectable 25 Gram(s) IV Push once  dextrose 50% Injectable 25 Gram(s) IV Push once  dextrose 50% Injectable 12.5 Gram(s) IV Push once  enoxaparin Injectable 40 milliGRAM(s) SubCutaneous every 24 hours  escitalopram 10 milliGRAM(s) Oral daily  glucagon  Injectable 1 milliGRAM(s) IntraMuscular once  influenza   Vaccine 0.5 milliLiter(s) IntraMuscular once  losartan 50 milliGRAM(s) Oral daily  metFORMIN 1000 milliGRAM(s) Oral two times a day  pantoprazole    Tablet 40 milliGRAM(s) Oral before breakfast  senna 2 Tablet(s) Oral at bedtime    MEDICATIONS  (PRN):  acetaminophen     Tablet .. 650 milliGRAM(s) Oral every 6 hours PRN Mild Pain (1 - 3)  dextrose Oral Gel 15 Gram(s) Oral once PRN Blood Glucose LESS THAN 70 milliGRAM(s)/deciliter  gabapentin 100 milliGRAM(s) Oral at bedtime PRN pain  melatonin 3 milliGRAM(s) Oral at bedtime PRN Insomnia  polyethylene glycol 3350 17 Gram(s) Oral at bedtime PRN for constipation

## 2023-12-12 NOTE — PROGRESS NOTE ADULT - ASSESSMENT
59 y/o F with PMH of vertigo, HTN, OA, Type 2 DM, shingles and sciatica who presented to [facility] on 11/23 as a direct transfer from [facility] for further evaluation of right carotid occlusion requiring diagnostic angiogram.  She presented to [facility] initially on 11/21 with progressively worsening left sided weakness, such that she slid off bed 2 days prior to admission. Her imaging showed as stroke with MRI showing scattered small acute infarcts involving the superior aspects of the right frontal lobe and right parietal lobe as well as the right caudate but was outside window for tPA.  Patient underwent a cerebral angiogram on 11/24 and no intervention provided. Patient was started on DAPT and statin and recommended for outpatient monitoring. She remained stable during her hospital stay and was evaluated for admission to acute inpatient rehab. She was admitted to [facility] on 11/28/23 - pt/ot/dvt ppx.    #Right frontal/parietal/caudate infarcts with right ICA stenosis now with left sided weakness  -Continue DAPT with Aspirin, Plavix   -Continue Atorvastatin    #Neuropathy  -Continue gabapentin    #HTN with OH  -Off HCTZ, continue Losartan 50mg qd   -TTE with EF of 60-65%    #Type 2 DM, HbA1c 7.7 (11/22/23)  -Off FS, ISS  -Home med: Metformin 1000mg BID - continued    DVT ppx - Lovenox  GI ppx - PPI   57 y/o F with PMH of vertigo, HTN, OA, Type 2 DM, shingles and sciatica who presented to [facility] on 11/23 as a direct transfer from [facility] for further evaluation of right carotid occlusion requiring diagnostic angiogram.  She presented to [facility] initially on 11/21 with progressively worsening left sided weakness, such that she slid off bed 2 days prior to admission. Her imaging showed as stroke with MRI showing scattered small acute infarcts involving the superior aspects of the right frontal lobe and right parietal lobe as well as the right caudate but was outside window for tPA.  Patient underwent a cerebral angiogram on 11/24 and no intervention provided. Patient was started on DAPT and statin and recommended for outpatient monitoring. She remained stable during her hospital stay and was evaluated for admission to acute inpatient rehab. She was admitted to [facility] on 11/28/23 - pt/ot/dvt ppx.    #Right frontal/parietal/caudate infarcts with right ICA stenosis now with left sided weakness  -Continue DAPT with Aspirin, Plavix   -Continue Atorvastatin    #Neuropathy  -Continue gabapentin    #HTN with OH  -Off HCTZ, continue Losartan 50mg qd   -TTE with EF of 60-65%    #Type 2 DM, HbA1c 7.7 (11/22/23)  -Off FS, ISS  -Home med: Metformin 1000mg BID - continued    DVT ppx - Lovenox  GI ppx - PPI

## 2023-12-12 NOTE — PROGRESS NOTE ADULT - SUBJECTIVE AND OBJECTIVE BOX
Patient is a 58y old  Female who presents with a chief complaint of CVA (11 Dec 2023 14:12)      Patient seen and examined at bedside. feeling well, motivated to continue therapies. no acute medical complaints.     ALLERGIES:  No Known Allergies    MEDICATIONS  (STANDING):  aspirin  chewable 81 milliGRAM(s) Oral daily  atorvastatin 40 milliGRAM(s) Oral at bedtime  clopidogrel Tablet 75 milliGRAM(s) Oral daily  dextrose 5%. 1000 milliLiter(s) (50 mL/Hr) IV Continuous <Continuous>  dextrose 5%. 1000 milliLiter(s) (100 mL/Hr) IV Continuous <Continuous>  dextrose 50% Injectable 12.5 Gram(s) IV Push once  dextrose 50% Injectable 25 Gram(s) IV Push once  dextrose 50% Injectable 25 Gram(s) IV Push once  enoxaparin Injectable 40 milliGRAM(s) SubCutaneous every 24 hours  escitalopram 10 milliGRAM(s) Oral daily  glucagon  Injectable 1 milliGRAM(s) IntraMuscular once  influenza   Vaccine 0.5 milliLiter(s) IntraMuscular once  losartan 50 milliGRAM(s) Oral daily  metFORMIN 1000 milliGRAM(s) Oral two times a day  pantoprazole    Tablet 40 milliGRAM(s) Oral before breakfast  senna 2 Tablet(s) Oral at bedtime    MEDICATIONS  (PRN):  acetaminophen     Tablet .. 650 milliGRAM(s) Oral every 6 hours PRN Mild Pain (1 - 3)  dextrose Oral Gel 15 Gram(s) Oral once PRN Blood Glucose LESS THAN 70 milliGRAM(s)/deciliter  gabapentin 100 milliGRAM(s) Oral at bedtime PRN pain  melatonin 3 milliGRAM(s) Oral at bedtime PRN Insomnia  polyethylene glycol 3350 17 Gram(s) Oral at bedtime PRN for constipation    Vital Signs Last 24 Hrs  T(F): 98 (12 Dec 2023 08:32), Max: 98 (11 Dec 2023 20:19)  HR: 69 (12 Dec 2023 08:32) (69 - 73)  BP: 105/67 (12 Dec 2023 08:32) (105/67 - 120/70)  RR: 16 (12 Dec 2023 08:32) (16 - 16)  SpO2: 100% (12 Dec 2023 08:32) (98% - 100%)  I&O's Summary      PHYSICAL EXAM:  General: NAD  ENT: MMM, no scleral icterus  Neck: Supple, No JVD  Lungs: Clear to auscultation bilaterally, no wheezes, rales, rhonchi  Cardio: RRR, S1/S2  Abdomen: Soft, Nontender, Nondistended; Bowel sounds present  Extremities: No calf tenderness, No pitting edema      LABS:                        11.9   7.18  )-----------( 250      ( 11 Dec 2023 05:32 )             34.9       12-11    138  |  101  |  23  ----------------------------<  120  3.9   |  27  |  0.94    Ca    9.6      11 Dec 2023 05:32    TPro  7.4  /  Alb  3.5  /  TBili  0.4  /  DBili  x   /  AST  17  /  ALT  35  /  AlkPhos  53  12-11                11-22 Chol 196 mg/dL LDL -- HDL 58 mg/dL Trig 125 mg/dL                  Urinalysis Basic - ( 11 Dec 2023 05:32 )    Color: x / Appearance: x / SG: x / pH: x  Gluc: 120 mg/dL / Ketone: x  / Bili: x / Urobili: x   Blood: x / Protein: x / Nitrite: x   Leuk Esterase: x / RBC: x / WBC x   Sq Epi: x / Non Sq Epi: x / Bacteria: x        COVID-19 PCR: NotDetec (11-29-23 @ 06:00)      RADIOLOGY & ADDITIONAL TESTS:    Care Discussed with Consultants/Other Providers: yes, rehab

## 2023-12-12 NOTE — PROGRESS NOTE ADULT - ASSESSMENT
ASSESSMENT/PLAN  58 year old female with PMH of vertigo, HTN, OA, Type 2 DM, shingles and sciatica who presented to [facility] on 11/23  as a direct transfer from [facility] for further evaluation of right carotid occlusion requiring diagnostic angiogram.  She presented to [facility] initially on 11/21 with progressively worsening left sided weakness, such that she slid off bed 2 days prior to admission. Her imaging showed as stroke with MRI showing scattered small acute infarcts involving the superior aspects of the right frontal lobe and right parietal lobe as well as the right caudate but was outside window for tPA.  Patient underwent a cerebral angiogram on 11/24 and no intervention provided. Patient was started on DAPT and statin and recommended for outpatient monitoring. She remained stable during her hospital stay and was evaluated for admission to acute inpatient rehab. She was admitted to [facility] on 11/28/23.       #Right frontal/parietal/caudate infarcts with right ICA stenosis now with left sided weakness, Gait Instability, ADL impairments and Functional impairments  - Continue Comprehensive Rehab Program of PT/OT/SLP  -Most likely large vessel atherosclerosis with Right ICA near occlusion of vessel  -Continue Aspirin 81mg  -Continue Plavix 75mg   -Continue Atorvastatin 40mg daily     #Sleep/mood  -Continue lexapro 10mg daily 12/8 - for anxiety and neurorecovery   -Continue melatonin 3mg PRN at bedtime  -Neuropsych consult appreciated   -Recreation therapy appreciated     #HTN  -Stop HCTZ 25mg 12/8  -Reduce Losartan to 50mg daily 12/8 - BP improved   -TTE with EF of 60-65%  -Monitor BP    #Type 2 DM  -a1c is 7.7  -Continue Metformin 1000mg BID    #Pain control  - Tylenol PRN  -Home med: Gabapentin 100mg at bedtime PRN    #GI/Bowel Mgmt   - Continue Senna at bedtime   - Miralax PRN    #DVT  - Lovenox  - TEDs     #Skin:  - intergluteal cleft fissure - continue barrier cream     FEN   - Diet - Regular + Thins  [CCHO, DASH/TLC]      Precautions / PROPHYLAXIS:   - Falls, Cardiac    TEAM MEETING 12/11/23  SW:  Lives with  in private home- family training with   OT: SV for adls, CG for shower transfers   PT: SV for transfers, 100-120' with cane and SV, 12 steps with 2 hands on 1 rail  SLP: not on service   barriers:  left hand/leg weakness  EDOD: Home 12/15/23         ASSESSMENT/PLAN  58 year old female with PMH of vertigo, HTN, OA, Type 2 DM, shingles and sciatica who presented to [facility] on 11/23  as a direct transfer from [facility] for further evaluation of right carotid occlusion requiring diagnostic angiogram.  She presented to [facility] initially on 11/21 with progressively worsening left sided weakness, such that she slid off bed 2 days prior to admission. Her imaging showed as stroke with MRI showing scattered small acute infarcts involving the superior aspects of the right frontal lobe and right parietal lobe as well as the right caudate but was outside window for tPA.  Patient underwent a cerebral angiogram on 11/24 and no intervention provided. Patient was started on DAPT and statin and recommended for outpatient monitoring. She remained stable during her hospital stay and was evaluated for admission to acute inpatient rehab. She was admitted to [facility] on 11/28/23.       #Right frontal/parietal/caudate infarcts with right ICA stenosis now with left sided weakness, Gait Instability, ADL impairments and Functional impairments  - Continue Comprehensive Rehab Program of PT/OT/SLP  -Most likely large vessel atherosclerosis with Right ICA near occlusion of vessel  -Continue Aspirin 81mg  -Continue Plavix 75mg   -Continue Atorvastatin 40mg daily     #Sleep/mood  -Continue lexapro 10mg daily 12/8 - for anxiety and neurorecovery   -Continue melatonin 3mg PRN at bedtime  -Neuropsych consult appreciated   -Recreation therapy appreciated     #HTN  -Stop HCTZ 25mg 12/8  -Reduce Losartan to 50mg daily 12/8 - BP improved   -TTE with EF of 60-65%  -Monitor BP    #Type 2 DM  -a1c is 7.7  -Continue Metformin 1000mg BID    #Pain control  - Tylenol PRN  -Home med: Gabapentin 100mg at bedtime PRN    #GI/Bowel Mgmt   - Continue Senna at bedtime   - Miralax PRN    #DVT  - Lovenox  - TEDs     #Skin:  - intergluteal cleft fissure - continue barrier cream     FEN   - Diet - Regular + Thins  [CCHO, DASH/TLC]      Precautions / PROPHYLAXIS:   - Falls, Cardiac    TEAM MEETING 12/11/23  SW:  Lives with  in private home- family training with   OT: SV for adls, CG for shower transfers   PT: SV for transfers, 100-120' with cane and SV, 12 steps with 2 hands on 1 rail  SLP: not on service   barriers:  left hand/leg weakness  EDOD: Home 12/15/23

## 2023-12-13 PROCEDURE — 99232 SBSQ HOSP IP/OBS MODERATE 35: CPT

## 2023-12-13 RX ADMIN — METFORMIN HYDROCHLORIDE 1000 MILLIGRAM(S): 850 TABLET ORAL at 07:51

## 2023-12-13 RX ADMIN — CLOPIDOGREL BISULFATE 75 MILLIGRAM(S): 75 TABLET, FILM COATED ORAL at 11:30

## 2023-12-13 RX ADMIN — METFORMIN HYDROCHLORIDE 1000 MILLIGRAM(S): 850 TABLET ORAL at 17:03

## 2023-12-13 RX ADMIN — SENNA PLUS 2 TABLET(S): 8.6 TABLET ORAL at 20:08

## 2023-12-13 RX ADMIN — Medication 81 MILLIGRAM(S): at 11:30

## 2023-12-13 RX ADMIN — ATORVASTATIN CALCIUM 40 MILLIGRAM(S): 80 TABLET, FILM COATED ORAL at 20:08

## 2023-12-13 RX ADMIN — LOSARTAN POTASSIUM 50 MILLIGRAM(S): 100 TABLET, FILM COATED ORAL at 05:19

## 2023-12-13 RX ADMIN — PANTOPRAZOLE SODIUM 40 MILLIGRAM(S): 20 TABLET, DELAYED RELEASE ORAL at 05:19

## 2023-12-13 RX ADMIN — ESCITALOPRAM OXALATE 10 MILLIGRAM(S): 10 TABLET, FILM COATED ORAL at 11:30

## 2023-12-13 RX ADMIN — ENOXAPARIN SODIUM 40 MILLIGRAM(S): 100 INJECTION SUBCUTANEOUS at 05:19

## 2023-12-13 NOTE — PROGRESS NOTE ADULT - ASSESSMENT
ASSESSMENT/PLAN  58 year old female with PMH of vertigo, HTN, OA, Type 2 DM, shingles and sciatica who presented to [facility] on 11/23  as a direct transfer from [facility] for further evaluation of right carotid occlusion requiring diagnostic angiogram.  She presented to [facility] initially on 11/21 with progressively worsening left sided weakness, such that she slid off bed 2 days prior to admission. Her imaging showed as stroke with MRI showing scattered small acute infarcts involving the superior aspects of the right frontal lobe and right parietal lobe as well as the right caudate but was outside window for tPA.  Patient underwent a cerebral angiogram on 11/24 and no intervention provided. Patient was started on DAPT and statin and recommended for outpatient monitoring. She remained stable during her hospital stay and was evaluated for admission to acute inpatient rehab. She was admitted to [facility] on 11/28/23.       #Right frontal/parietal/caudate infarcts with right ICA stenosis now with left sided weakness, Gait Instability, ADL impairments and Functional impairments  - Continue Comprehensive Rehab Program of PT/OT/SLP  -Most likely large vessel atherosclerosis with Right ICA near occlusion of vessel  -Continue Aspirin 81mg  -Continue Plavix 75mg   -Continue Atorvastatin 40mg daily     #Sleep/mood  -Continue lexapro 10mg daily 12/8 - for anxiety and neurorecovery   -Continue melatonin 3mg PRN at bedtime  -Neuropsych consult appreciated   -Recreation therapy appreciated     #HTN  -Stop HCTZ 25mg 12/8  -Reduce Losartan to 50mg daily 12/8 - BP improved   -TTE with EF of 60-65%  -Monitor BP    #Type 2 DM  -a1c is 7.7  -Continue Metformin 1000mg BID    #Pain control  - Tylenol PRN  -Home med: Gabapentin 100mg at bedtime PRN    #GI/Bowel Mgmt   - Continue Senna at bedtime   - Miralax PRN    #DVT  - Lovenox  - TEDs     #Skin:  - intergluteal cleft fissure - continue barrier cream     FEN   - Diet - Regular + Thins  [CCHO, DASH/TLC]      Precautions / PROPHYLAXIS:   - Falls, Cardiac    TEAM MEETING 12/11/23  SW:  Lives with  in private home- family training with   OT: SV for adls, CG for shower transfers   PT: SV for transfers, 100-120' with cane and SV, 12 steps with 2 hands on 1 rail  SLP: not on service   barriers:  left hand/leg weakness  EDOD: Home 12/15/23         ASSESSMENT/PLAN  58 year old female with PMH of vertigo, HTN, OA, Type 2 DM, shingles and sciatica who presented to [facility] on 11/23  as a direct transfer from [facility] for further evaluation of right carotid occlusion requiring diagnostic angiogram.  She presented to [facility] initially on 11/21 with progressively worsening left sided weakness, such that she slid off bed 2 days prior to admission. Her imaging showed as stroke with MRI showing scattered small acute infarcts involving the superior aspects of the right frontal lobe and right parietal lobe as well as the right caudate but was outside window for tPA.  Patient underwent a cerebral angiogram on 11/24 and no intervention provided. Patient was started on DAPT and statin and recommended for outpatient monitoring. She remained stable during her hospital stay and was evaluated for admission to acute inpatient rehab. She was admitted to [facility] on 11/28/23.       #Right frontal/parietal/caudate infarcts with right ICA stenosis now with left sided weakness, Gait Instability, ADL impairments and Functional impairments  - Continue Comprehensive Rehab Program of PT/OT/SLP  -Most likely large vessel atherosclerosis with Right ICA near occlusion of vessel  -Continue Aspirin 81mg  -Continue Plavix 75mg   -Continue Atorvastatin 40mg daily     #Sleep/mood  -Continue lexapro 10mg daily 12/8 - for anxiety and neurorecovery   -Continue melatonin 3mg PRN at bedtime  -Neuropsych consult appreciated   -Recreation therapy appreciated     #HTN  -Stop HCTZ 25mg 12/8  -Reduce Losartan to 50mg daily 12/8 - BP improved   -TTE with EF of 60-65%  -Monitor BP    #Type 2 DM  -a1c is 7.7  -Continue Metformin 1000mg BID    #Pain control  - Tylenol PRN  -Home med: Gabapentin 100mg at bedtime PRN    #GI/Bowel Mgmt   - Continue Senna at bedtime   - Miralax PRN    #DVT  - Lovenox  - TEDs     #Skin:  - intergluteal cleft fissure - continue barrier cream     FEN   - Diet - Regular + Thins  [CCHO, DASH/TLC]      Precautions / PROPHYLAXIS:   - Falls, Cardiac    TEAM MEETING 12/11/23  SW:  Lives with  in private home- family training with   OT: SV for adls, CG for shower transfers   PT: SV for transfers, 100-120' with cane and SV, 12 steps with 2 hands on 1 rail  SLP: not on service   barriers:  left hand/leg weakness  EDOD: Home 12/15/23

## 2023-12-13 NOTE — CHART NOTE - NSCHARTNOTEFT_GEN_A_CORE
Nutrition Follow Up Note  Source: Medical Record [X] Patient [X] Family [ ]         Diet: consistent carbohydrate with evening snack, DASH/TLC  Pt tolerating diet with good PO intake, eating >75% of meals per nursing flow sheets. Pt reports good PO intake. Discussed consistent carb diet + monitoring carb intake. Denies nausea, vomiting, diarrhea, constipation. Reports eating an apple a day which helps with bowel movements.    Enteral/Parenteral Nutrition: N/A    Current Weight: No weight in chart, requested pt be weighed by nursing    Pertinent Medications: MEDICATIONS  (STANDING):  aspirin  chewable 81 milliGRAM(s) Oral daily  atorvastatin 40 milliGRAM(s) Oral at bedtime  clopidogrel Tablet 75 milliGRAM(s) Oral daily  dextrose 5%. 1000 milliLiter(s) (50 mL/Hr) IV Continuous <Continuous>  dextrose 5%. 1000 milliLiter(s) (100 mL/Hr) IV Continuous <Continuous>  dextrose 50% Injectable 12.5 Gram(s) IV Push once  dextrose 50% Injectable 25 Gram(s) IV Push once  dextrose 50% Injectable 25 Gram(s) IV Push once  enoxaparin Injectable 40 milliGRAM(s) SubCutaneous every 24 hours  escitalopram 10 milliGRAM(s) Oral daily  glucagon  Injectable 1 milliGRAM(s) IntraMuscular once  influenza   Vaccine 0.5 milliLiter(s) IntraMuscular once  losartan 50 milliGRAM(s) Oral daily  metFORMIN 1000 milliGRAM(s) Oral two times a day  pantoprazole    Tablet 40 milliGRAM(s) Oral before breakfast  senna 2 Tablet(s) Oral at bedtime    MEDICATIONS  (PRN):  acetaminophen     Tablet .. 650 milliGRAM(s) Oral every 6 hours PRN Mild Pain (1 - 3)  dextrose Oral Gel 15 Gram(s) Oral once PRN Blood Glucose LESS THAN 70 milliGRAM(s)/deciliter  gabapentin 100 milliGRAM(s) Oral at bedtime PRN pain  melatonin 3 milliGRAM(s) Oral at bedtime PRN Insomnia  polyethylene glycol 3350 17 Gram(s) Oral at bedtime PRN for constipation      Pertinent Labs:  12-11 Na138 mmol/L Glu 120 mg/dL<H> K+ 3.9 mmol/L Cr  0.94 mg/dL BUN 23 mg/dL 12-11 Alb 3.5 g/dL 11-22 Chol 196 mg/dL LDL --    HDL 58 mg/dL Trig 125 mg/dL        Skin: Moisture associated dermatitis per nursing flow sheets.     Edema: No edema per nursing flow sheets     Last BM: on 12/12 Per nursing flowsheets     Estimated Needs:   [X] No Change since Previous Assessment  [ ] Recalculated:     Previous Nutrition Diagnosis:   Altered nutrition related lab values    Nutrition Diagnosis is [X] Ongoing         New Nutrition Diagnosis: [X] Not Applicable  [ ] Inadequate Protein Energy Intake   [ ] Inadequate Oral Intake   [ ] Excessive Energy Intake   [ ] Increased Nutrient Needs   [ ] Obesity   [ ] Altered GI Function   [ ] Unintended Weight Loss   [ ] Food & Nutrition Related Knowledge Deficit  [ ] Limited Adherence to nutrition related recommendations   [ ] Malnutrition      Interventions:   1. Recommend continuing with current plan of care  2. Encourage PO intake  3. Ongoing diet education  4. Requested weight from nursing    Monitoring & Evaluation:   [X] Weights   [X] PO Intake   [X] Follow Up (Per Protocol)  [X] Tolerance to Diet Prescription   [X] Other: Labs    RD Remains Available.  Jeni Ramos RD

## 2023-12-13 NOTE — PROGRESS NOTE ADULT - SUBJECTIVE AND OBJECTIVE BOX
SUBJECTIVE/ROS: Patient evaluated while in the chair. No acute events overnight. She states she is feeling well and is participating well in therapy. She denies chest pain, fever, chills, nausea, vomiting, abdominal pain, headache, or BLE pain.     HPI:  58 year old female with PMH of vertigo, HTN, OA, Type 2 DM, shingles and sciatica who presented to [facility] on 11/23  as a direct transfer from [facility] for further evaluation of right carotid occlusion requiring diagnostic angiogram.  She presented to [facility] initially on 11/21 with progressively worsening left sided weakness, such that she slid off bed 2 days prior to admission. Her imaging showed as stroke with MRI showing scattered small acute infarcts involving the superior aspects of the right frontal lobe and right parietal lobe as well as the right caudate but was outside window for tPA.  Patient underwent a cerebral angiogram on 11/24 and no intervention provided. Patient was started on DAPT and statin and recommended for outpatient monitoring. She remained stable during her hospital stay and was evaluated for admission to acute inpatient rehab. She was admitted to [facility] on 11/28/23.       Vital Signs Last 24 Hrs  T(C): 37.1 (13 Dec 2023 07:52), Max: 37.1 (13 Dec 2023 07:52)  T(F): 98.8 (13 Dec 2023 07:52), Max: 98.8 (13 Dec 2023 07:52)  HR: 67 (13 Dec 2023 07:52) (66 - 67)  BP: 141/61 (13 Dec 2023 07:52) (111/71 - 141/61)  BP(mean): --  RR: 16 (13 Dec 2023 07:52) (16 - 16)  SpO2: 97% (13 Dec 2023 07:52) (97% - 98%)    Parameters below as of 13 Dec 2023 07:52  Patient On (Oxygen Delivery Method): room air            PHYSICAL EXAM  Constitutional - NAD, Comfortable  HEENT - NCAT, EOMI  Neck - Supple, No limited ROM  Chest - breathing comfortably   Cardiovascular - RRR, S1S2  Abdomen -BS+, Soft, NTND  Extremities - No C/C/E, No calf tenderness   Neurologic Exam - aox3, RU/RL 5/5, JORGE/JORGE 4/5 with dysmetria          LABS:                          11.9   7.18  )-----------( 250      ( 11 Dec 2023 05:32 )             34.9     12-11    138  |  101  |  23  ----------------------------<  120<H>  3.9   |  27  |  0.94    Ca    9.6      11 Dec 2023 05:32    TPro  7.4  /  Alb  3.5  /  TBili  0.4  /  DBili  x   /  AST  17  /  ALT  35  /  AlkPhos  53  12-11    LIVER FUNCTIONS - ( 11 Dec 2023 05:32 )  Alb: 3.5 g/dL / Pro: 7.4 g/dL / ALK PHOS: 53 U/L / ALT: 35 U/L / AST: 17 U/L / GGT: x                   MEDICATIONS  (STANDING):  aspirin  chewable 81 milliGRAM(s) Oral daily  atorvastatin 40 milliGRAM(s) Oral at bedtime  clopidogrel Tablet 75 milliGRAM(s) Oral daily  dextrose 5%. 1000 milliLiter(s) (50 mL/Hr) IV Continuous <Continuous>  dextrose 5%. 1000 milliLiter(s) (100 mL/Hr) IV Continuous <Continuous>  dextrose 50% Injectable 25 Gram(s) IV Push once  dextrose 50% Injectable 25 Gram(s) IV Push once  dextrose 50% Injectable 12.5 Gram(s) IV Push once  enoxaparin Injectable 40 milliGRAM(s) SubCutaneous every 24 hours  escitalopram 10 milliGRAM(s) Oral daily  glucagon  Injectable 1 milliGRAM(s) IntraMuscular once  influenza   Vaccine 0.5 milliLiter(s) IntraMuscular once  losartan 50 milliGRAM(s) Oral daily  metFORMIN 1000 milliGRAM(s) Oral two times a day  pantoprazole    Tablet 40 milliGRAM(s) Oral before breakfast  senna 2 Tablet(s) Oral at bedtime    MEDICATIONS  (PRN):  acetaminophen     Tablet .. 650 milliGRAM(s) Oral every 6 hours PRN Mild Pain (1 - 3)  dextrose Oral Gel 15 Gram(s) Oral once PRN Blood Glucose LESS THAN 70 milliGRAM(s)/deciliter  gabapentin 100 milliGRAM(s) Oral at bedtime PRN pain  melatonin 3 milliGRAM(s) Oral at bedtime PRN Insomnia  polyethylene glycol 3350 17 Gram(s) Oral at bedtime PRN for constipation   SUBJECTIVE/ROS: Patient evaluated while in the chair. No acute events overnight. She states she is feeling well and is participating well in therapy. She denies chest pain, fever, chills, nausea, vomiting, abdominal pain, headache, or BLE pain.     HPI:  58 year old female with PMH of vertigo, HTN, OA, Type 2 DM, shingles and sciatica who presented to [facility] on 11/23  as a direct transfer from [facility] for further evaluation of right carotid occlusion requiring diagnostic angiogram.  She presented to [facility] initially on 11/21 with progressively worsening left sided weakness, such that she slid off bed 2 days prior to admission. Her imaging showed as stroke with MRI showing scattered small acute infarcts involving the superior aspects of the right frontal lobe and right parietal lobe as well as the right caudate but was outside window for tPA.  Patient underwent a cerebral angiogram on 11/24 and no intervention provided. Patient was started on DAPT and statin and recommended for outpatient monitoring. She remained stable during her hospital stay and was evaluated for admission to acute inpatient rehab. She was admitted to [facility] on 11/28/23.       Vital Signs Last 24 Hrs  T(C): 37.1 (13 Dec 2023 07:52), Max: 37.1 (13 Dec 2023 07:52)  T(F): 98.8 (13 Dec 2023 07:52), Max: 98.8 (13 Dec 2023 07:52)  HR: 67 (13 Dec 2023 07:52) (66 - 67)  BP: 141/61 (13 Dec 2023 07:52) (111/71 - 141/61)  BP(mean): --  RR: 16 (13 Dec 2023 07:52) (16 - 16)  SpO2: 97% (13 Dec 2023 07:52) (97% - 98%)    Parameters below as of 13 Dec 2023 07:52  Patient On (Oxygen Delivery Method): room air            PHYSICAL EXAM  Constitutional - NAD, Comfortable  HEENT - NCAT, EOMI  Neck - Supple, No limited ROM  Chest - breathing comfortably   Cardiovascular - RRR, S1S2  Abdomen -BS+, Soft, NTND  Extremities - No C/C/E, No calf tenderness   Neurologic Exam - aox3, RU/RL 5/5, JORGE/JORGE 4/5 with dysmetria          LABS:                          11.9   7.18  )-----------( 250      ( 11 Dec 2023 05:32 )             34.9     12-11    138  |  101  |  23  ----------------------------<  120<H>  3.9   |  27  |  0.94    Ca    9.6      11 Dec 2023 05:32    TPro  7.4  /  Alb  3.5  /  TBili  0.4  /  DBili  x   /  AST  17  /  ALT  35  /  AlkPhos  53  12-11    LIVER FUNCTIONS - ( 11 Dec 2023 05:32 )  Alb: 3.5 g/dL / Pro: 7.4 g/dL / ALK PHOS: 53 U/L / ALT: 35 U/L / AST: 17 U/L / GGT: x                   MEDICATIONS  (STANDING):  aspirin  chewable 81 milliGRAM(s) Oral daily  atorvastatin 40 milliGRAM(s) Oral at bedtime  clopidogrel Tablet 75 milliGRAM(s) Oral daily  dextrose 5%. 1000 milliLiter(s) (50 mL/Hr) IV Continuous <Continuous>  dextrose 5%. 1000 milliLiter(s) (100 mL/Hr) IV Continuous <Continuous>  dextrose 50% Injectable 25 Gram(s) IV Push once  dextrose 50% Injectable 25 Gram(s) IV Push once  dextrose 50% Injectable 12.5 Gram(s) IV Push once  enoxaparin Injectable 40 milliGRAM(s) SubCutaneous every 24 hours  escitalopram 10 milliGRAM(s) Oral daily  glucagon  Injectable 1 milliGRAM(s) IntraMuscular once  influenza   Vaccine 0.5 milliLiter(s) IntraMuscular once  losartan 50 milliGRAM(s) Oral daily  metFORMIN 1000 milliGRAM(s) Oral two times a day  pantoprazole    Tablet 40 milliGRAM(s) Oral before breakfast  senna 2 Tablet(s) Oral at bedtime    MEDICATIONS  (PRN):  acetaminophen     Tablet .. 650 milliGRAM(s) Oral every 6 hours PRN Mild Pain (1 - 3)  dextrose Oral Gel 15 Gram(s) Oral once PRN Blood Glucose LESS THAN 70 milliGRAM(s)/deciliter  gabapentin 100 milliGRAM(s) Oral at bedtime PRN pain  melatonin 3 milliGRAM(s) Oral at bedtime PRN Insomnia  polyethylene glycol 3350 17 Gram(s) Oral at bedtime PRN for constipation

## 2023-12-14 ENCOUNTER — TRANSCRIPTION ENCOUNTER (OUTPATIENT)
Age: 58
End: 2023-12-14

## 2023-12-14 LAB
ALBUMIN SERPL ELPH-MCNC: 3.3 G/DL — SIGNIFICANT CHANGE UP (ref 3.3–5)
ALBUMIN SERPL ELPH-MCNC: 3.3 G/DL — SIGNIFICANT CHANGE UP (ref 3.3–5)
ALP SERPL-CCNC: 50 U/L — SIGNIFICANT CHANGE UP (ref 40–120)
ALP SERPL-CCNC: 50 U/L — SIGNIFICANT CHANGE UP (ref 40–120)
ALT FLD-CCNC: 30 U/L — SIGNIFICANT CHANGE UP (ref 10–45)
ALT FLD-CCNC: 30 U/L — SIGNIFICANT CHANGE UP (ref 10–45)
ANION GAP SERPL CALC-SCNC: 7 MMOL/L — SIGNIFICANT CHANGE UP (ref 5–17)
ANION GAP SERPL CALC-SCNC: 7 MMOL/L — SIGNIFICANT CHANGE UP (ref 5–17)
AST SERPL-CCNC: 22 U/L — SIGNIFICANT CHANGE UP (ref 10–40)
AST SERPL-CCNC: 22 U/L — SIGNIFICANT CHANGE UP (ref 10–40)
BASOPHILS # BLD AUTO: 0.04 K/UL — SIGNIFICANT CHANGE UP (ref 0–0.2)
BASOPHILS # BLD AUTO: 0.04 K/UL — SIGNIFICANT CHANGE UP (ref 0–0.2)
BASOPHILS NFR BLD AUTO: 0.7 % — SIGNIFICANT CHANGE UP (ref 0–2)
BASOPHILS NFR BLD AUTO: 0.7 % — SIGNIFICANT CHANGE UP (ref 0–2)
BILIRUB SERPL-MCNC: 0.4 MG/DL — SIGNIFICANT CHANGE UP (ref 0.2–1.2)
BILIRUB SERPL-MCNC: 0.4 MG/DL — SIGNIFICANT CHANGE UP (ref 0.2–1.2)
BUN SERPL-MCNC: 15 MG/DL — SIGNIFICANT CHANGE UP (ref 7–23)
BUN SERPL-MCNC: 15 MG/DL — SIGNIFICANT CHANGE UP (ref 7–23)
CALCIUM SERPL-MCNC: 9.2 MG/DL — SIGNIFICANT CHANGE UP (ref 8.4–10.5)
CALCIUM SERPL-MCNC: 9.2 MG/DL — SIGNIFICANT CHANGE UP (ref 8.4–10.5)
CHLORIDE SERPL-SCNC: 103 MMOL/L — SIGNIFICANT CHANGE UP (ref 96–108)
CHLORIDE SERPL-SCNC: 103 MMOL/L — SIGNIFICANT CHANGE UP (ref 96–108)
CO2 SERPL-SCNC: 30 MMOL/L — SIGNIFICANT CHANGE UP (ref 22–31)
CO2 SERPL-SCNC: 30 MMOL/L — SIGNIFICANT CHANGE UP (ref 22–31)
CREAT SERPL-MCNC: 0.94 MG/DL — SIGNIFICANT CHANGE UP (ref 0.5–1.3)
CREAT SERPL-MCNC: 0.94 MG/DL — SIGNIFICANT CHANGE UP (ref 0.5–1.3)
EGFR: 70 ML/MIN/1.73M2 — SIGNIFICANT CHANGE UP
EGFR: 70 ML/MIN/1.73M2 — SIGNIFICANT CHANGE UP
EOSINOPHIL # BLD AUTO: 0.16 K/UL — SIGNIFICANT CHANGE UP (ref 0–0.5)
EOSINOPHIL # BLD AUTO: 0.16 K/UL — SIGNIFICANT CHANGE UP (ref 0–0.5)
EOSINOPHIL NFR BLD AUTO: 2.7 % — SIGNIFICANT CHANGE UP (ref 0–6)
EOSINOPHIL NFR BLD AUTO: 2.7 % — SIGNIFICANT CHANGE UP (ref 0–6)
GLUCOSE SERPL-MCNC: 118 MG/DL — HIGH (ref 70–99)
GLUCOSE SERPL-MCNC: 118 MG/DL — HIGH (ref 70–99)
HCT VFR BLD CALC: 34.8 % — SIGNIFICANT CHANGE UP (ref 34.5–45)
HCT VFR BLD CALC: 34.8 % — SIGNIFICANT CHANGE UP (ref 34.5–45)
HGB BLD-MCNC: 11.9 G/DL — SIGNIFICANT CHANGE UP (ref 11.5–15.5)
HGB BLD-MCNC: 11.9 G/DL — SIGNIFICANT CHANGE UP (ref 11.5–15.5)
IMM GRANULOCYTES NFR BLD AUTO: 0.2 % — SIGNIFICANT CHANGE UP (ref 0–0.9)
IMM GRANULOCYTES NFR BLD AUTO: 0.2 % — SIGNIFICANT CHANGE UP (ref 0–0.9)
LYMPHOCYTES # BLD AUTO: 2.1 K/UL — SIGNIFICANT CHANGE UP (ref 1–3.3)
LYMPHOCYTES # BLD AUTO: 2.1 K/UL — SIGNIFICANT CHANGE UP (ref 1–3.3)
LYMPHOCYTES # BLD AUTO: 35.7 % — SIGNIFICANT CHANGE UP (ref 13–44)
LYMPHOCYTES # BLD AUTO: 35.7 % — SIGNIFICANT CHANGE UP (ref 13–44)
MCHC RBC-ENTMCNC: 29.8 PG — SIGNIFICANT CHANGE UP (ref 27–34)
MCHC RBC-ENTMCNC: 29.8 PG — SIGNIFICANT CHANGE UP (ref 27–34)
MCHC RBC-ENTMCNC: 34.2 GM/DL — SIGNIFICANT CHANGE UP (ref 32–36)
MCHC RBC-ENTMCNC: 34.2 GM/DL — SIGNIFICANT CHANGE UP (ref 32–36)
MCV RBC AUTO: 87.2 FL — SIGNIFICANT CHANGE UP (ref 80–100)
MCV RBC AUTO: 87.2 FL — SIGNIFICANT CHANGE UP (ref 80–100)
MONOCYTES # BLD AUTO: 0.48 K/UL — SIGNIFICANT CHANGE UP (ref 0–0.9)
MONOCYTES # BLD AUTO: 0.48 K/UL — SIGNIFICANT CHANGE UP (ref 0–0.9)
MONOCYTES NFR BLD AUTO: 8.2 % — SIGNIFICANT CHANGE UP (ref 2–14)
MONOCYTES NFR BLD AUTO: 8.2 % — SIGNIFICANT CHANGE UP (ref 2–14)
NEUTROPHILS # BLD AUTO: 3.09 K/UL — SIGNIFICANT CHANGE UP (ref 1.8–7.4)
NEUTROPHILS # BLD AUTO: 3.09 K/UL — SIGNIFICANT CHANGE UP (ref 1.8–7.4)
NEUTROPHILS NFR BLD AUTO: 52.5 % — SIGNIFICANT CHANGE UP (ref 43–77)
NEUTROPHILS NFR BLD AUTO: 52.5 % — SIGNIFICANT CHANGE UP (ref 43–77)
NRBC # BLD: 0 /100 WBCS — SIGNIFICANT CHANGE UP (ref 0–0)
NRBC # BLD: 0 /100 WBCS — SIGNIFICANT CHANGE UP (ref 0–0)
PLATELET # BLD AUTO: 216 K/UL — SIGNIFICANT CHANGE UP (ref 150–400)
PLATELET # BLD AUTO: 216 K/UL — SIGNIFICANT CHANGE UP (ref 150–400)
POTASSIUM SERPL-MCNC: 4.6 MMOL/L — SIGNIFICANT CHANGE UP (ref 3.5–5.3)
POTASSIUM SERPL-MCNC: 4.6 MMOL/L — SIGNIFICANT CHANGE UP (ref 3.5–5.3)
POTASSIUM SERPL-SCNC: 4.6 MMOL/L — SIGNIFICANT CHANGE UP (ref 3.5–5.3)
POTASSIUM SERPL-SCNC: 4.6 MMOL/L — SIGNIFICANT CHANGE UP (ref 3.5–5.3)
PROT SERPL-MCNC: 7.2 G/DL — SIGNIFICANT CHANGE UP (ref 6–8.3)
PROT SERPL-MCNC: 7.2 G/DL — SIGNIFICANT CHANGE UP (ref 6–8.3)
RBC # BLD: 3.99 M/UL — SIGNIFICANT CHANGE UP (ref 3.8–5.2)
RBC # BLD: 3.99 M/UL — SIGNIFICANT CHANGE UP (ref 3.8–5.2)
RBC # FLD: 13.3 % — SIGNIFICANT CHANGE UP (ref 10.3–14.5)
RBC # FLD: 13.3 % — SIGNIFICANT CHANGE UP (ref 10.3–14.5)
SODIUM SERPL-SCNC: 140 MMOL/L — SIGNIFICANT CHANGE UP (ref 135–145)
SODIUM SERPL-SCNC: 140 MMOL/L — SIGNIFICANT CHANGE UP (ref 135–145)
WBC # BLD: 5.88 K/UL — SIGNIFICANT CHANGE UP (ref 3.8–10.5)
WBC # BLD: 5.88 K/UL — SIGNIFICANT CHANGE UP (ref 3.8–10.5)
WBC # FLD AUTO: 5.88 K/UL — SIGNIFICANT CHANGE UP (ref 3.8–10.5)
WBC # FLD AUTO: 5.88 K/UL — SIGNIFICANT CHANGE UP (ref 3.8–10.5)

## 2023-12-14 PROCEDURE — 99232 SBSQ HOSP IP/OBS MODERATE 35: CPT

## 2023-12-14 RX ORDER — ASPIRIN/CALCIUM CARB/MAGNESIUM 324 MG
1 TABLET ORAL
Qty: 0 | Refills: 0 | DISCHARGE
Start: 2023-12-14

## 2023-12-14 RX ORDER — ESCITALOPRAM OXALATE 10 MG/1
1 TABLET, FILM COATED ORAL
Qty: 30 | Refills: 0
Start: 2023-12-14 | End: 2024-01-12

## 2023-12-14 RX ORDER — SENNA PLUS 8.6 MG/1
2 TABLET ORAL
Qty: 0 | Refills: 0 | DISCHARGE
Start: 2023-12-14

## 2023-12-14 RX ORDER — GABAPENTIN 400 MG/1
1 CAPSULE ORAL
Qty: 0 | Refills: 0 | DISCHARGE
Start: 2023-12-14

## 2023-12-14 RX ORDER — ACETAMINOPHEN 500 MG
2 TABLET ORAL
Qty: 0 | Refills: 0 | DISCHARGE
Start: 2023-12-14

## 2023-12-14 RX ORDER — METFORMIN HYDROCHLORIDE 850 MG/1
1 TABLET ORAL
Qty: 60 | Refills: 0
Start: 2023-12-14 | End: 2024-01-12

## 2023-12-14 RX ORDER — POLYETHYLENE GLYCOL 3350 17 G/17G
17 POWDER, FOR SOLUTION ORAL
Qty: 0 | Refills: 0 | DISCHARGE
Start: 2023-12-14

## 2023-12-14 RX ORDER — LANOLIN ALCOHOL/MO/W.PET/CERES
1 CREAM (GRAM) TOPICAL
Qty: 0 | Refills: 0 | DISCHARGE
Start: 2023-12-14

## 2023-12-14 RX ORDER — ATORVASTATIN CALCIUM 80 MG/1
1 TABLET, FILM COATED ORAL
Qty: 30 | Refills: 0
Start: 2023-12-14 | End: 2024-01-12

## 2023-12-14 RX ORDER — PANTOPRAZOLE SODIUM 20 MG/1
1 TABLET, DELAYED RELEASE ORAL
Qty: 30 | Refills: 0
Start: 2023-12-14 | End: 2024-01-12

## 2023-12-14 RX ORDER — CLOPIDOGREL BISULFATE 75 MG/1
1 TABLET, FILM COATED ORAL
Qty: 30 | Refills: 0
Start: 2023-12-14 | End: 2024-01-12

## 2023-12-14 RX ORDER — LOSARTAN POTASSIUM 100 MG/1
1 TABLET, FILM COATED ORAL
Qty: 30 | Refills: 0
Start: 2023-12-14 | End: 2024-01-12

## 2023-12-14 RX ADMIN — ENOXAPARIN SODIUM 40 MILLIGRAM(S): 100 INJECTION SUBCUTANEOUS at 05:08

## 2023-12-14 RX ADMIN — LOSARTAN POTASSIUM 50 MILLIGRAM(S): 100 TABLET, FILM COATED ORAL at 05:08

## 2023-12-14 RX ADMIN — ESCITALOPRAM OXALATE 10 MILLIGRAM(S): 10 TABLET, FILM COATED ORAL at 11:42

## 2023-12-14 RX ADMIN — METFORMIN HYDROCHLORIDE 1000 MILLIGRAM(S): 850 TABLET ORAL at 17:01

## 2023-12-14 RX ADMIN — ATORVASTATIN CALCIUM 40 MILLIGRAM(S): 80 TABLET, FILM COATED ORAL at 20:20

## 2023-12-14 RX ADMIN — Medication 81 MILLIGRAM(S): at 11:42

## 2023-12-14 RX ADMIN — CLOPIDOGREL BISULFATE 75 MILLIGRAM(S): 75 TABLET, FILM COATED ORAL at 11:42

## 2023-12-14 RX ADMIN — METFORMIN HYDROCHLORIDE 1000 MILLIGRAM(S): 850 TABLET ORAL at 07:12

## 2023-12-14 RX ADMIN — PANTOPRAZOLE SODIUM 40 MILLIGRAM(S): 20 TABLET, DELAYED RELEASE ORAL at 05:08

## 2023-12-14 NOTE — PROGRESS NOTE ADULT - ASSESSMENT
59 y/o F with PMH of vertigo, HTN, OA, Type 2 DM, shingles and sciatica who presented to [facility] on 11/23 as a direct transfer from [facility] for further evaluation of right carotid occlusion requiring diagnostic angiogram.  She presented to [facility] initially on 11/21 with progressively worsening left sided weakness, such that she slid off bed 2 days prior to admission. Her imaging showed as stroke with MRI showing scattered small acute infarcts involving the superior aspects of the right frontal lobe and right parietal lobe as well as the right caudate but was outside window for tPA.  Patient underwent a cerebral angiogram on 11/24 and no intervention provided. Patient was started on DAPT and statin and recommended for outpatient monitoring. She remained stable during her hospital stay and was evaluated for admission to acute inpatient rehab. She was admitted to [facility] on 11/28/23 - pt/ot/dvt ppx.    #Right frontal/parietal/caudate infarcts with right ICA stenosis now with left sided weakness  -Continue DAPT with Aspirin, Plavix   -Continue Atorvastatin    #Neuropathy  -Continue gabapentin    #HTN with OH  -Off HCTZ, continue Losartan 50mg qd   -TTE with EF of 60-65%    #Type 2 DM, HbA1c 7.7 (11/22/23)  -Off FS, ISS  -Home med: Metformin 1000mg BID - continued    DVT ppx - Lovenox  GI ppx - PPI   57 y/o F with PMH of vertigo, HTN, OA, Type 2 DM, shingles and sciatica who presented to [facility] on 11/23 as a direct transfer from [facility] for further evaluation of right carotid occlusion requiring diagnostic angiogram.  She presented to [facility] initially on 11/21 with progressively worsening left sided weakness, such that she slid off bed 2 days prior to admission. Her imaging showed as stroke with MRI showing scattered small acute infarcts involving the superior aspects of the right frontal lobe and right parietal lobe as well as the right caudate but was outside window for tPA.  Patient underwent a cerebral angiogram on 11/24 and no intervention provided. Patient was started on DAPT and statin and recommended for outpatient monitoring. She remained stable during her hospital stay and was evaluated for admission to acute inpatient rehab. She was admitted to [facility] on 11/28/23 - pt/ot/dvt ppx.    #Right frontal/parietal/caudate infarcts with right ICA stenosis now with left sided weakness  -Continue DAPT with Aspirin, Plavix   -Continue Atorvastatin    #Neuropathy  -Continue gabapentin    #HTN with OH  -Off HCTZ, continue Losartan 50mg qd   -TTE with EF of 60-65%    #Type 2 DM, HbA1c 7.7 (11/22/23)  -Off FS, ISS  -Home med: Metformin 1000mg BID - continued    DVT ppx - Lovenox  GI ppx - PPI

## 2023-12-14 NOTE — PROGRESS NOTE ADULT - SUBJECTIVE AND OBJECTIVE BOX
SUBJECTIVE/ROS: Patient evaluated while in the chair. She is very excited to be able to go home tomorrow. Preliminary review of her medications and discharge planning provided and questions answered. She denies chest pain, fever, chills, nausea, vomiting, abdominal pain, headache, or BLE pain.     HPI:  58 year old female with PMH of vertigo, HTN, OA, Type 2 DM, shingles and sciatica who presented to [facility] on 11/23  as a direct transfer from [facility] for further evaluation of right carotid occlusion requiring diagnostic angiogram.  She presented to [facility] initially on 11/21 with progressively worsening left sided weakness, such that she slid off bed 2 days prior to admission. Her imaging showed as stroke with MRI showing scattered small acute infarcts involving the superior aspects of the right frontal lobe and right parietal lobe as well as the right caudate but was outside window for tPA.  Patient underwent a cerebral angiogram on 11/24 and no intervention provided. Patient was started on DAPT and statin and recommended for outpatient monitoring. She remained stable during her hospital stay and was evaluated for admission to acute inpatient rehab. She was admitted to [facility] on 11/28/23.       Vital Signs Last 24 Hrs  T(C): 36.9 (14 Dec 2023 07:36), Max: 37.3 (13 Dec 2023 20:02)  T(F): 98.4 (14 Dec 2023 07:36), Max: 99.1 (13 Dec 2023 20:02)  HR: 58 (14 Dec 2023 07:36) (58 - 66)  BP: 114/65 (14 Dec 2023 07:36) (114/65 - 129/65)  BP(mean): --  RR: 16 (14 Dec 2023 07:36) (16 - 16)  SpO2: 98% (14 Dec 2023 07:36) (95% - 98%)    Parameters below as of 14 Dec 2023 07:36  Patient On (Oxygen Delivery Method): room air            PHYSICAL EXAM  Constitutional - NAD, Comfortable  HEENT - NCAT, EOMI  Neck - Supple, No limited ROM  Chest - breathing comfortably   Cardiovascular - RRR, S1S2  Abdomen -BS+, Soft, NTND  Extremities - No C/C/E, No calf tenderness   Neurologic Exam - aox3, RU/RL 5/5, JORGE/JORGE 4/5 with dysmetria      LABS:                          11.9   5.88  )-----------( 216      ( 14 Dec 2023 07:16 )             34.8     12-14    140  |  103  |  15  ----------------------------<  118<H>  4.6   |  30  |  0.94    Ca    9.2      14 Dec 2023 07:16    TPro  7.2  /  Alb  3.3  /  TBili  0.4  /  DBili  x   /  AST  22  /  ALT  30  /  AlkPhos  50  12-14    LIVER FUNCTIONS - ( 14 Dec 2023 07:16 )  Alb: 3.3 g/dL / Pro: 7.2 g/dL / ALK PHOS: 50 U/L / ALT: 30 U/L / AST: 22 U/L / GGT: x                 MEDICATIONS  (STANDING):  aspirin  chewable 81 milliGRAM(s) Oral daily  atorvastatin 40 milliGRAM(s) Oral at bedtime  clopidogrel Tablet 75 milliGRAM(s) Oral daily  dextrose 5%. 1000 milliLiter(s) (50 mL/Hr) IV Continuous <Continuous>  dextrose 5%. 1000 milliLiter(s) (100 mL/Hr) IV Continuous <Continuous>  dextrose 50% Injectable 25 Gram(s) IV Push once  dextrose 50% Injectable 25 Gram(s) IV Push once  dextrose 50% Injectable 12.5 Gram(s) IV Push once  enoxaparin Injectable 40 milliGRAM(s) SubCutaneous every 24 hours  escitalopram 10 milliGRAM(s) Oral daily  glucagon  Injectable 1 milliGRAM(s) IntraMuscular once  influenza   Vaccine 0.5 milliLiter(s) IntraMuscular once  losartan 50 milliGRAM(s) Oral daily  metFORMIN 1000 milliGRAM(s) Oral two times a day  pantoprazole    Tablet 40 milliGRAM(s) Oral before breakfast  senna 2 Tablet(s) Oral at bedtime    MEDICATIONS  (PRN):  acetaminophen     Tablet .. 650 milliGRAM(s) Oral every 6 hours PRN Mild Pain (1 - 3)  dextrose Oral Gel 15 Gram(s) Oral once PRN Blood Glucose LESS THAN 70 milliGRAM(s)/deciliter  gabapentin 100 milliGRAM(s) Oral at bedtime PRN pain  melatonin 3 milliGRAM(s) Oral at bedtime PRN Insomnia  polyethylene glycol 3350 17 Gram(s) Oral at bedtime PRN for constipation   SUBJECTIVE/ROS: Patient evaluated while in the chair. She is very excited to be able to go home tomorrow. Preliminary review of her medications and discharge planning provided and questions answered. She denies chest pain, fever, chills, nausea, vomiting, abdominal pain, headache, or BLE pain.     HPI:  58 year old female with PMH of vertigo, HTN, OA, Type 2 DM, shingles and sciatica who presented to [facility] on 11/23  as a direct transfer from [facility] for further evaluation of right carotid occlusion requiring diagnostic angiogram.  She presented to [facility] initially on 11/21 with progressively worsening left sided weakness, such that she slid off bed 2 days prior to admission. Her imaging showed as stroke with MRI showing scattered small acute infarcts involving the superior aspects of the right frontal lobe and right parietal lobe as well as the right caudate but was outside window for tPA.  Patient underwent a cerebral angiogram on 11/24 and no intervention provided. Patient was started on DAPT and statin and recommended for outpatient monitoring. She remained stable during her hospital stay and was evaluated for admission to acute inpatient rehab. She was admitted to [facility] on 11/28/23.       Vital Signs Last 24 Hrs  T(C): 36.9 (14 Dec 2023 07:36), Max: 37.3 (13 Dec 2023 20:02)  T(F): 98.4 (14 Dec 2023 07:36), Max: 99.1 (13 Dec 2023 20:02)  HR: 58 (14 Dec 2023 07:36) (58 - 66)  BP: 114/65 (14 Dec 2023 07:36) (114/65 - 129/65)  BP(mean): --  RR: 16 (14 Dec 2023 07:36) (16 - 16)  SpO2: 98% (14 Dec 2023 07:36) (95% - 98%)    Parameters below as of 14 Dec 2023 07:36  Patient On (Oxygen Delivery Method): room air            PHYSICAL EXAM  Constitutional - NAD, Comfortable  HEENT - NCAT, EOMI  Neck - Supple, No limited ROM  Chest - breathing comfortably   Cardiovascular - RRR, S1S2  Abdomen -BS+, Soft, NTND  Extremities - No C/C/E, No calf tenderness   Neurologic Exam - aox3, RU/RL 5/5, JORGE/JORGE 4/5 with dysmetria      LABS:                          11.9   5.88  )-----------( 216      ( 14 Dec 2023 07:16 )             34.8     12-14    140  |  103  |  15  ----------------------------<  118<H>  4.6   |  30  |  0.94    Ca    9.2      14 Dec 2023 07:16    TPro  7.2  /  Alb  3.3  /  TBili  0.4  /  DBili  x   /  AST  22  /  ALT  30  /  AlkPhos  50  12-14    LIVER FUNCTIONS - ( 14 Dec 2023 07:16 )  Alb: 3.3 g/dL / Pro: 7.2 g/dL / ALK PHOS: 50 U/L / ALT: 30 U/L / AST: 22 U/L / GGT: x                 MEDICATIONS  (STANDING):  aspirin  chewable 81 milliGRAM(s) Oral daily  atorvastatin 40 milliGRAM(s) Oral at bedtime  clopidogrel Tablet 75 milliGRAM(s) Oral daily  dextrose 5%. 1000 milliLiter(s) (50 mL/Hr) IV Continuous <Continuous>  dextrose 5%. 1000 milliLiter(s) (100 mL/Hr) IV Continuous <Continuous>  dextrose 50% Injectable 25 Gram(s) IV Push once  dextrose 50% Injectable 25 Gram(s) IV Push once  dextrose 50% Injectable 12.5 Gram(s) IV Push once  enoxaparin Injectable 40 milliGRAM(s) SubCutaneous every 24 hours  escitalopram 10 milliGRAM(s) Oral daily  glucagon  Injectable 1 milliGRAM(s) IntraMuscular once  influenza   Vaccine 0.5 milliLiter(s) IntraMuscular once  losartan 50 milliGRAM(s) Oral daily  metFORMIN 1000 milliGRAM(s) Oral two times a day  pantoprazole    Tablet 40 milliGRAM(s) Oral before breakfast  senna 2 Tablet(s) Oral at bedtime    MEDICATIONS  (PRN):  acetaminophen     Tablet .. 650 milliGRAM(s) Oral every 6 hours PRN Mild Pain (1 - 3)  dextrose Oral Gel 15 Gram(s) Oral once PRN Blood Glucose LESS THAN 70 milliGRAM(s)/deciliter  gabapentin 100 milliGRAM(s) Oral at bedtime PRN pain  melatonin 3 milliGRAM(s) Oral at bedtime PRN Insomnia  polyethylene glycol 3350 17 Gram(s) Oral at bedtime PRN for constipation

## 2023-12-14 NOTE — DISCHARGE NOTE NURSING/CASE MANAGEMENT/SOCIAL WORK - PATIENT PORTAL LINK FT
You can access the FollowMyHealth Patient Portal offered by [facility] by registering at the following website: [email or web address]. By joining [employer]’s FollowMyHealth portal, you will also be able to view your health information using other applications (apps) compatible with our system. You can access the FollowMyHealth Patient Portal offered by [facility] by registering at the following website: [email or web address]. By joining [employer]’s FollowMyHealth portal, you will also be able to view your health information using other applications (apps) compatible with our system.

## 2023-12-14 NOTE — DISCHARGE NOTE NURSING/CASE MANAGEMENT/SOCIAL WORK - NSDCFUADDAPPT_GEN_ALL_CORE_FT
You have been provided Physical Therapy and Occupational Therapy prescriptions.   You have been provided a list of [facility] Outpatient Therapy Locations where you can use the scripts.  You have been provided Physical Therapy and Occupational Therapy prescriptions.   You have been provided a list of [facility] Outpatient Therapy Locations where you can use the scripts.

## 2023-12-14 NOTE — DISCHARGE NOTE NURSING/CASE MANAGEMENT/SOCIAL WORK - NSDCCRNAME_GEN_ALL_CORE_FT
List of [facility] Out patient locations  List of [facility] Out patient locations

## 2023-12-14 NOTE — PROGRESS NOTE ADULT - SUBJECTIVE AND OBJECTIVE BOX
[facility] Medicine  Dr. Tracy Paz  Progress Note    Patient is a 58y old  Female who presents with a chief complaint of CVA (11 Dec 2023 14:12)      Patient seen and examined. Sitting in chair. Left hand ROM much improved. Feels well. Denies any complaints. Wants to go home tomorrow.     ALLERGIES:  No Known Allergies    MEDICATIONS  (STANDING):  aspirin  chewable 81 milliGRAM(s) Oral daily  atorvastatin 40 milliGRAM(s) Oral at bedtime  clopidogrel Tablet 75 milliGRAM(s) Oral daily  dextrose 5%. 1000 milliLiter(s) (50 mL/Hr) IV Continuous <Continuous>  dextrose 5%. 1000 milliLiter(s) (100 mL/Hr) IV Continuous <Continuous>  dextrose 50% Injectable 12.5 Gram(s) IV Push once  dextrose 50% Injectable 25 Gram(s) IV Push once  dextrose 50% Injectable 25 Gram(s) IV Push once  enoxaparin Injectable 40 milliGRAM(s) SubCutaneous every 24 hours  escitalopram 10 milliGRAM(s) Oral daily  glucagon  Injectable 1 milliGRAM(s) IntraMuscular once  influenza   Vaccine 0.5 milliLiter(s) IntraMuscular once  losartan 50 milliGRAM(s) Oral daily  metFORMIN 1000 milliGRAM(s) Oral two times a day  pantoprazole    Tablet 40 milliGRAM(s) Oral before breakfast  senna 2 Tablet(s) Oral at bedtime    MEDICATIONS  (PRN):  acetaminophen     Tablet .. 650 milliGRAM(s) Oral every 6 hours PRN Mild Pain (1 - 3)  dextrose Oral Gel 15 Gram(s) Oral once PRN Blood Glucose LESS THAN 70 milliGRAM(s)/deciliter  gabapentin 100 milliGRAM(s) Oral at bedtime PRN pain  melatonin 3 milliGRAM(s) Oral at bedtime PRN Insomnia  polyethylene glycol 3350 17 Gram(s) Oral at bedtime PRN for constipation    Vital Signs Last 24 Hrs  T(C): 36.9 (14 Dec 2023 07:36), Max: 37.3 (13 Dec 2023 20:02)  T(F): 98.4 (14 Dec 2023 07:36), Max: 99.1 (13 Dec 2023 20:02)  HR: 58 (14 Dec 2023 07:36) (58 - 66)  BP: 114/65 (14 Dec 2023 07:36) (114/65 - 129/65)  BP(mean): --  RR: 16 (14 Dec 2023 07:36) (16 - 16)  SpO2: 98% (14 Dec 2023 07:36) (95% - 98%)    Parameters below as of 14 Dec 2023 07:36  Patient On (Oxygen Delivery Method): room air      PHYSICAL EXAM:  General: NAD  ENT: MMM, no scleral icterus  Neck: Supple, No JVD  Lungs: Clear to auscultation bilaterally, no wheezes, rales, rhonchi  Cardio: RRR, S1/S2  Abdomen: Soft, Nontender, Nondistended; Bowel sounds present  Extremities: No calf tenderness, No pitting edema      LABS:                                   11.9   5.88  )-----------( 216      ( 14 Dec 2023 07:16 )             34.8     12-14    140  |  103  |  15  ----------------------------<  118<H>  4.6   |  30  |  0.94    Ca    9.2      14 Dec 2023 07:16    TPro  7.2  /  Alb  3.3  /  TBili  0.4  /  DBili  x   /  AST  22  /  ALT  30  /  AlkPhos  50  12-14          Urinalysis Basic - ( 11 Dec 2023 05:32 )    Color: x / Appearance: x / SG: x / pH: x  Gluc: 120 mg/dL / Ketone: x  / Bili: x / Urobili: x   Blood: x / Protein: x / Nitrite: x   Leuk Esterase: x / RBC: x / WBC x   Sq Epi: x / Non Sq Epi: x / Bacteria: x        COVID-19 PCR: NotDetec (11-29-23 @ 06:00)      RADIOLOGY & ADDITIONAL TESTS:    Care Discussed with Consultants/Other Providers: yes, rehab   [facility] Medicine  Dr. Tracy Paz  Progress Note    Patient is a 58y old  Female who presents with a chief complaint of CVA (11 Dec 2023 14:12)      Patient seen and examined. Sitting in chair. Left hand ROM much improved. Feels well. Denies any complaints. Wants to go home tomorrow.     ALLERGIES:  No Known Allergies    MEDICATIONS  (STANDING):  aspirin  chewable 81 milliGRAM(s) Oral daily  atorvastatin 40 milliGRAM(s) Oral at bedtime  clopidogrel Tablet 75 milliGRAM(s) Oral daily  dextrose 5%. 1000 milliLiter(s) (50 mL/Hr) IV Continuous <Continuous>  dextrose 5%. 1000 milliLiter(s) (100 mL/Hr) IV Continuous <Continuous>  dextrose 50% Injectable 12.5 Gram(s) IV Push once  dextrose 50% Injectable 25 Gram(s) IV Push once  dextrose 50% Injectable 25 Gram(s) IV Push once  enoxaparin Injectable 40 milliGRAM(s) SubCutaneous every 24 hours  escitalopram 10 milliGRAM(s) Oral daily  glucagon  Injectable 1 milliGRAM(s) IntraMuscular once  influenza   Vaccine 0.5 milliLiter(s) IntraMuscular once  losartan 50 milliGRAM(s) Oral daily  metFORMIN 1000 milliGRAM(s) Oral two times a day  pantoprazole    Tablet 40 milliGRAM(s) Oral before breakfast  senna 2 Tablet(s) Oral at bedtime    MEDICATIONS  (PRN):  acetaminophen     Tablet .. 650 milliGRAM(s) Oral every 6 hours PRN Mild Pain (1 - 3)  dextrose Oral Gel 15 Gram(s) Oral once PRN Blood Glucose LESS THAN 70 milliGRAM(s)/deciliter  gabapentin 100 milliGRAM(s) Oral at bedtime PRN pain  melatonin 3 milliGRAM(s) Oral at bedtime PRN Insomnia  polyethylene glycol 3350 17 Gram(s) Oral at bedtime PRN for constipation    Vital Signs Last 24 Hrs  T(C): 36.9 (14 Dec 2023 07:36), Max: 37.3 (13 Dec 2023 20:02)  T(F): 98.4 (14 Dec 2023 07:36), Max: 99.1 (13 Dec 2023 20:02)  HR: 58 (14 Dec 2023 07:36) (58 - 66)  BP: 114/65 (14 Dec 2023 07:36) (114/65 - 129/65)  BP(mean): --  RR: 16 (14 Dec 2023 07:36) (16 - 16)  SpO2: 98% (14 Dec 2023 07:36) (95% - 98%)    Parameters below as of 14 Dec 2023 07:36  Patient On (Oxygen Delivery Method): room air      PHYSICAL EXAM:  General: NAD  ENT: MMM, no scleral icterus  Neck: Supple, No JVD  Lungs: Clear to auscultation bilaterally, no wheezes, rales, rhonchi  Cardio: RRR, S1/S2  Abdomen: Soft, Nontender, Nondistended; Bowel sounds present  Extremities: No calf tenderness, No pitting edema      LABS:                                   11.9   5.88  )-----------( 216      ( 14 Dec 2023 07:16 )             34.8     12-14    140  |  103  |  15  ----------------------------<  118<H>  4.6   |  30  |  0.94    Ca    9.2      14 Dec 2023 07:16    TPro  7.2  /  Alb  3.3  /  TBili  0.4  /  DBili  x   /  AST  22  /  ALT  30  /  AlkPhos  50  12-14          Urinalysis Basic - ( 11 Dec 2023 05:32 )    Color: x / Appearance: x / SG: x / pH: x  Gluc: 120 mg/dL / Ketone: x  / Bili: x / Urobili: x   Blood: x / Protein: x / Nitrite: x   Leuk Esterase: x / RBC: x / WBC x   Sq Epi: x / Non Sq Epi: x / Bacteria: x        COVID-19 PCR: NotDetec (11-29-23 @ 06:00)      RADIOLOGY & ADDITIONAL TESTS:    Care Discussed with Consultants/Other Providers: yes, rehab

## 2023-12-14 NOTE — DISCHARGE NOTE NURSING/CASE MANAGEMENT/SOCIAL WORK - NSDCPEFALRISK_GEN_ALL_CORE
For information on Fall & Injury Prevention, visit: [email or web address] OR  [email or web address] OR  https://www.cdc.gov/steadi/patient.html For information on Fall & Injury Prevention, visit: [email or web address] OR  [email or web address] OR  https://www.cdc.gov/steadi/patient.html

## 2023-12-14 NOTE — PROGRESS NOTE ADULT - ASSESSMENT
ASSESSMENT/PLAN  58 year old female with PMH of vertigo, HTN, OA, Type 2 DM, shingles and sciatica who presented to [facility] on 11/23  as a direct transfer from  for further evaluation of right carotid occlusion requiring diagnostic angiogram.  She presented to  initially on 11/21 with progressively worsening left sided weakness, such that she slid off bed 2 days prior to admission. Her imaging showed as stroke with MRI showing scattered small acute infarcts involving the superior aspects of the right frontal lobe and right parietal lobe as well as the right caudate but was outside window for tPA.  Patient underwent a cerebral angiogram on 11/24 and no intervention provided. Patient was started on DAPT and statin and recommended for outpatient monitoring. She remained stable during her hospital stay and was evaluated for admission to acute inpatient rehab. She was admitted to [facility] on 11/28/23.       #Right frontal/parietal/caudate infarcts with right ICA stenosis now with left sided weakness, Gait Instability, ADL impairments and Functional impairments  - Continue Comprehensive Rehab Program of PT/OT/SLP  -Most likely large vessel atherosclerosis with Right ICA near occlusion of vessel  -Continue Aspirin 81mg  -Continue Plavix 75mg   -Continue Atorvastatin 40mg daily     #Sleep/mood  -Continue lexapro 10mg daily 12/8 - for anxiety and neurorecovery   -Continue melatonin 3mg PRN at bedtime  -Neuropsych consult appreciated   -Recreation therapy appreciated     #HTN  -Stop HCTZ 25mg 12/8  -Reduce Losartan to 50mg daily 12/8 - BP improved   -TTE with EF of 60-65%  -Monitor BP    #Type 2 DM  -a1c is 7.7  -Continue Metformin 1000mg BID    #Pain control  - Tylenol PRN  -Home med: Gabapentin 100mg at bedtime PRN    #GI/Bowel Mgmt   - Continue Senna at bedtime   - Miralax PRN    #DVT  - Lovenox  - TEDs     #Skin:  - intergluteal cleft fissure - continue barrier cream     FEN   - Diet - Regular + Thins  [CCHO, DASH/TLC]      Precautions / PROPHYLAXIS:   - Falls, Cardiac    TEAM MEETING 12/11/23  SW:  Lives with  in private home- family training with   OT: SV for adls, CG for shower transfers   PT: SV for transfers, 100-120' with cane and SV, 12 steps with 2 hands on 1 rail  SLP: not on service   barriers:  left hand/leg weakness  EDOD: Home 12/15/23         ASSESSMENT/PLAN  58 year old female with PMH of vertigo, HTN, OA, Type 2 DM, shingles and sciatica who presented to [facility] on 11/23  as a direct transfer from [facility] for further evaluation of right carotid occlusion requiring diagnostic angiogram.  She presented to [facility] initially on 11/21 with progressively worsening left sided weakness, such that she slid off bed 2 days prior to admission. Her imaging showed as stroke with MRI showing scattered small acute infarcts involving the superior aspects of the right frontal lobe and right parietal lobe as well as the right caudate but was outside window for tPA.  Patient underwent a cerebral angiogram on 11/24 and no intervention provided. Patient was started on DAPT and statin and recommended for outpatient monitoring. She remained stable during her hospital stay and was evaluated for admission to acute inpatient rehab. She was admitted to [facility] on 11/28/23.       #Right frontal/parietal/caudate infarcts with right ICA stenosis now with left sided weakness, Gait Instability, ADL impairments and Functional impairments  - Continue Comprehensive Rehab Program of PT/OT/SLP  -Most likely large vessel atherosclerosis with Right ICA near occlusion of vessel  -Continue Aspirin 81mg  -Continue Plavix 75mg   -Continue Atorvastatin 40mg daily     #Sleep/mood  -Continue lexapro 10mg daily 12/8 - for anxiety and neurorecovery   -Continue melatonin 3mg PRN at bedtime  -Neuropsych consult appreciated   -Recreation therapy appreciated     #HTN  -Stop HCTZ 25mg 12/8  -Reduce Losartan to 50mg daily 12/8 - BP improved   -TTE with EF of 60-65%  -Monitor BP    #Type 2 DM  -a1c is 7.7  -Continue Metformin 1000mg BID    #Pain control  - Tylenol PRN  -Home med: Gabapentin 100mg at bedtime PRN    #GI/Bowel Mgmt   - Continue Senna at bedtime   - Miralax PRN    #DVT  - Lovenox  - TEDs     #Skin:  - intergluteal cleft fissure - continue barrier cream     FEN   - Diet - Regular + Thins  [CCHO, DASH/TLC]      Precautions / PROPHYLAXIS:   - Falls, Cardiac    TEAM MEETING 12/11/23  SW:  Lives with  in private home- family training with   OT: SV for adls, CG for shower transfers   PT: SV for transfers, 100-120' with cane and SV, 12 steps with 2 hands on 1 rail  SLP: not on service   barriers:  left hand/leg weakness  EDOD: Home 12/15/23

## 2023-12-15 VITALS
RESPIRATION RATE: 16 BRPM | SYSTOLIC BLOOD PRESSURE: 126 MMHG | OXYGEN SATURATION: 98 % | HEART RATE: 65 BPM | DIASTOLIC BLOOD PRESSURE: 62 MMHG | TEMPERATURE: 98 F

## 2023-12-15 PROCEDURE — 92507 TX SP LANG VOICE COMM INDIV: CPT

## 2023-12-15 PROCEDURE — 80053 COMPREHEN METABOLIC PANEL: CPT

## 2023-12-15 PROCEDURE — 85025 COMPLETE CBC W/AUTO DIFF WBC: CPT

## 2023-12-15 PROCEDURE — 87635 SARS-COV-2 COVID-19 AMP PRB: CPT

## 2023-12-15 PROCEDURE — 90832 PSYTX W PT 30 MINUTES: CPT

## 2023-12-15 PROCEDURE — 82962 GLUCOSE BLOOD TEST: CPT

## 2023-12-15 PROCEDURE — 92523 SPEECH SOUND LANG COMPREHEN: CPT

## 2023-12-15 PROCEDURE — 97112 NEUROMUSCULAR REEDUCATION: CPT

## 2023-12-15 PROCEDURE — 85027 COMPLETE CBC AUTOMATED: CPT

## 2023-12-15 PROCEDURE — 97116 GAIT TRAINING THERAPY: CPT

## 2023-12-15 PROCEDURE — 99239 HOSP IP/OBS DSCHRG MGMT >30: CPT

## 2023-12-15 PROCEDURE — 97530 THERAPEUTIC ACTIVITIES: CPT

## 2023-12-15 PROCEDURE — 92610 EVALUATE SWALLOWING FUNCTION: CPT

## 2023-12-15 PROCEDURE — 97167 OT EVAL HIGH COMPLEX 60 MIN: CPT

## 2023-12-15 PROCEDURE — 97535 SELF CARE MNGMENT TRAINING: CPT

## 2023-12-15 PROCEDURE — 97163 PT EVAL HIGH COMPLEX 45 MIN: CPT

## 2023-12-15 PROCEDURE — 97110 THERAPEUTIC EXERCISES: CPT

## 2023-12-15 PROCEDURE — 36415 COLL VENOUS BLD VENIPUNCTURE: CPT

## 2023-12-15 RX ADMIN — ENOXAPARIN SODIUM 40 MILLIGRAM(S): 100 INJECTION SUBCUTANEOUS at 05:12

## 2023-12-15 RX ADMIN — PANTOPRAZOLE SODIUM 40 MILLIGRAM(S): 20 TABLET, DELAYED RELEASE ORAL at 05:12

## 2023-12-15 RX ADMIN — LOSARTAN POTASSIUM 50 MILLIGRAM(S): 100 TABLET, FILM COATED ORAL at 05:12

## 2023-12-15 RX ADMIN — METFORMIN HYDROCHLORIDE 1000 MILLIGRAM(S): 850 TABLET ORAL at 08:04

## 2023-12-15 NOTE — PROGRESS NOTE ADULT - NS ATTEND OPT1 GEN_ALL_CORE
Patient weight is between 101-149kg. For prophylaxis with Enoxaparin, Pharmacy adjusted the dose to account for the patient's increased body weight in accordance with hospital approved protocol. The dose has been changed to 30mg BID. Please contact pharmacy with any concerns @ [phone]. Thank you.    Zeke Carrera [facility]  1/29/2020 3:04 PM
I independently performed the documented:

## 2023-12-15 NOTE — PROGRESS NOTE ADULT - ASSESSMENT
ASSESSMENT/PLAN  58 year old female with PMH of vertigo, HTN, OA, Type 2 DM, shingles and sciatica who presented to [facility] on 11/23  as a direct transfer from [facility] for further evaluation of right carotid occlusion requiring diagnostic angiogram.  She presented to [facility] initially on 11/21 with progressively worsening left sided weakness, such that she slid off bed 2 days prior to admission. Her imaging showed as stroke with MRI showing scattered small acute infarcts involving the superior aspects of the right frontal lobe and right parietal lobe as well as the right caudate but was outside window for tPA.  Patient underwent a cerebral angiogram on 11/24 and no intervention provided. Patient was started on DAPT and statin and recommended for outpatient monitoring. She remained stable during her hospital stay and was evaluated for admission to acute inpatient rehab. She was admitted to [facility] on 11/28/23.       #Right frontal/parietal/caudate infarcts with right ICA stenosis now with left sided weakness, Gait Instability, ADL impairments and Functional impairments  -Most likely large vessel atherosclerosis with Right ICA near occlusion of vessel  -Continue Aspirin 81mg  -Continue Plavix 75mg   -Continue Atorvastatin 40mg daily     #Sleep/mood  -Continue lexapro 10mg daily 12/8 - for anxiety and neurorecovery   -Continue melatonin 3mg PRN at bedtime    #HTN  -Continue Losartan 50mg daily 12/8   -TTE with EF of 60-65%    #Type 2 DM  -a1c is 7.7  -Continue Metformin 1000mg BID    #Pain control  - Tylenol PRN  -Home med: Gabapentin 100mg at bedtime PRN    #GI/Bowel Mgmt   - Continue Senna at bedtime     #Skin:  - intergluteal cleft fissure - continue barrier cream       TEAM MEETING 12/11/23  SW:  Lives with  in private home- family training with   OT: SV for adls, CG for shower transfers   PT: SV for transfers, 100-120' with cane and SV, 12 steps with 2 hands on 1 rail  SLP: not on service   barriers:  left hand/leg weakness  EDOD: Home 12/15/23      MEDICALLY STABLE AND READY FOR DISCHARGE HOME.    ASSESSMENT/PLAN  58 year old female with PMH of vertigo, HTN, OA, Type 2 DM, shingles and sciatica who presented to [facility] on 11/23  as a direct transfer from [facility] for further evaluation of right carotid occlusion requiring diagnostic angiogram.  She presented to [facility] initially on 11/21 with progressively worsening left sided weakness, such that she slid off bed 2 days prior to admission. Her imaging showed as stroke with MRI showing scattered small acute infarcts involving the superior aspects of the right frontal lobe and right parietal lobe as well as the right caudate but was outside window for tPA.  Patient underwent a cerebral angiogram on 11/24 and no intervention provided. Patient was started on DAPT and statin and recommended for outpatient monitoring. She remained stable during her hospital stay and was evaluated for admission to acute inpatient rehab. She was admitted to [facility] on 11/28/23.       #Right frontal/parietal/caudate infarcts with right ICA stenosis now with left sided weakness, Gait Instability, ADL impairments and Functional impairments  -Most likely large vessel atherosclerosis with Right ICA near occlusion of vessel  -Continue Aspirin 81mg  -Continue Plavix 75mg   -Continue Atorvastatin 40mg daily     #Sleep/mood  -Continue lexapro 10mg daily 12/8 - for anxiety and neurorecovery   -Continue melatonin 3mg PRN at bedtime    #HTN  -Continue Losartan 50mg daily 12/8   -TTE with EF of 60-65%    #Type 2 DM  -a1c is 7.7  -Continue Metformin 1000mg BID    #Pain control  - Tylenol PRN  -Home med: Gabapentin 100mg at bedtime PRN    #GI/Bowel Mgmt   - Continue Senna at bedtime     #Skin:  - intergluteal cleft fissure - continue barrier cream       TEAM MEETING 12/11/23  SW:  Lives with  in private home- family training with   OT: SV for adls, CG for shower transfers   PT: SV for transfers, 100-120' with cane and SV, 12 steps with 2 hands on 1 rail  SLP: not on service   barriers:  left hand/leg weakness  EDOD: Home 12/15/23      MEDICALLY STABLE AND READY FOR DISCHARGE HOME.

## 2023-12-15 NOTE — PROGRESS NOTE ADULT - ASSESSMENT
Pt alert, attentive, intact language, thought processes goal-directed, no abnormal thought contents observed, euthymic affect and mood, denied AH/VH, denied SI/HI/I/P, calm behavior, improved coping, stable at d/c. Plan: Case is closed.

## 2023-12-15 NOTE — PROGRESS NOTE ADULT - PROVIDER SPECIALTY LIST ADULT
Hospitalist
Hospitalist
Neuro Rehabilitation
Neuropsychology
Physiatry
Physiatry
Hospitalist
Neuro Rehabilitation
Hospitalist
Neuro Rehabilitation
Hospitalist
Hospitalist
Neuro Rehabilitation
Heme/Onc
Hospitalist
Neuro Rehabilitation
Hospitalist
Hospitalist
Neuro Rehabilitation
Neuro Rehabilitation
Hospitalist
Neuro Rehabilitation
Neuropsychology
Hospitalist

## 2023-12-15 NOTE — PROGRESS NOTE ADULT - SUBJECTIVE AND OBJECTIVE BOX
Pt was seen for 20 min for supportive tx; her  Gordon was present. Pt had no complaints. Pt reported doing well this morning, she is getting ready for d/c. Pt completed her course of AR with full participation and reports much improved ambulation now with a cane, almost independent performance of ADLs, and not significant communication or cognitive difficulties. Pt reported feeling very happy with her recovery so far and appreciative of impact of tx and quality of care received during her rehab stay. Pt expressed concern about possibility of falling, but reported that her  has been helpful in the past when she had knee surgery and will assist her, and also about having a repeat CVA. Encouraged Pt to consider life style variables such as diet, exercise and stress management as areas of growth and which can have a positive influence on her health. Also, discussed issues of safety at home/the community, and adherence to tx recommended at d/c to prevent CVA recurrence and achieve maximum functional gains. Also, encouraged Pt to inform her doctors of any negative, marked changes in mood in order to be referred to mental health services to address them. Pt agreed with issues discussed and willingness to f/u. Support and encouragement were provided.

## 2023-12-15 NOTE — PROGRESS NOTE ADULT - NS ATTEND AMEND GEN_ALL_CORE FT
I have personally seen and examined the patient with the NP. Medical records reviewed. I have made amendments to the documentation where necessary and adjusted the history, physical examination, and plan as documented by the NP.    Case discussed with Dr. Rankin - high grade stenosis of the right ICA confirmed, continue DAPT and follow up in 1 month recommended  Lexapro trial - very anxious and tearful  Neuropsychology input
I have personally seen and examined the patient with the NP. Medical records reviewed. I have made amendments to the documentation where necessary and adjusted the history, physical examination, and plan as documented by the NP.
I have personally seen and examined the patient with the NP. Medical records reviewed. I have made amendments to the documentation where necessary and adjusted the history, physical examination, and plan as documented by the NP.    Patient is being discharged home with home care today.  Discharge instructions were discussed with patient and family, all current medications were sent to the pharmacy. Patient and family were educated on importance of medication compliance,  continued  care with PMD and follow-up care with the specialists in the community. Safety and fall risk precautions  were discussed in detail, counseled on healthy life style modifications.  All questions were answered to their satisfaction.     47 min spent
I have personally seen and examined the patient with the NP. Medical records reviewed. I have made amendments to the documentation where necessary and adjusted the history, physical examination, and plan as documented by the NP.

## 2023-12-15 NOTE — PROGRESS NOTE ADULT - SUBJECTIVE AND OBJECTIVE BOX
SUBJECTIVE/ROS: Patient evaluated while in the chair. She denies chest pain, fever, chills, nausea, vomiting, abdominal pain, headache, or BLE pain. She is medically stable and ready for discharge home.  at bedside. All questions answered and instructions reviewed.     HPI:  58 year old female with PMH of vertigo, HTN, OA, Type 2 DM, shingles and sciatica who presented to [facility] on 11/23  as a direct transfer from [facility] for further evaluation of right carotid occlusion requiring diagnostic angiogram.  She presented to [facility] initially on 11/21 with progressively worsening left sided weakness, such that she slid off bed 2 days prior to admission. Her imaging showed as stroke with MRI showing scattered small acute infarcts involving the superior aspects of the right frontal lobe and right parietal lobe as well as the right caudate but was outside window for tPA.  Patient underwent a cerebral angiogram on 11/24 and no intervention provided. Patient was started on DAPT and statin and recommended for outpatient monitoring. She remained stable during her hospital stay and was evaluated for admission to acute inpatient rehab. She was admitted to [facility] on 11/28/23.       Vital Signs Last 24 Hrs  T(C): 36.7 (15 Dec 2023 08:08), Max: 36.9 (14 Dec 2023 20:05)  T(F): 98.1 (15 Dec 2023 08:08), Max: 98.5 (14 Dec 2023 20:05)  HR: 65 (15 Dec 2023 08:08) (62 - 65)  BP: 126/62 (15 Dec 2023 08:08) (121/63 - 126/62)  BP(mean): --  RR: 16 (15 Dec 2023 08:08) (16 - 16)  SpO2: 98% (15 Dec 2023 08:08) (98% - 98%)    Parameters below as of 15 Dec 2023 08:08  Patient On (Oxygen Delivery Method): room air            PHYSICAL EXAM  Constitutional - NAD, Comfortable  HEENT - NCAT, EOMI  Neck - Supple, No limited ROM  Chest - breathing comfortably   Cardiovascular - RRR, S1S2  Abdomen -BS+, Soft, NTND  Extremities - No C/C/E, No calf tenderness   Neurologic Exam - aox3, RU/RL 5/5, JORGE/JORGE 4/5 with dysmetria      LABS:                          11.9   5.88  )-----------( 216      ( 14 Dec 2023 07:16 )             34.8     12-14    140  |  103  |  15  ----------------------------<  118<H>  4.6   |  30  |  0.94    Ca    9.2      14 Dec 2023 07:16    TPro  7.2  /  Alb  3.3  /  TBili  0.4  /  DBili  x   /  AST  22  /  ALT  30  /  AlkPhos  50  12-14    LIVER FUNCTIONS - ( 14 Dec 2023 07:16 )  Alb: 3.3 g/dL / Pro: 7.2 g/dL / ALK PHOS: 50 U/L / ALT: 30 U/L / AST: 22 U/L / GGT: x                 MEDICATIONS  (STANDING):  aspirin  chewable 81 milliGRAM(s) Oral daily  atorvastatin 40 milliGRAM(s) Oral at bedtime  clopidogrel Tablet 75 milliGRAM(s) Oral daily  dextrose 5%. 1000 milliLiter(s) (50 mL/Hr) IV Continuous <Continuous>  dextrose 5%. 1000 milliLiter(s) (100 mL/Hr) IV Continuous <Continuous>  dextrose 50% Injectable 25 Gram(s) IV Push once  dextrose 50% Injectable 25 Gram(s) IV Push once  dextrose 50% Injectable 12.5 Gram(s) IV Push once  enoxaparin Injectable 40 milliGRAM(s) SubCutaneous every 24 hours  escitalopram 10 milliGRAM(s) Oral daily  glucagon  Injectable 1 milliGRAM(s) IntraMuscular once  influenza   Vaccine 0.5 milliLiter(s) IntraMuscular once  losartan 50 milliGRAM(s) Oral daily  metFORMIN 1000 milliGRAM(s) Oral two times a day  pantoprazole    Tablet 40 milliGRAM(s) Oral before breakfast  senna 2 Tablet(s) Oral at bedtime    MEDICATIONS  (PRN):  acetaminophen     Tablet .. 650 milliGRAM(s) Oral every 6 hours PRN Mild Pain (1 - 3)  dextrose Oral Gel 15 Gram(s) Oral once PRN Blood Glucose LESS THAN 70 milliGRAM(s)/deciliter  gabapentin 100 milliGRAM(s) Oral at bedtime PRN pain  melatonin 3 milliGRAM(s) Oral at bedtime PRN Insomnia  polyethylene glycol 3350 17 Gram(s) Oral at bedtime PRN for constipation   SUBJECTIVE/ROS: Patient evaluated while in the chair. She denies chest pain, fever, chills, nausea, vomiting, abdominal pain, headache, or BLE pain. She is medically stable and ready for discharge home.  at bedside. All questions answered and instructions reviewed.     HPI:  58 year old female with PMH of vertigo, HTN, OA, Type 2 DM, shingles and sciatica who presented to [facility] on 11/23  as a direct transfer from [facility] for further evaluation of right carotid occlusion requiring diagnostic angiogram.  She presented to [facility] initially on 11/21 with progressively worsening left sided weakness, such that she slid off bed 2 days prior to admission. Her imaging showed as stroke with MRI showing scattered small acute infarcts involving the superior aspects of the right frontal lobe and right parietal lobe as well as the right caudate but was outside window for tPA.  Patient underwent a cerebral angiogram on 11/24 and no intervention provided. Patient was started on DAPT and statin and recommended for outpatient monitoring. She remained stable during her hospital stay and was evaluated for admission to acute inpatient rehab. She was admitted to [facility] on 11/28/23.       Vital Signs Last 24 Hrs  T(C): 36.7 (15 Dec 2023 08:08), Max: 36.9 (14 Dec 2023 20:05)  T(F): 98.1 (15 Dec 2023 08:08), Max: 98.5 (14 Dec 2023 20:05)  HR: 65 (15 Dec 2023 08:08) (62 - 65)  BP: 126/62 (15 Dec 2023 08:08) (121/63 - 126/62)  BP(mean): --  RR: 16 (15 Dec 2023 08:08) (16 - 16)  SpO2: 98% (15 Dec 2023 08:08) (98% - 98%)    Parameters below as of 15 Dec 2023 08:08  Patient On (Oxygen Delivery Method): room air            PHYSICAL EXAM  Constitutional - NAD, Comfortable  HEENT - NCAT, EOMI  Neck - Supple, No limited ROM  Chest - breathing comfortably   Cardiovascular - RRR, S1S2  Abdomen -BS+, Soft, NTND  Extremities - No C/C/E, No calf tenderness   Neurologic Exam - aox3, RU/RL 5/5, JORGE/JORGE 4/5 with dysmetria      LABS:                          11.9   5.88  )-----------( 216      ( 14 Dec 2023 07:16 )             34.8     12-14    140  |  103  |  15  ----------------------------<  118<H>  4.6   |  30  |  0.94    Ca    9.2      14 Dec 2023 07:16    TPro  7.2  /  Alb  3.3  /  TBili  0.4  /  DBili  x   /  AST  22  /  ALT  30  /  AlkPhos  50  12-14    LIVER FUNCTIONS - ( 14 Dec 2023 07:16 )  Alb: 3.3 g/dL / Pro: 7.2 g/dL / ALK PHOS: 50 U/L / ALT: 30 U/L / AST: 22 U/L / GGT: x                 MEDICATIONS  (STANDING):  aspirin  chewable 81 milliGRAM(s) Oral daily  atorvastatin 40 milliGRAM(s) Oral at bedtime  clopidogrel Tablet 75 milliGRAM(s) Oral daily  dextrose 5%. 1000 milliLiter(s) (50 mL/Hr) IV Continuous <Continuous>  dextrose 5%. 1000 milliLiter(s) (100 mL/Hr) IV Continuous <Continuous>  dextrose 50% Injectable 25 Gram(s) IV Push once  dextrose 50% Injectable 25 Gram(s) IV Push once  dextrose 50% Injectable 12.5 Gram(s) IV Push once  enoxaparin Injectable 40 milliGRAM(s) SubCutaneous every 24 hours  escitalopram 10 milliGRAM(s) Oral daily  glucagon  Injectable 1 milliGRAM(s) IntraMuscular once  influenza   Vaccine 0.5 milliLiter(s) IntraMuscular once  losartan 50 milliGRAM(s) Oral daily  metFORMIN 1000 milliGRAM(s) Oral two times a day  pantoprazole    Tablet 40 milliGRAM(s) Oral before breakfast  senna 2 Tablet(s) Oral at bedtime    MEDICATIONS  (PRN):  acetaminophen     Tablet .. 650 milliGRAM(s) Oral every 6 hours PRN Mild Pain (1 - 3)  dextrose Oral Gel 15 Gram(s) Oral once PRN Blood Glucose LESS THAN 70 milliGRAM(s)/deciliter  gabapentin 100 milliGRAM(s) Oral at bedtime PRN pain  melatonin 3 milliGRAM(s) Oral at bedtime PRN Insomnia  polyethylene glycol 3350 17 Gram(s) Oral at bedtime PRN for constipation

## 2024-01-05 RX ORDER — GLUCOSAMINE HCL/CHONDROITIN SU 500-400 MG
3 CAPSULE ORAL
Refills: 0 | Status: ACTIVE | COMMUNITY

## 2024-01-05 RX ORDER — CLOPIDOGREL BISULFATE 75 MG/1
75 TABLET, FILM COATED ORAL
Refills: 0 | Status: ACTIVE | COMMUNITY

## 2024-01-05 RX ORDER — SENNOSIDES 8.6 MG/1
TABLET ORAL
Refills: 0 | Status: ACTIVE | COMMUNITY

## 2024-01-05 RX ORDER — DICLOFENAC SODIUM 75 MG/1
75 TABLET, DELAYED RELEASE ORAL
Qty: 60 | Refills: 0 | Status: DISCONTINUED | COMMUNITY
Start: 2023-03-03 | End: 2024-01-05

## 2024-01-05 RX ORDER — KRILL/OM-3/DHA/EPA/PHOSPHO/AST 1000-230MG
CAPSULE ORAL
Refills: 0 | Status: ACTIVE | COMMUNITY

## 2024-01-05 RX ORDER — ATORVASTATIN CALCIUM 40 MG/1
40 TABLET, FILM COATED ORAL
Refills: 0 | Status: ACTIVE | COMMUNITY

## 2024-01-05 RX ORDER — LOSARTAN POTASSIUM 50 MG/1
50 TABLET, FILM COATED ORAL
Refills: 0 | Status: ACTIVE | COMMUNITY

## 2024-01-05 RX ORDER — POLYETHYLENE GLYCOL 3350 17 G/17G
17 POWDER, FOR SOLUTION ORAL
Refills: 0 | Status: ACTIVE | COMMUNITY

## 2024-01-05 RX ORDER — AMOXICILLIN 500 MG/1
500 CAPSULE ORAL
Qty: 20 | Refills: 4 | Status: DISCONTINUED | COMMUNITY
Start: 2021-01-11 | End: 2024-01-05

## 2024-01-05 RX ORDER — PANTOPRAZOLE 40 MG/1
40 TABLET, DELAYED RELEASE ORAL
Refills: 0 | Status: ACTIVE | COMMUNITY

## 2024-01-05 RX ORDER — ESCITALOPRAM OXALATE 10 MG/1
10 TABLET ORAL
Refills: 0 | Status: ACTIVE | COMMUNITY

## 2024-01-05 RX ORDER — ACETAMINOPHEN 325 MG/1
325 TABLET ORAL
Refills: 0 | Status: ACTIVE | COMMUNITY

## 2024-01-23 ENCOUNTER — APPOINTMENT (OUTPATIENT)
Dept: NEUROLOGY | Facility: CLINIC | Age: 59
End: 2024-01-23
Payer: COMMERCIAL

## 2024-01-23 VITALS
HEIGHT: 66 IN | SYSTOLIC BLOOD PRESSURE: 137 MMHG | WEIGHT: 220 LBS | DIASTOLIC BLOOD PRESSURE: 80 MMHG | HEART RATE: 72 BPM | BODY MASS INDEX: 35.36 KG/M2 | TEMPERATURE: 97.7 F | OXYGEN SATURATION: 99 %

## 2024-01-23 DIAGNOSIS — I63.9 CEREBRAL INFARCTION, UNSPECIFIED: ICD-10-CM

## 2024-01-23 DIAGNOSIS — I65.21 OCCLUSION AND STENOSIS OF RIGHT CAROTID ARTERY: ICD-10-CM

## 2024-01-23 PROCEDURE — 99214 OFFICE O/P EST MOD 30 MIN: CPT

## 2024-01-23 PROCEDURE — G2211 COMPLEX E/M VISIT ADD ON: CPT

## 2024-01-23 NOTE — PHYSICAL EXAM
[FreeTextEntry1] :  GENERAL APPEARANCE: Well developed, well-nourished woman in no acute distress.  CARDIOVASCULAR: No carotid bruits bilaterally.   NEUROLOGIC EXAM:  MENTAL STATUS: Alert and Oriented to person, place and time. Speech is fluent, without aphasia or dysarthria Behavior and affect appropriate to situation.                         CRANIAL NERVES: CN 2:    Visual fields are full to confrontation. CN 3, 4, 6: Extraocular movements are intact. No nystagmus or ophthalmoplegia is evident. Pupils are equally round and reactive to light. CN 5:     Facial sensation is intact to light touch in all 3 divisions CN 7:     Facial excursion is full and symmetric bilaterally.  MOTOR: Strength is 5/5 throughout for age and stature. Mild pronator drift on the left.  SENSORY: Intact to light touch and perception in all four extremities without extinction to double simultaneous stimulation  COORD: Finger to nose testing without dysmetria bilaterally. Slowed fine finger movements on the left.  GAIT: Normal station and gait.

## 2024-01-23 NOTE — HISTORY OF PRESENT ILLNESS
[FreeTextEntry1] : Ms. Landry is a 60 yo female with history of vertigo, HTN, OA, Type 2 DM, shingles, sciatica, who presented to [facility] on 11/21/2023 with left sided weakness, subsequently transferred to [facility] on 11/23/2023 s/p cerebral angiogram for further evaluation of right carotid occlusion. Cerebral angiogram on 11/24/2023 showed near occlusive, flow limiting segmental stenosis of the origin of the CHANDA with significant diminution of cervical internal carotid artery caliber and atherosclerotic plaque in the origin of the LICA with ulceration and mild, 25% stenosis. MRI showed scattered small acute infarcts involving the superior aspects of the right frontal lobe, right parietal lobe, and right caudate. She is on DAPT and statin and recommended for outpatient monitoring. No significant signs of bleeding. She was at acute rehab at [facility] for 2 weeks for PT/OT. She is still going to PT/OT outpatient. She notes difficulty with fine finger movements and hand  of the left hand. She is ambulating and completing ADLs independently. She is not driving but would like to start again. No interval stroke/TIA symptoms.

## 2024-01-23 NOTE — DISCUSSION/SUMMARY
[FreeTextEntry1] : Ms. Landry is a 60 yo female with history of vertigo, HTN, OA, Type 2 DM, shingles, sciatica who presented to [facility] on 11/21/2023 and was found to have scattered small acute infarcts involving the superior aspects of the right frontal lobe, right parietal lobe, and right caudate in the setting of near occlusive stenosis of the CHANDA, subsequently transferred to [facility] now s/p cerebral angiogram 11/24/2023 for further visualization with no intervention.    I have personally reviewed available neuroradiological images. MRI head showed scattered small acute infarcts involving the superior aspects of the right frontal lobe, right parietal lobe, and right caudate.  Cerebral angiogram on 11/24/2023 showed near occlusive, flow limiting segmental stenosis of the origin of the CHANDA with significant diminution of cervical internal carotid artery caliber and atherosclerotic plaque in the origin of the LICA with ulceration and mild, 25% stenosis.  She is on maximum medical management. She will continue on DAPT with aspirin and Plavix, lipid lowering therapy, blood pressure medication, and diabetes medication for secondary stroke prevention. Patient was educated about signs and symptoms of stroke and was counseled to contact 911 upon emergence of stroke-like symptoms. Intravenous thrombolysis and mechanical thrombectomy was also counseled and educated to the patient today.  Plan for repeat b/l carotid US in April for reevaluation of bilateral carotid stenosis. Will check lipids and LFTs.   PLAN: 1. Continue DAPT, aspirin and Plavix, daily 2. Normotensive 3. Continue lipid lowering therapy 4. Blood sugar control  5. B/l carotid US in April 2024 6. Check lipid and LFTs in 3 months 7. She may begin driving, initially with a passenger, and then independently once she feels comfortable.  Follow up in April, after testing, or sooner if needed.   All of the patient's questions and concerns were addressed.

## 2024-04-08 RX ORDER — CLOPIDOGREL BISULFATE 75 MG/1
75 TABLET, FILM COATED ORAL DAILY
Qty: 90 | Refills: 3 | Status: ACTIVE | COMMUNITY
Start: 2024-01-23 | End: 1900-01-01

## 2024-04-30 ENCOUNTER — APPOINTMENT (OUTPATIENT)
Dept: NEUROLOGY | Facility: CLINIC | Age: 59
End: 2024-04-30

## 2024-06-11 NOTE — ED ADULT TRIAGE NOTE - MEANS OF ARRIVAL
[Dear  ___] : Dear  [unfilled], [Consult Letter:] : I had the pleasure of evaluating your patient, [unfilled]. [( Thank you for referring [unfilled] for consultation for _____ )] : Thank you for referring [unfilled] for consultation for [unfilled] [Please see my note below.] : Please see my note below. [Consult Closing:] : Thank you very much for allowing me to participate in the care of this patient.  If you have any questions, please do not hesitate to contact me. [Sincerely,] : Sincerely, [FreeTextEntry3] : Dena Geronimo MD Director, Pediatric Endocrinology [facility], [facility]  of Pediatric[facility]  [facility] [employer] at [facility] ambulatory

## 2024-08-05 ENCOUNTER — APPOINTMENT (OUTPATIENT)
Dept: NEUROLOGY | Facility: CLINIC | Age: 59
End: 2024-08-05

## 2024-08-05 PROCEDURE — 99215 OFFICE O/P EST HI 40 MIN: CPT

## 2024-08-05 PROCEDURE — G2211 COMPLEX E/M VISIT ADD ON: CPT

## 2024-08-05 NOTE — DISCUSSION/SUMMARY
[FreeTextEntry1] : 58 yo female with history of vertigo, HTN, OA, Type 2 DM, shingles, sciatica who presented to [facility] on 11/21/2023 and was found to have scattered small acute infarcts involving the superior aspects of the right frontal lobe, right parietal lobe, and right caudate in the setting of near occlusive stenosis of the CHANDA, subsequently transferred to [facility] s/p cerebral angiogram 11/24/2023 that revealed a near occlusive, segmental stenosis of the proximal cervical right ICA (no intervention).   MRI head 11/2023 showed scattered small acute infarcts involving the superior aspects of the right frontal lobe, right parietal lobe, and right caudate in a watershed pattern.   Cerebral angiogram on 11/24/2023 showed near occlusive, flow limiting segmental stenosis of the origin of the CHANDA with significant diminution of cervical internal carotid artery caliber and atherosclerotic plaque in the origin of the LICA with ulceration and mild, 25% stenosis. There was felt to be no safe vascular surgical or neuroendovascular treatment options due to diminutive residual luminal size of vessel.  She is on maximum medical management. She has been on DAPT with aspirin and Plavix and stat therapy, blood pressure medication, and diabetes medication for secondary stroke prevention.   She requires an emergent root canal for which antiplatelet regimen has been recommended to be withheld. Given clinical stability, there is no neurological contraindication for root canal to be performed and if antiplatelet medication withholding is required, then 5 days off of aspirin and clopidogrel is reasonable prior to procedure. Medications should be restarted as soon as possible after procedure.   She requires follow up carotid US now and yearly.   Patient was educated about signs and symptoms of stroke and was counseled to contact 911 upon emergence of stroke-like symptoms. Intravenous thrombolysis and mechanical thrombectomy was also counseled and educated to the patient today.  PLAN: 1. Continue DAPT, aspirin and Plavix, daily (withhold if needed for dental procedure) 2. Normotension 3. Continue atorvastatin 40 - obtain lipids and LFTs and A1c with PCP 4. Carotid US now and yearly.   Follow up in 6 months.    All of the patient's questions and concerns were addressed.

## 2024-08-05 NOTE — PHYSICAL EXAM
[FreeTextEntry1] : GENERAL APPEARANCE: Well developed, well-nourished woman in no acute distress.  CARDIOVASCULAR: No carotid bruits bilaterally.  NEUROLOGIC EXAM:  MENTAL STATUS: Alert and Oriented to person, place and time. Speech is fluent, without aphasia or dysarthria Behavior and affect appropriate to situation.  CRANIAL NERVES: CN 2: Visual fields are full to confrontation. CN 3, 4, 6: Extraocular movements are intact. No nystagmus or ophthalmoplegia is evident. Pupils are equally round and reactive to light. CN 5: Facial sensation is intact to light touch in all 3 divisions CN 7: Facial excursion is full and symmetric bilaterally.  MOTOR: LUE 5/5 shoulder abduction, elbow flex/ext and  (digits 2-5). Pincer  with thumb and 1st digit is weak.  There is orbiting of the RUE around the LUE.  LLE 5-/5 hip flexion, 5/5 knee ext/flex/dorsiflexion RUE/RLE 5/5  SENSORY: Intact to light touch perception in all four extremities without extinction to double simultaneous stimulation  COORD: Finger to nose testing without dysmetria bilaterally. Slowed fine finger movements on the left.  GAIT: Normal station and mild left limping gait.

## 2024-08-05 NOTE — HISTORY OF PRESENT ILLNESS
[FreeTextEntry1] : Patient reports being ill multiple times and mentioned symptoms of common cold for which she missed her appointment earlier this year. Additionally, she complained about clumsiness and loss of strength in her left hand due to her past stroke. She reported the sensation of numbness in her leg due to sciatica, which she stated she had experienced before her stroke, is chronic and debilitating at times with her ambulation. She cannot ambulate long distances without rest due to this condition. She also stated that she experiences difficulty cutting vegetables and performing certain tasks requiring fine motor skills due to her hand condition, and uses her right hand for most tasks. The patient reports a scheduled root canal treatment due to tooth decay. Family History: Her mother had carotid problem with stroke as well.

## 2024-08-05 NOTE — REVIEW OF SYSTEMS
[As Noted in HPI] : as noted in HPI [Suicidal] : not suicidal [Anxiety] : anxiety [Depression] : no depression

## 2024-08-08 ENCOUNTER — APPOINTMENT (OUTPATIENT)
Dept: ULTRASOUND IMAGING | Facility: CLINIC | Age: 59
End: 2024-08-08

## 2024-08-08 ENCOUNTER — OUTPATIENT (OUTPATIENT)
Dept: OUTPATIENT SERVICES | Facility: HOSPITAL | Age: 59
LOS: 1 days | End: 2024-08-08
Payer: COMMERCIAL

## 2024-08-08 DIAGNOSIS — Z90.49 ACQUIRED ABSENCE OF OTHER SPECIFIED PARTS OF DIGESTIVE TRACT: Chronic | ICD-10-CM

## 2024-08-08 PROCEDURE — 93880 EXTRACRANIAL BILAT STUDY: CPT | Mod: 26

## 2024-08-08 PROCEDURE — 93880 EXTRACRANIAL BILAT STUDY: CPT

## 2024-08-21 ENCOUNTER — NON-APPOINTMENT (OUTPATIENT)
Age: 59
End: 2024-08-21

## 2024-10-14 NOTE — H&P ADULT - CONVERSATION DETAILS
Physical Exam: 


SUBJECTIVE: Patient seen and examined. Daughter reports cough with thin 

secretions. Pt reports sour stomach.








OBJECTIVE:





 Vital Signs











 Period  Temp  Pulse  Resp  BP Sys/[place]  Pulse Ox


 


 Last 24 Hr  97.7 F  86  20-20  94/48  96-96











GENERAL: The patient is awake, alert, and fully oriented, in no acute distress, 

thin, weak


HEAD: Normal with no ecchymosis. Hair loss.


EYES: PERRL, extraocular movements intact, conjunctiva clear.


ENT: Ears normal, nares patent, moist mucous membranes.


NECK: Trachea midline, full range of motion


LUNGS: Clear to auscultation


HEART: Regular rate and rhythm, 3/6 systolic murmur


ABDOMEN: Non-tender to palpation, no distention, hypoactive bowel sounds. PEG 

site no erythema, clean


EXTREMITIES: Warm, well-perfused, no edema. 


NEUROLOGICAL: Cranial nerves II through XII grossly intact. Normal speech.


PSYCH: Normal mood, normal affect.


SKIN: Warm, dry, slightly decreased turgor














 Laboratory Results - last 24 hr











  12/17/19





  16:31


 


POC Fluid pH  7.6


 


Fluid Glucose  128


 


Fluid Total Protein  4.1


 


Fluid Albumin  2.2


 


Body Fluid LDH Source  125


 


Fluid Amylase  41


 


Fluid Triglycerides  113








Active Medications











Generic Name Dose Route Start Last Admin





  Trade Name Freq  PRN Reason Stop Dose Admin


 


Acetaminophen  500 mg  12/01/19 15:47  12/20/19 00:06





  Tylenol -  PO   500 mg





  Q8H PRN   Administration





  PAIN LEVEL 1-5   





     





     





     


 


Acetylcysteine  600 mg  12/16/19 20:00  12/20/19 07:25





  Mucomyst 20 Oral / Inh Use Only*  NEB   600 mg





  RBID BC   Administration





     





     





     





     


 


Albuterol Sulfate  1 amp  12/16/19 20:00  12/20/19 07:20





  Ventolin 0.083% Nebulizer Soln -  NEB   1 amp





  RBID BC   Administration





     





     





     





     


 


Artificial Tears  1 drop  11/20/19 23:41  





  Artificial Tears  OU   





  Q12H PRN   





  ALLERGIES   





     





     





     


 


Enoxaparin Sodium  30 mg  11/24/19 10:00  12/20/19 09:56





  Lovenox -  SQ   30 mg





  DAILY BC   Administration





     





     





     





     


 


Escitalopram Oxalate  5 mg  11/24/19 10:00  12/20/19 09:48





  Lexapro Oral Solution -  GT   5 mg





  DAILY BC   Administration





     





     





     





     


 


Famotidine  20 mg  12/09/19 10:00  12/20/19 09:48





  Pepcid  PEG   20 mg





  DAILY BC   Administration





     





     





     





     


 


Levetiracetam  500 mg  11/27/19 22:00  12/20/19 09:49





  Keppra Oral Solution -  PEG   500 mg





  BID BC   Administration





     





     





     





     


 


Levothyroxine Sodium  50 mcg  11/28/19 07:00  12/20/19 06:04





  Synthroid -  GT   50 mcg





  DAILY@0700 BC   Administration





     





     





     





     


 


Nystatin  500,000 units  12/14/19 00:00  12/20/19 12:17





  Nystatin Oral Suspension -  PO   Not Given





  Q6HPO BC   





     





     





     





     


 


Polyethylene Glycol  17 gm  11/30/19 14:35  





  Miralax (For Daily Use) -  GT   





  DAILY PRN   





  CONSTIPATION   





     





     





     


 


Sodium Chloride  2 spray  12/16/19 00:09  12/16/19 00:29





  Ocean Spray Nasal Spray -  NS   2 puff





  BID PRN   Administration





  MILD PAIN   





     





     





     











ASSESSMENT/PLAN:


Ms. Pittman is an 88y/o female with lung SCC s/p radiation (recently on chemo)

, reported dural mets, esophageal narrowing, HTN, hypothyroidism, focal seizure 

disorder, who presents with dysphagia.





#right side pleural effusion


-thoracentesis on 12/9 and 12/17


-Lasix d/c'ed


-pulm following


-consider Pleurx if fluid reaccumulates


-oncology following


-CXR tomorrow





#severe malnutrition 2/2 dysphagia from metastatic lung cancer


#constipation


-PEG tube with Jevity feeding


-PT


-Tylenol PRN


-Zofran PRN nausea


-miralax


-enema





#hyponatremia


-mild, stable


-continue feeds





#focal seizure disorder


-Keppra 500mg BID





#normocytic anemia, stable


-monitor





#hypothyroidism


-Synthroid


-f/u out pt





#UTI, resolved


#hypokalemia, resolved


#hypomagnesemia, resolved


#hypophosphatemia, resolved


#transaminitis, resolved


#ileus, resolved





DVT Ppx


Lovenox 30mg daily





GI Ppx


protonix 40mg daily





FEN


Jevity 30cc


monitor labs





dispo


monitor effusion








Visit type





- Emergency Visit


Emergency Visit: Yes


ED Registration Date: 11/16/19


Care time: The patient presented to the Emergency Department on the above date 

and was hospitalized for further evaluation of their emergent condition.





- New Patient


This patient is new to me today: No





- Critical Care


Critical Care patient: No





- Discharge Referral


Referred to [facility] No





ATTENDING PHYSICIAN STATEMENT





I saw and evaluated the patient.


I reviewed the resident's note and discussed the case with the resident.


I agree with the resident's findings and plan as documented.








SUBJECTIVE:








OBJECTIVE:








ASSESSMENT AND PLAN:
Parent
Patient denies having any limitations on resuscitation status
swelling to 3rd digit and hand to base of 3rd proximal phalanx and extending to 3rd and 2nd metacarpal extending . Mild bruising to palmar aspect of 3rd metacarpal area.. Pain when closing hand. FROM of fingers

## 2024-12-03 NOTE — PROGRESS NOTE ADULT - ASSESSMENT
57 y/o F with PMH of vertigo, HTN, OA, Type 2 DM, shingles and sciatica who presented to [facility] on 11/23 as a direct transfer from [facility] for further evaluation of right carotid occlusion requiring diagnostic angiogram.  She presented to [facility] initially on 11/21 with progressively worsening left sided weakness, such that she slid off bed 2 days prior to admission. Her imaging showed as stroke with MRI showing scattered small acute infarcts involving the superior aspects of the right frontal lobe and right parietal lobe as well as the right caudate but was outside window for tPA.  Patient underwent a cerebral angiogram on 11/24 and no intervention provided. Patient was started on DAPT and statin and recommended for outpatient monitoring. She remained stable during her hospital stay and was evaluated for admission to acute inpatient rehab. She was admitted to [facility] on 11/28/23- pt/ot/dvt ppx    #Right frontal/parietal/caudate infarcts with right ICA stenosis now with left sided weakness  -Continue DAPT with Aspirin , Plavix   -Continue Atorvastatin     # neuropathy  - gabapentin     #HTN with [place]  -stop  HCTZ, decrease  Losartan to 25 qd - 12/8/23  -TTE with EF of 60-65%    #Type 2 DM, HbA1c 7.7 (11/22/23)  -d/c FS and ISS 12/5/23  -Home med: Metformin 1000mg BID - continued    DVT ppx - Lovenox     GI ppx - PPI    will follow  d/w rehab team  59 y/o F with PMH of vertigo, HTN, OA, Type 2 DM, shingles and sciatica who presented to [facility] on 11/23 as a direct transfer from [facility] for further evaluation of right carotid occlusion requiring diagnostic angiogram.  She presented to [facility] initially on 11/21 with progressively worsening left sided weakness, such that she slid off bed 2 days prior to admission. Her imaging showed as stroke with MRI showing scattered small acute infarcts involving the superior aspects of the right frontal lobe and right parietal lobe as well as the right caudate but was outside window for tPA.  Patient underwent a cerebral angiogram on 11/24 and no intervention provided. Patient was started on DAPT and statin and recommended for outpatient monitoring. She remained stable during her hospital stay and was evaluated for admission to acute inpatient rehab. She was admitted to [facility] on 11/28/23- pt/ot/dvt ppx    #Right frontal/parietal/caudate infarcts with right ICA stenosis now with left sided weakness  -Continue DAPT with Aspirin , Plavix   -Continue Atorvastatin     # neuropathy  - gabapentin     #HTN with [place]  -stop  HCTZ, decrease  Losartan to 25 qd - 12/8/23  -TTE with EF of 60-65%    #Type 2 DM, HbA1c 7.7 (11/22/23)  -d/c FS and ISS 12/5/23  -Home med: Metformin 1000mg BID - continued    DVT ppx - Lovenox     GI ppx - PPI    will follow  d/w rehab team  Yes

## 2025-03-18 ENCOUNTER — APPOINTMENT (OUTPATIENT)
Dept: NEUROLOGY | Facility: CLINIC | Age: 60
End: 2025-03-18
Payer: COMMERCIAL

## 2025-03-18 VITALS
SYSTOLIC BLOOD PRESSURE: 148 MMHG | HEIGHT: 66 IN | DIASTOLIC BLOOD PRESSURE: 66 MMHG | BODY MASS INDEX: 36.96 KG/M2 | HEART RATE: 81 BPM | OXYGEN SATURATION: 97 % | WEIGHT: 230 LBS

## 2025-03-18 DIAGNOSIS — I65.21 OCCLUSION AND STENOSIS OF RIGHT CAROTID ARTERY: ICD-10-CM

## 2025-03-18 DIAGNOSIS — I63.9 CEREBRAL INFARCTION, UNSPECIFIED: ICD-10-CM

## 2025-03-18 PROCEDURE — G2211 COMPLEX E/M VISIT ADD ON: CPT | Mod: NC

## 2025-03-18 PROCEDURE — 99215 OFFICE O/P EST HI 40 MIN: CPT

## 2025-03-18 RX ORDER — AMOXICILLIN 500 MG/1
CAPSULE ORAL
Refills: 0 | Status: ACTIVE | COMMUNITY

## 2025-05-13 NOTE — H&P PST ADULT - VISION (WITH CORRECTIVE LENSES IF THE PATIENT USUALLY WEARS THEM):
Informed Consent for Blood Component Transfusion Note    I have discussed with the patient the rationale for blood component transfusion; its benefits in treating or preventing fatigue, organ damage, or death; and its risk which includes mild transfusion reactions, rare risk of blood borne infection, or more serious but rare reactions. I have discussed the alternatives to transfusion, including the risk and consequences of not receiving transfusion. The patient had an opportunity to ask questions and had agreed to proceed with transfusion of blood components.    Electronically signed by Gianluca Leal DO on 5/13/25 at 3:36 PM EDT   Normal vision: sees adequately in most situations; can see medication labels, newsprint

## 2025-06-04 ENCOUNTER — NON-APPOINTMENT (OUTPATIENT)
Age: 60
End: 2025-06-04

## 2025-08-12 ENCOUNTER — APPOINTMENT (OUTPATIENT)
Dept: NEUROLOGY | Facility: CLINIC | Age: 60
End: 2025-08-12
Payer: COMMERCIAL

## 2025-08-12 PROCEDURE — 93880 EXTRACRANIAL BILAT STUDY: CPT

## 2025-08-20 ENCOUNTER — NON-APPOINTMENT (OUTPATIENT)
Age: 60
End: 2025-08-20

## 2025-08-20 DIAGNOSIS — E04.1 NONTOXIC SINGLE THYROID NODULE: ICD-10-CM
